# Patient Record
Sex: MALE | Race: WHITE | NOT HISPANIC OR LATINO | Employment: UNEMPLOYED | URBAN - METROPOLITAN AREA
[De-identification: names, ages, dates, MRNs, and addresses within clinical notes are randomized per-mention and may not be internally consistent; named-entity substitution may affect disease eponyms.]

---

## 2018-01-11 NOTE — RESULT NOTES
Message   please notify pt all labs stable  I would recommend t change his metformin to 5000mg bid, if he already picked up the 1,000mg let me know if not let me know to send 500mg bid  tx     Verified Results  (1) MICROALBUMIN CREATININE RATIO, RANDOM URINE 13Sep2016 03:56PM Kristine Alonso Order Number: [id number]     Test Name Result Flag Reference   MICROALBUMIN/ CREAT R 8 mg/g creatinine  0-30   MICROALBUMIN,URINE 10 3 mg/L  0 0-20 0   CREATININE URINE 132 0 mg/dL       (1) CBC/PLT/DIFF 13Sep2016 08:45AM Kristine Alonso Order Number: [id number]     Test Name Result Flag Reference   WBC COUNT 9 72 Thousand/uL  4 31-10 16   RBC COUNT 4 58 Million/uL  3 88-5 62   HEMOGLOBIN 15 1 g/dL  12 0-17 0   HEMATOCRIT 44 2 %  36 5-49 3   MCV 97 fL  82-98   MCH 33 0 pg  26 8-34 3   MCHC 34 2 g/dL  31 4-37 4   RDW 12 6 %  11 6-15 1   MPV 10 3 fL  8 9-12 7   PLATELET COUNT 758 Thousands/uL  149-390   nRBC AUTOMATED 0 /100 WBCs     NEUTROPHILS RELATIVE PERCENT 55 %  43-75   LYMPHOCYTES RELATIVE PERCENT 26 %  14-44   MONOCYTES RELATIVE PERCENT 10 %  4-12   EOSINOPHILS RELATIVE PERCENT 8 % H 0-6   BASOPHILS RELATIVE PERCENT 1 %  0-1   NEUTROPHILS ABSOLUTE COUNT 5 35 Thousands/?L  1 85-7 62   LYMPHOCYTES ABSOLUTE COUNT 2 56 Thousands/?L  0 60-4 47   MONOCYTES ABSOLUTE COUNT 0 94 Thousand/?L  0 17-1 22   EOSINOPHILS ABSOLUTE COUNT 0 77 Thousand/?L H 0 00-0 61   BASOPHILS ABSOLUTE COUNT 0 08 Thousands/?L  0 00-0 10     (1) COMPREHENSIVE METABOLIC PANEL [id number] 47:60IE Kristine Alonso Order Number: [id number]     Test Name Result Flag Reference   GLUCOSE,RANDM 125 mg/dL     If the patient is fasting, the ADA then defines impaired fasting glucose as > 100 mg/dL and diabetes as > or equal to 123 mg/dL     SODIUM 139 mmol/L  136-145   POTASSIUM 4 2 mmol/L  3 5-5 3   CHLORIDE 104 mmol/L  100-108   CARBON DIOXIDE 29 mmol/L  21-32   ANION GAP (CALC) 6 mmol/L  4-13   BLOOD UREA NITROGEN 13 mg/dL  5-25   CREATININE 0 71 mg/dL  0 60-1 30   Standardized to IDMS reference method   CALCIUM 9 0 mg/dL  8 3-10 1   BILI, TOTAL 0 45 mg/dL  0 20-1 00   ALK PHOSPHATAS 69 U/L     ALT (SGPT) 22 U/L  12-78   AST(SGOT) 12 U/L  5-45   ALBUMIN 4 0 g/dL  3 5-5 0   TOTAL PROTEIN 6 8 g/dL  6 4-8 2   eGFR Non-African American      >60 0 ml/min/1 73sq [facility] Disease Education Program recommendations are as follows:  GFR calculation is accurate only with a steady state creatinine  Chronic Kidney disease less than 60 ml/min/1 73 sq  meters  Kidney failure less than 15 ml/min/1 73 sq  meters  (1) LIPID PANEL FASTING W DIRECT LDL REFLEX 79Hts1718 08:45AM [facility] Order Number: [id number]     Test Name Result Flag Reference   CHOLESTEROL 130 mg/dL     LDL CHOLESTEROL CALCULATED 76 mg/dL  0-100   Triglyceride:         Normal              <150 mg/dl       Borderline High    150-199 mg/dl       High               200-499 mg/dl       Very High          >499 mg/dl  Cholesterol:         Desirable        <200 mg/dl      Borderline High  200-239 mg/dl      High             >239 mg/dl  HDL Cholesterol:        High    >59 mg/dL      Low     <41 mg/dL  LDL Cholesterol:        Optimal          <100 mg/dl        Near Optimal     100-129 mg/dl        Above Optimal          Borderline High   130-159 mg/dl          High              160-189 mg/dl          Very High        >189 mg/dl  LDL CALCULATED:    This screening LDL is a calculated result  It does not have the accuracy of the Direct Measured LDL in the monitoring of patients with hyperlipidemia and/or statin therapy  Direct Measure LDL (JNE436) must be ordered separately in these patients  TRIGLYCERIDES 90 mg/dL  <=150   Specimen collection should occur prior to N-Acetylcysteine or Metamizole administration due to the potential for falsely depressed results     HDL,DIRECT 36 mg/dL L 40-60   Specimen collection should occur prior to Metamizole administration due to the potential for falsely depressed results  (1) HEMOGLOBIN A1C [id number] 08:45AM Simran Harmon Order Number: [id number]     Test Name Result Flag Reference   HEMOGLOBIN A1C 6 2 %  4 2-6 3   EST  AVG  GLUCOSE 131 mg/dl       (1) TSH WITH FT4 REFLEX 06Wdg1402 08:45AM Simran Harmon Order Number: [id number]     Test Name Result Flag Reference   TSH 2 300 uIU/mL  0 358-3 740   Patients undergoing fluorescein dye angiography may retain small amounts of fluorescein in the body for 48-72 hours post procedure  Samples containing fluorescein can produce falsely depressed TSH values  If the patient had this procedure,a specimen should be resubmitted post fluorescein clearance         Signatures   Electronically signed by : LIAM Ackerman ; Sep 14 2016  2:23PM EST                       (Author)

## 2018-01-12 NOTE — PROGRESS NOTES
Assessment    1  Diabetes (250 00) (E11 9)   2  Heroin dependence (304 00) (F11 20)    Plan  Heroin dependence    · Drug Rehabilitation Referral Other Physician Referral  Consult  Status: Hold For -  Scheduling  Requested for: [id number]  are Referring to a non- Preferred Provider : Services not provided in network  Care Summary provided  : Yes    Discussion/Summary    Called multiple places for addiction specialist, left VM in many  [facility] take his insurance and provided pt with contact info as they will need to take an intake directly with him  I encouraged him to call me with update and appt info  will call him when I get results  refilled metformin  urine microalbumin collected and sent today  The patient was counseled regarding instructions for management, risk factor reductions, impressions  total time of encounter was 30 minutes  Possible side effects of new medications were reviewed with the patient/guardian today  The treatment plan was reviewed with the patient/guardian  The patient/guardian understands and agrees with the treatment plan     Self Referrals: No      Chief Complaint  Patient would like counseling for his habit since 6 months  refused flu vaccine  needs urine albumin- done today      History of Present Illness  HPI: Pt presents bc he is interested in stopping heroine use  He admits snorting 2 small bag of heroin a day for 6 months  he states he can quit himself the problem is the withdrawal effects and the insomnia and anxiety sxs if he doesn't use  He states his back pain is controlled and seeing a chiropractor  he states he really want to quit heroin bc is getting too expensive for him he spends $100 a week  He started using it for pain management of his back  he states in the past he used Suboxone which he took for couple days and was able to quickly wean off of heroine that way, he got the Suboxone from the street       Of note he is a known DM, has not been compliant with his medicine, did not get BW done as previously instructed and did not follow up with recommended appt for it  Today he got the BW this morning before coming in, results still not available  Review of Systems    Constitutional: no fever or chills, feels well, no tiredness, no recent weight loss or weight gain  Cardiovascular: no complaints of slow or fast heart rate, no chest pain, no palpitations, no leg claudication or lower extremity edema  Respiratory: no complaints of shortness of breath, no wheezing or cough, no dyspnea on exertion, no orthopnea or PND  Gastrointestinal: no complaints of abdominal pain, no constipation, no nausea or vomiting, no diarrhea or bloody stools  Genitourinary: no complaints of dysuria or incontinence, no hesitancy, no nocturia, no genital lesion, no inadequacy of penile erection  Musculoskeletal: no complaints of arthralgia, no myalgia, no joint swelling or stiffness, no limb pain or swelling  Active Problems    1  Acute lumbar back pain (724 2) (M54 5)   2  Cellulitis of left index finger (681 00) (L03 012)   3  Diabetes (250 00) (E11 9)   4  Encounter for screening colonoscopy (V76 51) (Z12 11)   5  Need for pneumococcal vaccination (V03 82) (Z23)   6  Sciatica, left (724 3) (M54 32)    Past Medical History    1  History of low back pain (V13 59) (Z87 39)   2  History of type 2 diabetes mellitus (V12 29) (Z86 39)  Active Problems And Past Medical History Reviewed: The active problems and past medical history were reviewed and updated today  Family History  Mother    1  Family history of thyroid disease (V18 19) (Z83 49)  Father    2  Paternal family history unobtainable (V49 89) (Z78 9)  Sibling    3   Family history of systemic lupus erythematosus (V19 4) (Z82 69)    Social History    · Caffeine use (V49 89) (F15 90)   · Current every day smoker (305 1) (F17 200)   ·    · History of heroin use (V11 8) (Z86 59)   · has used heroine in the past for back pain  Denies adiction or abuse  States only used      on an acute episode when he didnt have insurance  Currently not using it  · Occasional alcohol use   · Part-time employment   ·    · Poor hygiene (V40 39) (R46 0)   · Single   · Three children    Surgical History    1  History of Tonsillectomy    Current Meds   1  Amoxicillin-Pot Clavulanate 875-125 MG Oral Tablet; TAKE 1 TABLET TWICE DAILY   FOR 7 DAYS; Therapy: 20YSW6404 to (Last Rx:06Jun2016)  Requested for: 06Jun2016 Ordered   2  MetFORMIN HCl - 1000 MG Oral Tablet; TAKE 1 TABLET TWICE DAILY WITH MEALS; Therapy: 41VNJ5379 to (Evaluate:01Jun2016)  Requested for: 24QNM2217; Last   Rx:18May2016 Ordered   3  Motrin TABS; Therapy: (Recorded:18May2016) to Recorded    The medication list was reviewed and updated today  Allergies    1  Imitrex    Vitals   Recorded: 09QSH5309 35:31NC   Systolic 161   Diastolic 60   Heart Rate 087   Temperature 98 4 F   Pain Scale 0   O2 Saturation 96   Height 5 ft 7 in   Weight 148 lb 8 0 oz   BMI Calculated 23 26   BSA Calculated 1 78     Physical Exam    Constitutional   General appearance: No acute distress, well appearing and well nourished  Eyes   Conjunctiva and lids: No swelling, erythema, or discharge  Pupils and irises: Abnormal   Pupils: miosis bilaterally  Cornea, Lens, and Sclera:   Pulmonary   Respiratory effort: No increased work of breathing or signs of respiratory distress  Auscultation of lungs: Clear to auscultation, equal breath sounds bilaterally, no wheezes, no rales, no rhonci  Cardiovascular   Auscultation of heart: Normal rate and rhythm, normal S1 and S2, without murmurs      Examination of extremities for edema and/or varicosities: Normal     Psychiatric   Mood and affect: Normal          Signatures   Electronically signed by : LIAM Laureano ; Sep 13 2016 11:53AM EST                       (Author)

## 2018-01-12 NOTE — RESULT NOTES
Message   no bone infection; cont current regimen     Verified Results  * XR FOOT 3+ VIEW LEFT [id number] 04:54PM José Martins Order Number: [id number]     Test Name Result Flag Reference   XR FOOT 3+ VW LEFT (Report)     LEFT FOOT     INDICATION: Cellulitis of the left foot  Swelling and erythema of the left great toe  COMPARISON: Left foot plain films from 7/13/2005     VIEWS: 3; 3 images     FINDINGS:     There is no acute fracture or dislocation  There are no bony erosions or periosteal reaction to suggest osteomyelitis  No degenerative changes  No lytic or blastic lesions are seen  Soft tissues are unremarkable  IMPRESSION:     No acute osseous abnormality         Workstation performed: [id number]     Signed by:   Dyaa Clay MD   9/22/16

## 2018-01-13 NOTE — MISCELLANEOUS
Signatures   Electronically signed by : Bre Salazar; Sep 29 2016  3:45PM EST                       (Author)

## 2018-08-28 ENCOUNTER — APPOINTMENT (EMERGENCY)
Dept: RADIOLOGY | Facility: HOSPITAL | Age: 58
End: 2018-08-28
Payer: COMMERCIAL

## 2018-08-28 ENCOUNTER — HOSPITAL ENCOUNTER (EMERGENCY)
Facility: HOSPITAL | Age: 58
Discharge: HOME | End: 2018-08-28
Attending: EMERGENCY MEDICINE
Payer: COMMERCIAL

## 2018-08-28 VITALS
SYSTOLIC BLOOD PRESSURE: 138 MMHG | TEMPERATURE: 98.4 F | WEIGHT: 160 LBS | RESPIRATION RATE: 19 BRPM | DIASTOLIC BLOOD PRESSURE: 65 MMHG | HEIGHT: 68 IN | HEART RATE: 76 BPM | BODY MASS INDEX: 24.25 KG/M2 | OXYGEN SATURATION: 94 %

## 2018-08-28 DIAGNOSIS — R07.89 CHEST WALL PAIN: ICD-10-CM

## 2018-08-28 DIAGNOSIS — S60.511S: ICD-10-CM

## 2018-08-28 DIAGNOSIS — M79.631 RIGHT FOREARM PAIN: ICD-10-CM

## 2018-08-28 DIAGNOSIS — Z04.1 ENCOUNTER FOR EXAMINATION FOLLOWING MOTOR VEHICLE COLLISION (MVC): ICD-10-CM

## 2018-08-28 DIAGNOSIS — S69.91XA HAND INJURY, RIGHT, INITIAL ENCOUNTER: Primary | ICD-10-CM

## 2018-08-28 LAB
ANION GAP BLD CALC-SCNC: 18 MMOL/L (ref 4–13)
BUN BLD-MCNC: 11 MG/DL (ref 5–25)
CA-I BLD-SCNC: 1.18 MMOL/L (ref 1.12–1.32)
CHLORIDE BLD-SCNC: 104 MMOL/L (ref 100–108)
CREAT BLD-MCNC: 0.6 MG/DL (ref 0.6–1.3)
GFR SERPL CREATININE-BSD FRML MDRD: 109 ML/MIN/1.73SQ M
GLUCOSE SERPL-MCNC: 170 MG/DL (ref 65–140)
HCT VFR BLD CALC: 41 % (ref 36.5–49.3)
HGB BLDA-MCNC: 13.9 G/DL (ref 12–17)
PCO2 BLD: 26 MMOL/L (ref 21–32)
POTASSIUM BLD-SCNC: 3.7 MMOL/L (ref 3.5–5.3)
SODIUM BLD-SCNC: 143 MMOL/L (ref 136–145)
SPECIMEN SOURCE: ABNORMAL

## 2018-08-28 PROCEDURE — 96372 THER/PROPH/DIAG INJ SC/IM: CPT

## 2018-08-28 PROCEDURE — 74177 CT ABD & PELVIS W/CONTRAST: CPT

## 2018-08-28 PROCEDURE — 73090 X-RAY EXAM OF FOREARM: CPT

## 2018-08-28 PROCEDURE — 99285 EMERGENCY DEPT VISIT HI MDM: CPT

## 2018-08-28 PROCEDURE — 85014 HEMATOCRIT: CPT

## 2018-08-28 PROCEDURE — 93005 ELECTROCARDIOGRAM TRACING: CPT

## 2018-08-28 PROCEDURE — 80047 BASIC METABLC PNL IONIZED CA: CPT

## 2018-08-28 PROCEDURE — 71260 CT THORAX DX C+: CPT

## 2018-08-28 PROCEDURE — 73130 X-RAY EXAM OF HAND: CPT

## 2018-08-28 RX ORDER — KETOROLAC TROMETHAMINE 30 MG/ML
15 INJECTION, SOLUTION INTRAMUSCULAR; INTRAVENOUS ONCE
Status: COMPLETED | OUTPATIENT
Start: 2018-08-28 | End: 2018-08-28

## 2018-08-28 RX ORDER — CEPHALEXIN 250 MG/1
500 CAPSULE ORAL ONCE
Status: COMPLETED | OUTPATIENT
Start: 2018-08-28 | End: 2018-08-28

## 2018-08-28 RX ORDER — METHADONE HYDROCHLORIDE 10 MG/1
80 TABLET ORAL DAILY
COMMUNITY

## 2018-08-28 RX ORDER — ACETAMINOPHEN 325 MG/1
975 TABLET ORAL EVERY 6 HOURS PRN
Status: DISCONTINUED | OUTPATIENT
Start: 2018-08-28 | End: 2018-08-28 | Stop reason: HOSPADM

## 2018-08-28 RX ORDER — CEPHALEXIN 250 MG/1
500 CAPSULE ORAL EVERY 6 HOURS SCHEDULED
Qty: 40 CAPSULE | Refills: 0 | Status: SHIPPED | OUTPATIENT
Start: 2018-08-28 | End: 2018-09-02

## 2018-08-28 RX ORDER — IBUPROFEN 600 MG/1
600 TABLET ORAL EVERY 6 HOURS
COMMUNITY
End: 2019-02-04

## 2018-08-28 RX ADMIN — KETOROLAC TROMETHAMINE 15 MG: 30 INJECTION, SOLUTION INTRAMUSCULAR at 18:15

## 2018-08-28 RX ADMIN — IOHEXOL 100 ML: 350 INJECTION, SOLUTION INTRAVENOUS at 19:07

## 2018-08-28 RX ADMIN — CEPHALEXIN 500 MG: 250 CAPSULE ORAL at 20:48

## 2018-08-28 RX ADMIN — ACETAMINOPHEN 975 MG: 325 TABLET, FILM COATED ORAL at 18:14

## 2018-08-29 LAB
ATRIAL RATE: 106 BPM
P AXIS: 78 DEGREES
PR INTERVAL: 186 MS
QRS AXIS: 69 DEGREES
QRSD INTERVAL: 92 MS
QT INTERVAL: 350 MS
QTC INTERVAL: 464 MS
T WAVE AXIS: 67 DEGREES
VENTRICULAR RATE: 106 BPM

## 2018-08-29 PROCEDURE — 93010 ELECTROCARDIOGRAM REPORT: CPT | Performed by: INTERNAL MEDICINE

## 2018-08-29 NOTE — ED NOTES
Pt  Made aware that Dr Марина Mckoy is currently reviewing x-rays, and will be in within next 10 minutes to talk to patient  Verbalizes understanding          Rankin Babinski, RN  08/28/18 2034

## 2018-08-29 NOTE — DISCHARGE INSTRUCTIONS
Chest Wall Pain   WHAT YOU NEED TO KNOW:   Chest wall pain may be caused by problems with the muscles, cartilage, or bones of the chest wall  Chest wall pain may also be caused by pain that spreads to your chest from another part of your body  The pain may be aching, severe, dull, or sharp  It may come and go, or it may be constant  The pain may be worse when you move in certain ways, breathe deeply, or cough  DISCHARGE INSTRUCTIONS:   Call 911 if:   · You have any of the following signs of a heart attack:      ¨ Squeezing, pressure, or pain in your chest that lasts longer than 5 minutes or returns    ¨ Discomfort or pain in your back, neck, jaw, stomach, or arm     ¨ Trouble breathing    ¨ Nausea or vomiting    ¨ Lightheadedness or a sudden cold sweat, especially with chest pain or trouble breathing    Return to the emergency department if:   · You have severe pain  Contact your healthcare provider if:   · You develop a rash  · You have other new symptoms  · Your pain does not improve, even with treatment  · You have questions or concerns about your condition or care  Medicines: You may need any of the following:  · NSAIDs , such as ibuprofen, help decrease swelling, pain, and fever  This medicine is available with or without a doctor's order  NSAIDs can cause stomach bleeding or kidney problems in certain people  If you take blood thinner medicine, always ask your healthcare provider if NSAIDs are safe for you  Always read the medicine label and follow directions  · Acetaminophen  decreases pain  It is available without a doctor's order  Ask how much to take and how often to take it  Follow directions  Acetaminophen can cause liver damage if not taken correctly  · A cream  may be applied to your chest to decrease pain  · Take your medicine as directed  Contact your healthcare provider if you think your medicine is not helping or if you have side effects   Tell him of her if you are allergic to any medicine  Keep a list of the medicines, vitamins, and herbs you take  Include the amounts, and when and why you take them  Bring the list or the pill bottles to follow-up visits  Carry your medicine list with you in case of an emergency  Follow up with your healthcare provider as directed:  Write down your questions so you remember to ask them during your visits  Self-care:   · Rest  as needed  Avoid activities that make your chest wall pain worse  · Apply heat  on your chest for 20 to 30 minutes every 2 hours for as many days as directed  Heat helps decrease pain and muscle spasms  · Apply ice  on your chest for 15 to 20 minutes every hour or as directed  Use an ice pack, or put crushed ice in a plastic bag  Cover it with a towel  Ice helps prevent tissue damage and decreases swelling and pain  © 2017 [employer]  Information is for End User's use only and may not be sold, redistributed or otherwise used for commercial purposes  All illustrations and images included in CareNotes® are the copyrighted property of [employer] Inc  or [employer]  The above information is an  only  It is not intended as medical advice for individual conditions or treatments  Talk to your doctor, nurse or pharmacist before following any medical regimen to see if it is safe and effective for you  Musculoskeletal Pain   WHAT YOU NEED TO KNOW:   Muscle pain can be dull, achy, or sharp  You may have pain and tenderness to the touch as well  It can occur anywhere on your body and is often brought on by exercise  Muscle pain may occur from an injury, such as a sprain, tendonitis, or bone fracture  Muscle pain can also be the result of medical conditions, such as polymyositis, fibromyalgia, and connective tissue disorders  DISCHARGE INSTRUCTIONS:   Self care:   · Rest  as directed and avoid activity that causes pain   You may be able to return to normal activity when you can move without pain  Follow directions for rest and activity  You are at risk for injury for 3 weeks after your symptoms go away  · Ice  your painful muscle area to decrease pain and swelling  Use an ice pack, or put ice in a plastic bag and cover it with a towel  Always  put a cloth between the ice and your skin  Apply the ice as often as directed for the first 24 to 48 hours  · Compression  with a splint, brace, or elastic bandage helps decrease pain and swelling  This may be needed for muscle pain in arms or legs  A splint, brace, or bandage will also help protect the painful area when you move around  · Elevate  a painful arm or leg to reduce swelling and pain  Elevate your limb while you are sitting or lying down  Prop a painful leg on pillows to keep it above the level of your heart  Medicines:   · NSAIDs  help decrease swelling and pain or fever  This medicine is available with or without a doctor's order  NSAIDs can cause stomach bleeding or kidney problems in certain people  If you take blood thinner medicine, always ask your healthcare provider if NSAIDs are safe for you  Always read the medicine label and follow directions  · Acetaminophen  is used to decrease pain  It is available without a doctor's order  Ask your healthcare provider how much to take and when to take it  Follow directions  Acetaminophen can cause liver damage if not taken correctly  Do not take more than one medicine that contains acetaminophen unless directed  · Muscle relaxers  help relax your muscles to decrease pain and muscle spasms  · Steroids  may be given to decrease redness, pain, and swelling  · Take your medicine as directed  Contact your healthcare provider if you think your medicine is not helping or if you have side effects  Tell him if you are allergic to any medicine  Keep a list of the medicines, vitamins, and herbs you take  Include the amounts, and when and why you take them   Bring the list or the pill bottles to follow-up visits  Carry your medicine list with you in case of an emergency  Follow up with your healthcare provider as directed: You may need more tests to help healthcare providers find the cause of your muscle pain  You may need physical therapy to learn muscle strengthening exercises  Write down your questions so you remember to ask them during your visits  Contact your healthcare provider if:   · You have a fever  · You have trouble sleeping because of your pain  · Your painful area becomes more tender, red, and warm to the touch  · You have decreased movement of the painful area  · You have questions or concerns about your condition or care  Return to the emergency department if:   · You have increased severe pain when you move the painful muscle area  · You lose feeling in your painful muscle area  · You have new or worse swelling in the painful area  Your skin may feel tight  · You have increased muscle pain or pain that does not improve with treatment  © 2017 [employer] Information is for End User's use only and may not be sold, redistributed or otherwise used for commercial purposes  All illustrations and images included in CareNotes® are the copyrighted property of A [employer] M [employer] Inc  or [facility]  The above information is an  only  It is not intended as medical advice for individual conditions or treatments  Talk to your doctor, nurse or pharmacist before following any medical regimen to see if it is safe and effective for you
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
## 2018-09-19 ENCOUNTER — APPOINTMENT (OUTPATIENT)
Dept: RADIOLOGY | Age: 58
End: 2018-09-19
Payer: COMMERCIAL

## 2018-09-19 ENCOUNTER — OFFICE VISIT (OUTPATIENT)
Dept: URGENT CARE | Age: 58
End: 2018-09-19
Payer: COMMERCIAL

## 2018-09-19 VITALS
OXYGEN SATURATION: 96 % | HEART RATE: 89 BPM | HEIGHT: 69 IN | SYSTOLIC BLOOD PRESSURE: 133 MMHG | DIASTOLIC BLOOD PRESSURE: 63 MMHG | BODY MASS INDEX: 23.85 KG/M2 | TEMPERATURE: 98.2 F | RESPIRATION RATE: 16 BRPM | WEIGHT: 161 LBS

## 2018-09-19 DIAGNOSIS — S46.911A STRAIN OF RIGHT SHOULDER, INITIAL ENCOUNTER: ICD-10-CM

## 2018-09-19 DIAGNOSIS — M25.511 ACUTE PAIN OF RIGHT SHOULDER: ICD-10-CM

## 2018-09-19 DIAGNOSIS — S39.012A STRAIN OF MUSCLE, FASCIA AND TENDON OF LOWER BACK, INITIAL ENCOUNTER: Primary | ICD-10-CM

## 2018-09-19 PROCEDURE — 73030 X-RAY EXAM OF SHOULDER: CPT

## 2018-09-19 PROCEDURE — G0382 LEV 3 HOSP TYPE B ED VISIT: HCPCS | Performed by: FAMILY MEDICINE

## 2018-09-19 RX ORDER — CYCLOBENZAPRINE HCL 10 MG
10 TABLET ORAL 3 TIMES DAILY PRN
Qty: 15 TABLET | Refills: 0 | Status: SHIPPED | OUTPATIENT
Start: 2018-09-19 | End: 2019-02-04
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
## 2019-01-16 ENCOUNTER — APPOINTMENT (EMERGENCY)
Dept: RADIOLOGY | Facility: HOSPITAL | Age: 59
DRG: 720 | End: 2019-01-16
Payer: COMMERCIAL

## 2019-01-16 ENCOUNTER — HOSPITAL ENCOUNTER (INPATIENT)
Facility: HOSPITAL | Age: 59
LOS: 2 days | Discharge: HOME | DRG: 720 | End: 2019-01-18
Attending: EMERGENCY MEDICINE | Admitting: INTERNAL MEDICINE
Payer: COMMERCIAL

## 2019-01-16 DIAGNOSIS — R00.0 TACHYCARDIA: ICD-10-CM

## 2019-01-16 DIAGNOSIS — J18.9 SEPSIS DUE TO PNEUMONIA (HCC): Primary | ICD-10-CM

## 2019-01-16 DIAGNOSIS — R06.02 SOB (SHORTNESS OF BREATH): ICD-10-CM

## 2019-01-16 DIAGNOSIS — D72.829 LEUKOCYTOSIS: ICD-10-CM

## 2019-01-16 DIAGNOSIS — R50.9 FEVER: ICD-10-CM

## 2019-01-16 DIAGNOSIS — R79.89 HIGH SERUM LACTATE: ICD-10-CM

## 2019-01-16 DIAGNOSIS — R79.89 ELEVATED PROCALCITONIN: ICD-10-CM

## 2019-01-16 DIAGNOSIS — A41.9 SEPSIS DUE TO PNEUMONIA (HCC): Primary | ICD-10-CM

## 2019-01-16 PROBLEM — J43.8 OTHER EMPHYSEMA (HCC): Chronic | Status: ACTIVE | Noted: 2019-01-16

## 2019-01-16 PROBLEM — E87.1 HYPONATREMIA: Status: ACTIVE | Noted: 2019-01-16

## 2019-01-16 LAB
ALBUMIN SERPL BCP-MCNC: 3.1 G/DL (ref 3.5–5)
ALP SERPL-CCNC: 93 U/L (ref 46–116)
ALT SERPL W P-5'-P-CCNC: 22 U/L (ref 12–78)
ANION GAP SERPL CALCULATED.3IONS-SCNC: 7 MMOL/L (ref 4–13)
APTT PPP: 32 SECONDS (ref 26–38)
AST SERPL W P-5'-P-CCNC: 24 U/L (ref 5–45)
ATRIAL RATE: 114 BPM
BASOPHILS # BLD MANUAL: 0 THOUSAND/UL (ref 0–0.1)
BASOPHILS NFR MAR MANUAL: 0 % (ref 0–1)
BILIRUB SERPL-MCNC: 0.78 MG/DL (ref 0.2–1)
BUN SERPL-MCNC: 45 MG/DL (ref 5–25)
CALCIUM SERPL-MCNC: 8.9 MG/DL (ref 8.3–10.1)
CHLORIDE SERPL-SCNC: 97 MMOL/L (ref 100–108)
CO2 SERPL-SCNC: 25 MMOL/L (ref 21–32)
CREAT SERPL-MCNC: 1.18 MG/DL (ref 0.6–1.3)
DOHLE BOD BLD QL SMEAR: PRESENT
EOSINOPHIL # BLD MANUAL: 0 THOUSAND/UL (ref 0–0.4)
EOSINOPHIL NFR BLD MANUAL: 0 % (ref 0–6)
ERYTHROCYTE [DISTWIDTH] IN BLOOD BY AUTOMATED COUNT: 12 % (ref 11.6–15.1)
GFR SERPL CREATININE-BSD FRML MDRD: 67 ML/MIN/1.73SQ M
GLUCOSE SERPL-MCNC: 261 MG/DL (ref 65–140)
GLUCOSE SERPL-MCNC: 340 MG/DL (ref 65–140)
HCT VFR BLD AUTO: 42.9 % (ref 36.5–49.3)
HGB BLD-MCNC: 14.6 G/DL (ref 12–17)
INR PPP: 1.6 (ref 0.86–1.17)
LACTATE SERPL-SCNC: 2.1 MMOL/L (ref 0.5–2)
LACTATE SERPL-SCNC: 2.9 MMOL/L (ref 0.5–2)
LACTATE SERPL-SCNC: 3.4 MMOL/L (ref 0.5–2)
LYMPHOCYTES # BLD AUTO: 1.69 THOUSAND/UL (ref 0.6–4.47)
LYMPHOCYTES # BLD AUTO: 8 % (ref 14–44)
MCH RBC QN AUTO: 32.3 PG (ref 26.8–34.3)
MCHC RBC AUTO-ENTMCNC: 34 G/DL (ref 31.4–37.4)
MCV RBC AUTO: 95 FL (ref 82–98)
MONOCYTES # BLD AUTO: 0.21 THOUSAND/UL (ref 0–1.22)
MONOCYTES NFR BLD: 1 % (ref 4–12)
MYELOCYTES NFR BLD MANUAL: 1 % (ref 0–1)
NEUTROPHILS # BLD MANUAL: 19.02 THOUSAND/UL (ref 1.85–7.62)
NEUTS BAND NFR BLD MANUAL: 23 % (ref 0–8)
NEUTS SEG NFR BLD AUTO: 67 % (ref 43–75)
NRBC BLD AUTO-RTO: 0 /100 WBCS
P AXIS: 83 DEGREES
PLATELET # BLD AUTO: 124 THOUSANDS/UL (ref 149–390)
PLATELET # BLD AUTO: 151 THOUSANDS/UL (ref 149–390)
PLATELET BLD QL SMEAR: ADEQUATE
PMV BLD AUTO: 10.3 FL (ref 8.9–12.7)
PMV BLD AUTO: 9.9 FL (ref 8.9–12.7)
POIKILOCYTOSIS BLD QL SMEAR: PRESENT
POTASSIUM SERPL-SCNC: 3.7 MMOL/L (ref 3.5–5.3)
PR INTERVAL: 170 MS
PROCALCITONIN SERPL-MCNC: 76.33 NG/ML
PROT SERPL-MCNC: 7.7 G/DL (ref 6.4–8.2)
PROTHROMBIN TIME: 19.1 SECONDS (ref 11.8–14.2)
QRS AXIS: 78 DEGREES
QRSD INTERVAL: 82 MS
QT INTERVAL: 298 MS
QTC INTERVAL: 410 MS
RBC # BLD AUTO: 4.52 MILLION/UL (ref 3.88–5.62)
RBC MORPH BLD: PRESENT
SODIUM SERPL-SCNC: 129 MMOL/L (ref 136–145)
T WAVE AXIS: 85 DEGREES
TROPONIN I SERPL-MCNC: <0.02 NG/ML
VENTRICULAR RATE: 114 BPM
WBC # BLD AUTO: 21.13 THOUSAND/UL (ref 4.31–10.16)

## 2019-01-16 PROCEDURE — 83605 ASSAY OF LACTIC ACID: CPT | Performed by: INTERNAL MEDICINE

## 2019-01-16 PROCEDURE — 84484 ASSAY OF TROPONIN QUANT: CPT | Performed by: EMERGENCY MEDICINE

## 2019-01-16 PROCEDURE — 87040 BLOOD CULTURE FOR BACTERIA: CPT | Performed by: EMERGENCY MEDICINE

## 2019-01-16 PROCEDURE — 93010 ELECTROCARDIOGRAM REPORT: CPT | Performed by: INTERNAL MEDICINE

## 2019-01-16 PROCEDURE — 96374 THER/PROPH/DIAG INJ IV PUSH: CPT

## 2019-01-16 PROCEDURE — 83605 ASSAY OF LACTIC ACID: CPT | Performed by: EMERGENCY MEDICINE

## 2019-01-16 PROCEDURE — 84145 PROCALCITONIN (PCT): CPT | Performed by: EMERGENCY MEDICINE

## 2019-01-16 PROCEDURE — 80053 COMPREHEN METABOLIC PANEL: CPT | Performed by: EMERGENCY MEDICINE

## 2019-01-16 PROCEDURE — 85027 COMPLETE CBC AUTOMATED: CPT | Performed by: EMERGENCY MEDICINE

## 2019-01-16 PROCEDURE — 87147 CULTURE TYPE IMMUNOLOGIC: CPT | Performed by: EMERGENCY MEDICINE

## 2019-01-16 PROCEDURE — 85610 PROTHROMBIN TIME: CPT | Performed by: EMERGENCY MEDICINE

## 2019-01-16 PROCEDURE — 85730 THROMBOPLASTIN TIME PARTIAL: CPT | Performed by: EMERGENCY MEDICINE

## 2019-01-16 PROCEDURE — 94760 N-INVAS EAR/PLS OXIMETRY 1: CPT

## 2019-01-16 PROCEDURE — 82948 REAGENT STRIP/BLOOD GLUCOSE: CPT

## 2019-01-16 PROCEDURE — 36415 COLL VENOUS BLD VENIPUNCTURE: CPT | Performed by: EMERGENCY MEDICINE

## 2019-01-16 PROCEDURE — 85049 AUTOMATED PLATELET COUNT: CPT | Performed by: STUDENT IN AN ORGANIZED HEALTH CARE EDUCATION/TRAINING PROGRAM

## 2019-01-16 PROCEDURE — 87631 RESP VIRUS 3-5 TARGETS: CPT | Performed by: EMERGENCY MEDICINE

## 2019-01-16 PROCEDURE — 99285 EMERGENCY DEPT VISIT HI MDM: CPT

## 2019-01-16 PROCEDURE — 85007 BL SMEAR W/DIFF WBC COUNT: CPT | Performed by: EMERGENCY MEDICINE

## 2019-01-16 PROCEDURE — 71046 X-RAY EXAM CHEST 2 VIEWS: CPT

## 2019-01-16 PROCEDURE — 96361 HYDRATE IV INFUSION ADD-ON: CPT

## 2019-01-16 PROCEDURE — 93005 ELECTROCARDIOGRAM TRACING: CPT

## 2019-01-16 RX ORDER — ALBUTEROL SULFATE 2.5 MG/3ML
2.5 SOLUTION RESPIRATORY (INHALATION) EVERY 4 HOURS PRN
Status: DISCONTINUED | OUTPATIENT
Start: 2019-01-16 | End: 2019-01-18

## 2019-01-16 RX ORDER — ACETAMINOPHEN 325 MG/1
650 TABLET ORAL EVERY 6 HOURS PRN
Status: DISCONTINUED | OUTPATIENT
Start: 2019-01-16 | End: 2019-01-18 | Stop reason: HOSPADM

## 2019-01-16 RX ORDER — NICOTINE 21 MG/24HR
1 PATCH, TRANSDERMAL 24 HOURS TRANSDERMAL DAILY
Status: DISCONTINUED | OUTPATIENT
Start: 2019-01-17 | End: 2019-01-18 | Stop reason: HOSPADM

## 2019-01-16 RX ORDER — CYCLOBENZAPRINE HCL 10 MG
10 TABLET ORAL 3 TIMES DAILY PRN
Status: DISCONTINUED | OUTPATIENT
Start: 2019-01-16 | End: 2019-01-18 | Stop reason: HOSPADM

## 2019-01-16 RX ORDER — ONDANSETRON 2 MG/ML
4 INJECTION INTRAMUSCULAR; INTRAVENOUS EVERY 6 HOURS PRN
Status: DISCONTINUED | OUTPATIENT
Start: 2019-01-16 | End: 2019-01-18 | Stop reason: HOSPADM

## 2019-01-16 RX ORDER — HEPARIN SODIUM 5000 [USP'U]/ML
5000 INJECTION, SOLUTION INTRAVENOUS; SUBCUTANEOUS EVERY 8 HOURS SCHEDULED
Status: DISCONTINUED | OUTPATIENT
Start: 2019-01-16 | End: 2019-01-18 | Stop reason: HOSPADM

## 2019-01-16 RX ORDER — SODIUM CHLORIDE 9 MG/ML
125 INJECTION, SOLUTION INTRAVENOUS CONTINUOUS
Status: DISCONTINUED | OUTPATIENT
Start: 2019-01-16 | End: 2019-01-17

## 2019-01-16 RX ADMIN — HEPARIN SODIUM 5000 UNITS: 5000 INJECTION INTRAVENOUS; SUBCUTANEOUS at 21:14

## 2019-01-16 RX ADMIN — SODIUM CHLORIDE 125 ML/HR: 0.9 INJECTION, SOLUTION INTRAVENOUS at 19:38

## 2019-01-16 RX ADMIN — SODIUM CHLORIDE 1000 ML: 0.9 INJECTION, SOLUTION INTRAVENOUS at 19:35

## 2019-01-16 RX ADMIN — AZITHROMYCIN MONOHYDRATE 500 MG: 500 INJECTION, POWDER, LYOPHILIZED, FOR SOLUTION INTRAVENOUS at 19:35

## 2019-01-16 RX ADMIN — INSULIN LISPRO 2 UNITS: 100 INJECTION, SOLUTION INTRAVENOUS; SUBCUTANEOUS at 23:37

## 2019-01-16 RX ADMIN — CEFTRIAXONE SODIUM 1000 MG: 10 INJECTION, POWDER, FOR SOLUTION INTRAVENOUS at 18:27

## 2019-01-16 RX ADMIN — SODIUM CHLORIDE 1000 ML: 0.9 INJECTION, SOLUTION INTRAVENOUS at 17:21

## 2019-01-16 NOTE — PROGRESS NOTES
Senior Admission Note   Unit/Bed # ED 3 Encounter: [id number]  SOD Team B           Jeff Orozco 59 y o  male [id number]       Patient seen and examined  Reviewed H&P per Dr Mary Syed  Agree with the assessment and plan      Assessment/Plan: Principal Problem:    Sepsis due to pneumonia [facility])  Active Problems:    Diabetes ([facility] [address] )    Heroin dependence ([facility] [address] )    Other emphysema ([facility] )    Hyponatremia       Admit as INPATIENT to  SOD-B, Dr Artem Gage  Diet: Diabetic  CODE STATUS: Full Code  DVT PPX: Lovenox subq and SCDs bilaterally        Leyda Graham MD

## 2019-01-16 NOTE — H&P
INTERNAL MEDICINE HISTORY AND PHYSICAL  ED 03 SOD Team B     NAME: Monique Cisneros  AGE: 59 y o  SEX: male  : 1960   MRN: [id number]  ENCOUNTER: [id number]    DATE: 2019  TIME: 6:42 PM    Primary Care Physician: No primary care provider on file  Admitting Provider: Ripley Hammans, MD    Chief complaint: cold symptoms    History of Present Illness     Monique Cisneros is a 59 y o  male with a past medical history of diabetes mellitus, opioid dependence on methadone, tobacco abuse who presents with cold symptoms over the past couple months  Report primarily from daughter is the patient is lethargic and unable answer most questions  The daughter reports that he has been having cold-like symptoms since November and recently over the past few days has become more lethargic with nonbloody nonbilious emesis, nonproductive cough, decreased alertness, shortness of breath     The patient did not come in earlier due to lack of insurance  Unable to answer remainder review of systems due to the patient being severely lethargic and disoriented  In the emergency department, the patient received ceftriaxone and azithromycin  Patient's daughter Tanner Beck contact: [phone]    Review of Systems   Review of Systems   Unable to perform ROS: Mental status change       Past Medical History     Past Medical History:   Diagnosis Date    Diabetes mellitus ([facility] [address] )        Past Surgical History     Past Surgical History:   Procedure Laterality Date    TONSILLECTOMY         Social History     History   Alcohol Use No     History   Drug Use No     Comment: pt reports last using heroin several months ago     History   Smoking Status    Current Every Day Smoker    Packs/day: 0 50   Smokeless Tobacco    Never Used       Family History     Family History   Problem Relation Age of Onset    Thyroid disease Mother        Medications Prior to Admission     Prior to Admission medications    Medication Sig Start Date End Date Taking? Authorizing Provider   metFORMIN (GLUCOPHAGE) 500 mg tablet Take 750 mg by mouth 2 (two) times a day   5/18/16  Yes Historical Provider, MD   cyclobenzaprine (FLEXERIL) 10 mg tablet Take 1 tablet (10 mg total) by mouth 3 (three) times a day as needed for muscle spasms for up to 5 days 9/19/18 9/24/18  MARCELLO Ortiz   Dextromethorphan-Guaifenesin (MUCINEX DM PO) Take 1 tablet by mouth daily    Historical Provider, MD   ibuprofen (MOTRIN) 600 mg tablet Take 600 mg by mouth every 6 (six) hours    Historical Provider, MD   methadone (DOLOPHINE) 10 mg tablet Take 80 mg by mouth daily,    Historical Provider, MD       Allergies     Allergies   Allergen Reactions    Imitrex [Sumatriptan] Hallucinations       Objective     Vitals:    01/16/19 1701 01/16/19 1713   BP: 158/71    BP Location: Left arm    Pulse: (!) 114    Resp: 20    Temp: 98 5 °F (36 9 °C) (!) 101 7 °F (38 7 °C)   TempSrc: Oral Rectal   SpO2: 92%      There is no height or weight on file to calculate BMI  No intake or output data in the 24 hours ending 01/16/19 1842  Invasive Devices     Peripheral Intravenous Line            Peripheral IV 01/16/19 Right Antecubital less than 1 day                Physical Exam  GENERAL: Appears well-developed and well-nourished  lethargic with facial mask, on 2 L nasal cannula   HEENT: Normocephalic and atraumatic  No scleral icterus  PERRLA  EOMI B/L  No oropharyngeal edema  MM moist    NECK: Neck supple with no lymphadenopathy  Trachea midline  No JVD  CARDIOVASCULAR: S1 and S2 are present  Regular rate and rhythm  No murmurs, rubs, or gallops  RESPIRATORY: Faint crackles diffusely  Diminished respiratory expansion  ABDOMINAL: Bowel sounds present in all 4 quadrants, non-tender, soft, non-distended  No organomegaly, rebound, or guarding  EXTREMITIES: 2+ DP and PT pulses bilaterally; no cyanosis, clubbing, edema  ROM intact   SON x4   MUSCULOSKELETAL: No joint tenderness, deformity or swelling, full range of motion without pain  NEUROLOGIC: Patient is alert and oriented to self only  No sensory or motor deficits  CN 2-12 intact  Plantars downgoing bilaterally  Speech slurred and the sergeant patient requires frequent stimulation during interview   SKIN: Skin is warm and dry  No skin lesions are present  No rashes  PSYCHIATRIC: Unable to assess given altered mental status     Lab Results: I have personally reviewed pertinent reports  CBC:   Results from last 7 days  Lab Units 01/16/19  1707   WBC Thousand/uL 21 13*   RBC Million/uL 4 52   HEMOGLOBIN g/dL 14 6   HEMATOCRIT % 42 9   MCV fL 95   MCH pg 32 3   MCHC g/dL 34 0   RDW % 12 0   MPV fL 10 3   PLATELETS Thousands/uL 151   NRBC AUTO /100 WBCs 0   LYMPHO PCT % 8*   MONO PCT % 1*   EOS PCT % 0   BASOS PCT % 0   EOS ABS Thousand/uL 0 00   , Chemistry Profile:   Results from last 7 days  Lab Units 01/16/19  1707   POTASSIUM mmol/L 3 7   CHLORIDE mmol/L 97*   CO2 mmol/L 25   BUN mg/dL 45*   CREATININE mg/dL 1 18   CALCIUM mg/dL 8 9   AST U/L 24   ALT U/L 22   ALK PHOS U/L 93   EGFR ml/min/1 73sq m 67   , Coagulation Studies:   Results from last 7 days  Lab Units 01/16/19  1707   PROTIME seconds 19 1*   INR  1 60*   PTT seconds 32   , Cardiac Studies:   Results from last 7 days  Lab Units 01/16/19  1718   TROPONIN I ng/mL <0 02   , Additional Labs:   Results from last 7 days  Lab Units 01/16/19  1707   LACTIC ACID mmol/L 3 4*   , iSTAT CHEM 8:   Results from last 7 days  Lab Units 01/16/19  1707   ANION GAP mmol/L 7   EGFR ml/min/1 73sq m 67   HEMOGLOBIN g/dL 14 6   , ABG:   , Toxicology:   , Last A1C/Lipid Panel/Thyroid Panel:   Lab Results   Component Value Date    HGBA1C 6 2 09/13/2016    TRIG 90 09/13/2016    HDL 36 (L) 09/13/2016    LDLCALC 76 09/13/2016    [id number] 2 300 09/13/2016       Imaging: I have personally reviewed pertinent films in PACS  Xr Chest 2 Views    Result Date: 1/16/2019  Narrative: CHEST INDICATION:   concern for pneumonia  Shortness of breath COMPARISON:  August 28, 2018 EXAM PERFORMED/VIEWS:  XR CHEST PA & LATERAL Images: 2 FINDINGS:  Lungs are adequately aerated  Patchy infiltrates in the basilar portion of the right upper lobe and right lower lobe  Patchy infiltrates in the left lower lobe  No significant pleural effusion  Cardiac size within normal limits  No vascular congestion or peribronchial thickening  Osseous structures appear within normal limits for patient age  No pneumothorax or free air  Numerous EKG leads in place  Impression: Patchy infiltrates in right upper lobe, right lower lobe, and left lower lobe as described  Findings almost certainly representing multi lobar pneumonia  Clinical correlation and follow-up advised in several weeks to ensure resolution  The study was marked in [facility] for immediate notification  Workstation performed: [id number]     Urinalysis:       Invalid input(s): URIBILINOGEN     Urine Micro:        EKG, Pathology, and Other Studies: I have personally reviewed pertinent reports  Medications given in Emergency Department     Medication Administration - last 24 hours from 01/15/2019 1842 to 01/16/2019 1842       Date/Time Order Dose Route Action Action by     01/16/2019 1721 sodium chloride 0 9 % bolus 1,000 mL 1,000 mL Intravenous New Bag Nicol Felix RN     01/16/2019 1827 ceftriaxone (ROCEPHIN) 1 g/50 mL in dextrose IVPB 1,000 mg Intravenous New Bag Nicol Felix RN          Assessment and Plan     1  Sepsis secondary to multilobar pneumonia:  Patient presents with flu-like symptoms since November at worsening over the past few days     Initial WBC 21, procalcitonin 76, lactate 3 4, chest x-ray concerning for multilobar pneumonia and patient febrile on  mission with temperature 101 7° along with tachycardia to 114   Patient received ceftriaxone and azithromycin x1 in ED   · Continue IV antibiotics with ceftriaxone and azithromycin  · Follow-up blood cultures, influenza, RSV panel  · Continue IV fluids at 100 cc/hour  · Monitor WBC, temperature, respiratory status     2  Diabetes mellitus:  Blood glucose 340 on admission likely secondary to uncontrolled diabetes and stress response from infection  · Continue insulin sliding scale    3  Opioid dependence  · Will not give opioids  Patient chronically on methadone however patient is unable to give name methadone clinic and daughter is unsure  Will follow up in a m  After to reassess patient's mental status and ask about methadone clinic at that time  4  Tobacco abuse  · Nicotine patch    5  Hyponatremia  · IV fluids, monitor sodium    Code Status: Level 1 - Full Code  VTE Pharmacologic Prophylaxis: Heparin   VTE Mechanical Prophylaxis: sequential compression device  Admission Status: INPATIENT     Admission Time  I spent 1 hour admitting the patient  This involved direct patient contact where I performed a full history and physical, reviewing previous records, and reviewing laboratory and other diagnostic studies      Hernán Bowman MD  Internal Medicine  PGY-1

## 2019-01-16 NOTE — ED ATTENDING ATTESTATION
Portillo Lee DO, saw and evaluated the patient  I have discussed the patient with the resident/non-physician practitioner and agree with the resident's/non-physician practitioner's findings, Plan of Care, and MDM as documented in the resident's/non-physician practitioner's note, except where noted  All available labs and Radiology studies were reviewed  At this point I agree with the current assessment done in the Emergency Department  I have conducted an independent evaluation of this patient a history and physical is as follows:    58 yo male presents for evaluation of "cold symptoms" for past couple months  Decreased appetite, dyspnea, fatigue, fever  Didn't want to come in due to lack of insurance  Symptoms constant, wax/wane, generalized, severe in nature  No a/e factors  Hx of DM2 on metformin  Chronic smoker  Opioid dependence, kicked out of methadone clinic due to +UDS for opioids (not methadone)      Imp: fever, dyspnea, fatigue  Concern for PNA/sepsis  Likely moderate dehydration  plan: septic eval, IVF for tachycardia, abx if infection identified  Critical Care Time  The patient presented with a condition in which there was a high probability of imminent or life-threatening deterioration, and critical care services (excluding separately billable procedures) totalled 30-74 minutes (32 minutes for evaluation of sepsis, ongoing reassessment, interpretation of radiologic studies, lab studies, ECG  dosing broad spectrum abx, IVF resuscitation for lactic acidosis  obtaining hx from pt and surrogates, discussion with admitting team)               Procedures

## 2019-01-16 NOTE — SEPSIS NOTE
Sepsis Note   Ranjith Taylor 59 y o  male MRN: [id number]  Unit/Bed#: ED 03 Encounter: [id number]            Initial Sepsis Screening     Row Name 01/16/19 1722                Is the patient's history suggestive of a new or worsening infection? (!)  Yes (Proceed)  -AW        Suspected source of infection pneumonia  -AW        Are two or more of the following signs & symptoms of infection both present and new to the patient? (!)  Yes (Proceed)  -AW        Indicate SIRS criteria Hyperthemia > 38 3C (100 9F); Tachycardia > 90 bpm;Leukocytosis (WBC > 97994 IJL)  -AW        If the answer is yes to both questions, suspicion of sepsis is present          If severe sepsis is present AND tissue hypoperfusion perists in the hour after fluid resuscitation or lactate > 4, the patient meets criteria for SEPTIC SHOCK          Are any of the following organ dysfunction criteria present within 6 hours of suspected infection and SIRS criteria that are NOT considered to be chronic conditions? (!)  Yes  -AW        Organ dysfunction Lactate > 2 0 mmol/L;INR > 1 5 or aPTT > 60 secs  -AW        Date of presentation of severe sepsis 01/16/19  -AW        Time of presentation of severe sepsis 1834  -AW        Tissue hypoperfusion persists in the hour after crystalloid fluid administration, evidenced, by either:          Was hypotension present within one hour of the conclusion of crystalloid fluid administration?  No  -AW        Date of presentation of septic shock          Time of presentation of septic shock            User Key  (r) = Recorded By, (t) = Taken By, (c) = Cosigned By    [address] Name Provider Rhoda Gardner MD Resident

## 2019-01-16 NOTE — ED PROVIDER NOTES
History  Chief Complaint   Patient presents with    Vomiting     daughter reports pt has been sick for awhile, unable to eat, get out of bed, or catch his breath  states pt has no insurance so he refused coming to the hospital for several weeks  pt appears weak, sob, and slightly confused    Shortness of Breath     HPI    This is a 59year old male who presents to ED for evaluation of nonproductive cough and sob  Patient has had a URI for months now  However his condition has deteriorated in the past few days  Patient has been extremely lethargic, has had no p o  Intake for the past 2 days, has had 1 episode of nonbloody nonbilious emesis yesterday  States he continues to have a nonproductive cough  Patient does have history of COPD, is a current smoker, has had increased use of his inhaler with minimal relief  Patient also has a past medical history of diabetes, on metformin  On ROS, patient is denying any diarrhea, he endorses fevers and chills, denies any changes to his urine, denies chest pain or abdominal pain at this time  Remainder ROS negative  Prior to Admission Medications   Prescriptions Last Dose Informant Patient Reported? Taking?    Dextromethorphan-Guaifenesin (MUCINEX DM PO)   Yes No   Sig: Take 1 tablet by mouth daily   cyclobenzaprine (FLEXERIL) 10 mg tablet   No No   Sig: Take 1 tablet (10 mg total) by mouth 3 (three) times a day as needed for muscle spasms for up to 5 days   ibuprofen (MOTRIN) 600 mg tablet   Yes No   Sig: Take 600 mg by mouth every 6 (six) hours   metFORMIN (GLUCOPHAGE) 500 mg tablet 1/16/2019 at morning  Yes Yes   Sig: Take 750 mg by mouth 2 (two) times a day     methadone (DOLOPHINE) 10 mg tablet   Yes No   Sig: Take 80 mg by mouth daily,      Facility-Administered Medications: None       Past Medical History:   Diagnosis Date    Diabetes mellitus ([facility] [address] )        Past Surgical History:   Procedure Laterality Date    TONSILLECTOMY         Family History   Problem Relation Age of Onset    Thyroid disease Mother      I have reviewed and agree with the history as documented  Social History   Substance Use Topics    Smoking status: Current Every Day Smoker     Packs/day: 0 50    Smokeless tobacco: Never Used    Alcohol use No        Review of Systems   Constitutional: Positive for chills, fatigue and fever  HENT: Negative for nosebleeds and sore throat  Eyes: Negative for redness and visual disturbance  Respiratory: Positive for cough and shortness of breath  Negative for wheezing and stridor  Cardiovascular: Negative for chest pain and palpitations  Gastrointestinal: Positive for nausea and vomiting  Negative for abdominal pain and diarrhea  Endocrine: Negative for polyuria  Genitourinary: Negative for difficulty urinating and testicular pain  Musculoskeletal: Negative for back pain and neck stiffness  Skin: Negative for rash and wound  Neurological: Negative for seizures, speech difficulty and headaches  Psychiatric/Behavioral: Negative for dysphoric mood and hallucinations  All other systems reviewed and are negative  Physical Exam  ED Triage Vitals   Temperature Pulse Respirations Blood Pressure SpO2   01/16/19 1701 01/16/19 1701 01/16/19 1701 01/16/19 1701 01/16/19 1701   98 5 °F (36 9 °C) (!) 114 20 158/71 92 %      Temp Source Heart Rate Source Patient Position - Orthostatic VS BP Location FiO2 (%)   01/16/19 1701 01/16/19 1701 01/16/19 1701 01/16/19 1701 --   Oral Monitor Lying Left arm       Pain Score       01/16/19 2100       No Pain           Orthostatic Vital Signs  Vitals:    01/16/19 1939 01/16/19 2115 01/16/19 2116 01/17/19 0000   BP: 129/62 140/68  112/56   Pulse: (!) 113 (!) 116 (!) 113    Patient Position - Orthostatic VS:    Lying       Physical Exam   Constitutional: He is oriented to person, place, and time  He appears well-developed and well-nourished  HENT:   Head: Normocephalic and atraumatic     Right Ear: External ear normal    Left Ear: External ear normal    Oropharynx dry, multiple dental carries  Eyes: Pupils are equal, round, and reactive to light  Conjunctivae and EOM are normal    Neck: Normal range of motion  Cardiovascular: Regular rhythm, normal heart sounds and intact distal pulses  No murmur heard  tachycardic   Pulmonary/Chest: He has no wheezes  He exhibits no tenderness  Diffuse rhonchi throughout both lung fields   Abdominal: Soft  Bowel sounds are normal  There is no tenderness  There is no guarding  Musculoskeletal: Normal range of motion  Neurological: He is alert and oriented to person, place, and time  No cranial nerve deficit or sensory deficit  He exhibits normal muscle tone  Coordination normal    Skin: Skin is warm and dry  No rash noted  Psychiatric: He has a normal mood and affect  Nursing note and vitals reviewed        ED Medications  Medications   ceftriaxone (ROCEPHIN) 1 g/50 mL in dextrose IVPB (0 mg Intravenous Stopped 1/16/19 1925)   cyclobenzaprine (FLEXERIL) tablet 10 mg (not administered)   sodium chloride 0 9 % infusion (125 mL/hr Intravenous New Bag 1/16/19 1938)   ondansetron (ZOFRAN) injection 4 mg (not administered)   nicotine (NICODERM CQ) 21 mg/24 hr TD 24 hr patch 1 patch (not administered)   heparin (porcine) subcutaneous injection 5,000 Units (5,000 Units Subcutaneous Given 1/16/19 2114)   azithromycin (ZITHROMAX) 500 mg in sodium chloride 0 9% 250mL IVPB 500 mg (not administered)   acetaminophen (TYLENOL) tablet 650 mg (650 mg Oral Not Given 1/16/19 1916)   albuterol inhalation solution 2 5 mg (not administered)   insulin lispro (HumaLOG) 100 units/mL subcutaneous injection 1-5 Units (not administered)   insulin lispro (HumaLOG) 100 units/mL subcutaneous injection 1-5 Units (2 Units Subcutaneous Given 1/16/19 2337)   sodium chloride 0 9 % bolus 1,000 mL (0 mL Intravenous Stopped 1/16/19 1925)   azithromycin (ZITHROMAX) 500 mg in sodium chloride 0 9% 250mL IVPB 500 mg (500 mg Intravenous New Bag 1/16/19 1935)   sodium chloride 0 9 % bolus 1,000 mL (1,000 mL Intravenous New Bag 1/16/19 1935)       Diagnostic Studies  Results Reviewed     Procedure Component Value Units Date/Time    Lactic acid, plasma [[id number]]  (Normal) Collected:  01/17/19 0106    Lab Status:  Final result Specimen:  Blood from Arm, Left Updated:  01/17/19 0137     LACTIC ACID 2 0 mmol/L     Narrative:         Result may be elevated if tourniquet was used during collection  Lactic acid, plasma [[id number]]     Lab Status:  No result Specimen:  Blood     Lactic acid, plasma [[id number]]  (Abnormal) Collected:  01/16/19 2222    Lab Status:  Final result Specimen:  Blood Updated:  01/16/19 2307     LACTIC ACID 2 1 (HH) mmol/L     Narrative:         Result may be elevated if tourniquet was used during collection  Platelet count [[id number]]  (Abnormal) Collected:  01/16/19 2158    Lab Status:  Final result Specimen:  Blood from Arm, Left Updated:  01/16/19 2209     Platelets 379 (L) Thousands/uL      MPV 9 9 fL     Lactic Acid x2 [[id number]]  (Abnormal) Collected:  01/16/19 1933    Lab Status:  Final result Specimen:  Blood from Arm, Left Updated:  01/16/19 2025     LACTIC ACID 2 9 (HH) mmol/L     Narrative:         Result may be elevated if tourniquet was used during collection  Lactic acid, plasma [[id number]]     Lab Status:  No result Specimen:  Blood     CBC and differential [[id number]]  (Abnormal) Collected:  01/16/19 1707    Lab Status:  Final result Specimen:  Blood from Arm, Right Updated:  01/16/19 1817     WBC 21 13 (H) Thousand/uL      RBC 4 52 Million/uL      Hemoglobin 14 6 g/dL      Hematocrit 42 9 %      MCV 95 fL      MCH 32 3 pg      MCHC 34 0 g/dL      RDW 12 0 %      MPV 10 3 fL      Platelets 043 Thousands/uL      nRBC 0 /100 WBCs     Narrative: This is an appended report  These results have been appended to a previously verified report      Lactic Acid x2 [[id number]] (Abnormal) Collected:  01/16/19 1707    Lab Status:  Final result Specimen:  Blood from Arm, Right Updated:  01/16/19 1807     LACTIC ACID 3 4 (HH) mmol/L     Narrative:         Result may be elevated if tourniquet was used during collection  Procalcitonin [[id number]]  (Abnormal) Collected:  01/16/19 1707    Lab Status:  Final result Specimen:  Blood from Arm, Right Updated:  01/16/19 1759     Procalcitonin 76 33 (H) ng/ml     Troponin I [[id number]]  (Normal) Collected:  01/16/19 1718    Lab Status:  Final result Specimen:  Blood from Arm, Right Updated:  01/16/19 1754     Troponin I <0 02 ng/mL     APTT [[id number]]  (Normal) Collected:  01/16/19 1707    Lab Status:  Final result Specimen:  Blood from Arm, Right Updated:  01/16/19 1740     PTT 32 seconds     Protime-INR [[id number]]  (Abnormal) Collected:  01/16/19 1707    Lab Status:  Final result Specimen:  Blood from Arm, Right Updated:  01/16/19 1740     Protime 19 1 (H) seconds      INR 1 60 (H)    Comprehensive metabolic panel [[id number]]  (Abnormal) Collected:  01/16/19 1707    Lab Status:  Final result Specimen:  Blood from Arm, Right Updated:  01/16/19 1736     Sodium 129 (L) mmol/L      Potassium 3 7 mmol/L      Chloride 97 (L) mmol/L      CO2 25 mmol/L      ANION GAP 7 mmol/L      BUN 45 (H) mg/dL      Creatinine 1 18 mg/dL      Glucose 340 (H) mg/dL      Calcium 8 9 mg/dL      AST 24 U/L      ALT 22 U/L      Alkaline Phosphatase 93 U/L      Total Protein 7 7 g/dL      Albumin 3 1 (L) g/dL      Total Bilirubin 0 78 mg/dL      eGFR 67 ml/min/1 73sq m     Narrative:         National Kidney Disease Education Program recommendations are as follows:  GFR calculation is accurate only with a steady state creatinine  Chronic Kidney disease less than 60 ml/min/1 73 sq  meters  Kidney failure less than 15 ml/min/1 73 sq  meters  Influenza A/B and RSV by PCR [[id number]] Collected:  01/16/19 1725    Lab Status:   In process Specimen:  Nasopharyngeal from Nasopharyngeal Swab Updated:  01/16/19 1728    Blood culture #1 [[id number]] Collected:  01/16/19 1716    Lab Status: In process Specimen:  Blood from Arm, Left Updated:  01/16/19 1720    Blood culture #2 [[id number]] Collected:  01/16/19 1707    Lab Status: In process Specimen:  Blood from Arm, Right Updated:  01/16/19 1714    UA w Reflex to Microscopic w Reflex to Culture [[id number]]     Lab Status:  No result Specimen:  Urine                  XR chest 2 views   ED Interpretation by Margarita Collier DO (01/16 1834)   Abnormal   Multilobar PNA      Final Result by Armin Arroyo DO (01/16 1821)   Patchy infiltrates in right upper lobe, right lower lobe, and left lower lobe as described  Findings almost certainly representing multi lobar pneumonia  Clinical correlation and follow-up advised in several weeks to ensure resolution  The study was marked in [facility] for immediate notification  Workstation performed: [id number]               Procedures  Procedures      Phone Consults  ED Phone Contact    ED Course  ED Course as of Jan 17 0236 Wed Jan 16, 2019   1730 WBC: (!) 21 13   1745 Sodium: (!) 129   1809 Procalcitonin: (!) 76 33   1809 LACTIC ACID: (!!) 3 4                         Initial Sepsis Screening     Row Name 01/16/19 1722                Is the patient's history suggestive of a new or worsening infection? (!)  Yes (Proceed)  -AW        Suspected source of infection pneumonia  -AW        Are two or more of the following signs & symptoms of infection both present and new to the patient? (!)  Yes (Proceed)  -AW        Indicate SIRS criteria Hyperthemia > 38 3C (100 9F); Tachycardia > 90 bpm;Leukocytosis (WBC > 86594 IJL)  -AW        If the answer is yes to both questions, suspicion of sepsis is present          If severe sepsis is present AND tissue hypoperfusion perists in the hour after fluid resuscitation or lactate > 4, the patient meets criteria for SEPTIC SHOCK          Are any of the following organ dysfunction criteria present within 6 hours of suspected infection and SIRS criteria that are NOT considered to be chronic conditions? (!)  Yes  -AW        Organ dysfunction Lactate > 2 0 mmol/L;INR > 1 5 or aPTT > 60 secs  -AW        Date of presentation of severe sepsis 01/16/19  -AW        Time of presentation of severe sepsis 1834  -AW        Tissue hypoperfusion persists in the hour after crystalloid fluid administration, evidenced, by either:          Was hypotension present within one hour of the conclusion of crystalloid fluid administration? No  -AW        Date of presentation of septic shock          Time of presentation of septic shock            User Key  (r) = Recorded By, (t) = Taken By, (c) = Cosigned By    [address] Name Provider Type    AW Bettie Stewart MD Resident           Default Flowsheet Data (last 720 hours)      Sepsis Reassess     Row Name 01/16/19 2003                   Repeat Volume Status and Tissue Perfusion Assessment Performed    Repeat Volume Status and Tissue Perfusion Assessment Performed             Volume Status and Tissue Perfusion Post Fluid Resuscitation * Must Document All *    Vital Signs Reviewed (HR, RR, BP, T) Yes  -ROB        Shock Index Reviewed          Arterial Oxygen Saturation Reviewed (POx, SaO2 or SpO2) Yes (comment %)  -ROB        Cardio (!)  Normal S1/S2; Regular rate and rhythm; Tachycardia; No murmor; No rub or gallop  -ROB        Pulmonary Normal effort  -ROB        Capillary Refill Brisk  -ROB        Peripheral Pulses Radial  -ROB        Peripheral Pulse +2  -ROB        Skin Warm;Dry  -ROB        Urine output assessed Adequate  -ROB           *OR*   Intensive Monitoring- Must Document One of the Following Four *:    Vital Signs Reviewed          * Central Venous Pressure (CVP or RAP)          * Central Venous Oxygen (SVO2, ScvO2 or Oxygen saturation via central catheter)          * Bedside Cardiovascular US in IVC diameter and % collapse          * Passive Leg Raise OR Crystalloid Challenge            User Key  (r) = Recorded By, (t) = Taken By, (c) = Cosigned By    [address] Name Provider Sree Waller MD Resident                [facility]  CritCare Time    25-year-old male who presents to ED for likely sepsis secondary to pneumonia  Patient has elevated white count, his procalcitonin is also elevated  Patient started on ceftriaxone and azithromycin  Patient admitted to Medicine for further management  Disposition  Final diagnoses:   Sepsis due to pneumonia ([facility] [address] )   Fever   Tachycardia   SOB (shortness of breath)   Leukocytosis   High serum lactate   Elevated procalcitonin     Time reflects when diagnosis was documented in both MDM as applicable and the Disposition within this note     Time User Action Codes Description Comment    1/16/2019  6:37 PM Jaquita Siemens [J18 9,  A41 9] Sepsis due to pneumonia ([facility] [address] )     1/16/2019  6:37 PM Senia Nay Add [R50 9] Fever     1/16/2019  6:37 PM Senia Nay Add [R00 0] Tachycardia     1/16/2019  6:37 PM Senia Nay Add [R06 02] SOB (shortness of breath)     1/16/2019  6:37 PM Senia Nay Add [D72 829] Leukocytosis     1/16/2019  6:37 PM Senia Nay Add [R79 89] High serum lactate     1/16/2019  6:38 PM Senia Nay Add [R79 89] Elevated procalcitonin       ED Disposition     ED Disposition Condition Comment    Admit  Case was discussed with SOD and the patient's admission status was agreed to be Admission Status: inpatient status to the service of Dr Lakhwinder Pepe          Follow-up Information    None         Current Discharge Medication List      CONTINUE these medications which have NOT CHANGED    Details   metFORMIN (GLUCOPHAGE) 500 mg tablet Take 750 mg by mouth 2 (two) times a day        cyclobenzaprine (FLEXERIL) 10 mg tablet Take 1 tablet (10 mg total) by mouth 3 (three) times a day as needed for muscle spasms for up to 5 days  Qty: 15 tablet, Refills: 0    Associated Diagnoses: Strain of muscle, fascia and tendon of lower back, initial encounter      Dextromethorphan-Guaifenesin (MUCINEX DM PO) Take 1 tablet by mouth daily      ibuprofen (MOTRIN) 600 mg tablet Take 600 mg by mouth every 6 (six) hours      methadone (DOLOPHINE) 10 mg tablet Take 80 mg by mouth daily,           No discharge procedures on file  ED Provider  Attending physically available and evaluated Bret Perez I managed the patient along with the ED Attending      Electronically Signed by         Jorge Serna MD  01/17/19 4059

## 2019-01-17 LAB
ANION GAP SERPL CALCULATED.3IONS-SCNC: 6 MMOL/L (ref 4–13)
BUN SERPL-MCNC: 27 MG/DL (ref 5–25)
CALCIUM SERPL-MCNC: 8 MG/DL (ref 8.3–10.1)
CHLORIDE SERPL-SCNC: 107 MMOL/L (ref 100–108)
CO2 SERPL-SCNC: 24 MMOL/L (ref 21–32)
CREAT SERPL-MCNC: 0.71 MG/DL (ref 0.6–1.3)
FLUAV AG SPEC QL: NORMAL
FLUBV AG SPEC QL: NORMAL
GFR SERPL CREATININE-BSD FRML MDRD: 102 ML/MIN/1.73SQ M
GLUCOSE SERPL-MCNC: 163 MG/DL (ref 65–140)
GLUCOSE SERPL-MCNC: 193 MG/DL (ref 65–140)
GLUCOSE SERPL-MCNC: 208 MG/DL (ref 65–140)
GLUCOSE SERPL-MCNC: 212 MG/DL (ref 65–140)
GLUCOSE SERPL-MCNC: 220 MG/DL (ref 65–140)
GLUCOSE SERPL-MCNC: 229 MG/DL (ref 65–140)
L PNEUMO1 AG UR QL IA.RAPID: NEGATIVE
LACTATE SERPL-SCNC: 1.6 MMOL/L (ref 0.5–2)
LACTATE SERPL-SCNC: 2 MMOL/L (ref 0.5–2)
POTASSIUM SERPL-SCNC: 3.8 MMOL/L (ref 3.5–5.3)
PROCALCITONIN SERPL-MCNC: 68.87 NG/ML
RSV B RNA SPEC QL NAA+PROBE: NORMAL
S PNEUM AG UR QL: NEGATIVE
SODIUM SERPL-SCNC: 137 MMOL/L (ref 136–145)

## 2019-01-17 PROCEDURE — 80048 BASIC METABOLIC PNL TOTAL CA: CPT | Performed by: STUDENT IN AN ORGANIZED HEALTH CARE EDUCATION/TRAINING PROGRAM

## 2019-01-17 PROCEDURE — 82948 REAGENT STRIP/BLOOD GLUCOSE: CPT

## 2019-01-17 PROCEDURE — 84145 PROCALCITONIN (PCT): CPT | Performed by: STUDENT IN AN ORGANIZED HEALTH CARE EDUCATION/TRAINING PROGRAM

## 2019-01-17 PROCEDURE — 87449 NOS EACH ORGANISM AG IA: CPT | Performed by: INTERNAL MEDICINE

## 2019-01-17 PROCEDURE — 83605 ASSAY OF LACTIC ACID: CPT | Performed by: INTERNAL MEDICINE

## 2019-01-17 RX ORDER — METHADONE HYDROCHLORIDE 10 MG/1
90 TABLET ORAL DAILY
Status: DISCONTINUED | OUTPATIENT
Start: 2019-01-17 | End: 2019-01-18 | Stop reason: HOSPADM

## 2019-01-17 RX ORDER — IBUPROFEN 400 MG/1
400 TABLET ORAL ONCE
Status: DISCONTINUED | OUTPATIENT
Start: 2019-01-17 | End: 2019-01-18 | Stop reason: HOSPADM

## 2019-01-17 RX ORDER — VANCOMYCIN HYDROCHLORIDE 1 G/200ML
15 INJECTION, SOLUTION INTRAVENOUS EVERY 12 HOURS
Status: DISCONTINUED | OUTPATIENT
Start: 2019-01-17 | End: 2019-01-17

## 2019-01-17 RX ORDER — IBUPROFEN 400 MG/1
400 TABLET ORAL EVERY 6 HOURS PRN
Status: DISCONTINUED | OUTPATIENT
Start: 2019-01-17 | End: 2019-01-18 | Stop reason: HOSPADM

## 2019-01-17 RX ORDER — INSULIN GLARGINE 100 [IU]/ML
5 INJECTION, SOLUTION SUBCUTANEOUS
Status: DISCONTINUED | OUTPATIENT
Start: 2019-01-17 | End: 2019-01-18 | Stop reason: HOSPADM

## 2019-01-17 RX ADMIN — INSULIN LISPRO 2 UNITS: 100 INJECTION, SOLUTION INTRAVENOUS; SUBCUTANEOUS at 12:08

## 2019-01-17 RX ADMIN — INSULIN LISPRO 1 UNITS: 100 INJECTION, SOLUTION INTRAVENOUS; SUBCUTANEOUS at 17:23

## 2019-01-17 RX ADMIN — CEFTRIAXONE 1000 MG: 1 INJECTION, POWDER, FOR SOLUTION INTRAMUSCULAR; INTRAVENOUS at 16:30

## 2019-01-17 RX ADMIN — CEFEPIME HYDROCHLORIDE 2000 MG: 1 INJECTION, POWDER, FOR SOLUTION INTRAMUSCULAR; INTRAVENOUS at 05:03

## 2019-01-17 RX ADMIN — INSULIN GLARGINE 5 UNITS: 100 INJECTION, SOLUTION SUBCUTANEOUS at 22:57

## 2019-01-17 RX ADMIN — HEPARIN SODIUM 5000 UNITS: 5000 INJECTION INTRAVENOUS; SUBCUTANEOUS at 14:20

## 2019-01-17 RX ADMIN — SODIUM CHLORIDE 125 ML/HR: 0.9 INJECTION, SOLUTION INTRAVENOUS at 04:00

## 2019-01-17 RX ADMIN — AZITHROMYCIN MONOHYDRATE 500 MG: 500 INJECTION, POWDER, LYOPHILIZED, FOR SOLUTION INTRAVENOUS at 16:56

## 2019-01-17 RX ADMIN — HEPARIN SODIUM 5000 UNITS: 5000 INJECTION INTRAVENOUS; SUBCUTANEOUS at 22:57

## 2019-01-17 RX ADMIN — HEPARIN SODIUM 5000 UNITS: 5000 INJECTION INTRAVENOUS; SUBCUTANEOUS at 05:02

## 2019-01-17 RX ADMIN — INSULIN LISPRO 1 UNITS: 100 INJECTION, SOLUTION INTRAVENOUS; SUBCUTANEOUS at 22:56

## 2019-01-17 RX ADMIN — INSULIN LISPRO 2 UNITS: 100 INJECTION, SOLUTION INTRAVENOUS; SUBCUTANEOUS at 08:54

## 2019-01-17 RX ADMIN — METHADONE HYDROCHLORIDE 90 MG: 10 TABLET ORAL at 10:42

## 2019-01-17 RX ADMIN — ACETAMINOPHEN 650 MG: 325 TABLET ORAL at 02:34

## 2019-01-17 RX ADMIN — VANCOMYCIN HYDROCHLORIDE 1000 MG: 1 INJECTION, SOLUTION INTRAVENOUS at 06:07

## 2019-01-17 NOTE — RESTORATIVE TECHNICIAN NOTE
Restorative Specialist Mobility Note       Activity: Chair, Other (Comment) (Patient refused OOB amb, but agreeable to sit up in chair  Would like to "sit for a minute" and then walk  Restorative will follow up at a later time )     Assistive Device: None     Ambulation Response: Tolerated well  Repositioned: Sitting, Up in chair           Range of Motion: All extremities, Active  Anti-Embolism Device On: Bilateral     Patient left resting comfortably in bedside recliner, with chair alarm activated and call bell/table within reach

## 2019-01-17 NOTE — PROGRESS NOTES
Sod Res Katherine notified pt with elevated temp 102 7, tachycardia 110's  Tylenol 650 mg given  Will continue to monitor

## 2019-01-17 NOTE — UTILIZATION REVIEW
Initial Clinical Review    Admission: Date/Time/Statement: 1/16/19 @ 1839     Orders Placed This Encounter   Procedures    Inpatient Admission (expected length of stay for this patient is greater than two midnights)     Standing Status:   Standing     Number of Occurrences:   1     Order Specific Question:   Admitting Physician     Answer:   Alisha Davies     Order Specific Question:   Level of Care     Answer:   Med Surg [16]     Order Specific Question:   Estimated length of stay     Answer:   More than 2 Midnights     Order Specific Question:   Certification     Answer:   I certify that inpatient services are medically necessary for this patient for a duration of greater than two midnights  See H&P and MD Progress Notes for additional information about the patient's course of treatment  ED: Date/Time/Mode of Arrival:   ED Arrival Information     Expected Arrival Acuity Means of Arrival Escorted By Service Admission Type    - 1/16/2019 16:49 Emergent Walk-In Self General Medicine Emergency    Arrival Complaint    fever; sob        Chief Complaint:   Chief Complaint   Patient presents with    Vomiting     daughter reports pt has been sick for awhile, unable to eat, get out of bed, or catch his breath  states pt has no insurance so he refused coming to the hospital for several weeks  pt appears weak, sob, and slightly confused    Shortness of Breath     History of Illness: 59year old male who presents to ED for evaluation of nonproductive cough and sob      ED Vital Signs:   ED Triage Vitals   Temperature Pulse Respirations Blood Pressure SpO2   01/16/19 1701 01/16/19 1701 01/16/19 1701 01/16/19 1701 01/16/19 1701   98 5 °F (36 9 °C) (!) 114 20 158/71 92 %      Temp Source Heart Rate Source Patient Position - Orthostatic VS BP Location FiO2 (%)   01/16/19 1701 01/16/19 1701 01/16/19 1701 01/16/19 1701 --   Oral Monitor Lying Left arm       Pain Score       01/16/19 2100       No Pain        Wt Readings from Last 1 Encounters:   01/17/19 73 9 kg (163 lb)     O2 2L N/C    Vital Signs (abnormal):   01/17/19 0504    101  --  --  94 %  --  --   01/17/19 0406  99 5 °F (37 5 °C)   111  22  128/68  94 %  --  --   01/17/19 0355   102 2 °F (39 °C)  --  --  --  --  --  --   01/17/19 0000   102 7 °F (39 3 °C)  --  20  112/56  90 %  None (Room air)  Lying   01/16/19 2116  --   113  --  --  --  --  --   01/16/19 2115   100 58 °F (38 1 °C)   116  19  140/68  93 %  --  --   01/16/19 1939  --   113               Pertinent Labs/Diagnostic Test Results:   CXR - Patchy infiltrates in right upper lobe, right lower lobe, and left lower lobe  Findings almost certainly representing multi lobar pneumonia  Na = 129  Bun = 45  Glucose = 340  Lactic acid = 3 4  Wbc = 21 13  Procalcitonin = 76 33    ED Treatment:   Medication Administration from 01/16/2019 1649 to 01/16/2019 2035       Date/Time Order Dose Route Action     01/16/2019 1721 sodium chloride 0 9 % bolus 1,000 mL 1,000 mL Intravenous New Bag     01/16/2019 1827 ceftriaxone (ROCEPHIN) 1 g/50 mL in dextrose IVPB 1,000 mg Intravenous New Bag     01/16/2019 1935 azithromycin (ZITHROMAX) 500 mg in sodium chloride 0 9% 250mL IVPB 500 mg 500 mg Intravenous New Bag     01/16/2019 1935 sodium chloride 0 9 % bolus 1,000 mL 1,000 mL Intravenous New Bag     01/16/2019 1938 sodium chloride 0 9 % infusion 125 mL/hr Intravenous New Bag        Past Medical/Surgical History:    Active Ambulatory Problems     Diagnosis Date Noted    No Active Ambulatory Problems     Resolved Ambulatory Problems     Diagnosis Date Noted    No Resolved Ambulatory Problems     Past Medical History:   Diagnosis Date    Diabetes mellitus ([facility] )      Admitting Diagnosis: Leukocytosis [D72 829]  SOB (shortness of breath) [R06 02]  Tachycardia [R00 0]  Fever [R50 9]  Elevated procalcitonin [R79 89]  High serum lactate [R79 89]  Sepsis due to pneumonia ([facility] ) [J18 9, A41 9]     Age/Sex: 59 y o  male Assessment/Plan:   57y Male to ED with cold symptoms  over the past couple months  Report primarily from daughter as the patient is lethargic and unable answer most questions  The daughter reports that he has been having cold-like symptoms since November and recently over the past few days has become more lethargic with nonbloody nonbilious emesis, nonproductive cough, decreased alertness, shortness of breath     The patient did not come in earlier due to lack of insurance  Patient chronically on methadone however patient is unable to give name methadone clinic     Pt is being admitted with Sepsis secondary to multilobar pneumonia, Hyponatremia, Diabetes mellitus, Opioid dependence  Pt with flu like symptoms since November and worsening over the past few days  Iv Antibiotics  IVF  Monitor WBC, temp, respiratory status  Monitor sodium  Insulin sliding scale        Admission Orders:  Bld culture x2    Scheduled Meds:   Current Facility-Administered Medications:  cefepime 2,000 mg Intravenous Q12H   heparin (porcine) 5,000 Units Subcutaneous Q8H [address]   ibuprofen 400 mg Oral Once   insulin lispro 1-5 Units Subcutaneous TID AC   insulin lispro 1-5 Units Subcutaneous HS   methadone 90 mg Oral Daily   nicotine 1 patch Transdermal Daily   vancomycin 15 mg/kg Intravenous Q12H     Continuous Infusions:   sodium chloride 125 mL/hr Last Rate: 125 mL/hr (01/17/19 0400)     PRN Meds:   acetaminophen    albuterol    cyclobenzaprine    ibuprofen    ondansetron

## 2019-01-17 NOTE — MEDICAL STUDENT
Assessment and Plan:  Mr Whitney Richmond is a 60 y/o male recovering from sepsis due to multilobar community-acquired pneumonia  - Continue to give IV cephalosporin + a macrolide (e g  Ceftriaxone + azithromycin)  - monitor vitals, give 30 mgs/kg IV normal saline bolus and IV norepinephrine if rapid hypotension develops  - consider a B2 agonist for SOB like albuterol  - consult with case management to see what we can do to help him take his metformin to control hyperglycemia  Subjective:    HPI: I had the pleasure of meeting Mikhail Hernandez this morning  Mr Whitney Richmond is a 60 y/o male with a PMH significant for Diabetes Mellitus type II and heroin dependence  Mr Whitney Richmond noticed that he was having cold-like sx approximately one month ago  Upon questioning, his sx were a cough productive of clear sputum, sore throat, chills, weakness/fatigue, headache, nasal congestion, sinus pressure, and constipation  ROS was negative for nausea, vomiting, diarrhea, muscle aches, rashes, syncope/loss of consciousness, dizziness, chest pain, palpitations, or urinary changes  He says that the only medication he took to relieve his sx was OTC ibuprofen twice daily for the past month, providing some relief  His sx gradually worsened and 2 weeks ago, Mr Whitney Richmond noticed that he was feeling SOB at rest  He has been using his grandson's albuterol nebulizer which helped with his SOB  His medications include metformin for his DM (not taken for the past year due to lack of insurance), ibuprofen, and his grandson's albuterol (past two weeks)  He has been attempting to control his DM with diet without success  The patient says that earlier this year, his blood glucose on fingerstick was ~600  Social history includes a longtime smoking history of 0 5 ppd for 45 years  Mr Whitney Richmond says he is trying to cut down and planning to eventually quit  He also occasionally smokes marijuana 1-2 times per month   I let him know that his smoking history makes him more susceptible to lung infections  He does not drink alcohol and denies any other drug use  He used to work as a  for several years and was laid off last year  Since then, he has been without insurance and has been unable to pay for his prescription meds or to see a PCP  Objective:    Vitals:  T: 98 6  P: 95  R: 20  BP: 110/62  SpO2: 96% on nasal cannula    Physical Exam   Constitutional: He is oriented to person, place, and time  No distress  Nasal cannula in place  HENT:   Head: Normocephalic and atraumatic  Mouth/Throat: Uvula is midline  Dental caries present  Oropharyngeal exudate, posterior oropharyngeal edema and posterior oropharyngeal erythema present  Poor dentition  Thick, pungent white exudate on oropharynx   Eyes: Pupils are equal, round, and reactive to light  Conjunctivae and EOM are normal    Cardiovascular: Normal rate, regular rhythm, normal heart sounds and intact distal pulses  Exam reveals no gallop and no friction rub  No murmur heard  Pulses:       Radial pulses are 2+ on the right side, and 2+ on the left side  Dorsalis pedis pulses are 2+ on the right side, and 2+ on the left side  Pulmonary/Chest: He has wheezes in the right upper field, the right middle field, the right lower field, the left upper field, the left middle field and the left lower field  He exhibits no tenderness  Abdominal: Soft  Bowel sounds are normal  There is no tenderness  There is no guarding  Musculoskeletal: He exhibits no edema or tenderness  Neurological: He is alert and oriented to person, place, and time  No sensory deficit  Reflex Scores:       Brachioradialis reflexes are 2+ on the right side and 2+ on the left side  Patellar reflexes are 3+ on the right side and 2+ on the left side  Skin: Skin is warm and dry  No rash noted  He is not diaphoretic  No erythema  No pallor  Psychiatric: He has a normal mood and affect

## 2019-01-17 NOTE — PLAN OF CARE
METABOLIC, FLUID AND ELECTROLYTES - ADULT     Electrolytes maintained within normal limits Progressing     Fluid balance maintained Progressing     Glucose maintained within target range Progressing        Potential for Falls     Patient will remain free of falls Progressing        Prexisting or High Potential for Compromised Skin Integrity     Skin integrity is maintained or improved Progressing

## 2019-01-17 NOTE — RESPIRATORY THERAPY NOTE
RT Protocol Note  Dasia Mcrae 59 y o  male MRN: [id number]  Unit/Bed#: [facility] 601-01 Encounter: [id number]    Assessment    Principal Problem:    Sepsis due to pneumonia [facility])  Active Problems:    Diabetes ([facility] )    Heroin dependence ([facility] )    Other emphysema ([facility] )    Hyponatremia      Home Pulmonary Medications:  reviewed       Past Medical History:   Diagnosis Date    Diabetes mellitus ([place] [address] )      Social History     Social History    Marital status: Single     Spouse name: N/A    Number of children: N/A    Years of education: N/A     Social History Main Topics    Smoking status: Current Every Day Smoker     Packs/day: 0 50    Smokeless tobacco: Never Used    Alcohol use No    Drug use: No      Comment: pt reports last using heroin several months ago    Sexual activity: Not Asked     Other Topics Concern    None     Social History Narrative    None       Subjective         Objective    Physical Exam:   Assessment Type: (P) Post-treatment  General Appearance: (P) Drowsy, Lethargic  Respiratory Pattern: (P) Normal, Spontaneous  Chest Assessment: (P) Chest expansion symmetrical  Bilateral Breath Sounds: (P) Diminished, Coarse  R Breath Sounds: (P) Diminished  L Breath Sounds: (P) Diminished  Cough: (P) Strong, Non-productive, Congested  O2 Device: (P) nasal cannula    Vitals:  Blood pressure 140/68, pulse (!) 113, temperature (!) 100 58 °F (38 1 °C), resp  rate 19, SpO2 93 %  Imaging and other studies: I have personally reviewed pertinent reports  O2 Device: (P) nasal cannula     Plan    Respiratory Plan: (P) Mild Distress pathway        Resp Comments: (P) Pt assessed per respiratory protocol  Pt currently not in acute resp distress, pattern regular/unlabored and denies dyspnea  Poor historian do to lethargic nature  SLIM and RN aware of this  Current admission reason is sepsis secondary to pneumonia  History of smoking per patient  He stated he doesnt think he take respiratory medications at home  Spo2 on 2lpm nasal cannula is 96%  Based on the physical assessment and medical history, nebulizer therapy ordered Q4prn  Will continue to monitor and assess per respiratory protocol

## 2019-01-17 NOTE — SOCIAL WORK
MSW Student met with patient and explained her role  Pt was alert and oriented and reported that his emergency contact is [place] his [address]  Pt reported that he lives in a 2 story home with Bree  Pt reported that there are 2 steps to enter and 12 steps to the bdrm/bath  Pt denied any hx of MH stays, HHC, or inpatient PT/OT  Pt reported hx of substance abuse - he stated that he used to snort heroin and last time he used was about a year ago  Pt was IPTA with ADLS  Pt does not have a PCP but is interested in getting one  Pts pharmacy of choice is [facility] in [place]  Student gave pt an info link card  CM reviewed d/c planning process including the following: identifying help at home, patient preference for d/c planning needs, Discharge Lounge, [facility] Meds to Bed program, availability of treatment team to discuss questions or concerns patient and/or family may have regarding understanding medications and recognizing signs and symptoms once discharged  CM also encouraged patient to follow up with all recommended appointments after discharge   Patient advised of importance for patient and family to participate in managing patients medical well being     ~I have read and agree with the above documentation~    MANDIE Coronel

## 2019-01-17 NOTE — SEPSIS NOTE
Sepsis Note   Gabriel Sosa 59 y o  male MRN: [id number]  Unit/Bed#: ED 03 Encounter: [id number]            Initial Sepsis Screening     Row Name 01/16/19 1722                Is the patient's history suggestive of a new or worsening infection? (!)  Yes (Proceed)  -AW        Suspected source of infection pneumonia  -AW        Are two or more of the following signs & symptoms of infection both present and new to the patient? (!)  Yes (Proceed)  -AW        Indicate SIRS criteria Hyperthemia > 38 3C (100 9F); Tachycardia > 90 bpm;Leukocytosis (WBC > 22379 IJL)  -AW        If the answer is yes to both questions, suspicion of sepsis is present          If severe sepsis is present AND tissue hypoperfusion perists in the hour after fluid resuscitation or lactate > 4, the patient meets criteria for SEPTIC SHOCK          Are any of the following organ dysfunction criteria present within 6 hours of suspected infection and SIRS criteria that are NOT considered to be chronic conditions? (!)  Yes  -AW        Organ dysfunction Lactate > 2 0 mmol/L;INR > 1 5 or aPTT > 60 secs  -AW        Date of presentation of severe sepsis 01/16/19  -AW        Time of presentation of severe sepsis 1834  -AW        Tissue hypoperfusion persists in the hour after crystalloid fluid administration, evidenced, by either:          Was hypotension present within one hour of the conclusion of crystalloid fluid administration?  No  -AW        Date of presentation of septic shock          Time of presentation of septic shock            User Key  (r) = Recorded By, (t) = Taken By, (c) = Cosigned By    [address] Name Provider Type    AW Lawrence Cm MD Resident               Default Flowsheet Data (last 720 hours)      Sepsis Reassess     Row Name 01/16/19 2003                   Repeat Volume Status and Tissue Perfusion Assessment Performed    Repeat Volume Status and Tissue Perfusion Assessment Performed             Volume Status and Tissue Perfusion Post Fluid Resuscitation * Must Document All *    Vital Signs Reviewed (HR, RR, BP, T) Yes  -ROB        Shock Index Reviewed          Arterial Oxygen Saturation Reviewed (POx, SaO2 or SpO2) Yes (comment %)  -ROB        Cardio (!)  Normal S1/S2; Regular rate and rhythm; Tachycardia; No murmor; No rub or gallop  -ROB        Pulmonary Normal effort  -ROB        Capillary Refill Brisk  -ROB        Peripheral Pulses Radial  -ROB        Peripheral Pulse +2  -ROB        Skin Warm;Dry  -ROB        Urine output assessed Adequate  -ROB           *OR*   Intensive Monitoring- Must Document One of the Following Four *:    Vital Signs Reviewed          * Central Venous Pressure (CVP or RAP)          * Central Venous Oxygen (SVO2, ScvO2 or Oxygen saturation via central catheter)          * Bedside Cardiovascular US in IVC diameter and % collapse          * Passive Leg Raise OR Crystalloid Challenge            User Key  (r) = Recorded By, (t) = Taken By, (c) = Cosigned By    Initials Name Provider Oh Caldwell MD Resident

## 2019-01-17 NOTE — PROGRESS NOTES
IM Residency Progress Note   Unit/Bed#: [facility] 601-01 Encounter: [id number]  SOD Team Lani Cortes 59 y o  male [id number]    Hospital Stay Days: 1    Assessment/Plan:    Principal Problem:    Sepsis due to pneumonia [facility])  Active Problems:    Diabetes ([facility] [address] )    [facility] ([facility] )    Other emphysema ([facility] )    Hyponatremia    1  Sepsis secondary to multilobar pneumonia:  Overnight, patient had temperature to 101 7 with heart rate 110s and patient was transitioned to cefepime and vancomycin due to worsening clinical status  Procalcitonin down trending from 76 to 69 this morning with recent lactate 1 6  · Continue IV antibiotics with cefepime and vancomycin  · Follow-up blood cultures, flu and RSV negative  · Continue IV fluids at 125 cc/hour  · Monitor WBC, temperature, respiratory status     2  Diabetes mellitus:  Blood glucose 340 on admission likely secondary to uncontrolled diabetes and stress response from infection  Two hundred twenty this morning  · Continue insulin sliding scale     3  Opioid dependence  · Restarted home methadone 90 mg daily per [facility] meth[facility]     4  Tobacco abuse  · Nicotine patch     5  Hyponatremia  · IV fluids, monitor sodium    Disposition:  Continue inpatient care to antibiotics       Subjective:   Patient seen and examined this morning  No acute events overnight  Continues to have nonproductive cough  Denies fevers, chills, sweats, CP, SOB, abdominal pain, palpitations, wheezing, dysuria, bowel complaints         Vitals: Temp (24hrs), Av 3 °F (37 9 °C), Min:98 5 °F (36 9 °C), Max:102 7 °F (39 3 °C)  Current: Temperature: 98 6 °F (37 °C)  Vitals:    19 0406 19 0504 19 0647 19 0730   BP: 128/68  110/62    BP Location:   Left arm    Pulse: (!) 111 101 95    Resp: 22  20    Temp: 99 5 °F (37 5 °C) 98 96 °F (37 2 °C) 98 6 °F (37 °C)    TempSrc:   Oral    SpO2: 94% 94% 95% 96%   Weight:   73 9 kg (163 lb)    Height:   5' 10" (1 778 m) Body mass index is 23 39 kg/m²  I/O last 24 hours: In: 2537 1 [P O :60; I V :1377 1;  IV Piggyback:1100]  Out: 850 [Urine:850]      Physical Exam:   General Appearance:    Alert, cooperative, no distress, appears stated age on 2 L nasal cannula  Lungs:     Clear to auscultation bilaterally, respirations unlabored  Heart:    Regular rate and rhythm, S1 and S2 normal, no murmur, rub   or gallop  Abdomen:     Soft, non-tender, bowel sounds active all four quadrants,     no masses, no organomegaly  Extremities:   Extremities normal, atraumatic, no cyanosis or edema  Pulses:   2+ and symmetric all extremities  Skin:   Skin color, texture, turgor normal, no rashes or lesions  Neurologic:   AAO x 3    Invasive Devices     Peripheral Intravenous Line            Peripheral IV 01/16/19 Left Antecubital less than 1 day    Peripheral IV 01/16/19 Right Antecubital less than 1 day                Labs:   Recent Results (from the past 24 hour(s))   ECG 12 lead    Collection Time: 01/16/19  5:00 PM   Result Value Ref Range    Ventricular Rate 114 BPM    Atrial Rate 114 BPM    WY Interval 170 ms    QRSD Interval 82 ms    QT Interval 298 ms    QTC Interval 410 ms    P Axis 83 degrees    QRS Axis 78 degrees    T Wave Axis 85 degrees   APTT    Collection Time: 01/16/19  5:07 PM   Result Value Ref Range    PTT 32 26 - 38 seconds   Protime-INR    Collection Time: 01/16/19  5:07 PM   Result Value Ref Range    Protime 19 1 (H) 11 8 - 14 2 seconds    INR 1 60 (H) 0 86 - 1 17   CBC and differential    Collection Time: 01/16/19  5:07 PM   Result Value Ref Range    WBC 21 13 (H) 4 31 - 10 16 Thousand/uL    RBC 4 52 3 88 - 5 62 Million/uL    Hemoglobin 14 6 12 0 - 17 0 g/dL    Hematocrit 42 9 36 5 - 49 3 %    MCV 95 82 - 98 fL    MCH 32 3 26 8 - 34 3 pg    MCHC 34 0 31 4 - 37 4 g/dL    RDW 12 0 11 6 - 15 1 %    MPV 10 3 8 9 - 12 7 fL    Platelets 162 553 - 419 Thousands/uL    nRBC 0 /100 WBCs   Comprehensive metabolic panel Collection Time: 01/16/19  5:07 PM   Result Value Ref Range    Sodium 129 (L) 136 - 145 mmol/L    Potassium 3 7 3 5 - 5 3 mmol/L    Chloride 97 (L) 100 - 108 mmol/L    CO2 25 21 - 32 mmol/L    ANION GAP 7 4 - 13 mmol/L    BUN 45 (H) 5 - 25 mg/dL    Creatinine 1 18 0 60 - 1 30 mg/dL    Glucose 340 (H) 65 - 140 mg/dL    Calcium 8 9 8 3 - 10 1 mg/dL    AST 24 5 - 45 U/L    ALT 22 12 - 78 U/L    Alkaline Phosphatase 93 46 - 116 U/L    Total Protein 7 7 6 4 - 8 2 g/dL    Albumin 3 1 (L) 3 5 - 5 0 g/dL    Total Bilirubin 0 78 0 20 - 1 00 mg/dL    eGFR 67 ml/min/1 73sq m   Lactic Acid x2    Collection Time: 01/16/19  5:07 PM   Result Value Ref Range    LACTIC ACID 3 4 (HH) 0 5 - 2 0 mmol/L   Procalcitonin    Collection Time: 01/16/19  5:07 PM   Result Value Ref Range    Procalcitonin 76 33 (H) <=0 25 ng/ml   Manual Differential(PHLEBS Do Not Order)    Collection Time: 01/16/19  5:07 PM   Result Value Ref Range    Segmented % 67 43 - 75 %    Bands % 23 (H) 0 - 8 %    Lymphocytes % 8 (L) 14 - 44 %    Monocytes % 1 (L) 4 - 12 %    Eosinophils, % 0 0 - 6 %    Basophils % 0 0 - 1 %    Myelocytes % 1 0 - 1 %    Absolute Neutrophils 19 02 (H) 1 85 - 7 62 Thousand/uL    Lymphocytes Absolute 1 69 0 60 - 4 47 Thousand/uL    Monocytes Absolute 0 21 0 00 - 1 22 Thousand/uL    Eosinophils Absolute 0 00 0 00 - 0 40 Thousand/uL    Basophils Absolute 0 00 0 00 - 0 10 Thousand/uL    Total Counted      Dohle Bodies Present     RBC Morphology Present     Poikilocytes Present     Platelet Estimate Adequate Adequate   Troponin I    Collection Time: 01/16/19  5:18 PM   Result Value Ref Range    Troponin I <0 02 <=0 04 ng/mL   Influenza A/B and RSV by PCR    Collection Time: 01/16/19  5:25 PM   Result Value Ref Range    INFLU A PCR None Detected None Detected    INFLU B PCR None Detected None Detected    RSV PCR None Detected None Detected   Lactic Acid x2    Collection Time: 01/16/19  7:33 PM   Result Value Ref Range    LACTIC ACID 2 9 (HH) 0 5 - 2 0 mmol/L   Fingerstick Glucose (POCT)    Collection Time: 01/16/19  9:42 PM   Result Value Ref Range    POC Glucose 261 (H) 65 - 140 mg/dl   Platelet count    Collection Time: 01/16/19  9:58 PM   Result Value Ref Range    Platelets 630 (L) 114 - 390 Thousands/uL    MPV 9 9 8 9 - 12 7 fL   Lactic acid, plasma    Collection Time: 01/16/19 10:22 PM   Result Value Ref Range    LACTIC ACID 2 1 (HH) 0 5 - 2 0 mmol/L   Lactic acid, plasma    Collection Time: 01/17/19  1:06 AM   Result Value Ref Range    LACTIC ACID 2 0 0 5 - 2 0 mmol/L   Lactic acid, plasma    Collection Time: 01/17/19  4:46 AM   Result Value Ref Range    LACTIC ACID 1 6 0 5 - 2 0 mmol/L   Basic metabolic panel    Collection Time: 01/17/19  4:46 AM   Result Value Ref Range    Sodium 137 136 - 145 mmol/L    Potassium 3 8 3 5 - 5 3 mmol/L    Chloride 107 100 - 108 mmol/L    CO2 24 21 - 32 mmol/L    ANION GAP 6 4 - 13 mmol/L    BUN 27 (H) 5 - 25 mg/dL    Creatinine 0 71 0 60 - 1 30 mg/dL    Glucose 208 (H) 65 - 140 mg/dL    Calcium 8 0 (L) 8 3 - 10 1 mg/dL    eGFR 102 ml/min/1 73sq m   Procalcitonin    Collection Time: 01/17/19  4:46 AM   Result Value Ref Range    Procalcitonin 68 87 (H) <=0 25 ng/ml   Fingerstick Glucose (POCT)    Collection Time: 01/17/19  7:47 AM   Result Value Ref Range    POC Glucose 229 (H) 65 - 140 mg/dl       Radiology Results: I have personally reviewed pertinent reports  Other Diagnostic Testing:   I have personally reviewed pertinent reports          Active Meds:   Current Facility-Administered Medications   Medication Dose Route Frequency    acetaminophen (TYLENOL) tablet 650 mg  650 mg Oral Q6H PRN    albuterol inhalation solution 2 5 mg  2 5 mg Nebulization Q4H PRN    cefepime (MAXIPIME) 2,000 mg in dextrose 5 % 50 mL IVPB  2,000 mg Intravenous Q12H    cyclobenzaprine (FLEXERIL) tablet 10 mg  10 mg Oral TID PRN    heparin (porcine) subcutaneous injection 5,000 Units  5,000 Units Subcutaneous Q8H [facility]    ibuprofen (MOTRIN) tablet 400 mg  400 mg Oral Once    ibuprofen (MOTRIN) tablet 400 mg  400 mg Oral Q6H PRN    insulin lispro (HumaLOG) 100 units/mL subcutaneous injection 1-5 Units  1-5 Units Subcutaneous TID AC    insulin lispro (HumaLOG) 100 units/mL subcutaneous injection 1-5 Units  1-5 Units Subcutaneous HS    nicotine (NICODERM CQ) 21 mg/24 hr TD 24 hr patch 1 patch  1 patch Transdermal Daily    ondansetron (ZOFRAN) injection 4 mg  4 mg Intravenous Q6H PRN    sodium chloride 0 9 % infusion  125 mL/hr Intravenous Continuous    vancomycin (VANCOCIN) IVPB (premix) 1,000 mg  15 mg/kg Intravenous Q12H         VTE Pharmacologic Prophylaxis: Heparin  VTE Mechanical Prophylaxis: sequential compression device    Reji Machuca MD

## 2019-01-17 NOTE — PROGRESS NOTES
Pt  lethargic unable and uncertain to answer Database information, daughter also at bedside and provided some information though not fully versed on fathers medical information, will defer and attempt later when pt mentation improves

## 2019-01-17 NOTE — PROGRESS NOTES
Given consistently elevated blood sugars throughout the day requiring insulin coverage will start patient on 5units Lantus at bed time and check Hemoglobin A1c in the morning to assess for further need for diabetic regimen on discharge

## 2019-01-17 NOTE — PHYSICIAN ADVISOR
Current patient class: Inpatient  The patient is currently on Hospital Day: 2 at [facility]      The patient was admitted to the hospital at [address] [place] [address] on 1/16/19 for the following diagnosis:  Leukocytosis [D72 829]  SOB (shortness of breath) [R06 02]  Tachycardia [R00 0]  Fever [R50 9]  Elevated procalcitonin [R79 89]  High serum lactate [R79 89]  Sepsis due to pneumonia ([address] ) [J18 9, A41 9]       There is documentation in the medical record of an expected length of stay of at least 2 midnights  The patient is therefore expected to satisfy the 2 midnight benchmark and given the 2 midnight presumption is appropriate for INPATIENT ADMISSION  Given this expectation of a satisfying stay, CMS instructs us that the patient is most often appropriate for inpatient admission under part A provided medical necessity is documented in the chart  After review of the relevant documentation, labs, vital signs and test results, the patient is appropriate for INPATIENT ADMISSION  Admission to the hospital as an inpatient is a complex decision making process which requires the practitioner to consider the patients presenting complaint, history and physical examination and all relevant testing  With this in mind, in this case, the patient was deemed appropriate for INPATIENT ADMISSION  After review of the documentation and testing available at the time of the admission I concur with this clinical determination of medical necessity  Rationale is as follows: The patient is a 59 yrs old Male who presented to the ED at 1/16/2019  4:53 PM with a chief complaint of cold symptoms, vomiting and lethargy  He has had cold-like symptoms since November and recently over the past few days has become more lethargic with nonbloody nonbilious emesis, nonproductive cough, decreased alertness, shortness of breath  He had not presented to ED previously due to lack of insurance   Upon presentation to the emergency room patient was noted to be lethargic with difficulty answering questions  His ekg showed sinus tachycardia rate 114 and chest xray showed multilobar pneumonia and he met sepsis criteria  He was febrile to 102 7  Labs showed leucocytosis to 21 13, hyponatremia with sodium 129, hyperglycemia and lactic acidosis with procalcitonin to 76  He was treated for community acquired pneumonia with rocephin and zithromax IV, respiratory protocol and IV fluids and follow up procalcitonin levels to show response to antibiotics  Flu, blood cultures and RSV panel were obtained        The patients vitals on arrival were ED Triage Vitals   Temperature Pulse Respirations Blood Pressure SpO2   01/16/19 1701 01/16/19 1701 01/16/19 1701 01/16/19 1701 01/16/19 1701   98 5 °F (36 9 °C) (!) 114 20 158/71 92 %      Temp Source Heart Rate Source Patient Position - Orthostatic VS BP Location FiO2 (%)   01/16/19 1701 01/16/19 1701 01/16/19 1701 01/16/19 1701 --   Oral Monitor Lying Left arm       Pain Score       01/16/19 2100       No Pain           Past Medical History:   Diagnosis Date    Diabetes mellitus ([facility] [address] )      Past Surgical History:   Procedure Laterality Date    TONSILLECTOMY             Consults have been placed to:   IP CONSULT TO CASE MANAGEMENT  IP CONSULT TO PHARMACY    Vitals:    01/17/19 0406 01/17/19 0504 01/17/19 0647 01/17/19 0730   BP: 128/68  110/62    BP Location:   Left arm    Pulse: (!) 111 101 95    Resp: 22  20    Temp: 99 5 °F (37 5 °C) 98 96 °F (37 2 °C) 98 6 °F (37 °C)    TempSrc:   Oral    SpO2: 94% 94% 95% 96%   Weight:   73 9 kg (163 lb)    Height:   5' 10" (1 778 m)        Most recent labs:    Recent Labs      01/16/19   1707  01/16/19   1718  01/16/19   2158  01/17/19   0446   WBC  21 13*   --    --    --    HGB  14 6   --    --    --    HCT  42 9   --    --    --    PLT  151   --   124*   --    K  3 7   --    --   3 8   CALCIUM  8 9   --    --   8 0*   BUN  45*   --    --   27*   CREATININE 1 18   --    --   0 71   INR  1 60*   --    --    --    TROPONINI   --   <0 02   --    --    AST  24   --    --    --    ALT  22   --    --    --    ALKPHOS  93   --    --    --        Scheduled Meds:  Current Facility-Administered Medications:  acetaminophen 650 mg Oral Q6H PRN Sandy Romano MD   albuterol 2 5 mg Nebulization Q4H PRN Jennine Hatchet, MD   azithromycin 500 mg Intravenous Q24H Fariba Ireland MD   cefTRIAXone 1,000 mg Intravenous Q24H Fariba Ireland MD   cyclobenzaprine 10 mg Oral TID PRN Sandy Romano MD   heparin (porcine) 5,000 Units Subcutaneous [facility] Sandy Romano MD   ibuprofen 400 mg Oral Once Kailee Zayas, DO   ibuprofen 400 mg Oral Q6H PRN Fariba Ireland MD   insulin lispro 1-5 Units Subcutaneous TID AC Katherine Shannon,    insulin lispro 1-5 Units Subcutaneous HS Katherine Shannon DO   methadone 90 mg Oral Daily Sandy Romano MD   nicotine 1 patch Transdermal Daily Sandy Romano MD   ondansetron 4 mg Intravenous Q6H PRN Sandy Romano MD     Continuous Infusions:   PRN Meds:   acetaminophen    albuterol    cyclobenzaprine    ibuprofen    ondansetron    Surgical procedures (if appropriate):

## 2019-01-18 VITALS
DIASTOLIC BLOOD PRESSURE: 71 MMHG | TEMPERATURE: 99.3 F | BODY MASS INDEX: 23.34 KG/M2 | RESPIRATION RATE: 20 BRPM | HEART RATE: 103 BPM | HEIGHT: 70 IN | WEIGHT: 163 LBS | OXYGEN SATURATION: 89 % | SYSTOLIC BLOOD PRESSURE: 121 MMHG

## 2019-01-18 LAB
ANION GAP SERPL CALCULATED.3IONS-SCNC: 9 MMOL/L (ref 4–13)
BUN SERPL-MCNC: 20 MG/DL (ref 5–25)
CALCIUM SERPL-MCNC: 8.3 MG/DL (ref 8.3–10.1)
CHLORIDE SERPL-SCNC: 104 MMOL/L (ref 100–108)
CO2 SERPL-SCNC: 23 MMOL/L (ref 21–32)
CREAT SERPL-MCNC: 0.58 MG/DL (ref 0.6–1.3)
ERYTHROCYTE [DISTWIDTH] IN BLOOD BY AUTOMATED COUNT: 12.6 % (ref 11.6–15.1)
EST. AVERAGE GLUCOSE BLD GHB EST-MCNC: 192 MG/DL
GFR SERPL CREATININE-BSD FRML MDRD: 111 ML/MIN/1.73SQ M
GLUCOSE SERPL-MCNC: 121 MG/DL (ref 65–140)
GLUCOSE SERPL-MCNC: 243 MG/DL (ref 65–140)
GLUCOSE SERPL-MCNC: 251 MG/DL (ref 65–140)
HBA1C MFR BLD: 8.3 % (ref 4.2–6.3)
HCT VFR BLD AUTO: 36.8 % (ref 36.5–49.3)
HGB BLD-MCNC: 12.1 G/DL (ref 12–17)
MCH RBC QN AUTO: 31.4 PG (ref 26.8–34.3)
MCHC RBC AUTO-ENTMCNC: 32.9 G/DL (ref 31.4–37.4)
MCV RBC AUTO: 96 FL (ref 82–98)
PLATELET # BLD AUTO: 135 THOUSANDS/UL (ref 149–390)
PMV BLD AUTO: 10.4 FL (ref 8.9–12.7)
POTASSIUM SERPL-SCNC: 3.6 MMOL/L (ref 3.5–5.3)
PROCALCITONIN SERPL-MCNC: 38.69 NG/ML
RBC # BLD AUTO: 3.85 MILLION/UL (ref 3.88–5.62)
SODIUM SERPL-SCNC: 136 MMOL/L (ref 136–145)
WBC # BLD AUTO: 17.79 THOUSAND/UL (ref 4.31–10.16)

## 2019-01-18 PROCEDURE — 83036 HEMOGLOBIN GLYCOSYLATED A1C: CPT | Performed by: INTERNAL MEDICINE

## 2019-01-18 PROCEDURE — 82948 REAGENT STRIP/BLOOD GLUCOSE: CPT

## 2019-01-18 PROCEDURE — 85027 COMPLETE CBC AUTOMATED: CPT | Performed by: STUDENT IN AN ORGANIZED HEALTH CARE EDUCATION/TRAINING PROGRAM

## 2019-01-18 PROCEDURE — 80048 BASIC METABOLIC PNL TOTAL CA: CPT | Performed by: STUDENT IN AN ORGANIZED HEALTH CARE EDUCATION/TRAINING PROGRAM

## 2019-01-18 PROCEDURE — 84145 PROCALCITONIN (PCT): CPT | Performed by: STUDENT IN AN ORGANIZED HEALTH CARE EDUCATION/TRAINING PROGRAM

## 2019-01-18 PROCEDURE — 94760 N-INVAS EAR/PLS OXIMETRY 1: CPT

## 2019-01-18 RX ORDER — CEFDINIR 300 MG/1
300 CAPSULE ORAL 2 TIMES DAILY
Qty: 10 CAPSULE | Refills: 0 | Status: SHIPPED | OUTPATIENT
Start: 2019-01-18 | End: 2019-01-23

## 2019-01-18 RX ORDER — AZITHROMYCIN 250 MG/1
500 TABLET, FILM COATED ORAL EVERY 24 HOURS
Status: DISCONTINUED | OUTPATIENT
Start: 2019-01-18 | End: 2019-01-18

## 2019-01-18 RX ORDER — ALBUTEROL SULFATE 90 UG/1
2 AEROSOL, METERED RESPIRATORY (INHALATION) EVERY 4 HOURS PRN
Status: DISCONTINUED | OUTPATIENT
Start: 2019-01-18 | End: 2019-01-18 | Stop reason: HOSPADM

## 2019-01-18 RX ORDER — AZITHROMYCIN 250 MG/1
500 TABLET, FILM COATED ORAL EVERY 24 HOURS
Status: DISCONTINUED | OUTPATIENT
Start: 2019-01-18 | End: 2019-01-18 | Stop reason: HOSPADM

## 2019-01-18 RX ORDER — ALBUTEROL SULFATE 90 UG/1
2 AEROSOL, METERED RESPIRATORY (INHALATION) EVERY 4 HOURS PRN
Qty: 6.7 G | Refills: 0 | Status: SHIPPED | OUTPATIENT
Start: 2019-01-18 | End: 2019-02-04 | Stop reason: SDUPTHER

## 2019-01-18 RX ADMIN — INSULIN LISPRO 2 UNITS: 100 INJECTION, SOLUTION INTRAVENOUS; SUBCUTANEOUS at 08:19

## 2019-01-18 RX ADMIN — HEPARIN SODIUM 5000 UNITS: 5000 INJECTION INTRAVENOUS; SUBCUTANEOUS at 06:38

## 2019-01-18 RX ADMIN — METHADONE HYDROCHLORIDE 90 MG: 10 TABLET ORAL at 08:20

## 2019-01-18 RX ADMIN — NICOTINE 1 PATCH: 21 PATCH TRANSDERMAL at 08:19

## 2019-01-18 RX ADMIN — INSULIN LISPRO 2 UNITS: 100 INJECTION, SOLUTION INTRAVENOUS; SUBCUTANEOUS at 12:50

## 2019-01-18 NOTE — PROGRESS NOTES
IM Residency Progress Note   Unit/Bed#: [facility] 601-01 Encounter: [id number]  SOD Team Loreta Blackmon 59 y o  male [id number]    Hospital Stay Days: 2      Assessment/Plan:    Principal Problem:    Sepsis due to pneumonia [facility])  Active Problems:    Diabetes ([facility] [address] )    Heroin dependence ([facility] )    Other emphysema ([facility] )    Hyponatremia     1  Sepsis secondary to multilobar pneumonia:   AM Procalcitonin 38 down from 69 yesterday  WBC 17 this morning down from 21 on admission  Strep pneumoniae urine antigen negative  Patient afebrile overnight, vital signs stable  · Continue IV ceftriaxone and oral azithromycin (day 3/10?) with transition to oral antibiotic for total 10 day course  · Blood cultures on  with no growth, flu and RSV negative  · Monitor WBC, temperature, respiratory status     2  Diabetes mellitus:  Blood glucose 340 on admission likely secondary to uncontrolled diabetes and stress response from infection  Hemoglobin A1c 8 3  · Added Lantus 5 units at bedtime and continue insulin sliding scale     3  Opioid dependence  · Restarted home [facility]ad[facility]      4  Tobacco abuse  · Nicotine patch     5  Hyponatremia  · Resolved, continue to monitor sodium     Disposition:  Continue inpatient care to antibiotics     Subjective:   Patient seen and examined this morning  No acute events overnight  Denies fevers, chills, sweats, CP, SOB, abdominal pain, palpitations, wheezing, dysuria, bowel complaints  Vitals: Temp (24hrs), Av 7 °F (36 5 °C), Min:97 2 °F (36 2 °C), Max:98 1 °F (36 7 °C)  Current: Temperature: 98 1 °F (36 7 °C)  Vitals:    19 1522 19 2300 19 0055 19 0700   BP: 115/62 121/71     BP Location:  Left arm     Pulse: 100 (!) 108     Resp: 16 16     Temp: (!) 97 2 °F (36 2 °C) 98 1 °F (36 7 °C)     TempSrc:  Oral     SpO2: 94% 95% 94% 92%   Weight:       Height:        Body mass index is 23 39 kg/m²        I/O last 24 hours: In: 1317 1 [P O :560; I V :457 1;  IV Piggyback:300]  Out: 850 [Urine:850]      Physical Exam:  General Appearance:    Alert, cooperative, no distress, appears stated age  Lungs:    Diffuse wheezing worse on expiration  Heart:    Regular rate and rhythm, S1 and S2 normal, no murmur, rub   or gallop  Abdomen:     Soft, non-tender, bowel sounds active all four quadrants,     no masses, no organomegaly  Extremities:   Extremities normal, atraumatic, no cyanosis or edema  Pulses:   2+ and symmetric all extremities  Skin:   Skin color, texture, turgor normal, no rashes or lesions  Neurologic:   AAO x 3      Invasive Devices     Peripheral Intravenous Line            Peripheral IV 01/16/19 Left Antecubital 1 day    Peripheral IV 01/16/19 Right Antecubital 1 day                          Labs:   Recent Results (from the past 24 hour(s))   Fingerstick Glucose (POCT)    Collection Time: 01/17/19 11:35 AM   Result Value Ref Range    POC Glucose 220 (H) 65 - 140 mg/dl   Strep Pneumoniae, Urine    Collection Time: 01/17/19 11:45 AM   Result Value Ref Range    Strep pneumoniae antigen, urine Negative Negative   Legionella antigen, urine    Collection Time: 01/17/19 11:45 AM   Result Value Ref Range    Legionella Urinary Antigen Negative Negative   Fingerstick Glucose (POCT)    Collection Time: 01/17/19  5:09 PM   Result Value Ref Range    POC Glucose 212 (H) 65 - 140 mg/dl   Fingerstick Glucose (POCT)    Collection Time: 01/17/19  5:20 PM   Result Value Ref Range    POC Glucose 193 (H) 65 - 140 mg/dl   Fingerstick Glucose (POCT)    Collection Time: 01/17/19  9:29 PM   Result Value Ref Range    POC Glucose 163 (H) 65 - 140 mg/dl   Hemoglobin A1C w/ EAG Estimation    Collection Time: 01/18/19  4:43 AM   Result Value Ref Range    Hemoglobin A1C 8 3 (H) 4 2 - 6 3 %     mg/dl   CBC    Collection Time: 01/18/19  4:43 AM   Result Value Ref Range    WBC 17 79 (H) 4 31 - 10 16 Thousand/uL    RBC 3 85 (L) 3 88 - 5 62 Million/uL    Hemoglobin 12 1 12 0 - 17 0 g/dL    Hematocrit 36 8 36 5 - 49 3 %    MCV 96 82 - 98 fL    MCH 31 4 26 8 - 34 3 pg    MCHC 32 9 31 4 - 37 4 g/dL    RDW 12 6 11 6 - 15 1 %    Platelets 365 (L) 704 - 390 Thousands/uL    MPV 10 4 8 9 - 12 7 fL   Basic metabolic panel    Collection Time: 01/18/19  4:43 AM   Result Value Ref Range    Sodium 136 136 - 145 mmol/L    Potassium 3 6 3 5 - 5 3 mmol/L    Chloride 104 100 - 108 mmol/L    CO2 23 21 - 32 mmol/L    ANION GAP 9 4 - 13 mmol/L    BUN 20 5 - 25 mg/dL    Creatinine 0 58 (L) 0 60 - 1 30 mg/dL    Glucose 121 65 - 140 mg/dL    Calcium 8 3 8 3 - 10 1 mg/dL    eGFR 111 ml/min/1 73sq m   Procalcitonin    Collection Time: 01/18/19  4:43 AM   Result Value Ref Range    Procalcitonin 38 69 (H) <=0 25 ng/ml       Radiology Results: I have personally reviewed pertinent reports  Other Diagnostic Testing:   I have personally reviewed pertinent reports          Active Meds:   Current Facility-Administered Medications   Medication Dose Route Frequency    acetaminophen (TYLENOL) tablet 650 mg  650 mg Oral Q6H PRN    albuterol inhalation solution 2 5 mg  2 5 mg Nebulization Q4H PRN    azithromycin (ZITHROMAX) tablet 500 mg  500 mg Oral Q24H    cefTRIAXone (ROCEPHIN) 1,000 mg in dextrose 5 % 50 mL IVPB  1,000 mg Intravenous Q24H    cyclobenzaprine (FLEXERIL) tablet 10 mg  10 mg Oral TID PRN    heparin (porcine) subcutaneous injection 5,000 Units  5,000 Units Subcutaneous Q8H [address]    ibuprofen (MOTRIN) tablet 400 mg  400 mg Oral Once    ibuprofen (MOTRIN) tablet 400 mg  400 mg Oral Q6H PRN    insulin glargine (LANTUS) subcutaneous injection 5 Units 0 05 mL  5 Units Subcutaneous HS    insulin lispro (HumaLOG) 100 units/mL subcutaneous injection 1-5 Units  1-5 Units Subcutaneous TID AC    insulin lispro (HumaLOG) 100 units/mL subcutaneous injection 1-5 Units  1-5 Units Subcutaneous HS    methadone (DOLOPHINE) tablet 90 mg  90 mg Oral Daily    nicotine (NICODERM CQ) 21 mg/24 hr TD 24 hr patch 1 patch  1 patch Transdermal Daily    ondansetron (ZOFRAN) injection 4 mg  4 mg Intravenous Q6H PRN         VTE Pharmacologic Prophylaxis: Heparin  VTE Mechanical Prophylaxis: sequential compression device    Geovany Mcmahon MD

## 2019-01-18 NOTE — DISCHARGE INSTRUCTIONS
Community Acquired Pneumonia   WHAT YOU NEED TO KNOW:   What is community-acquired pneumonia (CAP)? CAP is a lung infection that you get outside of a hospital or nursing home setting  Your lungs become inflamed and cannot work well  CAP may be caused by bacteria, viruses, or fungi  What increases my risk for CAP? · Chronic lung disease    · Cigarette smoking    · Brain disorders such as stroke, dementia, and cerebral palsy    · Weakened immune system    · Recent surgery or trauma    · Surgery for cancer of the mouth, throat, or neck    · Medical conditions such as diabetes or heart disease  What are the signs and symptoms of CAP?   · Cough that may bring up green, yellow, or bloody mucus    · Fever, chills, or severe shaking    · Shortness of breath    · Breathing and heartbeat that are faster than usual    · Pain in your chest or back when you breathe in or cough    · Fatigue and loss of appetite    · Trouble thinking clearly (especially in elderly people)  How is CAP diagnosed? Your healthcare provider will listen to your lungs for abnormal sounds  You may also need any of the following:  · X-ray or CT scan  pictures may show a lung infection or other problems, such as fluid around your lungs  You may be given contrast liquid to help your lungs show up better in the pictures  Tell the healthcare provider if you have ever had an allergic reaction to contrast liquid  · A pulse oximeter  is a device that measures the amount of oxygen in your blood  · Blood and sputum tests  may be done to check for the germ causing your infection  · Bronchoscopy  is a procedure to look inside your airway and learn the cause of your airway or lung condition  A bronchoscope (thin tube with a light) is inserted into your mouth and moved down your throat to your airway  You may be given medicine to numb your throat and help you relax during the procedure   Tissue and fluid may be collected from your airway or lungs to be tested  How is CAP treated? Treatment will depend on what type of germ is causing your CAP, and how bad your symptoms are  You may need antibiotics if your pneumonia is caused by bacteria  Antiviral medicines may be given if you have viral pneumonia  You may need medicines that dilate your bronchial tubes  You may need oxygen if your blood oxygen level is lower than it should be  You may need to be admitted to the hospital if your pneumonia is severe  What can I do to manage CAP?   · Do not smoke or allow others to smoke around you  Nicotine and other chemicals in cigarettes and cigars can cause lung damage  Ask your healthcare provider for information if you currently smoke and need help to quit  E-cigarettes or smokeless tobacco still contain nicotine  Talk to your healthcare provider before you use these products  · Breathe warm, moist air  This helps loosen mucus  Loosely place a warm, wet washcloth over your nose and mouth  A room humidifier may also help make the air moist     · Take deep breaths  Deep breaths help open your airway  Take 2 deep breaths and cough 2 or 3 times every hour  Coughing helps get mucus out of your body  · Drink liquids as directed  Ask your healthcare provider how much liquid to drink each day and which liquids to drink  Liquids help make mucus thin and easier to get out of your body  · Gently tap your chest   This helps loosen mucus so it is easier to cough  Lie with your head lower than your chest several times a day and tap your chest      · Get plenty of rest   Rest helps your body heal   How can I prevent CAP? · Wash your hands often with soap and water  Carry germ-killing hand gel with you  You can use the gel to clean your hands when soap and water are not available  Do not touch your eyes, nose, or mouth unless you have washed your hands first      · Clean surfaces often    Clean doorknobs, countertops, cell phones, and other surfaces that are touched often     · Always cover your mouth when you cough  Cough into a tissue or your shirtsleeve so you do not spread germs from your hands  · Try to avoid people who have a cold or the flu  If you are sick, stay away from others as much as possible  · Ask about vaccines  You may need a vaccine to help prevent pneumonia  Get an influenza (flu) vaccine every year as soon as it becomes available  When should I seek immediate care? · You are confused and cannot think clearly  · You have increased trouble breathing  · Your lips or fingernails turn gray or blue  When should I contact my healthcare provider? · Your symptoms do not get better, or get worse  · You are urinating less, or not at all  · You have questions or concerns about your condition or care  CARE AGREEMENT:   You have the right to help plan your care  Learn about your health condition and how it may be treated  Discuss treatment options with your caregivers to decide what care you want to receive  You always have the right to refuse treatment  The above information is an  only  It is not intended as medical advice for individual conditions or treatments  Talk to your doctor, nurse or pharmacist before following any medical regimen to see if it is safe and effective for you  © 2017 [employer]  Information is for End User's use only and may not be sold, redistributed or otherwise used for commercial purposes  All illustrations and images included in CareNotes® are the copyrighted property of [employer]  or [employer]  Community Acquired Pneumonia   WHAT YOU NEED TO KNOW:   Community-acquired pneumonia (CAP) is a lung infection that you get outside of a hospital or nursing home setting  Your lungs become inflamed and cannot work well  CAP may be caused by bacteria, viruses, or fungi          DISCHARGE INSTRUCTIONS:   Seek care immediately if:   · You are confused and cannot think clearly  · You have increased trouble breathing  · Your lips or fingernails turn gray or blue  Contact your healthcare provider if:   · Your symptoms do not get better, or they get worse  · You are urinating less, or not at all  · You have questions or concerns about your condition or care  Medicines:   · Medicines  may be given to treat a bacterial, viral, or fungal infection  You may also be given medicines to dilate your bronchial tubes to help you breathe more easily  · Take your medicine as directed  Contact your healthcare provider if you think your medicine is not helping or if you have side effects  Tell him or her if you are allergic to any medicine  Keep a list of the medicines, vitamins, and herbs you take  Include the amounts, and when and why you take them  Bring the list or the pill bottles to follow-up visits  Carry your medicine list with you in case of an emergency  Follow up with your healthcare provider within 3 days or as directed: You may need another x-ray  Write down your questions so you remember to ask them during your visits  Deep breathing and coughing:  Deep breathing helps open the air passages in your lungs  Coughing helps bring up mucus from your lungs  Take a deep breath and hold the breath as long as you can  Then push the air out of your lungs with a deep, strong cough  Spit out any mucus you have coughed up  Take 10 deep breaths in a row every hour that you are awake  Remember to follow each deep breath with a cough  Do not smoke or allow others to smoke around you:  Nicotine and other chemicals in cigarettes and cigars can cause lung damage  Ask your healthcare provider for information if you currently smoke and need help to quit  E-cigarettes or smokeless tobacco still contain nicotine  Talk to your healthcare provider before you use these products  Manage CAP at home:   · Breathe warm, moist air  This helps loosen mucus   Loosely place a warm, wet washcloth over your nose and mouth  A room humidifier may also help make the air moist     · Drink liquids as directed  Ask your healthcare provider how much liquid to drink each day and which liquids to drink  Liquids help make mucus thin and easier to get out of your body  · Gently tap your chest   This helps loosen mucus so it is easier to cough  Lie with your head lower than your chest several times a day and tap your chest      · Get plenty of rest   Rest helps your body heal   Prevent CAP:   · Wash your hands often with soap and water  Carry germ-killing hand gel with you  You can use the gel to clean your hands when soap and water are not available  Do not touch your eyes, nose, or mouth unless you have washed your hands first      · Clean surfaces often  Clean doorknobs, countertops, cell phones, and other surfaces that are touched often  · Always cover your mouth when you cough  Cough into a tissue or your shirtsleeve so you do not spread germs from your hands  · Try to avoid people who have a cold or the flu  If you are sick, stay away from others as much as possible  · Ask about vaccines  You may need a vaccine to help prevent pneumonia  Get an influenza (flu) vaccine every year as soon as it becomes available  © 2017 [employer] Information is for End User's use only and may not be sold, redistributed or otherwise used for commercial purposes  All illustrations and images included in CareNotes® are the copyrighted property of [employer]  or Kin Hernandez  The above information is an  only  It is not intended as medical advice for individual conditions or treatments  Talk to your doctor, nurse or pharmacist before following any medical regimen to see if it is safe and effective for you      Community Acquired Pneumonia   WHAT YOU NEED TO KNOW:   Community-acquired pneumonia (CAP) is a lung infection that you get outside of a hospital or nursing home setting  Your lungs become inflamed and cannot work well  CAP may be caused by bacteria, viruses, or fungi  DISCHARGE INSTRUCTIONS:   Return to the emergency department if:   · You are confused and cannot think clearly  · You have increased trouble breathing  · Your lips or fingernails turn gray or blue  Contact your healthcare provider if:   · Your symptoms do not get better, or they get worse  · You are urinating less, or not at all  · You have questions or concerns about your condition or care  Medicines:   · Medicines  may be given to treat a bacterial, viral, or fungal infection  You may also be given medicines to dilate your bronchial tubes to help you breathe more easily  · Take your medicine as directed  Contact your healthcare provider if you think your medicine is not helping or if you have side effects  Tell him or her if you are allergic to any medicine  Keep a list of the medicines, vitamins, and herbs you take  Include the amounts, and when and why you take them  Bring the list or the pill bottles to follow-up visits  Carry your medicine list with you in case of an emergency  Follow up with your healthcare provider within 3 days or as directed: You may need another x-ray  Write down your questions so you remember to ask them during your visits  Deep breathing and coughing:  Deep breathing helps open the air passages in your lungs  Coughing helps bring up mucus from your lungs  Take a deep breath and hold the breath as long as you can  Then push the air out of your lungs with a deep, strong cough  Spit out any mucus you have coughed up  Take 10 deep breaths in a row every hour that you are awake  Remember to follow each deep breath with a cough  Do not smoke or allow others to smoke around you:  Nicotine and other chemicals in cigarettes and cigars can cause lung damage  Ask your healthcare provider for information if you currently smoke and need help to quit  E-cigarettes or smokeless tobacco still contain nicotine  Talk to your healthcare provider before you use these products  Manage CAP at home:   · Breathe warm, moist air  This helps loosen mucus  Loosely place a warm, wet washcloth over your nose and mouth  A room humidifier may also help make the air moist     · Drink liquids as directed  Ask your healthcare provider how much liquid to drink each day and which liquids to drink  Liquids help make mucus thin and easier to get out of your body  · Gently tap your chest   This helps loosen mucus so it is easier to cough  Lie with your head lower than your chest several times a day and tap your chest      · Get plenty of rest   Rest helps your body heal   Prevent CAP:   · Wash your hands often with soap and water  Carry germ-killing hand gel with you  You can use the gel to clean your hands when soap and water are not available  Do not touch your eyes, nose, or mouth unless you have washed your hands first      · Clean surfaces often  Clean doorknobs, countertops, cell phones, and other surfaces that are touched often  · Always cover your mouth when you cough  Cough into a tissue or your shirtsleeve so you do not spread germs from your hands  · Try to avoid people who have a cold or the flu  If you are sick, stay away from others as much as possible  · Ask about vaccines  You may need a vaccine to help prevent pneumonia  Get an influenza (flu) vaccine every year as soon as it becomes available  © 2017 [employer] Information is for End User's use only and may not be sold, redistributed or otherwise used for commercial purposes  All illustrations and images included in CareNotes® are the copyrighted property of [employer]  or Kin Hernandez  The above information is an  only  It is not intended as medical advice for individual conditions or treatments   Talk to your doctor, nurse or pharmacist before following any medical regimen to see if it is safe and effective for you

## 2019-01-18 NOTE — PLAN OF CARE

## 2019-01-18 NOTE — SOCIAL WORK
CM reviewed case with [facility] resident Olga Harris  Pt is not medically stable for discharge  Pt is anticipated to have no needs at time of discharge

## 2019-01-18 NOTE — PLAN OF CARE
DISCHARGE PLANNING - CARE MANAGEMENT     Discharge to post-acute care or home with appropriate resources Progressing        METABOLIC, FLUID AND ELECTROLYTES - ADULT     Electrolytes maintained within normal limits Progressing     Fluid balance maintained Progressing     Glucose maintained within target range Progressing        Potential for Falls     Patient will remain free of falls Progressing        Prexisting or High Potential for Compromised Skin Integrity     Skin integrity is maintained or improved Progressing

## 2019-01-18 NOTE — DISCHARGE SUMMARY
[facility] Discharge Summary - Elza Orozco 59 y o  male MRN: [id number]    [address] [facility]  Room / Bed: [facility] [address]62 Encounter: [id number]    BRIEF OVERVIEW    Admitting Provider: Irwin Soliz MD  Discharge Provider: No att  providers found  Primary Care Physician at Discharge: Irwin Soliz MD    Discharge To: Home    Admission Date: 1/16/2019     Discharge Date: 1/18/2019  1:57 PM    Primary Discharge Diagnosis  Principal Problem:    Sepsis due to pneumonia [facility])  Active Problems:    Diabetes ([facility] )    Heroin dependence ([facility] )    Other [facility]se[facility] )    Hyponatremia  Resolved Problems:    * No resolved hospital problems   *      Discharge Disposition: Home/Self Care  Discharged With Lines: no      Outpatient Follow-Up  Call and make appointment with patient when discharged  Follow up with consulting providers  none required   Active Issues Requiring Follow-up   none    Code Status: Prior  Advance Directive and Living Will: <no information>  Power of :    POLST:      Medications   Current Discharge Medication List      CONTINUE these medications which have NOT CHANGED    Details   metFORMIN (GLUCOPHAGE) 500 mg tablet Take 750 mg by mouth 2 (two) times a day        cyclobenzaprine (FLEXERIL) 10 mg tablet Take 1 tablet (10 mg total) by mouth 3 (three) times a day as needed for muscle spasms for up to 5 days  Qty: 15 tablet, Refills: 0    Associated Diagnoses: Strain of muscle, fascia and tendon of lower back, initial encounter      Dextromethorphan-Guaifenesin (MUCINEX DM PO) Take 1 tablet by mouth daily      ibuprofen (MOTRIN) 600 mg tablet Take 600 mg by mouth every 6 (six) hours      methadone (DOLOPHINE) 10 mg tablet Take 80 mg by mouth daily,           Current Discharge Medication List      START taking these medications    Details   albuterol (PROVENTIL HFA,VENTOLIN HFA) 90 mcg/act inhaler Inhale 2 puffs every 4 (four) hours as needed for wheezing for up to 30 days  Qty: 6 7 g, Refills: 0    Associated Diagnoses: SOB (shortness of breath)      cefdinir (OMNICEF) 300 mg capsule Take 1 capsule (300 mg total) by mouth 2 (two) times a day for 5 days  Qty: 10 capsule, Refills: 0    Associated Diagnoses: Sepsis due to pneumonia ([facility] )             Allergies  Allergies   Allergen Reactions    Imitrex [Sumatriptan] Hallucinations     Discharge Diet: regular diet  Activity restrictions: none    Praveen Smalls is a 59 y o  male with a past medical history of diabetes mellitus, opioid dependence on methadone, tobacco abuse who presents with cold symptoms over the past couple months  Report primarily from daughter is the patient is lethargic and unable answer most questions  The daughter reports that he has been having cold-like symptoms since November and recently over the past few days has become more lethargic with nonbloody nonbilious emesis, nonproductive cough, decreased alertness, shortness of breath  The patient did not come in earlier due to lack of insurance        Unable to answer remainder review of systems due to the patient being severely lethargic and disoriented      In the emergency department, the patient received ceftriaxone and azithromycin  During his hospital course, blood cultures were negative, flu and RSV negative  His white count trended down to 17 and procalcitonin down to 38  He remained afebrile and vital signs were stable  He was discharged to home with instructions to call to make an appointments outside medical center and continued on oral cefdinir for a total 7 day course  Presenting Problem/History of Present Illness  Principal Problem:    Sepsis due to pneumonia [facility])  Active Problems:    Diabetes ([facility] )    Heroin dependence ([facility] )    Other emphysema ([facility] )    Hyponatremia  Resolved Problems:    * No resolved hospital problems  *       1   Sepsis secondary to multilobar pneumonia:   AM Procalcitonin 38 down from 69 yesterday  WBC 17 this morning down from 21 on admission  Strep pneumoniae urine antigen negative  Patient afebrile overnight, vital signs stable  · Transition to p o  Cefdinir for 7 day course  · Blood cultures on 01/16 with no growth, flu and RSV negative     2  Diabetes mellitus:  Blood glucose 340 on admission likely secondary to uncontrolled diabetes and stress response from infection  Hemoglobin A1c 8 3  · Added Lantus 5 units at bedtime and continue insulin sliding scale     3  Opioid dependence  · Restarted home methadone 90 mg daily per [facility]      4  Tobacco abuse  · Nicotine patch     5  Hyponatremia  · Resolved    Discharge Condition: good    Discharge  Statement   I spent 1 hour minutes discharging the patient  This time was spent on the day of discharge  I had direct contact with the patient on the day of discharge  Additional documentation is required if more than 30 minutes were spent on discharge       Ghislaine Richey MD  PGY-1 Internal Medicine

## 2019-01-19 LAB
BACTERIA BLD CULT: ABNORMAL
GRAM STN SPEC: ABNORMAL

## 2019-01-21 LAB — BACTERIA BLD CULT: NORMAL

## 2019-02-04 ENCOUNTER — OFFICE VISIT (OUTPATIENT)
Dept: INTERNAL MEDICINE CLINIC | Facility: CLINIC | Age: 59
End: 2019-02-04

## 2019-02-04 VITALS
HEART RATE: 100 BPM | DIASTOLIC BLOOD PRESSURE: 70 MMHG | TEMPERATURE: 99 F | SYSTOLIC BLOOD PRESSURE: 110 MMHG | BODY MASS INDEX: 22.78 KG/M2 | WEIGHT: 158.73 LBS

## 2019-02-04 DIAGNOSIS — R06.02 SOB (SHORTNESS OF BREATH): ICD-10-CM

## 2019-02-04 DIAGNOSIS — Z12.11 SCREENING FOR COLON CANCER: ICD-10-CM

## 2019-02-04 DIAGNOSIS — E11.8 TYPE 2 DIABETES MELLITUS WITH COMPLICATION, UNSPECIFIED WHETHER LONG TERM INSULIN USE: Primary | ICD-10-CM

## 2019-02-04 DIAGNOSIS — R05.9 COUGH: ICD-10-CM

## 2019-02-04 LAB
LEFT EYE DIABETIC RETINOPATHY: NORMAL
LEFT EYE IMAGE QUALITY: NORMAL
LEFT EYE MACULAR EDEMA: NORMAL
LEFT EYE OTHER RETINOPATHY: NORMAL
RIGHT EYE DIABETIC RETINOPATHY: NORMAL
RIGHT EYE IMAGE QUALITY: NORMAL
RIGHT EYE MACULAR EDEMA: NORMAL
RIGHT EYE OTHER RETINOPATHY: NORMAL
SEVERITY (EYE EXAM): NORMAL

## 2019-02-04 PROCEDURE — 3725F SCREEN DEPRESSION PERFORMED: CPT | Performed by: INTERNAL MEDICINE

## 2019-02-04 PROCEDURE — 92250 FUNDUS PHOTOGRAPHY W/I&R: CPT | Performed by: INTERNAL MEDICINE

## 2019-02-04 PROCEDURE — 2022F DILAT RTA XM EVC RTNOPTHY: CPT | Performed by: INTERNAL MEDICINE

## 2019-02-04 PROCEDURE — 1111F DSCHRG MED/CURRENT MED MERGE: CPT | Performed by: INTERNAL MEDICINE

## 2019-02-04 PROCEDURE — 99213 OFFICE O/P EST LOW 20 MIN: CPT | Performed by: INTERNAL MEDICINE

## 2019-02-04 RX ORDER — CEFDINIR 300 MG/1
300 CAPSULE ORAL EVERY 12 HOURS SCHEDULED
COMMUNITY
End: 2019-02-04

## 2019-02-04 RX ORDER — ALBUTEROL SULFATE 90 UG/1
2 AEROSOL, METERED RESPIRATORY (INHALATION) EVERY 4 HOURS PRN
Qty: 6.7 G | Refills: 0 | Status: SHIPPED | OUTPATIENT
Start: 2019-02-04 | End: 2019-02-05 | Stop reason: SDUPTHER

## 2019-02-04 RX ORDER — BENZONATATE 100 MG/1
100 CAPSULE ORAL 3 TIMES DAILY PRN
Qty: 20 CAPSULE | Refills: 0 | Status: SHIPPED | OUTPATIENT
Start: 2019-02-04 | End: 2019-02-05 | Stop reason: SDUPTHER

## 2019-02-04 NOTE — ASSESSMENT & PLAN NOTE
Lab Results   Component Value Date    HGBA1C 8 3 (H) 01/18/2019       No results for input(s): POCGLU in the last 72 hours  Blood Sugar Average: Last 72 hrs:   last A1c as above 8 3 percent  Continue metformin 750 milligram twice a day  Will add sitagliptin 100 milligram once a day

## 2019-02-04 NOTE — PATIENT INSTRUCTIONS
Community Acquired Pneumonia   WHAT YOU NEED TO KNOW:   Community-acquired pneumonia (CAP) is a lung infection that you get outside of a hospital or nursing home setting  Your lungs become inflamed and cannot work well  CAP may be caused by bacteria, viruses, or fungi  DISCHARGE INSTRUCTIONS:   Return to the emergency department if:   · You are confused and cannot think clearly  · You have increased trouble breathing  · Your lips or fingernails turn gray or blue  Contact your healthcare provider if:   · Your symptoms do not get better, or they get worse  · You are urinating less, or not at all  · You have questions or concerns about your condition or care  Medicines:   · Medicines  may be given to treat a bacterial, viral, or fungal infection  You may also be given medicines to dilate your bronchial tubes to help you breathe more easily  · Take your medicine as directed  Contact your healthcare provider if you think your medicine is not helping or if you have side effects  Tell him or her if you are allergic to any medicine  Keep a list of the medicines, vitamins, and herbs you take  Include the amounts, and when and why you take them  Bring the list or the pill bottles to follow-up visits  Carry your medicine list with you in case of an emergency  Follow up with your healthcare provider within 3 days or as directed: You may need another x-ray  Write down your questions so you remember to ask them during your visits  Deep breathing and coughing:  Deep breathing helps open the air passages in your lungs  Coughing helps bring up mucus from your lungs  Take a deep breath and hold the breath as long as you can  Then push the air out of your lungs with a deep, strong cough  Spit out any mucus you have coughed up  Take 10 deep breaths in a row every hour that you are awake  Remember to follow each deep breath with a cough     Do not smoke or allow others to smoke around you:  Nicotine and other chemicals in cigarettes and cigars can cause lung damage  Ask your healthcare provider for information if you currently smoke and need help to quit  E-cigarettes or smokeless tobacco still contain nicotine  Talk to your healthcare provider before you use these products  Manage CAP at home:   · Breathe warm, moist air  This helps loosen mucus  Loosely place a warm, wet washcloth over your nose and mouth  A room humidifier may also help make the air moist     · Drink liquids as directed  Ask your healthcare provider how much liquid to drink each day and which liquids to drink  Liquids help make mucus thin and easier to get out of your body  · Gently tap your chest   This helps loosen mucus so it is easier to cough  Lie with your head lower than your chest several times a day and tap your chest      · Get plenty of rest   Rest helps your body heal   Prevent CAP:   · Wash your hands often with soap and water  Carry germ-killing hand gel with you  You can use the gel to clean your hands when soap and water are not available  Do not touch your eyes, nose, or mouth unless you have washed your hands first      · Clean surfaces often  Clean doorknobs, countertops, cell phones, and other surfaces that are touched often  · Always cover your mouth when you cough  Cough into a tissue or your shirtsleeve so you do not spread germs from your hands  · Try to avoid people who have a cold or the flu  If you are sick, stay away from others as much as possible  · Ask about vaccines  You may need a vaccine to help prevent pneumonia  Get an influenza (flu) vaccine every year as soon as it becomes available  © 2017 [address] [employer] Information is for End User's use only and may not be sold, redistributed or otherwise used for commercial purposes  All illustrations and images included in CareNotes® are the copyrighted property of [employer]  or Kin Hernandez    The above information is an  only  It is not intended as medical advice for individual conditions or treatments  Talk to your doctor, nurse or pharmacist before following any medical regimen to see if it is safe and effective for you
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
## 2019-02-04 NOTE — ASSESSMENT & PLAN NOTE
Has been recently discharged after treated for community-acquired pneumonia with 1 week course of antibiotic -  For has pleuritic chest pain however today afebrile lungs exam remarkable for expiratory  Wheezes   does not appear in respiratory distress  Will order chest x-ray in 5 weeks to follow up on his bilateral infiltrate cited in January 16 19    Will give him Tessalon Perles for cough   Will monitor of antibiotic and advised to go to the emergency room if his symptoms worsen

## 2019-02-04 NOTE — PROGRESS NOTES
INTERNAL MEDICINE FOLLOW-UP OFFICE VISIT  [facility]  [address]    NAME: Aziza Goetz  AGE: 59 y o  SEX: male    DATE OF ENCOUNTER: 2/4/2019    Assessment and Plan     Problem List Items Addressed This Visit     Diabetes ([address] ) - Primary     Lab Results   Component Value Date    HGBA1C 8 3 (H) 01/18/2019       No results for input(s): POCGLU in the last 72 hours  Blood Sugar Average: Last 72 hrs:   last A1c as above 8 3 percent  Continue metformin 750 milligram twice a day  Will add sitagliptin 100 milligram once a day  Relevant Medications    metFORMIN (GLUCOPHAGE) 500 mg tablet    sitaGLIPtin (JANUVIA) 100 mg tablet    Other Relevant Orders    IRIS Diabetc eye exam    Cough     Has been recently discharged after treated for community-acquired pneumonia with 1 week course of antibiotic -  For has pleuritic chest pain however today afebrile lungs exam remarkable for expiratory  Wheezes   does not appear in respiratory distress  Will order chest x-ray in 5 weeks to follow up on his bilateral infiltrate cited in January 16 19    Will give him Tessalon Perles for cough   Will monitor of antibiotic and advised to go to the emergency room if his symptoms worsen         Relevant Medications    benzonatate (TESSALON PERLES) 100 mg capsule    Other Relevant Orders    XR chest pa & lateral      Other Visit Diagnoses     Screening for colon cancer        Relevant Orders    Ambulatory referral to Gastroenterology    SOB (shortness of breath)        Relevant Medications    albuterol (PROVENTIL HFA,VENTOLIN HFA) 90 mcg/act inhaler          Orders Placed This Encounter   Procedures    IRIS Diabetc eye exam    XR chest pa & lateral    Ambulatory referral to Gastroenterology       - Counseling Documentation: patient was counseled regarding: instructions for management and risks and benefits of treatment options    Chief Complaint     Chief Complaint Patient presents with    Follow-up    Diabetes       History of Present Illness     This is a 40-year-old male with past medical history of opioid dependence-heroin abuse on methadone follow-up at methadone clinic, type 2 diabetes mellitus non-insulin-dependent, and tobacco abuse who was recently discharged from the hospital after treated for community-acquired pneumonia with 1 week course of antibiotic  He presented to the clinic today to follow-up on his pneumonia  History complaining of cough with clear sputum associated with pleuritic chest pain was seeking further antibiotic however he is afebrile, lung exam remarkable for expiratory wheeze mild no crackles or bronchial breath sounds not in acute respiratory distress  Does not appear sick  In terms of his diabetes his last A1c was 8 3 percent January 18 of this year he is on 1500 total of metformin a day for her  We will add the sitagliptin 100 milligram once a day  High-risk exam will be done today as well as diabetic foot exam          The following portions of the patient's history were reviewed and updated as appropriate: allergies, current medications, past family history, past medical history, past social history, past surgical history and problem list     Review of Systems     Review of Systems   Constitutional: Positive for fever  Respiratory: Negative for shortness of breath  Cardiovascular: Positive for chest pain  Gastrointestinal: Negative for diarrhea, nausea and vomiting         Active Problem List     Patient Active Problem List   Diagnosis    Diabetes [address] )    Heroin dependence ([facility] )    Sepsis due to pneumonia ([facility] )    Other emphysema (HCC)    Hyponatremia    Cough       Objective     /70 (BP Location: Right arm, Patient Position: Sitting, Cuff Size: Adult)   Pulse 100   Temp 99 °F (37 2 °C) (Oral)   Wt 72 kg (158 lb 11 7 oz)   BMI 22 78 kg/m²     Physical Exam   Constitutional: He is oriented to person, place, and time  No distress  Thin appearing  poorly groomed   Eyes: Right eye exhibits no discharge  Left eye exhibits no discharge  No scleral icterus  Neck: No tracheal deviation present  Cardiovascular: Normal rate and regular rhythm  Exam reveals no friction rub  Pulses are weak pulses  No murmur heard  Pulses:       Dorsalis pedis pulses are 1+ on the right side, and 1+ on the left side  Posterior tibial pulses are 0 on the right side, and 1+ on the left side  Pulmonary/Chest: Effort normal  No stridor  No respiratory distress  He has wheezes  Abdominal: Soft  Bowel sounds are normal  He exhibits no distension  Musculoskeletal: He exhibits no edema or deformity  Feet:    Feet:   Right Foot:   Skin Integrity: Negative for ulcer, skin breakdown, erythema, warmth, callus or dry skin  Left Foot:   Skin Integrity: Negative for ulcer, skin breakdown, erythema, warmth, callus or dry skin  Lymphadenopathy:     He has no cervical adenopathy  Neurological: He is alert and oriented to person, place, and time  Skin: He is not diaphoretic  Psychiatric: His behavior is normal  Thought content normal    Poorly groomed   Nursing note and vitals reviewed        Pertinent Laboratory/Diagnostic Studies:  CBC:   Lab Results   Component Value Date/Time    WBC 17 79 (H) 01/18/2019 04:43 AM    RBC 3 85 (L) 01/18/2019 04:43 AM    HGB 12 1 01/18/2019 04:43 AM    HCT 36 8 01/18/2019 04:43 AM    MCV 96 01/18/2019 04:43 AM    MCH 31 4 01/18/2019 04:43 AM    MCHC 32 9 01/18/2019 04:43 AM    RDW 12 6 01/18/2019 04:43 AM    MPV 10 4 01/18/2019 04:43 AM     (L) 01/18/2019 04:43 AM    NRBC 0 01/16/2019 05:07 PM    NEUTOPHILPCT 55 09/13/2016 08:45 AM    LYMPHOPCT 8 (L) 01/16/2019 05:07 PM    LYMPHOPCT 26 09/13/2016 08:45 AM    MONOPCT 1 (L) 01/16/2019 05:07 PM    MONOPCT 10 09/13/2016 08:45 AM    EOSPCT 0 01/16/2019 05:07 PM    EOSPCT 8 (H) 09/13/2016 08:45 AM    BASOPCT 0 01/16/2019 05:07 PM    BASOPCT 1 09/13/2016 08:45 AM    NEUTROABS 5 35 09/13/2016 08:45 AM    LYMPHSABS 2 56 09/13/2016 08:45 AM    MONOSABS 0 94 09/13/2016 08:45 AM    EOSABS 0 00 01/16/2019 05:07 PM    EOSABS 0 77 (H) 09/13/2016 08:45 AM     Chemistry Profile:   Lab Results   Component Value Date/Time    K 3 6 01/18/2019 04:43 AM     01/18/2019 04:43 AM    CO2 23 01/18/2019 04:43 AM    CO2 26 08/28/2018 06:31 PM    BUN 20 01/18/2019 04:43 AM    CREATININE 0 58 (L) 01/18/2019 04:43 AM    GLUC 121 01/18/2019 04:43 AM    GLUCOSE 170 (H) 08/28/2018 06:31 PM    CALCIUM 8 3 01/18/2019 04:43 AM    AST 24 01/16/2019 05:07 PM    ALT 22 01/16/2019 05:07 PM    ALKPHOS 93 01/16/2019 05:07 PM    EGFR 111 01/18/2019 04:43 AM    EGFR 109 08/28/2018 06:31 PM     CBC:   Results from last 6 Months  Lab Units 01/18/19 0443 01/16/19  1707   WBC Thousand/uL 17 79*  --  21 13*   RBC Million/uL 3 85*  --  4 52   HEMOGLOBIN g/dL 12 1  --  14 6   HEMATOCRIT % 36 8  --  42 9   MCV fL 96  --  95   MCH pg 31 4  --  32 3   MCHC g/dL 32 9  --  34 0   RDW % 12 6  --  12 0   MPV fL 10 4  < > 10 3   PLATELETS Thousands/uL 135*  < > 151   NRBC AUTO /100 WBCs  --   --  0   LYMPHO PCT %  --   --  8*   MONO PCT %  --   --  1*   EOS PCT %  --   --  0   BASOS PCT %  --   --  0   EOS ABS Thousand/uL  --   --  0 00   < > = values in this interval not displayed  Chemistry Profile:   Results from last 6 Months  Lab Units 01/18/19 0443 01/16/19  1707 08/28/18  1831   POTASSIUM mmol/L 3 6  < > 3 7  --    CHLORIDE mmol/L 104  < > 97*  --    CO2 mmol/L 23  < > 25  --    CO2, I-STAT mmol/L  --   --   --  26   BUN mg/dL 20  < > 45*  --    CREATININE mg/dL 0 58*  < > 1 18  --    GLUCOSE RANDOM mg/dL 121  < > 340*  --    GLUCOSE, ISTAT mg/dl  --   --   --  170*   CALCIUM mg/dL 8 3  < > 8 9  --    AST U/L  --   --  24  --    ALT U/L  --   --  22  --    ALK PHOS U/L  --   --  93  --    EGFR ml/min/1 73sq m 111  < > 67 109   < > = values in this interval not displayed      Current Medications     Current Outpatient Prescriptions:     albuterol (PROVENTIL HFA,VENTOLIN HFA) 90 mcg/act inhaler, Inhale 2 puffs every 4 (four) hours as needed for wheezing for up to 30 days, Disp: 6 7 g, Rfl: 0    ibuprofen (MOTRIN IB) 200 mg tablet, Take by mouth, Disp: , Rfl:     metFORMIN (GLUCOPHAGE) 500 mg tablet, Take 1 5 tablets (750 mg total) by mouth 2 (two) times a day, Disp: 90 tablet, Rfl: 3    methadone (DOLOPHINE) 10 mg tablet, Take 80 mg by mouth daily,, Disp: , Rfl:     benzonatate (TESSALON PERLES) 100 mg capsule, Take 1 capsule (100 mg total) by mouth 3 (three) times a day as needed for cough, Disp: 20 capsule, Rfl: 0    sitaGLIPtin (JANUVIA) 100 mg tablet, Take 1 tablet (100 mg total) by mouth daily, Disp: 90 tablet, Rfl: 1    Health Maintenance     Health Maintenance   Topic Date Due    Hepatitis C Screening  1960    Depression Screening PHQ  1960    CRC Screening: Colonoscopy  1960    DM Eye Exam  08/20/2016    Diabetic Foot Exam  06/13/2017    URINE MICROALBUMIN  09/13/2017    INFLUENZA VACCINE  04/04/2019 (Originally 7/1/2018)    Pneumococcal PPSV23 Medium Risk Adult (1 of 1 - PPSV23) 04/04/2019 (Originally 4/8/1977)    HEMOGLOBIN A1C  04/18/2019    DTaP,Tdap,and Td Vaccines (2 - Td) 06/04/2026   Colonoscopy screening ordered today  IRIS eye exam done today  Immunization History   Administered Date(s) Administered    Tdap 06/04/2016   He refused his flu shot and PPSV 23 toay as he is not feeling good  Martha Ramirez MD  PGY II  2/4/2019 4:22 PM            Diabetic Foot Exam    Patient's shoes and socks removed  Right Foot/Ankle   Right Foot Inspection  Skin Exam: skin normal and skin intact no dry skin, no warmth, no callus, no erythema, no maceration, no abnormal color, no pre-ulcer, no ulcer and no callus                          Toe Exam: no swelling  Sensory       Monofilament testing: diminished  Vascular    The right DP pulse is 1+   The right PT pulse is 0  Left Foot/Ankle  Left Foot Inspection  Skin Exam: skin normal and skin intactno dry skin, no warmth, no erythema, no maceration, normal color, no pre-ulcer, no ulcer and no callus                         Toe Exam: no swelling                   Sensory       Monofilament: diminished  Vascular    The left DP pulse is 1+  The left PT pulse is 1+  Assign Risk Category:  No deformity present;  Loss of protective sensation; Weak pulses       Risk: 1          PHQ-9 Depression Screening    PHQ-9:    Frequency of the following problems over the past two weeks:       Little interest or pleasure in doing things:  0 - not at all  Feeling down, depressed, or hopeless:  0 - not at all  PHQ-2 Score:  0

## 2019-02-05 ENCOUNTER — TELEPHONE (OUTPATIENT)
Dept: INTERNAL MEDICINE CLINIC | Facility: CLINIC | Age: 59
End: 2019-02-05

## 2019-02-05 DIAGNOSIS — R06.02 SOB (SHORTNESS OF BREATH): ICD-10-CM

## 2019-02-05 DIAGNOSIS — R05.9 COUGH: ICD-10-CM

## 2019-02-05 DIAGNOSIS — E11.8 TYPE 2 DIABETES MELLITUS WITH COMPLICATION, UNSPECIFIED WHETHER LONG TERM INSULIN USE: ICD-10-CM

## 2019-02-05 RX ORDER — BENZONATATE 100 MG/1
100 CAPSULE ORAL 3 TIMES DAILY PRN
Qty: 20 CAPSULE | Refills: 1 | Status: SHIPPED | OUTPATIENT
Start: 2019-02-05 | End: 2019-04-12 | Stop reason: ALTCHOICE

## 2019-02-05 RX ORDER — ALBUTEROL SULFATE 90 UG/1
2 AEROSOL, METERED RESPIRATORY (INHALATION) EVERY 4 HOURS PRN
Qty: 6.7 G | Refills: 2 | Status: SHIPPED | OUTPATIENT
Start: 2019-02-05 | End: 2019-03-03 | Stop reason: SDUPTHER

## 2019-02-05 RX ORDER — ALBUTEROL SULFATE 90 UG/1
2 AEROSOL, METERED RESPIRATORY (INHALATION) EVERY 4 HOURS PRN
Qty: 6.7 G | Refills: 0 | Status: SHIPPED | OUTPATIENT
Start: 2019-02-05 | End: 2019-02-05 | Stop reason: SDUPTHER

## 2019-02-05 NOTE — TELEPHONE ENCOUNTER
THIS MEDICATION ALBUTEROL 90 MCG/ACT INHALER WAS PRINTED YESTERDAY  CAN YOU PLEASE SEND IT TO THE PATIENT PHARMACY   THANKS

## 2019-02-05 NOTE — TELEPHONE ENCOUNTER
[address] IS NOT ENROLLED IN MEDICAID SO THE SCRIPTS HE SENT HAVE TO BE SENT AGAIN BY AN ATTENDING OR SOMEONE ELSE ENROLLED   CAN YOU PLEASE SEND    [facility]BPAR[facility] Best

## 2019-02-28 ENCOUNTER — TELEPHONE (OUTPATIENT)
Dept: INTERNAL MEDICINE CLINIC | Facility: CLINIC | Age: 59
End: 2019-02-28

## 2019-03-01 ENCOUNTER — TELEPHONE (OUTPATIENT)
Dept: INTERNAL MEDICINE CLINIC | Facility: CLINIC | Age: 59
End: 2019-03-01

## 2019-03-01 NOTE — TELEPHONE ENCOUNTER
I CONTACTED [facility] [employer] TO VERIFY IF PATIENTS PRIOR AUTH FOR JANUVIA WAS REVIEWED AND THEY INFORMED ME THAT AFFECTIVE TODAY 03/01/2019 PATIENT IS COVERED UNDER [employer]   IN ORDER TO VERIFY WHETHER OR NOT PRIOR AUTH IS NEEDED PATIENT NEEDS TO TAKE HIS [employer] CARITAS CARD INTO THE PHARMACY SO THEY CAN RUN HIS INFORMATION   I AM GOING TO CONTACT PATIENT TO INFORM HIM OF THIS   IF THE JANUVIA COMES UP AS NOT COVERED UNDER [employer] THEN I WILL BEGIN PRIOR AUTH AND HE WILL PROVIDE ME WITH ID NUMBER AND INSURANCE INFO

## 2019-03-01 NOTE — TELEPHONE ENCOUNTER
SPOKE WITH PATIENT AND MADE HIM AWARE, STATES THAT HE WILL BE GOING TO PHARMACY TO GIVE HIS INSURANCE CARDS TO PHARMACIST

## 2019-03-03 DIAGNOSIS — R06.02 SOB (SHORTNESS OF BREATH): ICD-10-CM

## 2019-03-03 RX ORDER — ALBUTEROL SULFATE 90 UG/1
2 AEROSOL, METERED RESPIRATORY (INHALATION) EVERY 4 HOURS PRN
Qty: 6.7 G | Refills: 2 | Status: SHIPPED | OUTPATIENT
Start: 2019-03-03 | End: 2019-03-29 | Stop reason: SDUPTHER

## 2019-03-29 DIAGNOSIS — R06.02 SOB (SHORTNESS OF BREATH): ICD-10-CM

## 2019-04-02 RX ORDER — ALBUTEROL SULFATE 90 UG/1
2 AEROSOL, METERED RESPIRATORY (INHALATION) EVERY 4 HOURS PRN
Qty: 6.7 G | Refills: 2 | Status: SHIPPED | OUTPATIENT
Start: 2019-04-02 | End: 2019-04-03 | Stop reason: SDUPTHER

## 2019-04-03 DIAGNOSIS — R06.02 SOB (SHORTNESS OF BREATH): ICD-10-CM

## 2019-04-03 RX ORDER — ALBUTEROL SULFATE 90 UG/1
2 AEROSOL, METERED RESPIRATORY (INHALATION) EVERY 4 HOURS PRN
Qty: 6.7 G | Refills: 2 | Status: SHIPPED | OUTPATIENT
Start: 2019-04-03 | End: 2019-05-03

## 2019-04-11 ENCOUNTER — TELEPHONE (OUTPATIENT)
Dept: INTERNAL MEDICINE CLINIC | Facility: CLINIC | Age: 59
End: 2019-04-11

## 2019-04-12 ENCOUNTER — OFFICE VISIT (OUTPATIENT)
Dept: INTERNAL MEDICINE CLINIC | Facility: CLINIC | Age: 59
End: 2019-04-12

## 2019-04-12 VITALS
HEART RATE: 88 BPM | WEIGHT: 163.58 LBS | HEIGHT: 68 IN | TEMPERATURE: 97.8 F | DIASTOLIC BLOOD PRESSURE: 58 MMHG | SYSTOLIC BLOOD PRESSURE: 110 MMHG | BODY MASS INDEX: 24.79 KG/M2

## 2019-04-12 DIAGNOSIS — Z23 NEED FOR PNEUMOCOCCAL VACCINATION: ICD-10-CM

## 2019-04-12 DIAGNOSIS — R05.9 COUGH: ICD-10-CM

## 2019-04-12 DIAGNOSIS — Z23 NEED FOR INFLUENZA VACCINATION: ICD-10-CM

## 2019-04-12 DIAGNOSIS — F17.200 TOBACCO DEPENDENCE: Primary | Chronic | ICD-10-CM

## 2019-04-12 PROCEDURE — 90472 IMMUNIZATION ADMIN EACH ADD: CPT | Performed by: INTERNAL MEDICINE

## 2019-04-12 PROCEDURE — 90732 PPSV23 VACC 2 YRS+ SUBQ/IM: CPT | Performed by: INTERNAL MEDICINE

## 2019-04-12 PROCEDURE — 90682 RIV4 VACC RECOMBINANT DNA IM: CPT | Performed by: INTERNAL MEDICINE

## 2019-04-12 PROCEDURE — 99213 OFFICE O/P EST LOW 20 MIN: CPT | Performed by: INTERNAL MEDICINE

## 2019-04-12 PROCEDURE — 90471 IMMUNIZATION ADMIN: CPT | Performed by: INTERNAL MEDICINE

## 2019-04-19 ENCOUNTER — CLINICAL SUPPORT (OUTPATIENT)
Dept: INTERNAL MEDICINE CLINIC | Facility: CLINIC | Age: 59
End: 2019-04-19

## 2019-04-19 DIAGNOSIS — J43.8 OTHER EMPHYSEMA (HCC): Primary | Chronic | ICD-10-CM

## 2019-04-19 DIAGNOSIS — R05.9 COUGH: ICD-10-CM

## 2019-04-19 RX ORDER — ALBUTEROL SULFATE 2.5 MG/3ML
2.5 SOLUTION RESPIRATORY (INHALATION) ONCE
Status: COMPLETED | OUTPATIENT
Start: 2019-04-19 | End: 2019-04-19

## 2019-04-19 RX ADMIN — ALBUTEROL SULFATE 2.5 MG: 2.5 SOLUTION RESPIRATORY (INHALATION) at 14:34

## 2019-05-13 ENCOUNTER — HOSPITAL ENCOUNTER (OUTPATIENT)
Dept: RADIOLOGY | Facility: HOSPITAL | Age: 59
Discharge: HOME | End: 2019-05-13
Payer: COMMERCIAL

## 2019-05-13 DIAGNOSIS — F17.200 TOBACCO DEPENDENCE: ICD-10-CM

## 2019-05-15 ENCOUNTER — TELEPHONE (OUTPATIENT)
Dept: OTHER | Facility: OTHER | Age: 59
End: 2019-05-15

## 2019-06-24 ENCOUNTER — TELEPHONE (OUTPATIENT)
Dept: INTERNAL MEDICINE CLINIC | Facility: CLINIC | Age: 59
End: 2019-06-24

## 2019-07-17 ENCOUNTER — OFFICE VISIT (OUTPATIENT)
Dept: INTERNAL MEDICINE CLINIC | Facility: CLINIC | Age: 59
End: 2019-07-17

## 2019-07-17 ENCOUNTER — TELEPHONE (OUTPATIENT)
Dept: INTERNAL MEDICINE CLINIC | Facility: CLINIC | Age: 59
End: 2019-07-17

## 2019-07-17 VITALS
BODY MASS INDEX: 22.99 KG/M2 | SYSTOLIC BLOOD PRESSURE: 108 MMHG | HEIGHT: 68 IN | TEMPERATURE: 98.3 F | DIASTOLIC BLOOD PRESSURE: 70 MMHG | HEART RATE: 84 BPM | WEIGHT: 151.68 LBS

## 2019-07-17 DIAGNOSIS — J43.8 OTHER EMPHYSEMA (HCC): Chronic | ICD-10-CM

## 2019-07-17 DIAGNOSIS — K22.3 ESOPHAGEAL RUPTURE: ICD-10-CM

## 2019-07-17 DIAGNOSIS — M25.512 BILATERAL SHOULDER PAIN, UNSPECIFIED CHRONICITY: ICD-10-CM

## 2019-07-17 DIAGNOSIS — R06.02 SHORTNESS OF BREATH: ICD-10-CM

## 2019-07-17 DIAGNOSIS — J44.9 CHRONIC OBSTRUCTIVE PULMONARY DISEASE, UNSPECIFIED COPD TYPE (HCC): ICD-10-CM

## 2019-07-17 DIAGNOSIS — R01.1 HEART MURMUR: ICD-10-CM

## 2019-07-17 DIAGNOSIS — M25.511 BILATERAL SHOULDER PAIN, UNSPECIFIED CHRONICITY: ICD-10-CM

## 2019-07-17 DIAGNOSIS — E11.8 TYPE 2 DIABETES MELLITUS WITH COMPLICATION, UNSPECIFIED WHETHER LONG TERM INSULIN USE: Primary | ICD-10-CM

## 2019-07-17 DIAGNOSIS — J98.4 RESTRICTIVE LUNG DISEASE: ICD-10-CM

## 2019-07-17 LAB — SL AMB POCT HEMOGLOBIN AIC: 5.8 (ref ?–6.5)

## 2019-07-17 PROCEDURE — 3044F HG A1C LEVEL LT 7.0%: CPT | Performed by: INTERNAL MEDICINE

## 2019-07-17 PROCEDURE — 83036 HEMOGLOBIN GLYCOSYLATED A1C: CPT | Performed by: INTERNAL MEDICINE

## 2019-07-17 PROCEDURE — 99214 OFFICE O/P EST MOD 30 MIN: CPT | Performed by: INTERNAL MEDICINE

## 2019-07-17 PROCEDURE — 3008F BODY MASS INDEX DOCD: CPT | Performed by: INTERNAL MEDICINE

## 2019-07-17 RX ORDER — ALBUTEROL SULFATE 90 UG/1
AEROSOL, METERED RESPIRATORY (INHALATION)
Refills: 2 | COMMUNITY
Start: 2019-07-15 | End: 2019-07-18 | Stop reason: SDUPTHER

## 2019-07-17 NOTE — PROGRESS NOTES
ASSESSMENT/PLAN:  1) Shoulder pain  Patient has been having shoulder pain for 6 weeks  He has full range of motion with 5/5 strength on physical exam  Pain is reduced with ibuprofen  Advised to take 300 mg ibuprofen if he feels pain  Will see back in 5 weeks  2) Diabetes mellitus type 2  HbA1C 5 8  Well controlled on metformin and Januvia    3) Esophageal rupture  Patient denies any chest pain  Vital signs are within normal limits  Follow up with high resolution CT scan    4) Dyspnea with restrictive disease  Patient is using albuterol at least 3 times a day  Spirometry indicated restrictive lung disease  PFTs ordered with bronchodilator before and after  5) Heart murmur  Patient has a new heart murmur  Late systolic and attenuated on inspiration  Possibly MVP  Echocardiogram ordered  6) Tobacco cessation  Patient is still smoking, however he is quitting slowly on his own terms  Chantix did not help him      Health Maintenance:  Advised diet and exercise  Advised to refrain from tobacco, alcohol, illicit drug use  Advised medical compliance    Schedule a follow-up appointment in 5 weeks  CHIEF COMPLAINT: Shoulder pain/Diabetes    HISTORY OF PRESENT ILLNESS:  Patient is a 38-year-old male with past medical history for diabetes mellitus type 2, tobacco abuse, and heroin abuse on methadone  He came in complaining of shoulder pain for the past 6 weeks that improve with ibuprofen 300 mg 3x per week  He has no problems moving his arms due to pain, but claims that he does sleep on his shoulders  He also uses his albuterol 3 times a day every day  He says that he has a productive cough in the morning clear sputum  He still smokes  Spirometry shows restrictive lung disease  We will follow up with a PFT  Patient claims that he remains to have bad breath  He also says that he has a bad taste with belching  He denies any regurgitation of undigested food   Previous low-dose CT scan showed pneumomediastinum  [facility] [employer] called him and told to visit the ER  However the patient claims that he thought it was a bad dream     SH:   Patient is currently unemployed, he was previously a   Patient admits to smoking and drinking  He also to heroin use, no IV use  The following portions of the patient's history were reviewed and updated as appropriate: allergies, current medications, past family history, past medical history, past social history, past surgical history and problem list     Review of Systems   Constitutional: Negative  HENT: Negative  Eyes: Negative  Respiratory: Positive for cough and shortness of breath  Cardiovascular: Negative for chest pain, palpitations and leg swelling  Gastrointestinal: Negative  Endocrine: Negative  Genitourinary: Negative  Musculoskeletal:        Bilateral shoulder pain, but full range of motion  Skin: Negative  Allergic/Immunologic: Negative  Neurological: Negative  Hematological: Negative  Psychiatric/Behavioral: Negative  OBJECTIVE:  Vitals:    07/17/19 1453   BP: 108/70   BP Location: Left arm   Patient Position: Sitting   Cuff Size: Adult   Pulse: 84   Temp: 98 3 °F (36 8 °C)   TempSrc: Oral   Weight: 68 8 kg (151 lb 10 8 oz)   Height: 5' 8" (1 727 m)     Physical Exam   Constitutional: He is oriented to person, place, and time  He appears well-developed and well-nourished  HENT:   Head: Normocephalic and atraumatic  Eyes: Pupils are equal, round, and reactive to light  Conjunctivae and EOM are normal    Neck: Normal range of motion  Neck supple  Cardiovascular: Normal rate and regular rhythm  Exam reveals no gallop and no friction rub  Murmur heard  Late systolic murmur attenuated with inspiration  Pulmonary/Chest: Effort normal  He has wheezes  Wheezing through out all lung fields   Abdominal: Soft  Bowel sounds are normal    Musculoskeletal: Normal range of motion     Bilateral upper extremities demonstrated full ROM and 5/5 strength  Neurological: He is alert and oriented to person, place, and time  Skin: Skin is warm and dry  Psychiatric: He has a normal mood and affect   His behavior is normal  Judgment and thought content normal          Current Outpatient Medications:     albuterol (PROVENTIL HFA,VENTOLIN HFA) 90 mcg/act inhaler, INHALE 2 PUFFS BY MOUTH EVERY 4 HOURS AS NEEDED FOR WHEEZING FOR UP TO 30 DAYS, Disp: , Rfl: 2    metFORMIN (GLUCOPHAGE) 500 mg tablet, Take 1 5 tablets (750 mg total) by mouth 2 (two) times a day, Disp: 90 tablet, Rfl: 3    methadone (DOLOPHINE) 10 mg tablet, Take 80 mg by mouth daily,, Disp: , Rfl:     sitaGLIPtin (JANUVIA) 100 mg tablet, Take 1 tablet (100 mg total) by mouth daily, Disp: 90 tablet, Rfl: 3    Past Medical History:   Diagnosis Date    Diabetes mellitus (HCC)      Past Surgical History:   Procedure Laterality Date    TONSILLECTOMY       Social History     Socioeconomic History    Marital status: Single     Spouse name: Not on file    Number of children: Not on file    Years of education: Not on file    Highest education level: Not on file   Occupational History    Not on file   Social Needs    Financial resource strain: Not on file    Food insecurity:     Worry: Not on file     Inability: Not on file    Transportation needs:     Medical: Not on file     Non-medical: Not on file   Tobacco Use    Smoking status: Current Every Day Smoker     Packs/day: 0 50    Smokeless tobacco: Never Used    Tobacco comment: "one per day"   Substance and Sexual Activity    Alcohol use: No    Drug use: Yes     Types: Marijuana, Heroin     Comment: currently smoking marihuana; pt reports last using heroin several months ago    Sexual activity: Never   Lifestyle    Physical activity:     Days per week: Not on file     Minutes per session: Not on file    Stress: Not on file   Relationships    Social connections:     Talks on phone: Not on file     Gets together: Not on file     Attends Scientologist service: Not on file     Active member of club or organization: Not on file     Attends meetings of clubs or organizations: Not on file     Relationship status: Not on file    Intimate partner violence:     Fear of current or ex partner: Not on file     Emotionally abused: Not on file     Physically abused: Not on file     Forced sexual activity: Not on file   Other Topics Concern    Not on file   Social History Narrative    Not on file     Family History   Problem Relation Age of Onset    Thyroid disease Mother    Naa Hidden Mother        ==  MD Hever Flowers [address] Internal Medicine PGY-1    [place] [address] [address] , [address] [address] [facility], [address]  Office: [phone]  Fax: [phone]

## 2019-07-17 NOTE — TELEPHONE ENCOUNTER
Pharmacy called in requesting refills on patients ventolin   It comes up as D/C on his med list    Can you please clarify ? If patient should be on it can you please send refills electronically to pharmacy ? Thank you !

## 2019-07-17 NOTE — TELEPHONE ENCOUNTER
Pt has appointment today at 3pm with Dr Miguel Saavedra to establish care with a resident and discuss inhaler usage as pharmacy had previously contacted us regarding his usage of it as well as possible need for statin  Will defer refills to appointment today

## 2019-07-18 DIAGNOSIS — R06.02 SHORTNESS OF BREATH: Primary | ICD-10-CM

## 2019-07-18 RX ORDER — ALBUTEROL SULFATE 90 UG/1
1 AEROSOL, METERED RESPIRATORY (INHALATION) EVERY 6 HOURS PRN
Qty: 1 INHALER | Refills: 2 | Status: SHIPPED | OUTPATIENT
Start: 2019-07-18 | End: 2020-01-10 | Stop reason: SDUPTHER

## 2019-07-29 DIAGNOSIS — E11.8 TYPE 2 DIABETES MELLITUS WITH COMPLICATION, UNSPECIFIED WHETHER LONG TERM INSULIN USE: ICD-10-CM

## 2019-08-13 ENCOUNTER — TELEPHONE (OUTPATIENT)
Dept: FAMILY MEDICINE CLINIC | Facility: CLINIC | Age: 59
End: 2019-08-13

## 2019-08-13 NOTE — TELEPHONE ENCOUNTER
This patient is scheduled for CT OF CHEST  The information I submitted was not enough to get it approved, so [facility] is requesting a [id number]  If you are able to do so, the number is below  If you wish to withdraw this request let us know so we can call central scheduling to cancel their upcoming appointment  WARREN:  [phone] Option #3  Tracking Number: [id number]  Insurance: [employer]   ID#: [id number]     Brenda Ryan8 IS UNDER ATTENDING:   DR Rob So     Reason for denial:   A physician reviewer made this  determination based on a review of the submitted information (lung problem)  Unless  otherwise indicated, results of a recent (Chest X-ray)  That test must show why more tests are  needed should be provided prior to an approval     PS: I had sent chest xray results done 01/16 and ct lung screening program that was done on 05/13/19 along with the visit notes  PLEASE LET ME KNOW AS SOON AS YOU CAN IN REGARDS TO THIS PATIENT'S CT

## 2019-08-14 NOTE — TELEPHONE ENCOUNTER
Dr Amy Sommers, I know you are covering, Is it possible for you to look into this? This case is time sensitive unfortunately

## 2019-08-15 ENCOUNTER — TELEPHONE (OUTPATIENT)
Dept: INTERNAL MEDICINE CLINIC | Facility: CLINIC | Age: 59
End: 2019-08-15

## 2019-08-15 NOTE — TELEPHONE ENCOUNTER
Spoke with peer to peer physician at [facility]  on 08/15/2019 at 8:41 a m  For approval of high-dose chest CT, peer to lakisha physician states that they require report of low-dose screening CT obtained on 05/13/2019 to the following fax number: [phone]  [facility] will respond accordingly by fax

## 2019-08-15 NOTE — TELEPHONE ENCOUNTER
I had initially sent this along with the visit notes  I have Re-faxed to WARREN  I will followup with them for approval  Thank you!

## 2019-08-16 NOTE — TELEPHONE ENCOUNTER
PER WARREN: I NEED TO RESUBMIT AUTH WITH NEW TRACKING NUMBER DUE TO INITIAL CASE BEING COMPLETELY CLOSED       NEW TRACKING# [id number]  WAITING FOR RESPONSE FROM INSURANCE

## 2019-08-16 NOTE — TELEPHONE ENCOUNTER
Unfortunately, Insurance still denied request for CT Chest  I will be calling WARREN to see why this was denied after peer to peer was done

## 2019-08-21 DIAGNOSIS — J98.2 PNEUMOMEDIASTINUM (HCC): Primary | ICD-10-CM

## 2019-08-21 NOTE — TELEPHONE ENCOUNTER
Since peer to peer was already done and CT scan was still denied, I have put in an order for a chest xray for the patient  Please tell him to get this done before his next appointment on 8/30/2019

## 2019-08-21 NOTE — TELEPHONE ENCOUNTER
Unfortunately, Authorization was still denied even with sending results of CT Low dose done on 05/13  With this new case, you can do peer to peer to re-confirm what the physician had stated  I had initially faxed over this test along with visit notes as well  Please let me know!  Thanks!!

## 2019-10-01 ENCOUNTER — HOSPITAL ENCOUNTER (EMERGENCY)
Facility: HOSPITAL | Age: 59
Discharge: HOME | End: 2019-10-01
Attending: EMERGENCY MEDICINE | Admitting: EMERGENCY MEDICINE
Payer: COMMERCIAL

## 2019-10-01 ENCOUNTER — APPOINTMENT (EMERGENCY)
Dept: RADIOLOGY | Facility: HOSPITAL | Age: 59
End: 2019-10-01
Payer: COMMERCIAL

## 2019-10-01 VITALS
RESPIRATION RATE: 22 BRPM | SYSTOLIC BLOOD PRESSURE: 149 MMHG | HEART RATE: 98 BPM | OXYGEN SATURATION: 97 % | TEMPERATURE: 98 F | DIASTOLIC BLOOD PRESSURE: 90 MMHG

## 2019-10-01 DIAGNOSIS — F11.23 OPIOID WITHDRAWAL (HCC): Primary | ICD-10-CM

## 2019-10-01 PROCEDURE — 99285 EMERGENCY DEPT VISIT HI MDM: CPT

## 2019-10-01 PROCEDURE — 71046 X-RAY EXAM CHEST 2 VIEWS: CPT

## 2019-10-01 PROCEDURE — 99284 EMERGENCY DEPT VISIT MOD MDM: CPT | Performed by: EMERGENCY MEDICINE

## 2019-10-01 RX ORDER — LORAZEPAM 0.5 MG/1
1 TABLET ORAL ONCE
Status: COMPLETED | OUTPATIENT
Start: 2019-10-01 | End: 2019-10-01

## 2019-10-01 RX ORDER — CLONIDINE HYDROCHLORIDE 0.2 MG/1
0.2 TABLET ORAL ONCE
Status: COMPLETED | OUTPATIENT
Start: 2019-10-01 | End: 2019-10-01

## 2019-10-01 RX ADMIN — CLONIDINE HYDROCHLORIDE 0.2 MG: 0.2 TABLET ORAL at 05:21

## 2019-10-01 RX ADMIN — LORAZEPAM 1 MG: 0.5 TABLET ORAL at 04:21

## 2019-10-01 NOTE — ED NOTES
Dr Boothe Keepers at bedside for patient evaluation; Patient pacing in the room at this time responding to questions       Katherine Vega RN  10/01/19 7058

## 2019-10-01 NOTE — ED PROVIDER NOTES
History  Chief Complaint   Patient presents with    Agitation     Pt reports taking 8mg of bupprenorphine at 0100  Pt states "I don't know if it was too strong or what but I've been shaking all over "       Patient is a 55-year-old male with a history of heroin abuse on methadone who presents for tremors and agitation  Patient says that he took an 8 mg Buprenorphine pill round 1:00 a m  Ryan Aguillon Patient says that he was prescribed this by his doctor to wean him off methadone  Patient says that shortly after taking the medication he developed tremors, shortness of breath and inability to stay still  Patient also admits to chills  Patient says that this is the 1st time that he has taken it  Patient says that he used a 10 dollar bag of heroin earlier this afternoon  He denies any nausea, vomiting, fevers, headache, chest pain, abdominal pain  He denies any other drug use today besides the heroin earlier in the afternoon          Prior to Admission Medications   Prescriptions Last Dose Informant Patient Reported? Taking? albuterol (PROVENTIL HFA,VENTOLIN HFA) 90 mcg/act inhaler   No Yes   Sig: Inhale 1 puff every 6 (six) hours as needed for wheezing   metFORMIN (GLUCOPHAGE) 500 mg tablet   No Yes   Sig: Take 1 5 tablets (750 mg total) by mouth 2 (two) times a day   methadone (DOLOPHINE) 10 mg tablet   Yes Yes   Sig: Take 80 mg by mouth daily,   sitaGLIPtin (JANUVIA) 100 mg tablet   No No   Sig: Take 1 tablet (100 mg total) by mouth daily      Facility-Administered Medications: None       Past Medical History:   Diagnosis Date    Diabetes mellitus ([facility] [address] )        Past Surgical History:   Procedure Laterality Date    TONSILLECTOMY         Family History   Problem Relation Age of Onset    Thyroid disease Mother     Cancer Mother      I have reviewed and agree with the history as documented      Social History     Tobacco Use    Smoking status: Current Every Day Smoker     Packs/day: 0 50    Smokeless tobacco: Never Used    Tobacco comment: "one per day"   Substance Use Topics    Alcohol use: No    Drug use: Yes     Types: Marijuana, Heroin     Comment: currently smoking marihuana; pt reports last using heroin earlier today        Review of Systems   Constitutional: Negative for chills, fever and unexpected weight change  HENT: Negative for congestion, sore throat and trouble swallowing  Eyes: Negative for pain, discharge and itching  Respiratory: Positive for shortness of breath  Negative for cough, chest tightness and wheezing  Cardiovascular: Negative for chest pain, palpitations and leg swelling  Gastrointestinal: Negative for abdominal pain, blood in stool, diarrhea, nausea and vomiting  Endocrine: Negative for polyuria  Genitourinary: Negative for difficulty urinating, dysuria, frequency and hematuria  Musculoskeletal: Negative for arthralgias and back pain  Neurological: Positive for tremors  Negative for dizziness, syncope, weakness, light-headedness and headaches  Physical Exam  ED Triage Vitals [10/01/19 0357]   Temperature Pulse Respirations Blood Pressure SpO2   98 °F (36 7 °C) (!) 122 22 165/72 97 %      Temp Source Heart Rate Source Patient Position - Orthostatic VS BP Location FiO2 (%)   Oral Monitor Lying Right arm --      Pain Score       No Pain             Orthostatic Vital Signs  Vitals:    10/01/19 0357 10/01/19 0521 10/01/19 0523   BP: 165/72 149/90 149/90   Pulse: (!) 122  98   Patient Position - Orthostatic VS: Lying  Lying       Physical Exam   Constitutional: He is oriented to person, place, and time  He appears well-developed and well-nourished  Continually moving throughout the room  Will not sit still   HENT:   Head: Normocephalic and atraumatic  Right Ear: External ear normal    Left Ear: External ear normal    Eyes: Pupils are equal, round, and reactive to light   Conjunctivae and EOM are normal    Pupils  3 mm,equal reactive to light   Neck: Normal range of motion  Cardiovascular: Normal rate, regular rhythm, normal heart sounds and intact distal pulses  Exam reveals no gallop and no friction rub  No murmur heard  Pulmonary/Chest: Effort normal and breath sounds normal  No stridor  No respiratory distress  He has no wheezes  He has no rales  Abdominal: Soft  Bowel sounds are normal  He exhibits no distension  There is no tenderness  There is no guarding  Musculoskeletal: Normal range of motion  He exhibits no edema, tenderness or deformity  No clonus   Lymphadenopathy:     He has no cervical adenopathy  Neurological: He is alert and oriented to person, place, and time  No cranial nerve deficit or sensory deficit  He exhibits normal muscle tone  Skin: Skin is warm and dry  Psychiatric: He has a normal mood and affect  His behavior is normal    Nursing note and vitals reviewed  ED Medications  Medications   LORazepam (ATIVAN) tablet 1 mg (1 mg Oral Given 10/1/19 0421)   cloNIDine (CATAPRES) tablet 0 2 mg (0 2 mg Oral Given 10/1/19 0521)       Diagnostic Studies  Results Reviewed     None                 XR chest 2 views   ED Interpretation by Selam Osorio DO (10/01 0518)   No acute cardiopulmonary disease            Procedures  Procedures        ED Course                               MDM  Number of Diagnoses or Management Options  Opioid withdrawal [facility]):   Diagnosis management comments:  40-year-old male presenting for tremors, shortness of breath and agitation after taking Buprenorphine  Tonight  Says he was prescribed by his doctor to wean him off methadone  Patient is tachycardic secondary to agitation  Denies chest pain, , abdominal pain, nausea, vomiting   believe patient is withdrawing from opioids  Will give 1mg ativan for agitation  Will give   2mg clonidine   Patient discharged, told to follow up with doctor who prescribes his buprenorphine       Disposition  Final diagnoses:   Opioid withdrawal ([address] )     Time reflects when diagnosis was documented in both MDM as applicable and the Disposition within this note     Time User Action Codes Description Comment    10/1/2019  5:22 AM Valeda  Add [F11 23] Opioid withdrawal [facility])       ED Disposition     ED Disposition Condition Date/Time Comment    Discharge Stable Tue Oct 1, 2019  5:22 AM Jhonny Vargas discharge to home/self care  Follow-up Information     Follow up With Specialties Details Why Contact Info    your primary care doctor  Schedule an appointment as soon as possible for a visit  As needed           Discharge Medication List as of 10/1/2019  5:22 AM      CONTINUE these medications which have NOT CHANGED    Details   albuterol (PROVENTIL HFA,VENTOLIN HFA) 90 mcg/act inhaler Inhale 1 puff every 6 (six) hours as needed for wheezing, Starting Thu 7/18/2019, Normal      metFORMIN (GLUCOPHAGE) 500 mg tablet Take 1 5 tablets (750 mg total) by mouth 2 (two) times a day, Starting Mon 7/29/2019, Normal      methadone (DOLOPHINE) 10 mg tablet Take 80 mg by mouth daily,, Historical Med      sitaGLIPtin (JANUVIA) 100 mg tablet Take 1 tablet (100 mg total) by mouth daily, Starting Tue 2/5/2019, Normal           No discharge procedures on file  ED Provider  Attending physically available and evaluated Jhonny Vargas I managed the patient along with the ED Attending      Electronically Signed by         Madison Zelaya DO  10/01/19 4461

## 2019-10-01 NOTE — ED ATTENDING ATTESTATION
10/1/2019  I, Boni Solo MD, saw and evaluated the patient  I have discussed the patient with the resident/non-physician practitioner and agree with the resident's/non-physician practitioner's findings, Plan of Care, and MDM as documented in the resident's/non-physician practitioner's note, except where noted  All available labs and Radiology studies were reviewed  I was present for key portions of any procedure(s) performed by the resident/non-physician practitioner and I was immediately available to provide assistance  At this point I agree with the current assessment done in the Emergency Department  I have conducted an independent evaluation of this patient a history and physical is as follows:    ED Course      Emergency Department Note- Luisa Reagan 62 y o  male MRN: [id number]    Unit/Bed#: ED 10 Encounter: [id number]    Luisa Reagan is a 62 y o  male who presents with   Chief Complaint   Patient presents with    Agitation     Pt reports taking 8mg of bupprenorphine at 0100  Pt states "I don't know if it was too strong or what but I've been shaking all over "         History of Present Illness   HPI:  Luisa Reagan is a 62 y o  male who presents for evaluation of:  Heroin withdrawal  Patient uses heroin daily, typically he snorts the heroin daily  Patient has also been taking methadone chronically  Patient took buprenorphine at 1 am that Dr Cornelio Brantley prescribed  Patient feels shaky all over and in withdrawal      Review of Systems   Constitutional: Positive for chills  Negative for fever  Cardiovascular: Negative for chest pain and palpitations  Gastrointestinal: Negative for nausea and vomiting  Psychiatric/Behavioral: Negative for dysphoric mood and suicidal ideas  All other systems reviewed and are negative        Historical Information   Past Medical History:   Diagnosis Date    Diabetes mellitus ([facility] [address] )      Past Surgical History:   Procedure Laterality Date    TONSILLECTOMY Social History   Social History     Substance and Sexual Activity   Alcohol Use No     Social History     Substance and Sexual Activity   Drug Use Yes    Types: Marijuana, Heroin    Comment: currently smoking marihuana; pt reports last using heroin earlier today     Social History     Tobacco Use   Smoking Status Current Every Day Smoker    Packs/day: 0 50   Smokeless Tobacco Never Used   Tobacco Comment    "one per day"     Family History: non-contributory    Meds/Allergies   all medications and allergies reviewed  Allergies   Allergen Reactions    Imitrex [Sumatriptan] Hallucinations     Annotation - [id number]: metal taste and spacey    Immune Globulin      Other reaction(s): Other (See Comments)  Tripping, spacing       Objective   First Vitals:   Blood Pressure: 165/72 (10/01/19 0357)  Pulse: (!) 122 (10/01/19 0357)  Temperature: 98 °F (36 7 °C) (10/01/19 0357)  Temp Source: Oral (10/01/19 0357)  Respirations: 22 (10/01/19 0357)  SpO2: 97 % (10/01/19 0357)    Current Vitals:   Blood Pressure: 165/72 (10/01/19 0357)  Pulse: (!) 122 (10/01/19 0357)  Temperature: 98 °F (36 7 °C) (10/01/19 0357)  Temp Source: Oral (10/01/19 0357)  Respirations: 22 (10/01/19 0357)  SpO2: 97 % (10/01/19 0357)    No intake or output data in the 24 hours ending 10/01/19 0500    Invasive Devices     None                 Physical Exam   Constitutional: He is oriented to person, place, and time  He appears well-developed and well-nourished  HENT:   Head: Normocephalic and atraumatic  Eyes: Pupils are equal, round, and reactive to light  EOM are normal    Musculoskeletal: Normal range of motion  He exhibits no deformity  Neurological: He is alert and oriented to person, place, and time  Skin: Skin is warm and dry  Capillary refill takes less than 2 seconds  Psychiatric: He has a normal mood and affect  His behavior is normal  Judgment and thought content normal    Nursing note and vitals reviewed          Medical Decision Makin  Opiate withdrawal    No results found for this or any previous visit (from the past 36 hour(s))  XR chest 2 views    (Results Pending)         Portions of the record may have been created with voice recognition software  Occasional wrong word or "sound a like" substitutions may have occurred due to the inherent limitations of voice recognition software  Read the chart carefully and recognize, using context, where substitutions have occurred            Critical Care Time  Procedures

## 2019-10-01 NOTE — ED NOTES
Pt reports last heroin use earlier today, states " I don't know if it was fentanyl or what"     Maday Rojas, RN  10/01/19 0636

## 2019-10-28 ENCOUNTER — TELEPHONE (OUTPATIENT)
Dept: MULTI SPECIALTY CLINIC | Facility: CLINIC | Age: 59
End: 2019-10-28

## 2019-10-28 DIAGNOSIS — E11.8 TYPE 2 DIABETES MELLITUS WITH COMPLICATION (HCC): ICD-10-CM

## 2020-01-10 ENCOUNTER — TELEPHONE (OUTPATIENT)
Dept: INTERNAL MEDICINE CLINIC | Facility: CLINIC | Age: 60
End: 2020-01-10

## 2020-01-10 DIAGNOSIS — R06.02 SHORTNESS OF BREATH: ICD-10-CM

## 2020-01-10 RX ORDER — ALBUTEROL SULFATE 90 UG/1
1 AEROSOL, METERED RESPIRATORY (INHALATION) EVERY 6 HOURS PRN
Qty: 1 INHALER | Refills: 0 | Status: SHIPPED | OUTPATIENT
Start: 2020-01-10 | End: 2020-02-10 | Stop reason: SDUPTHER

## 2020-01-10 NOTE — TELEPHONE ENCOUNTER
albuterol (PROVENTIL HFA,VENTOLIN HFA) 90 mcg/act inhaler    SOB      FYI patient is scheduled for 1/13/2020 9:30am with Dr Sukhwinder Alvarez

## 2020-01-13 ENCOUNTER — OFFICE VISIT (OUTPATIENT)
Dept: INTERNAL MEDICINE CLINIC | Facility: CLINIC | Age: 60
End: 2020-01-13

## 2020-01-13 ENCOUNTER — TELEPHONE (OUTPATIENT)
Dept: INTERNAL MEDICINE CLINIC | Facility: CLINIC | Age: 60
End: 2020-01-13

## 2020-01-13 VITALS
HEIGHT: 68 IN | HEART RATE: 81 BPM | TEMPERATURE: 98 F | DIASTOLIC BLOOD PRESSURE: 60 MMHG | OXYGEN SATURATION: 96 % | WEIGHT: 160.94 LBS | BODY MASS INDEX: 24.39 KG/M2 | SYSTOLIC BLOOD PRESSURE: 118 MMHG

## 2020-01-13 DIAGNOSIS — J43.8 OTHER EMPHYSEMA (HCC): Chronic | ICD-10-CM

## 2020-01-13 DIAGNOSIS — E11.8 TYPE 2 DIABETES MELLITUS WITH COMPLICATION (HCC): Primary | ICD-10-CM

## 2020-01-13 DIAGNOSIS — Z23 NEED FOR INFLUENZA VACCINATION: ICD-10-CM

## 2020-01-13 DIAGNOSIS — M54.31 SCIATICA OF RIGHT SIDE: ICD-10-CM

## 2020-01-13 DIAGNOSIS — F17.200 TOBACCO DEPENDENCE: Chronic | ICD-10-CM

## 2020-01-13 DIAGNOSIS — Z12.11 COLON CANCER SCREENING: ICD-10-CM

## 2020-01-13 LAB — SL AMB POCT HEMOGLOBIN AIC: 6.8 (ref ?–6.5)

## 2020-01-13 PROCEDURE — 3044F HG A1C LEVEL LT 7.0%: CPT | Performed by: INTERNAL MEDICINE

## 2020-01-13 PROCEDURE — 90471 IMMUNIZATION ADMIN: CPT | Performed by: INTERNAL MEDICINE

## 2020-01-13 PROCEDURE — 90682 RIV4 VACC RECOMBINANT DNA IM: CPT | Performed by: INTERNAL MEDICINE

## 2020-01-13 PROCEDURE — 3008F BODY MASS INDEX DOCD: CPT | Performed by: INTERNAL MEDICINE

## 2020-01-13 PROCEDURE — 83036 HEMOGLOBIN GLYCOSYLATED A1C: CPT | Performed by: INTERNAL MEDICINE

## 2020-01-13 PROCEDURE — 99213 OFFICE O/P EST LOW 20 MIN: CPT | Performed by: INTERNAL MEDICINE

## 2020-01-13 RX ORDER — CYCLOBENZAPRINE HCL 5 MG
5 TABLET ORAL 3 TIMES DAILY PRN
Qty: 90 TABLET | Refills: 0 | Status: SHIPPED | OUTPATIENT
Start: 2020-01-13 | End: 2020-01-29 | Stop reason: SDUPTHER

## 2020-01-13 RX ORDER — BUPRENORPHINE AND NALOXONE 8; 2 MG/1; MG/1
FILM, SOLUBLE BUCCAL; SUBLINGUAL
COMMUNITY
Start: 2019-10-09 | End: 2020-01-13 | Stop reason: ALTCHOICE

## 2020-01-13 NOTE — PROGRESS NOTES
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, and mold in basement where he was working  He had screening CT done in May 2019 which was not concerning for cancer, but did show pneumomediastinum which he did not follow up  Lungs did show emphysematous changes, and groundglass opacities though those may be due to pneumomediastinum  High-res CT was ordered at last visit, not completed  He needs further characterization of his lung disease in order to determine appropriate treatment, and will need to follow up with a pulmonologist  He will obtain full PFTs and HRCT then see pulmonology  Patient left the office before conclusion of visit; task sent and patient was called with instructions  Type 2 diabetes mellitus with complication (HCC)  -     POCT hemoglobin A1c  A1c increased today to 6 8 from 5 8 five months ago  He admits to not using metformin for 2 months  He does not have sitagliptin due to coverage  Will resume metformin, pt has plenty at home  Recheck in 3 months  Nurse visit scheduled for IRIS exam      Need for influenza vaccination  -     influenza vaccine, [id number], quadrivalent, recombinant, PF, 0 5 mL, for patients 18 yr+ (FLUBLOK)    Colon cancer screening  -     Occult Bloood,Fecal Immunochemical; Future  Declines colonoscopy    Sciatica of right side  -     cyclobenzaprine (FLEXERIL) 5 mg tablet; Take 1 tablet (5 mg total) by mouth 3 (three) times a day as needed for muscle spasms    Tobacco dependence  -     Ambulatory referral to Pulmonology;  Future    Other orders  -     Discontinue: buprenorphine-naloxone (SUBOXONE) 8-2 mg; PLACE 1 FILM UNDER THE TONGUE TWO TIMES DAILY    Not taking due to episode of precipitated withdrawal        Subjective:      Patient ID: Noe Balderas is a 62 y o  male  Patient has history of COPD/ILD - not fully characterized due to noncompliance with workup; current methadone use, R sciatica  History is also notable for CT lung cancer screening done in May 2019 which was notable for possible ILD, but also a pneumomediastinum - patient was called and told to go to ED but did not  Here for f/u breathing - needs albuterol refilled  He also needs flexeril for sciatica on R; this has worked in the past to decrease the spasming and improves pain overall  States that the methadone clinic warns against using a certain muscle relaxer  Going to a chiropractor soon which has helped his back pain in the past     Reports that he did not take his metformin for about 2 months - Was out of the house for several weeks due to lead paint getting fixed, did not have access to medications   Has not seen a pulmonologist  Used to work as a   Still smokes but has decreased  Rolls own cigarettes  Concerned about mold - had worked in his basement which had a lot of mold  Does have chronic productive cough with clear phlegm, but says "if i'm doing dirty work it turns brown" No hemoptysis  Has not been physically active due to sciatica, though does walk his dogs and has no breathing issues with this    Not on suboxone currently    Has lots of extra metformin at home, is taking 3 pills daily    Reports that he has had testing for hepatitis, HIV, and others at methadone clinic    Wants eye exam on a different day                The following portions of the patient's history were reviewed and updated as appropriate: allergies, current medications, past family history, past medical history, past social history, past surgical history and problem list     Review of Systems   Constitutional: Negative for chills and fever     HENT: Positive for sinus pressure  Negative for sinus pain  Respiratory: Positive for cough, shortness of breath and wheezing  Cardiovascular: Negative for chest pain and leg swelling  Gastrointestinal: Positive for constipation  Negative for diarrhea, nausea and vomiting  Musculoskeletal: Positive for back pain and gait problem  R foot drop secondary to sciatica   Skin: Negative for rash and wound  Objective:      /60 (BP Location: Right arm, Patient Position: Sitting, Cuff Size: Adult)   Pulse 81   Temp 98 °F (36 7 °C) (Oral)   Ht 5' 8" (1 727 m)   Wt 73 kg (160 lb 15 oz)   SpO2 96%   BMI 24 47 kg/m²          Physical Exam   Constitutional: He is oriented to person, place, and time  He appears well-developed and well-nourished  No distress  HENT:   Head: Normocephalic  Mouth/Throat: Oropharynx is clear and moist    Eyes: Pupils are equal, round, and reactive to light  Conjunctivae and EOM are normal    Neck: Neck supple  Cardiovascular: Normal rate and regular rhythm  Pulmonary/Chest: Effort normal  No accessory muscle usage  No tachypnea  He has wheezes in the left upper field  He has rhonchi in the left upper field and the left middle field  He has no rales  Abdominal: Soft  Bowel sounds are normal  He exhibits no distension  There is no tenderness  Musculoskeletal: Normal range of motion  He exhibits no edema or deformity  Neurological: He is alert and oriented to person, place, and time  Skin: Skin is warm and dry  He is not diaphoretic  Psychiatric: He has a normal mood and affect

## 2020-01-13 NOTE — TELEPHONE ENCOUNTER
Called patient and made him aware  I gave him the phone number for  central Scheduling to schedule PFTs and CT scan  Patient aware Pulmonary will call him for an appointment  Please place order for PFT

## 2020-01-13 NOTE — TELEPHONE ENCOUNTER
Please call patient to make sure he gets the followin  IRIS eye exam (has appt)  2  PFT testing - I know that he has nurse visit for this but we actually need him to get full PFTs done - may need new appt for this instead of the nurse visit; let me know if we need new order  3  CT high resolution scan ordered by Dr Breana Gerardo  4  Appointment with pulmonology    Numbers 2 and 3 are the most important, because they will need that information at the pulm appointment  Thank you!

## 2020-01-17 ENCOUNTER — CLINICAL SUPPORT (OUTPATIENT)
Dept: INTERNAL MEDICINE CLINIC | Facility: CLINIC | Age: 60
End: 2020-01-17

## 2020-01-17 DIAGNOSIS — E11.69 TYPE 2 DIABETES MELLITUS WITH OTHER SPECIFIED COMPLICATION, UNSPECIFIED WHETHER LONG TERM INSULIN USE (HCC): Primary | ICD-10-CM

## 2020-01-17 PROCEDURE — 2023F DILAT RTA XM W/O RTNOPTHY: CPT | Performed by: INTERNAL MEDICINE

## 2020-01-17 PROCEDURE — 2025F 7 FLD RTA PHOTO W/O RTNOPTHY: CPT | Performed by: INTERNAL MEDICINE

## 2020-01-17 PROCEDURE — 92250 FUNDUS PHOTOGRAPHY W/I&R: CPT | Performed by: INTERNAL MEDICINE

## 2020-01-17 NOTE — PROGRESS NOTES
Patient seen here today on nurse schedule for iris exam  Patient states he has metal fragments in both his eyes due to being a   Patient toelrated test well

## 2020-01-29 DIAGNOSIS — M54.31 SCIATICA OF RIGHT SIDE: ICD-10-CM

## 2020-02-01 ENCOUNTER — HOSPITAL ENCOUNTER (EMERGENCY)
Facility: HOSPITAL | Age: 60
Discharge: HOME | End: 2020-02-01
Attending: EMERGENCY MEDICINE | Admitting: EMERGENCY MEDICINE
Payer: COMMERCIAL

## 2020-02-01 VITALS
WEIGHT: 170 LBS | SYSTOLIC BLOOD PRESSURE: 144 MMHG | DIASTOLIC BLOOD PRESSURE: 82 MMHG | TEMPERATURE: 98.3 F | BODY MASS INDEX: 25.85 KG/M2 | OXYGEN SATURATION: 94 % | RESPIRATION RATE: 18 BRPM | HEART RATE: 109 BPM

## 2020-02-01 DIAGNOSIS — J45.909 ASTHMA: Primary | ICD-10-CM

## 2020-02-01 DIAGNOSIS — J44.9 COPD (CHRONIC OBSTRUCTIVE PULMONARY DISEASE) (HCC): ICD-10-CM

## 2020-02-01 PROCEDURE — 94640 AIRWAY INHALATION TREATMENT: CPT

## 2020-02-01 PROCEDURE — 99284 EMERGENCY DEPT VISIT MOD MDM: CPT | Performed by: EMERGENCY MEDICINE

## 2020-02-01 PROCEDURE — 99283 EMERGENCY DEPT VISIT LOW MDM: CPT

## 2020-02-01 RX ORDER — IPRATROPIUM BROMIDE AND ALBUTEROL SULFATE 2.5; .5 MG/3ML; MG/3ML
3 SOLUTION RESPIRATORY (INHALATION) ONCE
Status: COMPLETED | OUTPATIENT
Start: 2020-02-01 | End: 2020-02-01

## 2020-02-01 RX ORDER — ALBUTEROL SULFATE 90 UG/1
2 AEROSOL, METERED RESPIRATORY (INHALATION) ONCE
Status: COMPLETED | OUTPATIENT
Start: 2020-02-01 | End: 2020-02-01

## 2020-02-01 RX ADMIN — IPRATROPIUM BROMIDE AND ALBUTEROL SULFATE 3 ML: 2.5; .5 SOLUTION RESPIRATORY (INHALATION) at 17:21

## 2020-02-01 RX ADMIN — DEXAMETHASONE SODIUM PHOSPHATE 10 MG: 10 INJECTION, SOLUTION INTRAMUSCULAR; INTRAVENOUS at 17:30

## 2020-02-01 RX ADMIN — ALBUTEROL SULFATE 2 PUFF: 90 AEROSOL, METERED RESPIRATORY (INHALATION) at 17:21

## 2020-02-01 NOTE — ED ATTENDING ATTESTATION
2/1/2020  I, Andre Yañez MD, saw and evaluated the patient  I have discussed the patient with the resident/non-physician practitioner and agree with the resident's/non-physician practitioner's findings, Plan of Care, and MDM as documented in the resident's/non-physician practitioner's note, except where noted  All available labs and Radiology studies were reviewed  I was present for key portions of any procedure(s) performed by the resident/non-physician practitioner and I was immediately available to provide assistance  At this point I agree with the current assessment done in the Emergency Department  I have conducted an independent evaluation of this patient a history and physical is as follows:    77-year-old man with history of asthma and COPD presenting with dry cough and wheezing  He is out of his albuterol inhaler  He does not feel particularly short of breath at this time period is awake and alert no acute distress  Heart regular rate rhythm, no murmurs rubs or gallops  There is decreased air movement with expiratory wheezing in all lung fields  No respiratory distress  No extremity swelling or edema  Will treat symptomatically and instruct return if worsening symptoms  Will prescribe albuterol inhaler      ED Course         Critical Care Time  Procedures

## 2020-02-01 NOTE — ED PROVIDER NOTES
History  Chief Complaint   Patient presents with    Asthma     patient states "i ran out of my albuterol and I don't know where else to go"  Patient c/o wheezing and cough     Patient presents for evaluation of asthma and need for an albuterol inhaler  He states that he has a history of COPD, asthma, diabetes  He ran out of his asthma inhaler earlier today and since then has been having increasing dyspnea  This is made worse when he is ambulating, he states sometimes in the morning it takes him a little while to get going that he needs to take apart from his inhaler to help  He also believes this is partially because he he came down with a cold over the past couple days that has made him use his inhaler more often  He denies any fever, chills, productive sputum, chest pain, history of blood clots, recent travel, sick contacts  States that he feels like his breathing has been getting worse over the past year and a half but that he has follow-up with a pulmonologist and is supposed to be getting a CT scan as well as a further evaluation of his current respiratory status  Patient has no other complaints for me at this time  Prior to Admission Medications   Prescriptions Last Dose Informant Patient Reported? Taking?    albuterol (PROVENTIL HFA,VENTOLIN HFA) 90 mcg/act inhaler   No No   Sig: Inhale 1 puff every 6 (six) hours as needed for wheezing   cyclobenzaprine (FLEXERIL) 5 mg tablet   No No   Sig: Take 1 tablet (5 mg total) by mouth 3 (three) times a day as needed for muscle spasms   metFORMIN (GLUCOPHAGE) 500 mg tablet   No No   Sig: Take 1 5 tablets (750 mg total) by mouth 2 (two) times a day   methadone (DOLOPHINE) 10 mg tablet   Yes No   Sig: Take 80 mg by mouth daily,   sitaGLIPtin (JANUVIA) 100 mg tablet   No No   Sig: Take 1 tablet (100 mg total) by mouth daily   Patient not taking: Reported on 1/13/2020      Facility-Administered Medications: None       Past Medical History:   Diagnosis Date  Diabetes mellitus ([facility] [address] )        Past Surgical History:   Procedure Laterality Date    TONSILLECTOMY         Family History   Problem Relation Age of Onset    Thyroid disease Mother     Cancer Mother      I have reviewed and agree with the history as documented  Social History     Tobacco Use    Smoking status: Current Every Day Smoker     Packs/day: 0 50    Smokeless tobacco: Never Used    Tobacco comment: "one per day"   Substance Use Topics    Alcohol use: Yes     Comment: rarely    Drug use: Yes     Types: Marijuana, Heroin     Comment: currently smoking marihuana; pt reports last using heroin earlier today        Review of Systems   Constitutional: Negative for activity change, chills, fatigue and fever  Respiratory: Positive for cough, shortness of breath and wheezing  Negative for chest tightness  Cardiovascular: Negative for chest pain and palpitations  Gastrointestinal: Negative for abdominal distention, abdominal pain, constipation, diarrhea, nausea and vomiting  Genitourinary: Negative for dysuria and hematuria  Musculoskeletal: Negative for arthralgias, myalgias and neck pain  Neurological: Negative for dizziness, syncope, light-headedness and headaches  All other systems reviewed and are negative  Physical Exam  ED Triage Vitals [02/01/20 1700]   Temperature Pulse Respirations Blood Pressure SpO2   98 3 °F (36 8 °C) (!) 109 18 144/82 94 %      Temp Source Heart Rate Source Patient Position - Orthostatic VS BP Location FiO2 (%)   Oral Monitor Sitting Right arm --      Pain Score       9             Orthostatic Vital Signs  Vitals:    02/01/20 1700   BP: 144/82   Pulse: (!) 109   Patient Position - Orthostatic VS: Sitting       Physical Exam   Constitutional: He is oriented to person, place, and time  He appears well-developed and well-nourished  No distress  HENT:   Head: Normocephalic and atraumatic     Right Ear: External ear normal    Left Ear: External ear normal  Eyes: Pupils are equal, round, and reactive to light  Conjunctivae and EOM are normal  Right eye exhibits no discharge  Left eye exhibits no discharge  Neck: Normal range of motion  Neck supple  No JVD present  No tracheal deviation present  Cardiovascular: Normal rate, regular rhythm, normal heart sounds and intact distal pulses  Exam reveals no gallop and no friction rub  No murmur heard  Pulmonary/Chest: Effort normal  No respiratory distress  He has wheezes  He has no rales  Diffuse wheezing in all lung fields, poor air movement throughout  Abdominal: Soft  Bowel sounds are normal  He exhibits no distension and no mass  There is no tenderness  There is no guarding  Musculoskeletal: Normal range of motion  He exhibits no edema, tenderness or deformity  Neurological: He is alert and oriented to person, place, and time  No cranial nerve deficit or sensory deficit  He exhibits normal muscle tone  Coordination normal    Skin: He is not diaphoretic  Psychiatric: He has a normal mood and affect  His behavior is normal  Judgment and thought content normal    Vitals reviewed  ED Medications  Medications   ipratropium-albuterol (DUO-NEB) 0 5-2 5 mg/3 mL inhalation solution 3 mL (3 mL Nebulization Given 2/1/20 1721)   albuterol (PROVENTIL HFA,VENTOLIN HFA) inhaler 2 puff (2 puffs Inhalation Given 2/1/20 1721)   dexamethasone 10 mg/mL oral liquid 10 mg 1 mL (10 mg Oral Given 2/1/20 1730)       Diagnostic Studies  Results Reviewed     None                 No orders to display         Procedures  Procedures      ED Course                               MDM  Number of Diagnoses or Management Options  Asthma:   COPD (chronic obstructive pulmonary disease) [facility]):   Diagnosis management comments: Patient much improved after receiving breathing treatment here in the emergency department    We provided with an albuterol inhaler to use at home instructed follow up with primary care office in the next couple of days for further discussion controlled COPD  Provided with a dose of dexamethasone here as well for asthma exacerbation  Instructed return to emergency department for any shortness of breath, difficulty breathing, chest pain  Disposition  Final diagnoses:   Asthma   COPD (chronic obstructive pulmonary disease) ([facility] [address] )     Time reflects when diagnosis was documented in both MDM as applicable and the Disposition within this note     Time User Action Codes Description Comment    2/1/2020  5:42 PM Mariaa Aguilar Add [K69 543] Asthma     2/1/2020  5:42 PM Demetra Pablo Add [J44 9] COPD (chronic obstructive pulmonary disease) [facility])       ED Disposition     ED Disposition Condition Date/Time Comment    Discharge Stable Sat Feb 1, 2020  5:42 PM Daron Kim discharge to home/self care  Follow-up Information     Follow up With Specialties Details Why Contact Info Additional Information    [address] [place] [address] Emergency Department Emergency Medicine  As needed [address] [place] [address], [address] [place] [address], [address] [phone]          Patient's Medications   Discharge Prescriptions    No medications on file     No discharge procedures on file  ED Provider  Attending physically available and evaluated Daron Kim I managed the patient along with the ED Attending      Electronically Signed by         Maame Elizalde MD  02/01/20 3232

## 2020-02-04 RX ORDER — CYCLOBENZAPRINE HCL 5 MG
5 TABLET ORAL 3 TIMES DAILY PRN
Qty: 90 TABLET | Refills: 2 | Status: SHIPPED | OUTPATIENT
Start: 2020-02-04 | End: 2020-04-30 | Stop reason: SDUPTHER

## 2020-02-10 DIAGNOSIS — R06.02 SHORTNESS OF BREATH: ICD-10-CM

## 2020-02-10 RX ORDER — ALBUTEROL SULFATE 90 UG/1
1 AEROSOL, METERED RESPIRATORY (INHALATION) EVERY 6 HOURS PRN
Qty: 1 INHALER | Refills: 2 | Status: SHIPPED | OUTPATIENT
Start: 2020-02-10 | End: 2020-02-11 | Stop reason: SDUPTHER

## 2020-02-10 NOTE — TELEPHONE ENCOUNTER
Name of medication, dose, quantity and frequency:  ALBUTEROL INHALER    Number of refills left: 0     Amount of medication left:  0    Pharmacy verified and updated:  YES    Additional information:      PATIENT CALLED IN REQUESTING REFILLS

## 2020-02-11 ENCOUNTER — TELEPHONE (OUTPATIENT)
Dept: INTERNAL MEDICINE CLINIC | Facility: CLINIC | Age: 60
End: 2020-02-11

## 2020-02-11 DIAGNOSIS — R06.02 SHORTNESS OF BREATH: ICD-10-CM

## 2020-02-11 RX ORDER — ALBUTEROL SULFATE 90 UG/1
2 AEROSOL, METERED RESPIRATORY (INHALATION) EVERY 6 HOURS PRN
Qty: 1 INHALER | Refills: 2 | Status: SHIPPED | OUTPATIENT
Start: 2020-02-11 | End: 2020-02-20 | Stop reason: SDUPTHER

## 2020-02-12 ENCOUNTER — TELEPHONE (OUTPATIENT)
Dept: INTERNAL MEDICINE CLINIC | Facility: CLINIC | Age: 60
End: 2020-02-12

## 2020-02-12 NOTE — TELEPHONE ENCOUNTER
This patient was scheduled for CT CHEST  The information I submitted was not enough to get it approved, so [facility] is requesting a [id number]  If you are able to do so, the number is below  If you wish to withdraw this request let us know so we can call central scheduling to cancel their upcoming appointment  [place]:  [phone] Option #3  Tracking [address] [place] [address]  ID#:  [id number]          [place] have been completely denied please see attached forms  Please let me know if you need anything thank you        Attending NPI [id number] Frances Franco

## 2020-02-20 ENCOUNTER — OFFICE VISIT (OUTPATIENT)
Dept: INTERNAL MEDICINE CLINIC | Facility: CLINIC | Age: 60
End: 2020-02-20

## 2020-02-20 ENCOUNTER — TELEPHONE (OUTPATIENT)
Dept: INTERNAL MEDICINE CLINIC | Facility: CLINIC | Age: 60
End: 2020-02-20

## 2020-02-20 VITALS
HEART RATE: 111 BPM | WEIGHT: 160.94 LBS | SYSTOLIC BLOOD PRESSURE: 122 MMHG | BODY MASS INDEX: 24.39 KG/M2 | TEMPERATURE: 98.7 F | DIASTOLIC BLOOD PRESSURE: 64 MMHG | HEIGHT: 68 IN | OXYGEN SATURATION: 97 %

## 2020-02-20 DIAGNOSIS — J44.9 CHRONIC OBSTRUCTIVE PULMONARY DISEASE, UNSPECIFIED COPD TYPE (HCC): Primary | ICD-10-CM

## 2020-02-20 DIAGNOSIS — R06.02 SHORTNESS OF BREATH: ICD-10-CM

## 2020-02-20 PROBLEM — F17.200 SMOKER: Status: ACTIVE | Noted: 2020-02-20

## 2020-02-20 PROCEDURE — T1015 CLINIC SERVICE: HCPCS | Performed by: HOSPITALIST

## 2020-02-20 PROCEDURE — 2022F DILAT RTA XM EVC RTNOPTHY: CPT | Performed by: HOSPITALIST

## 2020-02-20 PROCEDURE — 3044F HG A1C LEVEL LT 7.0%: CPT | Performed by: HOSPITALIST

## 2020-02-20 PROCEDURE — 99213 OFFICE O/P EST LOW 20 MIN: CPT | Performed by: HOSPITALIST

## 2020-02-20 PROCEDURE — 3008F BODY MASS INDEX DOCD: CPT | Performed by: HOSPITALIST

## 2020-02-20 RX ORDER — ALBUTEROL SULFATE 90 UG/1
2 AEROSOL, METERED RESPIRATORY (INHALATION) EVERY 6 HOURS PRN
Qty: 1 INHALER | Refills: 2 | Status: SHIPPED | OUTPATIENT
Start: 2020-02-20 | End: 2020-07-24 | Stop reason: SDUPTHER

## 2020-02-20 RX ORDER — ALBUTEROL SULFATE 90 UG/1
2 AEROSOL, METERED RESPIRATORY (INHALATION) EVERY 6 HOURS PRN
Qty: 1 INHALER | Refills: 2 | Status: SHIPPED | OUTPATIENT
Start: 2020-02-20 | End: 2020-02-20

## 2020-02-20 NOTE — TELEPHONE ENCOUNTER
Name of medication, dose, quantity and frequency albuterol (PROVENTIL HFA,VENTOLIN HFA) 90 mcg/act inhaler        Number of refills left: 2    Amount of medication left: 0    Pharmacy verified and updated : yes    Additional information:   Informed patient there are 2 refills at the pharmacy , he stated he went to the pharmacy to request the refill and was advised to contact us since it is too early to request refill   Patient stated hes been having shortness of breath and really needs a refill

## 2020-02-20 NOTE — TELEPHONE ENCOUNTER
Spoke with Jose Ramon Che , was advised to add patient to schedule    Patient was added on to the schedule

## 2020-02-20 NOTE — PROGRESS NOTES
[facility]  INTERNAL MEDICINE OFFICE VISIT     PATIENT INFORMATION     Prince Chavira   62 y o  male   MRN: [id number]    ASSESSMENT/PLAN     1  SOB and cough with clear sputum  Likely from his presumed COPD  Pt with long history of smoking  No official PFTs available  CT scan from last year showed emphysematous changes  Currently ran out of albuterol for past 2 days  Admits to feeling fine while using albuterol  Denies any chest pain, history of PE or DVT or any recent prolonged inactivity  Stable vitals  Bilateral diffuse wheezing on physical exam   His symptoms likely due to on going smoking history, not on appropriate medications for presumed COPD and running out of his albuterol  Plan:  · Will refill his albuterol and start on Advair  · Patient admits that he has appointment with pulmonary for PFTs and further management on 02/21/20  · Will see patient in 5 weeks for follow-up on his symptoms and PFTs results  2  Current smoker  Patient admits to smoking since very young age  Currently has cut down to 3 cigarettes per day  Admits to not being ready to quit right now  Plan:  · Will reassess for smoking cessation during next visit  3  DM: Currently takes metformin and Januvia  A1c 6 8 a month ago  Continue current regimen  4  History of opioid dependence:  Currently on methadone treatment with a outpatient clinic  Screenings:  Colon cancer: pending fecal occult test  Will re-enforce next visit  Will consider HIV and Hep C screening during next visit  Diagnoses and all orders for this visit:    Chronic obstructive pulmonary disease, unspecified COPD type ([facility] )  -     Discontinue: fluticasone-salmeterol (Lou Vaughan) 250-50 mcg/dose inhaler; Inhale 1 puff 2 (two) times a day Rinse mouth after use  -     Discontinue: albuterol (PROVENTIL HFA,VENTOLIN HFA) 90 mcg/act inhaler;  Inhale 2 puffs every 6 (six) hours as needed for wheezing  - fluticasone-salmeterol (ADVAIR, WIXELA) 250-50 mcg/dose inhaler; Inhale 1 puff 2 (two) times a day Rinse mouth after use  -     albuterol (PROVENTIL HFA,VENTOLIN HFA) 90 mcg/act inhaler; Inhale 2 puffs every 6 (six) hours as needed for wheezing    Shortness of breath  -     Discontinue: fluticasone-salmeterol (ADVAIR, WIXELA) 250-50 mcg/dose inhaler; Inhale 1 puff 2 (two) times a day Rinse mouth after use  -     Discontinue: albuterol (PROVENTIL HFA,VENTOLIN HFA) 90 mcg/act inhaler; Inhale 2 puffs every 6 (six) hours as needed for wheezing  -     fluticasone-salmeterol (ADVAIR, WIXELA) 250-50 mcg/dose inhaler; Inhale 1 puff 2 (two) times a day Rinse mouth after use  -     albuterol (PROVENTIL HFA,VENTOLIN HFA) 90 mcg/act inhaler; Inhale 2 puffs every 6 (six) hours as needed for wheezing      Schedule a follow-up appointment in 5 weeks  HEALTH MAINTENANCE     Immunization History   Administered Date(s) Administered    Influenza, recombinant, quadrivalent,injectable, preservative free 04/12/2019, 01/13/2020    Pneumococcal Polysaccharide PPV23 04/12/2019    Tdap 06/04/2016     CHIEF COMPLAINT     Chief Complaint   Patient presents with    Follow-up    Shortness of Breath      HISTORY OF PRESENT ILLNESS     This is 62 year male with past medical history of current smoker, presumed COPD/asthma, diabetes, history of opioid dependence came to the clinic because of worsening shortness of breath and running out of his albuterol inhaler  Patient has a long history of smoking but has never been officially diagnosed with COPD  Patient was last seen on February 11 and was asked to see pulmonary for further management of his presumed COPD  Patient was also prescribed albuterol during that visit  Patient admits that he was feeling fine with his albuterol until 2 days ago when he ran out of it and started having shortness of breath with exertion    Denies any history of cancer or previous history of DVT or PE  Denies any recent inmobility  Patient admits to having some mild cough with clear sputum production  Denies any fever or chills, chest pain or chest pressure  Per chart review, patient was found to have diffuse emphysematous changes on his CT scan from 2019  Patient admits to smoking 3 cigarettes per day  Admits that he used to smoke 1 pack per day before  Denies any alcohol or IV drug use  REVIEW OF SYSTEMS     Review of Systems   Constitutional: Negative for activity change, chills, diaphoresis, fatigue and fever  HENT: Negative for congestion, rhinorrhea, sinus pressure and sinus pain  Respiratory: Positive for shortness of breath  Negative for apnea, cough and stridor  Cardiovascular: Negative for chest pain, palpitations and leg swelling  Gastrointestinal: Negative for abdominal distention, abdominal pain, blood in stool, constipation and diarrhea  Endocrine: Negative for cold intolerance, heat intolerance and polyuria  Genitourinary: Negative for difficulty urinating  Musculoskeletal: Negative for arthralgias, back pain, joint swelling and myalgias  Skin: Negative for color change, pallor, rash and wound  Neurological: Negative for dizziness, seizures, syncope, light-headedness, numbness and headaches  Psychiatric/Behavioral: Negative for agitation and hallucinations  The patient is not nervous/anxious and is not hyperactive  OBJECTIVE     Vitals:    02/20/20 1013   BP: 122/64   BP Location: Right arm   Patient Position: Sitting   Cuff Size: Adult   Pulse: (!) 111   Temp: 98 7 °F (37 1 °C)   TempSrc: Temporal   SpO2: 97%   Weight: 73 kg (160 lb 15 oz)   Height: 5' 8" (1 727 m)     Physical Exam   Constitutional: He is oriented to person, place, and time  He appears well-developed and well-nourished  No distress  HENT:   Head: Normocephalic and atraumatic  Nose: Nose normal    Mouth/Throat: Oropharynx is clear and moist  No oropharyngeal exudate     Eyes: Right eye exhibits no discharge  Left eye exhibits no discharge  No scleral icterus  Neck: Normal range of motion  Neck supple  Cardiovascular: Normal rate, regular rhythm, normal heart sounds and intact distal pulses  Exam reveals no gallop and no friction rub  No murmur heard  Pulmonary/Chest: Effort normal  No stridor  No respiratory distress  He has wheezes  He has no rales  Abdominal: Soft  Bowel sounds are normal  He exhibits no distension  There is no tenderness  There is no guarding  Musculoskeletal: Normal range of motion  He exhibits no edema  Neurological: He is alert and oriented to person, place, and time  Skin: Skin is warm  He is not diaphoretic  No erythema  Psychiatric: He has a normal mood and affect   His behavior is normal  Judgment and thought content normal      CURRENT MEDICATIONS     Current Outpatient Medications:     albuterol (PROVENTIL HFA,VENTOLIN HFA) 90 mcg/act inhaler, Inhale 2 puffs every 6 (six) hours as needed for wheezing, Disp: 1 Inhaler, Rfl: 2    cyclobenzaprine (FLEXERIL) 5 mg tablet, Take 1 tablet (5 mg total) by mouth 3 (three) times a day as needed for muscle spasms, Disp: 90 tablet, Rfl: 2    metFORMIN (GLUCOPHAGE) 500 mg tablet, Take 1 5 tablets (750 mg total) by mouth 2 (two) times a day, Disp: 90 tablet, Rfl: 3    methadone (DOLOPHINE) 10 mg tablet, Take 80 mg by mouth daily,, Disp: , Rfl:     fluticasone-salmeterol (ADVAIR, WIXELA) 250-50 mcg/dose inhaler, Inhale 1 puff 2 (two) times a day Rinse mouth after use , Disp: 1 Inhaler, Rfl: 2    sitaGLIPtin (JANUVIA) 100 mg tablet, Take 1 tablet (100 mg total) by mouth daily (Patient not taking: Reported on 1/13/2020), Disp: 90 tablet, Rfl: 3    PAST MEDICAL & SURGICAL HISTORY     Past Medical History:   Diagnosis Date    Diabetes mellitus ([facility] [address] )      Past Surgical History:   Procedure Laterality Date    TONSILLECTOMY       SOCIAL & FAMILY HISTORY     Social History     Socioeconomic History    Marital status: Single     Spouse name: Not on file    Number of children: Not on file    Years of education: Not on file    Highest education level: Not on file   Occupational History    Not on file   Social Needs    Financial resource strain: Not on file    Food insecurity:     Worry: Not on file     Inability: Not on file    Transportation needs:     Medical: Not on file     Non-medical: Not on file   Tobacco Use    Smoking status: Current Every Day Smoker     Packs/day: 0 50    Smokeless tobacco: Never Used    Tobacco comment: "one per day"   Substance and Sexual Activity    Alcohol use: Yes     Comment: rarely    Drug use: Yes     Types: Marijuana, Heroin     Comment: currently smoking marihuana; pt reports last using heroin earlier today    Sexual activity: Never   Lifestyle    Physical activity:     Days per week: Not on file     Minutes per session: Not on file    Stress: Not on file   Relationships    Social connections:     Talks on phone: Not on file     Gets together: Not on file     Attends Protestant service: Not on file     Active member of club or organization: Not on file     Attends meetings of clubs or organizations: Not on file     Relationship status: Not on file    Intimate partner violence:     Fear of current or ex partner: Not on file     Emotionally abused: Not on file     Physically abused: Not on file     Forced sexual activity: Not on file   Other Topics Concern    Not on file   Social History Narrative    Not on file     Social History     Substance and Sexual Activity   Alcohol Use Yes    Comment: rarely     Social History     Substance and Sexual Activity   Drug Use Yes    Types: Marijuana, Heroin    Comment: currently smoking marihuana; pt reports last using heroin earlier today     Social History     Tobacco Use   Smoking Status Current Every Day Smoker    Packs/day: 0 50   Smokeless Tobacco Never Used   Tobacco Comment    "one per day"     Family History   Problem Relation Age of Onset    Thyroid disease Mother     Cancer Mother      ==  Frank Coronel, DO  PGY-1  [address] Internal [facility]   [facility] , [address] [address] [facility] [address], [address] [facility]  Office: [phone]  Fax: [phone]

## 2020-02-21 ENCOUNTER — TRANSCRIBE ORDERS (OUTPATIENT)
Dept: ADMISSIONS | Facility: HOSPITAL | Age: 60
End: 2020-02-21

## 2020-02-21 ENCOUNTER — HOSPITAL ENCOUNTER (OUTPATIENT)
Dept: PULMONOLOGY | Facility: HOSPITAL | Age: 60
Discharge: HOME | End: 2020-02-21
Payer: COMMERCIAL

## 2020-02-21 DIAGNOSIS — J43.8 OTHER EMPHYSEMA (HCC): ICD-10-CM

## 2020-02-21 PROCEDURE — 94060 EVALUATION OF WHEEZING: CPT | Performed by: INTERNAL MEDICINE

## 2020-02-21 PROCEDURE — 94760 N-INVAS EAR/PLS OXIMETRY 1: CPT

## 2020-02-21 PROCEDURE — 94729 DIFFUSING CAPACITY: CPT

## 2020-02-21 PROCEDURE — 94729 DIFFUSING CAPACITY: CPT | Performed by: INTERNAL MEDICINE

## 2020-02-21 PROCEDURE — 94726 PLETHYSMOGRAPHY LUNG VOLUMES: CPT

## 2020-02-21 PROCEDURE — 94060 EVALUATION OF WHEEZING: CPT

## 2020-02-21 PROCEDURE — 94726 PLETHYSMOGRAPHY LUNG VOLUMES: CPT | Performed by: INTERNAL MEDICINE

## 2020-02-21 RX ORDER — ALBUTEROL SULFATE 2.5 MG/3ML
2.5 SOLUTION RESPIRATORY (INHALATION) ONCE
Status: COMPLETED | OUTPATIENT
Start: 2020-02-21 | End: 2020-02-21

## 2020-02-21 RX ADMIN — ALBUTEROL SULFATE 2.5 MG: 2.5 SOLUTION RESPIRATORY (INHALATION) at 10:22

## 2020-03-09 DIAGNOSIS — E11.8 TYPE 2 DIABETES MELLITUS WITH COMPLICATION (HCC): ICD-10-CM

## 2020-03-09 NOTE — TELEPHONE ENCOUNTER
Name of medication, dose, quantity and frequency:    Requested Prescriptions     Pending Prescriptions Disp Refills    metFORMIN (GLUCOPHAGE) 500 mg tablet 90 tablet 3     Sig: Take 1 5 tablets (750 mg total) by mouth 2 (two) times a day       Number of refills left: 0    Amount of medication left:    Pharmacy verified and updated: yes    Additional information:    Received fax from pharmacy requesting refills

## 2020-04-30 ENCOUNTER — TELEMEDICINE (OUTPATIENT)
Dept: INTERNAL MEDICINE CLINIC | Facility: CLINIC | Age: 60
End: 2020-04-30

## 2020-04-30 DIAGNOSIS — R06.02 SHORTNESS OF BREATH: ICD-10-CM

## 2020-04-30 DIAGNOSIS — E11.69 TYPE 2 DIABETES MELLITUS WITH OTHER SPECIFIED COMPLICATION, WITHOUT LONG-TERM CURRENT USE OF INSULIN (HCC): ICD-10-CM

## 2020-04-30 DIAGNOSIS — J44.9 CHRONIC OBSTRUCTIVE PULMONARY DISEASE, UNSPECIFIED COPD TYPE (HCC): ICD-10-CM

## 2020-04-30 DIAGNOSIS — M54.31 SCIATICA OF RIGHT SIDE: ICD-10-CM

## 2020-04-30 DIAGNOSIS — E11.69 TYPE 2 DIABETES MELLITUS WITH OTHER SPECIFIED COMPLICATION, UNSPECIFIED WHETHER LONG TERM INSULIN USE (HCC): ICD-10-CM

## 2020-04-30 DIAGNOSIS — J30.2 SEASONAL ALLERGIC RHINITIS, UNSPECIFIED TRIGGER: ICD-10-CM

## 2020-04-30 DIAGNOSIS — J44.9 CHRONIC OBSTRUCTIVE PULMONARY DISEASE, UNSPECIFIED COPD TYPE (HCC): Primary | ICD-10-CM

## 2020-04-30 PROBLEM — F11.20 METHADONE DEPENDENCE (HCC): Status: ACTIVE | Noted: 2020-04-30

## 2020-04-30 PROBLEM — E87.1 HYPONATREMIA: Status: RESOLVED | Noted: 2019-01-16 | Resolved: 2020-04-30

## 2020-04-30 PROBLEM — A41.9 SEPSIS DUE TO PNEUMONIA (HCC): Status: RESOLVED | Noted: 2019-01-16 | Resolved: 2020-04-30

## 2020-04-30 PROBLEM — J18.9 SEPSIS DUE TO PNEUMONIA (HCC): Status: RESOLVED | Noted: 2019-01-16 | Resolved: 2020-04-30

## 2020-04-30 PROBLEM — R05.9 COUGH: Status: RESOLVED | Noted: 2019-02-04 | Resolved: 2020-04-30

## 2020-04-30 PROCEDURE — G2012 BRIEF CHECK IN BY MD/QHP: HCPCS | Performed by: INTERNAL MEDICINE

## 2020-04-30 PROCEDURE — T1015 CLINIC SERVICE: HCPCS | Performed by: INTERNAL MEDICINE

## 2020-04-30 RX ORDER — LORATADINE 10 MG/1
10 TABLET ORAL DAILY
Qty: 30 TABLET | Refills: 1 | Status: SHIPPED | OUTPATIENT
Start: 2020-04-30 | End: 2020-06-25 | Stop reason: SDUPTHER

## 2020-04-30 RX ORDER — CYCLOBENZAPRINE HCL 5 MG
5 TABLET ORAL 3 TIMES DAILY PRN
Qty: 90 TABLET | Refills: 2 | Status: SHIPPED | OUTPATIENT
Start: 2020-04-30 | End: 2020-07-28 | Stop reason: SDUPTHER

## 2020-06-25 DIAGNOSIS — J30.2 SEASONAL ALLERGIC RHINITIS, UNSPECIFIED TRIGGER: ICD-10-CM

## 2020-06-27 RX ORDER — LORATADINE 10 MG/1
10 TABLET ORAL DAILY
Qty: 30 TABLET | Refills: 1 | Status: SHIPPED | OUTPATIENT
Start: 2020-06-27 | End: 2020-08-23

## 2020-07-13 DIAGNOSIS — E11.8 TYPE 2 DIABETES MELLITUS WITH COMPLICATION (HCC): ICD-10-CM

## 2020-07-13 NOTE — TELEPHONE ENCOUNTER
Requested Prescriptions     Pending Prescriptions Disp Refills    metFORMIN (GLUCOPHAGE) 500 mg tablet 90 tablet 3     Sig: Take 1 5 tablets (750 mg total) by mouth 2 (two) times a day

## 2020-07-24 DIAGNOSIS — J44.9 CHRONIC OBSTRUCTIVE PULMONARY DISEASE, UNSPECIFIED COPD TYPE (HCC): ICD-10-CM

## 2020-07-24 DIAGNOSIS — R06.02 SHORTNESS OF BREATH: ICD-10-CM

## 2020-07-24 NOTE — TELEPHONE ENCOUNTER
Name of medication, dose, quantity and frequency:    Requested Prescriptions     Pending Prescriptions Disp Refills    albuterol (PROVENTIL HFA,VENTOLIN HFA) 90 mcg/act inhaler 1 Inhaler 2     Sig: Inhale 2 puffs every 6 (six) hours as needed for wheezing       Number of refills left: 0    Amount of medication left:    Pharmacy verified and updated: yes    Additional information:      Received fax from pharmacy requesting refills

## 2020-07-25 RX ORDER — ALBUTEROL SULFATE 90 UG/1
2 AEROSOL, METERED RESPIRATORY (INHALATION) EVERY 6 HOURS PRN
Qty: 1 INHALER | Refills: 2 | Status: SHIPPED | OUTPATIENT
Start: 2020-07-25 | End: 2020-11-16 | Stop reason: SDUPTHER

## 2020-07-28 DIAGNOSIS — R06.02 SHORTNESS OF BREATH: ICD-10-CM

## 2020-07-28 DIAGNOSIS — J44.9 CHRONIC OBSTRUCTIVE PULMONARY DISEASE, UNSPECIFIED COPD TYPE (HCC): ICD-10-CM

## 2020-07-28 DIAGNOSIS — M54.31 SCIATICA OF RIGHT SIDE: ICD-10-CM

## 2020-07-28 NOTE — TELEPHONE ENCOUNTER
Name of medication, dose, quantity and frequency:      Requested Prescriptions     Pending Prescriptions Disp Refills    cyclobenzaprine (FLEXERIL) 5 mg tablet 90 tablet 2     Sig: Take 1 tablet (5 mg total) by mouth 3 (three) times a day as needed for muscle spasms    fluticasone-salmeterol (ADVAIR, WIXELA) 250-50 mcg/dose inhaler 1 Inhaler 2     Sig: Inhale 1 puff 2 (two) times a day Rinse mouth after use  Number of refills left: 0    Amount of medication left:    Pharmacy verified and updated: yes    Additional information:      Received fax from pharmacy requesting refills

## 2020-07-29 RX ORDER — CYCLOBENZAPRINE HCL 5 MG
5 TABLET ORAL 3 TIMES DAILY PRN
Qty: 90 TABLET | Refills: 2 | Status: SHIPPED | OUTPATIENT
Start: 2020-07-29 | End: 2020-11-13 | Stop reason: ALTCHOICE

## 2020-08-22 DIAGNOSIS — J30.2 SEASONAL ALLERGIC RHINITIS, UNSPECIFIED TRIGGER: ICD-10-CM

## 2020-08-23 RX ORDER — LORATADINE 10 MG/1
TABLET ORAL
Qty: 30 TABLET | Refills: 1 | Status: SHIPPED | OUTPATIENT
Start: 2020-08-23 | End: 2020-11-30 | Stop reason: SDUPTHER

## 2020-10-24 DIAGNOSIS — J30.2 SEASONAL ALLERGIC RHINITIS, UNSPECIFIED TRIGGER: ICD-10-CM

## 2020-10-24 DIAGNOSIS — J44.9 CHRONIC OBSTRUCTIVE PULMONARY DISEASE, UNSPECIFIED COPD TYPE (HCC): ICD-10-CM

## 2020-10-24 DIAGNOSIS — M54.31 SCIATICA OF RIGHT SIDE: ICD-10-CM

## 2020-10-24 DIAGNOSIS — R06.02 SHORTNESS OF BREATH: ICD-10-CM

## 2020-10-27 DIAGNOSIS — M54.31 SCIATICA OF RIGHT SIDE: ICD-10-CM

## 2020-10-27 DIAGNOSIS — J30.2 SEASONAL ALLERGIC RHINITIS, UNSPECIFIED TRIGGER: ICD-10-CM

## 2020-11-02 DIAGNOSIS — M54.31 SCIATICA OF RIGHT SIDE: ICD-10-CM

## 2020-11-10 DIAGNOSIS — E11.8 TYPE 2 DIABETES MELLITUS WITH COMPLICATION (HCC): ICD-10-CM

## 2020-11-12 RX ORDER — CYCLOBENZAPRINE HCL 5 MG
5 TABLET ORAL 3 TIMES DAILY PRN
Qty: 90 TABLET | Refills: 2 | OUTPATIENT
Start: 2020-11-12

## 2020-11-16 DIAGNOSIS — J44.9 CHRONIC OBSTRUCTIVE PULMONARY DISEASE, UNSPECIFIED COPD TYPE (HCC): ICD-10-CM

## 2020-11-16 DIAGNOSIS — R06.02 SHORTNESS OF BREATH: ICD-10-CM

## 2020-11-17 RX ORDER — ALBUTEROL SULFATE 90 UG/1
2 AEROSOL, METERED RESPIRATORY (INHALATION) EVERY 6 HOURS PRN
Qty: 1 INHALER | Refills: 2 | Status: SHIPPED | OUTPATIENT
Start: 2020-11-17 | End: 2021-03-05 | Stop reason: SDUPTHER

## 2020-11-20 RX ORDER — LORATADINE 10 MG/1
TABLET ORAL
Qty: 30 TABLET | Refills: 1 | OUTPATIENT
Start: 2020-11-20

## 2020-11-20 RX ORDER — CYCLOBENZAPRINE HCL 5 MG
5 TABLET ORAL 3 TIMES DAILY PRN
Qty: 90 TABLET | Refills: 2 | OUTPATIENT
Start: 2020-11-20

## 2020-11-30 DIAGNOSIS — J44.9 CHRONIC OBSTRUCTIVE PULMONARY DISEASE, UNSPECIFIED COPD TYPE (HCC): ICD-10-CM

## 2020-11-30 DIAGNOSIS — R06.02 SHORTNESS OF BREATH: ICD-10-CM

## 2020-11-30 DIAGNOSIS — J30.2 SEASONAL ALLERGIC RHINITIS, UNSPECIFIED TRIGGER: ICD-10-CM

## 2020-11-30 RX ORDER — LORATADINE 10 MG/1
10 TABLET ORAL DAILY
Qty: 30 TABLET | Refills: 5 | Status: SHIPPED | OUTPATIENT
Start: 2020-11-30 | End: 2021-05-31

## 2020-11-30 RX ORDER — CYCLOBENZAPRINE HCL 5 MG
TABLET ORAL
Qty: 90 TABLET | Refills: 2 | OUTPATIENT
Start: 2020-11-30

## 2020-11-30 RX ORDER — LORATADINE 10 MG/1
TABLET ORAL
Qty: 30 TABLET | Refills: 1 | OUTPATIENT
Start: 2020-11-30

## 2020-12-23 ENCOUNTER — OFFICE VISIT (OUTPATIENT)
Dept: INTERNAL MEDICINE CLINIC | Facility: CLINIC | Age: 60
End: 2020-12-23

## 2020-12-23 VITALS
BODY MASS INDEX: 25.76 KG/M2 | SYSTOLIC BLOOD PRESSURE: 131 MMHG | HEIGHT: 68 IN | TEMPERATURE: 97.1 F | DIASTOLIC BLOOD PRESSURE: 63 MMHG | WEIGHT: 170 LBS | HEART RATE: 97 BPM

## 2020-12-23 DIAGNOSIS — G62.9 POLYNEUROPATHY: ICD-10-CM

## 2020-12-23 DIAGNOSIS — E11.8 TYPE 2 DIABETES MELLITUS WITH COMPLICATION (HCC): Primary | ICD-10-CM

## 2020-12-23 LAB — SL AMB POCT HEMOGLOBIN AIC: 11.5 (ref ?–6.5)

## 2020-12-23 PROCEDURE — 3008F BODY MASS INDEX DOCD: CPT | Performed by: INTERNAL MEDICINE

## 2020-12-23 PROCEDURE — 3725F SCREEN DEPRESSION PERFORMED: CPT | Performed by: INTERNAL MEDICINE

## 2020-12-23 PROCEDURE — 83036 HEMOGLOBIN GLYCOSYLATED A1C: CPT | Performed by: INTERNAL MEDICINE

## 2020-12-23 PROCEDURE — 99213 OFFICE O/P EST LOW 20 MIN: CPT | Performed by: INTERNAL MEDICINE

## 2020-12-23 PROCEDURE — 3046F HEMOGLOBIN A1C LEVEL >9.0%: CPT | Performed by: INTERNAL MEDICINE

## 2020-12-23 RX ORDER — BLOOD-GLUCOSE METER
1 KIT MISCELLANEOUS AS NEEDED
Qty: 1 EACH | Refills: 0 | Status: SHIPPED | OUTPATIENT
Start: 2020-12-23 | End: 2021-09-07

## 2020-12-23 RX ORDER — INSULIN GLARGINE 100 [IU]/ML
15 INJECTION, SOLUTION SUBCUTANEOUS
Qty: 5 PEN | Refills: 2 | Status: SHIPPED | OUTPATIENT
Start: 2020-12-23 | End: 2021-01-06

## 2020-12-23 RX ORDER — LANCETS 28 GAUGE
EACH MISCELLANEOUS
Qty: 100 EACH | Refills: 1 | Status: SHIPPED | OUTPATIENT
Start: 2020-12-23 | End: 2021-02-05

## 2021-01-04 ENCOUNTER — TELEPHONE (OUTPATIENT)
Dept: INTERNAL MEDICINE CLINIC | Facility: CLINIC | Age: 61
End: 2021-01-04

## 2021-01-04 NOTE — TELEPHONE ENCOUNTER
Patient called and said he's unable to get insulin pen cause pharmacy needs a prior authorization  I called pharmacy to confirm  As per Ludie Congress at [facility] [place] they need prior authorization for insulin

## 2021-01-05 NOTE — TELEPHONE ENCOUNTER
We receive fax from patient pharmacy [facility]) confirming that is not covered by patient insurance  Please review:     Covered alternative are:Lantus Solostar U-100                                         Levemir FlexTouch U-100                                         Lantus U-100 insulin (Soln)                                         Apidra SoloStar U-100                                         Humalog Mix 75-25 Kwikpen                                         Levemir U-100 insulin

## 2021-01-06 ENCOUNTER — TELEPHONE (OUTPATIENT)
Dept: INTERNAL MEDICINE CLINIC | Facility: CLINIC | Age: 61
End: 2021-01-06

## 2021-01-06 DIAGNOSIS — E11.69 TYPE 2 DIABETES MELLITUS WITH OTHER SPECIFIED COMPLICATION, WITHOUT LONG-TERM CURRENT USE OF INSULIN (HCC): Primary | ICD-10-CM

## 2021-01-06 RX ORDER — INSULIN GLARGINE 100 [IU]/ML
15 INJECTION, SOLUTION SUBCUTANEOUS
Qty: 5 PEN | Refills: 0 | Status: SHIPPED | OUTPATIENT
Start: 2021-01-06 | End: 2021-01-28

## 2021-01-06 NOTE — TELEPHONE ENCOUNTER
NO SHOW #2 [id number] (PODIATRY)    NO SHOW #1 ON 12/28/2020 (PODIATRY)    Called PT and LVM to reschedule  Please let him know next time will be 3rd offense

## 2021-01-07 ENCOUNTER — TELEPHONE (OUTPATIENT)
Dept: INTERNAL MEDICINE CLINIC | Facility: CLINIC | Age: 61
End: 2021-01-07

## 2021-01-07 DIAGNOSIS — E11.69 TYPE 2 DIABETES MELLITUS WITH OTHER SPECIFIED COMPLICATION, WITHOUT LONG-TERM CURRENT USE OF INSULIN (HCC): ICD-10-CM

## 2021-01-07 DIAGNOSIS — J43.8 OTHER EMPHYSEMA (HCC): Primary | Chronic | ICD-10-CM

## 2021-01-28 ENCOUNTER — TELEMEDICINE (OUTPATIENT)
Dept: INTERNAL MEDICINE CLINIC | Facility: CLINIC | Age: 61
End: 2021-01-28

## 2021-01-28 VITALS — TEMPERATURE: 97.2 F

## 2021-01-28 DIAGNOSIS — E11.69 TYPE 2 DIABETES MELLITUS WITH OTHER SPECIFIED COMPLICATION, WITHOUT LONG-TERM CURRENT USE OF INSULIN (HCC): ICD-10-CM

## 2021-01-28 PROCEDURE — 99213 OFFICE O/P EST LOW 20 MIN: CPT | Performed by: INTERNAL MEDICINE

## 2021-01-28 RX ORDER — INSULIN GLARGINE 100 [IU]/ML
20 INJECTION, SOLUTION SUBCUTANEOUS
Qty: 5 PEN | Refills: 0 | Status: SHIPPED | OUTPATIENT
Start: 2021-01-28 | End: 2021-05-14

## 2021-01-28 NOTE — PROGRESS NOTES
Virtual Regular Visit      Assessment/Plan:    Problem List Items Addressed This Visit        Endocrine    Type 2 diabetes mellitus, without long-term current use of insulin ([facility])    Relevant Medications    insulin glargine (Lantus SoloStar) 100 units/mL injection pen          Type 2 diabetes now insulin dependent  Patient is currently on 15 U of glargine at bedtime and 1000 mg of metformin BID  He stated his BG has been as follows:  Morning (8 am) 200-250 (400 when he first started using insulin)  Lunch (noon) 200   Dinner (4-8 pm) 170-200  Bedtime (11pm-midnight) 100-200   He stated he had one hypoglycemic episode a few days ago  His BG was 73 and he felt quite jittery  He drank a small can of coke which relieved his symptoms  Will increase glargine to 20 U at this time; Patient will call in 1 week with BG log as well as food log  Return to clinic in 5 weeks  Reason for visit is   Chief Complaint   Patient presents with    Follow-up        Encounter provider Micah Mota MD    Provider located at [facility]-[address]  [address]  [facility] [address] [phone]      Recent Visits  No visits were found meeting these conditions  Showing recent visits within past 7 days and meeting all other requirements     Today's Visits  Date Type Provider Dept   01/28/21 Telemedicine Micah Mota MD  [facility] today's visits and meeting all other requirements     Future Appointments  No visits were found meeting these conditions  Showing future appointments within next 150 days and meeting all other requirements        The patient was identified by name and date of birth  Camilla Marie was informed that this is a telemedicine visit and that the visit is being conducted through [facility] and patient was informed that this is a secure, HIPAA-compliant platform  He agrees to proceed     My office door was closed  No one else was in the room  He acknowledged consent and understanding of privacy and security of the video platform  The patient has agreed to participate and understands they can discontinue the visit at any time  Patient is aware this is a billable service  Subjective  Alvarez Licona is a 56 y o  male  With a PMHx significant for T2DM now requiring insulin, COPD, opioid dependence, and tobacco abuse  He is coming in for follow up of T2DM  Patient states that his blood sugars have been around 200 through out the day  He's had one episode of hypoglycemia a few days ago - he stated his BG was 73, but he was feeling quite jittery and this episode resolved after drinking a small can of coke  Past Medical History:   Diagnosis Date    Cough 2/4/2019    Diabetes mellitus ([address] )        Past Surgical History:   Procedure Laterality Date    TONSILLECTOMY         Current Outpatient Medications   Medication Sig Dispense Refill    albuterol (PROVENTIL HFA,VENTOLIN HFA) 90 mcg/act inhaler Inhale 2 puffs every 6 (six) hours as needed for wheezing 1 Inhaler 2    fluticasone-salmeterol (ADVAIR, WIXELA) 250-50 mcg/dose inhaler Inhale 1 puff 2 (two) times a day Rinse mouth after use  1 Inhaler 5    glucose blood test strip Use as instructed 200 each 2    glucose monitoring kit (FREESTYLE) monitoring kit Use 1 each as needed (Twice a day and as needed) 1 each 0    insulin glargine (Lantus SoloStar) 100 units/mL injection pen Inject 15 Units under the skin daily at bedtime 5 pen 0    Insulin Pen Needle 31G X 4 MM MISC Use daily 100 each 0    Lancets (freestyle) lancets Use as instructed 100 each 1    loratadine (CLARITIN) 10 mg tablet Take 1 tablet (10 mg total) by mouth daily 30 tablet 5    metFORMIN (GLUCOPHAGE) 500 mg tablet TAKE 1 AND 1/2 TABLETS BY MOUTH TWO TIMES DAILY 90 tablet 3    methadone (DOLOPHINE) 10 mg tablet Take 80 mg by mouth daily,       No current facility-administered medications for this visit           Allergies Allergen Reactions    Imitrex [Sumatriptan] Hallucinations     Annotation - [id number]: metal taste and spacey    Immune Globulin      Other reaction(s): Other (See Comments)  Tripping, spacing       Review of Systems   Constitutional: Negative  Respiratory: Negative  Cardiovascular: Negative  Gastrointestinal: Negative  Genitourinary: Negative  Neurological: Negative  Video Exam    Vitals:    01/28/21 1433   Temp: (!) 97 2 °F (36 2 °C)   TempSrc: Temporal       Physical Exam  Constitutional:       Appearance: Normal appearance  HENT:      Head: Normocephalic and atraumatic  Mouth/Throat:      Mouth: Mucous membranes are moist       Pharynx: Oropharynx is clear  Eyes:      Extraocular Movements: Extraocular movements intact  Conjunctiva/sclera: Conjunctivae normal    Neck:      Musculoskeletal: Normal range of motion  Pulmonary:      Effort: Pulmonary effort is normal    Musculoskeletal: Normal range of motion  Neurological:      Mental Status: He is alert and oriented to person, place, and time  Mental status is at baseline  Psychiatric:         Mood and Affect: Mood normal           I spent 25 minutes directly with the patient during this visit      VIRTUAL VISIT Heri Alonso acknowledges that he has consented to an online visit or consultation  He understands that the online visit is based solely on information provided by him, and that, in the absence of a face-to-face physical evaluation by the physician, the diagnosis he receives is both limited and provisional in terms of accuracy and completeness  This is not intended to replace a full medical face-to-face evaluation by the physician  Jesenia Mcgill understands and accepts these terms

## 2021-02-05 DIAGNOSIS — E11.8 TYPE 2 DIABETES MELLITUS WITH COMPLICATION (HCC): ICD-10-CM

## 2021-02-05 RX ORDER — LANCETS
EACH MISCELLANEOUS
Qty: 100 EACH | Refills: 1 | Status: SHIPPED | OUTPATIENT
Start: 2021-02-05 | End: 2021-04-09

## 2021-03-05 DIAGNOSIS — J44.9 CHRONIC OBSTRUCTIVE PULMONARY DISEASE, UNSPECIFIED COPD TYPE (HCC): ICD-10-CM

## 2021-03-05 DIAGNOSIS — R06.02 SHORTNESS OF BREATH: ICD-10-CM

## 2021-03-05 NOTE — TELEPHONE ENCOUNTER
Name of medication, dose, quantity and frequency  Requested Prescriptions     Pending Prescriptions Disp Refills    albuterol (PROVENTIL HFA,VENTOLIN HFA) 90 mcg/act inhaler 1 Inhaler 2     Sig: Inhale 2 puffs every 6 (six) hours as needed for wheezing         Number of refills left:    Amount of medication left:    Pharmacy verified and updated    Additional information:

## 2021-03-08 RX ORDER — ALBUTEROL SULFATE 90 UG/1
2 AEROSOL, METERED RESPIRATORY (INHALATION) EVERY 6 HOURS PRN
Qty: 1 INHALER | Refills: 2 | Status: SHIPPED | OUTPATIENT
Start: 2021-03-08 | End: 2021-06-14

## 2021-03-11 DIAGNOSIS — E11.8 TYPE 2 DIABETES MELLITUS WITH COMPLICATION (HCC): ICD-10-CM

## 2021-04-09 DIAGNOSIS — E11.8 TYPE 2 DIABETES MELLITUS WITH COMPLICATION (HCC): ICD-10-CM

## 2021-04-09 RX ORDER — LANCETS
EACH MISCELLANEOUS
Qty: 100 EACH | Refills: 1 | Status: SHIPPED | OUTPATIENT
Start: 2021-04-09 | End: 2021-06-03

## 2021-04-13 ENCOUNTER — TELEPHONE (OUTPATIENT)
Dept: INTERNAL MEDICINE CLINIC | Facility: CLINIC | Age: 61
End: 2021-04-13

## 2021-04-13 NOTE — TELEPHONE ENCOUNTER
Patient has an appt on Thursday & wants to know if he should be concerned due to underlying conditions   Please review

## 2021-04-15 ENCOUNTER — IMMUNIZATIONS (OUTPATIENT)
Dept: FAMILY MEDICINE CLINIC | Facility: HOSPITAL | Age: 61
End: 2021-04-15

## 2021-04-15 DIAGNOSIS — Z23 ENCOUNTER FOR IMMUNIZATION: Primary | ICD-10-CM

## 2021-04-15 PROCEDURE — 0001A SARS-COV-2 / COVID-19 MRNA VACCINE (PFIZER-BIONTECH) 30 MCG: CPT

## 2021-04-15 PROCEDURE — 91300 SARS-COV-2 / COVID-19 MRNA VACCINE (PFIZER-BIONTECH) 30 MCG: CPT

## 2021-04-30 ENCOUNTER — OFFICE VISIT (OUTPATIENT)
Dept: INTERNAL MEDICINE CLINIC | Facility: CLINIC | Age: 61
End: 2021-04-30

## 2021-04-30 VITALS
BODY MASS INDEX: 24.98 KG/M2 | TEMPERATURE: 98.1 F | HEIGHT: 68 IN | WEIGHT: 164.8 LBS | SYSTOLIC BLOOD PRESSURE: 147 MMHG | DIASTOLIC BLOOD PRESSURE: 74 MMHG | HEART RATE: 99 BPM

## 2021-04-30 DIAGNOSIS — E11.40 DIABETIC NEUROPATHY (HCC): ICD-10-CM

## 2021-04-30 DIAGNOSIS — H61.23 BILATERAL IMPACTED CERUMEN: ICD-10-CM

## 2021-04-30 DIAGNOSIS — E11.69 TYPE 2 DIABETES MELLITUS WITH OTHER SPECIFIED COMPLICATION, WITHOUT LONG-TERM CURRENT USE OF INSULIN (HCC): Primary | ICD-10-CM

## 2021-04-30 DIAGNOSIS — Z00.00 HEALTHCARE MAINTENANCE: ICD-10-CM

## 2021-04-30 LAB — SL AMB POCT HEMOGLOBIN AIC: 9.6 (ref ?–6.5)

## 2021-04-30 PROCEDURE — 83036 HEMOGLOBIN GLYCOSYLATED A1C: CPT | Performed by: INTERNAL MEDICINE

## 2021-04-30 PROCEDURE — 99213 OFFICE O/P EST LOW 20 MIN: CPT | Performed by: INTERNAL MEDICINE

## 2021-04-30 RX ORDER — DULOXETIN HYDROCHLORIDE 30 MG/1
30 CAPSULE, DELAYED RELEASE ORAL DAILY
Qty: 30 CAPSULE | Refills: 3 | Status: SHIPPED | OUTPATIENT
Start: 2021-04-30 | End: 2021-05-14 | Stop reason: ALTCHOICE

## 2021-04-30 NOTE — PROGRESS NOTES
INTERNAL MEDICINE FOLLOW-UP OFFICE VISIT  [facility]  [address]    NAME: Dasia Mcrae  AGE: 59 y o  SEX: male    DATE OF ENCOUNTER: 4/30/2021    Assessment and Plan     Neuropathic pain: Likely diabetic neuropathy based on description and history of poorly-controlled diabetes  Patient requesting gabapentin at this time  Given his ongoing history of opiate abuse I am hesitant to recommend this medication if other alternatives are available  -  Will prescribe Cymbalta at this time     diabetes:  Poorly controlled recently  Patient reports compliance with glargine 20 hs which was started in December of 2020  He denies recent hypoglycemic events  He also is compliant with metformin 750 b i d   Patient does not bring a blood glucose log but reports his post meal blood sugars are usually in the low 200s with a m  blood glucose in the 170s  A1c pending at this time  Patient reports he has attempted to make improved dietary and exercise choices  -  Will continue current regimen at this time, patient advised to bring in a blood glucose log to his upcoming appointment with his PCP Dr Kacy Keita  Earwax: Patient reports recent aggressive cleaning of years leading to a small amount of bleeding from ear canal   On exam eardrum intact and with small amount of dried blood noted in ear canal as well as significant earwax  Patient advised to not stick Q-tips in his ears  -  Debrox ordered     health maintenance:  -  Patient reports he has a fit kit at home and plans to have this test done  - CT lung ordered for lung cancer screening  - basic labs  Ordered as patient has not had these drawn  In quite a while  - will check hep C/HIV  - patient has had 1/2 COVID vaccines performed, advised compliance with 2nd vaccine      Orders Placed This Encounter   Procedures    CT lung screening program    Basic metabolic panel    CBC and differential    Microalbumin / creatinine urine ratio    HIV 1/2 ANTIGEN/ANTIBODY (4TH GENERATION) W REFLEX [facility]    Hepatitis C Qualitative by PCR    POCT hemoglobin A1c           Chief Complaint     Chief Complaint   Patient presents with    Leg Pain     requesting Gabapentin for nerve pain       History of Present Illness     HPI      69-year-old male with medical history of active heroin abuse, 30 pack year history with active tobacco abuse, poorly controlled type 2 diabetes, COPD  Patient presents to discuss lower extremity neuropathic pain described as a burning sensation and primarily noted at night  This has been going on for the last several weeks  Patient reports that he has continued using heroin in attempt to control the pain  He requests gabapentin to assist with management  Patient also reports good compliance with glargine 20 hs and metformin 750 b i d  related to his diabetes  He reports morning blood glucose is typically in the 170s with post meal blood glucose in the low 200s  He does not bring in a blood glucose log this time  States he has been attempting to improve his diet and exercise habits  Patient reports that he may quit tobacco use within the next year,  Refuses any nicotine replacement therapy etc  At this time  No other acute complaints  The following portions of the patient's history were reviewed and updated as appropriate: allergies, current medications, past family history, past medical history, past social history, past surgical history and problem list     Review of Systems     Review of Systems   Constitutional: Negative for activity change, appetite change, chills, diaphoresis, fatigue, fever and unexpected weight change  Respiratory: Negative for apnea, cough, choking, chest tightness, shortness of breath, wheezing and stridor  Cardiovascular: Negative for chest pain, palpitations and leg swelling     Gastrointestinal: Negative for abdominal distention, abdominal pain, anal bleeding, blood in stool, constipation, diarrhea and nausea  Genitourinary: Negative for difficulty urinating, dysuria, enuresis, flank pain, frequency, genital sores and hematuria  Musculoskeletal: Negative for arthralgias, back pain, gait problem, joint swelling, myalgias, neck pain and neck stiffness  Skin: Negative for color change, pallor, rash and wound  Neurological: Negative for dizziness, facial asymmetry, light-headedness and headaches  Hematological: Negative for adenopathy  Does not bruise/bleed easily  Psychiatric/Behavioral: Negative for agitation, behavioral problems, confusion, decreased concentration and dysphoric mood  Active Problem List     Patient Active Problem List   Diagnosis    Type 2 diabetes mellitus, without long-term current use of insulin (HCC)    Other emphysema (HCC)    Tobacco dependence    Smoker    Chronic obstructive pulmonary disease (HCC)    Methadone dependence (HCC)       Objective     /74 (BP Location: Left arm, Patient Position: Sitting, Cuff Size: Standard)   Pulse 99   Temp 98 1 °F (36 7 °C) (Temporal)   Ht 5' 8" (1 727 m)   Wt 74 8 kg (164 lb 12 8 oz)   BMI 25 06 kg/m²     Physical Exam  Vitals signs reviewed  Constitutional:       General: He is not in acute distress  Appearance: Normal appearance  He is not ill-appearing, toxic-appearing or diaphoretic  HENT:      Head: Normocephalic and atraumatic  Eyes:      General: No scleral icterus  Right eye: No discharge  Left eye: No discharge  Extraocular Movements: Extraocular movements intact  Conjunctiva/sclera: Conjunctivae normal       Pupils: Pupils are equal, round, and reactive to light  Cardiovascular:      Rate and Rhythm: Normal rate and regular rhythm  Pulses: Normal pulses  Heart sounds: Normal heart sounds  No murmur  No friction rub  No gallop  Pulmonary:      Effort: Pulmonary effort is normal  No respiratory distress  Breath sounds: Normal breath sounds  No stridor  No wheezing, rhonchi or rales  Chest:      Chest wall: No tenderness  Abdominal:      General: Abdomen is flat  Bowel sounds are normal  There is no distension  Palpations: Abdomen is soft  There is no mass  Tenderness: There is no abdominal tenderness  There is no guarding or rebound  Hernia: No hernia is present  Musculoskeletal: Normal range of motion  Right lower leg: No edema  Left lower leg: No edema  Skin:     General: Skin is warm and dry  Coloration: Skin is not jaundiced or pale  Findings: No bruising or erythema  Neurological:      General: No focal deficit present  Mental Status: He is alert and oriented to person, place, and time  Cranial Nerves: No cranial nerve deficit  Sensory: No sensory deficit           Pertinent Laboratory/Diagnostic Studies:  CBC:   Lab Results   Component Value Date/Time    WBC 17 79 (H) 01/18/2019 04:43 AM    RBC 3 85 (L) 01/18/2019 04:43 AM    HGB 12 1 01/18/2019 04:43 AM    HCT 36 8 01/18/2019 04:43 AM    MCV 96 01/18/2019 04:43 AM    MCH 31 4 01/18/2019 04:43 AM    MCHC 32 9 01/18/2019 04:43 AM    RDW 12 6 01/18/2019 04:43 AM    MPV 10 4 01/18/2019 04:43 AM     (L) 01/18/2019 04:43 AM    NRBC 0 01/16/2019 05:07 PM    NEUTOPHILPCT 55 09/13/2016 08:45 AM    LYMPHOPCT 8 (L) 01/16/2019 05:07 PM    LYMPHOPCT 26 09/13/2016 08:45 AM    MONOPCT 1 (L) 01/16/2019 05:07 PM    MONOPCT 10 09/13/2016 08:45 AM    EOSPCT 0 01/16/2019 05:07 PM    EOSPCT 8 (H) 09/13/2016 08:45 AM    BASOPCT 0 01/16/2019 05:07 PM    BASOPCT 1 09/13/2016 08:45 AM    NEUTROABS 5 35 09/13/2016 08:45 AM    LYMPHSABS 2 56 09/13/2016 08:45 AM    MONOSABS 0 94 09/13/2016 08:45 AM    EOSABS 0 00 01/16/2019 05:07 PM    EOSABS 0 77 (H) 09/13/2016 08:45 AM     Chemistry Profile:   Lab Results   Component Value Date/Time    K 3 6 01/18/2019 04:43 AM     01/18/2019 04:43 AM    CO2 23 01/18/2019 04:43 AM    CO2 26 08/28/2018 06:31 PM    BUN 20 01/18/2019 04:43 AM    CREATININE 0 58 (L) 01/18/2019 04:43 AM    GLUC 121 01/18/2019 04:43 AM    GLUCOSE 170 (H) 08/28/2018 06:31 PM    CALCIUM 8 3 01/18/2019 04:43 AM    AST 24 01/16/2019 05:07 PM    ALT 22 01/16/2019 05:07 PM    ALKPHOS 93 01/16/2019 05:07 PM    EGFR 111 01/18/2019 04:43 AM    EGFR 109 08/28/2018 06:31 PM     CBC: No results for input(s): WBC, RBC, HGB, HCT, MCV, MCH, MCHC, RDW, MPV, PLT, NRBC, NEUTOPHILPCT, LYMPHOPCT, MONOPCT, EOSPCT, BASOPCT, NEUTROABS, LYMPHSABS, MONOSABS, EOSABS, MONOSABS in the last 8784 hours  Chemistry Profile: No results for input(s): NA, K, CL, CO2, ANIONGAP, BUN, CREATININE, GLUF, GLUC, GLUCOSE, CALCIUM, CORRECTEDCA, MG, PHOS, AST, ALT, ALKPHOS, PROT, BILITOT, EGFR, GFRAA, GFRNONAA, EGFRAA, EGFRNAA, EGFRNONAFA in the last 8784 hours      Invalid input(s): EXTSODIUM, EXTPOTASSIUM, EXTCO2, EXTANIONGAP, EXTBUN, EXTCREAT, EXTGLUBLD, GLU, SLAMBGLUCOSE, EXTCALCIUM, CAADJUST, ALBUMIN, SERUMALBUMIN, EXTEGFR    Current Medications     Current Outpatient Medications:     albuterol (PROVENTIL HFA,VENTOLIN HFA) 90 mcg/act inhaler, Inhale 2 puffs every 6 (six) hours as needed for wheezing, Disp: 1 Inhaler, Rfl: 2    fluticasone-salmeterol (ADVAIR, WIXELA) 250-50 mcg/dose inhaler, Inhale 1 puff 2 (two) times a day Rinse mouth after use , Disp: 1 Inhaler, Rfl: 5    glucose blood test strip, Use as instructed, Disp: 200 each, Rfl: 2    glucose monitoring kit (FREESTYLE) monitoring kit, Use 1 each as needed (Twice a day and as needed), Disp: 1 each, Rfl: 0    insulin glargine (Lantus SoloStar) 100 units/mL injection pen, Inject 20 Units under the skin daily at bedtime, Disp: 5 pen, Rfl: 0    Insulin Pen Needle 31G X 4 MM MISC, Use daily, Disp: 100 each, Rfl: 0    loratadine (CLARITIN) 10 mg tablet, Take 1 tablet (10 mg total) by mouth daily, Disp: 30 tablet, Rfl: 5    metFORMIN (GLUCOPHAGE) 500 mg tablet, TAKE 1 AND 1/2 TABLETS BY MOUTH TWO TIMES DAILY, Disp: 90 tablet, Rfl: 3    methadone (DOLOPHINE) 10 mg tablet, Take 80 mg by mouth daily,, Disp: , Rfl:     Microlet Lancets MISC, USE AS INSTRUCTED, Disp: 100 each, Rfl: 1    carbamide peroxide (DEBROX) 6 5 % otic solution, Administer 5 drops into both ears 2 (two) times a day, Disp: 15 mL, Rfl: 3    DULoxetine (CYMBALTA) 30 mg delayed release capsule, Take 1 capsule (30 mg total) by mouth daily, Disp: 30 capsule, Rfl: 3    Health Maintenance     Health Maintenance   Topic Date Due    Hepatitis C Screening  Never done    HIV Screening  Never done    BMI: Followup Plan  Never done    Annual Physical  Never done    Colorectal Cancer Screening  Never done    URINE MICROALBUMIN  09/13/2017    Diabetic Foot Exam  02/04/2020    Influenza Vaccine (1) 09/01/2020    DM Eye Exam  01/17/2021    HEMOGLOBIN A1C  03/23/2021    COVID-19 Vaccine (2 - Pfizer 2-dose series) 05/06/2021    Depression Screening PHQ  12/23/2021    BMI: Adult  04/30/2022    DTaP,Tdap,and Td Vaccines (2 - Td) 06/04/2026    Pneumococcal Vaccine: Pediatrics (0 to 5 Years) and At-Risk Patients (6 to 59 Years)  Completed    HIB Vaccine  Aged Out    Hepatitis B Vaccine  Aged Out    IPV Vaccine  Aged Out    Hepatitis A Vaccine  Aged Out    Meningococcal ACWY Vaccine  Aged Out    HPV Vaccine  Aged [employer] History   Administered Date(s) Administered    Influenza, recombinant, quadrivalent,injectable, preservative free 04/12/2019, 01/13/2020    Pneumococcal Polysaccharide PPV23 04/12/2019    SARS-CoV-2 / COVID-19 mRNA IM (Pfizer-BioNTech) 04/15/2021    Tdap 06/04/2016       Yohana End  4/30/2021 11:17 AM

## 2021-05-08 ENCOUNTER — IMMUNIZATIONS (OUTPATIENT)
Dept: FAMILY MEDICINE CLINIC | Facility: HOSPITAL | Age: 61
End: 2021-05-08

## 2021-05-08 DIAGNOSIS — Z23 ENCOUNTER FOR IMMUNIZATION: Primary | ICD-10-CM

## 2021-05-08 PROCEDURE — 0002A SARS-COV-2 / COVID-19 MRNA VACCINE (PFIZER-BIONTECH) 30 MCG: CPT

## 2021-05-08 PROCEDURE — 91300 SARS-COV-2 / COVID-19 MRNA VACCINE (PFIZER-BIONTECH) 30 MCG: CPT

## 2021-05-14 ENCOUNTER — OFFICE VISIT (OUTPATIENT)
Dept: INTERNAL MEDICINE CLINIC | Facility: CLINIC | Age: 61
End: 2021-05-14

## 2021-05-14 VITALS
BODY MASS INDEX: 25.46 KG/M2 | TEMPERATURE: 97.2 F | DIASTOLIC BLOOD PRESSURE: 76 MMHG | SYSTOLIC BLOOD PRESSURE: 164 MMHG | HEIGHT: 68 IN | WEIGHT: 168 LBS | HEART RATE: 90 BPM

## 2021-05-14 DIAGNOSIS — M54.32 SCIATICA OF LEFT SIDE: ICD-10-CM

## 2021-05-14 DIAGNOSIS — E11.8 TYPE 2 DIABETES MELLITUS WITH COMPLICATION (HCC): ICD-10-CM

## 2021-05-14 DIAGNOSIS — E11.69 TYPE 2 DIABETES MELLITUS WITH OTHER SPECIFIED COMPLICATION, WITHOUT LONG-TERM CURRENT USE OF INSULIN (HCC): Primary | ICD-10-CM

## 2021-05-14 DIAGNOSIS — E11.42 DIABETIC POLYNEUROPATHY ASSOCIATED WITH TYPE 2 DIABETES MELLITUS (HCC): ICD-10-CM

## 2021-05-14 PROCEDURE — 99213 OFFICE O/P EST LOW 20 MIN: CPT | Performed by: HOSPITALIST

## 2021-05-14 RX ORDER — GABAPENTIN 100 MG/1
100 CAPSULE ORAL 3 TIMES DAILY
Qty: 90 CAPSULE | Refills: 2 | Status: SHIPPED | OUTPATIENT
Start: 2021-05-14 | End: 2021-07-29

## 2021-05-14 RX ORDER — INSULIN GLARGINE 100 [IU]/ML
30 INJECTION, SOLUTION SUBCUTANEOUS
Qty: 9 ML | Refills: 2 | Status: SHIPPED | OUTPATIENT
Start: 2021-05-14 | End: 2021-08-30 | Stop reason: SDUPTHER

## 2021-05-14 NOTE — PROGRESS NOTES
ASSESSMENT/PLAN:  Diagnoses and all orders for this visit:    Type 2 diabetes mellitus with other specified complication, without long-term current use of insulin ([facility] [address] )  -     Ambulatory referral to Podiatry; Future  -     Ambulatory referral to Ophthalmology; Future  -     insulin glargine (Lantus SoloStar) 100 units/mL injection pen; Inject 30 Units under the skin daily at bedtime  -     metFORMIN (GLUCOPHAGE) 500 mg tablet; Take 2 tablets (1,000 mg total) by mouth 2 (two) times a day  - Will increase metformin to 1000 mg BID; Will also increase lantus 30 at bedtime from 20 U  - He states that all his BG is 150-200 before breakfast, lunch, and dinner  Sciatica of left side  -     Ambulatory referral to Physical Therapy; Future    Diabetic polyneuropathy associated with type 2 diabetes mellitus (HCC)  -     gabapentin (NEURONTIN) 100 mg capsule; Take 1 capsule (100 mg total) by mouth 3 (three) times a day  - Will start on gabapentin 100 mg TID    - Patient to complete lab work prior to the next visit  Health Maintenance:  Advised diet and exercise  Advised to refrain from tobacco, alcohol, illicit drug use  Advised medical compliance  He has a FIT test at home that he will get done soon  BMI Counseling: Body mass index is 25 54 kg/m²  The BMI is above normal  Nutrition recommendations include reducing portion sizes, 3-5 servings of fruits/vegetables daily, reducing fast food intake, consuming healthier snacks, increasing intake of lean protein and reducing intake of saturated fat and trans fat  Exercise recommendations include exercising 3-5 times per week  Schedule a follow-up appointment in 5 weeks  CHIEF COMPLAINT: Follow up    HISTORY OF PRESENT ILLNESS:    Patient is a 59year old with a PMHx significant for COPD GOLD stage III, T2DM on insulin, opioid abuse on Methadone  He presents for diabetes follow up  He states that his BG range from 150 to 200, but never over 250 or below 120   He denies any hypoglycemic events  He is still smoking 1/2 pack a day; he states that he has cut down from 1 pack  He does not want any nicotine patches/gum to help quit  He would like to do it on his own  He is requesting gabapentin due to his diabetic neuropathy  It is in his bilateral lower extremities and has been occurring for a year now, but he has noticed that it has worsened over the past month or so  He is using nasal heroine approximately once weekly due to this pain  The following portions of the patient's history were reviewed and updated as appropriate: allergies, current medications, past family history, past medical history, past social history, past surgical history and problem list     Review of Systems   Constitutional: Negative  Respiratory: Negative  Cardiovascular: Negative  Gastrointestinal: Negative  OBJECTIVE:  Vitals:    05/14/21 0812   BP: 164/76   BP Location: Right arm   Patient Position: Sitting   Cuff Size: Adult   Pulse: 90   Temp: (!) 97 2 °F (36 2 °C)   TempSrc: Temporal   Weight: 76 2 kg (168 lb)   Height: 5' 8" (1 727 m)     Physical Exam  Constitutional:       Appearance: Normal appearance  HENT:      Head: Normocephalic and atraumatic  Nose: Nose normal       Mouth/Throat:      Mouth: Mucous membranes are moist       Pharynx: Oropharynx is clear  Eyes:      Extraocular Movements: Extraocular movements intact  Conjunctiva/sclera: Conjunctivae normal    Neck:      Musculoskeletal: Normal range of motion  Cardiovascular:      Rate and Rhythm: Normal rate and regular rhythm  Heart sounds: No murmur  No friction rub  No gallop  Pulmonary:      Effort: Pulmonary effort is normal  No respiratory distress  Breath sounds: Normal breath sounds  No stridor  No wheezing, rhonchi or rales  Chest:      Chest wall: No tenderness  Abdominal:      General: Bowel sounds are normal  There is no distension  Palpations: Abdomen is soft  There is no mass  Tenderness: There is no guarding or rebound  Hernia: No hernia is present  Musculoskeletal: Normal range of motion  Skin:     General: Skin is warm and dry  Neurological:      General: No focal deficit present  Mental Status: He is alert and oriented to person, place, and time     Psychiatric:         Mood and Affect: Mood normal            Current Outpatient Medications:     albuterol (PROVENTIL HFA,VENTOLIN HFA) 90 mcg/act inhaler, Inhale 2 puffs every 6 (six) hours as needed for wheezing, Disp: 1 Inhaler, Rfl: 2    carbamide peroxide (DEBROX) 6 5 % otic solution, Administer 5 drops into both ears 2 (two) times a day, Disp: 15 mL, Rfl: 3    fluticasone-salmeterol (ADVAIR, WIXELA) 250-50 mcg/dose inhaler, Inhale 1 puff 2 (two) times a day Rinse mouth after use , Disp: 1 Inhaler, Rfl: 5    glucose blood test strip, Use as instructed, Disp: 200 each, Rfl: 2    glucose monitoring kit (FREESTYLE) monitoring kit, Use 1 each as needed (Twice a day and as needed), Disp: 1 each, Rfl: 0    insulin glargine (Lantus SoloStar) 100 units/mL injection pen, Inject 30 Units under the skin daily at bedtime, Disp: 9 mL, Rfl: 2    Insulin Pen Needle 31G X 4 MM MISC, Use daily, Disp: 100 each, Rfl: 0    loratadine (CLARITIN) 10 mg tablet, Take 1 tablet (10 mg total) by mouth daily, Disp: 30 tablet, Rfl: 5    metFORMIN (GLUCOPHAGE) 500 mg tablet, Take 2 tablets (1,000 mg total) by mouth 2 (two) times a day, Disp: 90 tablet, Rfl: 3    methadone (DOLOPHINE) 10 mg tablet, Take 80 mg by mouth daily,, Disp: , Rfl:     Microlet Lancets MISC, USE AS INSTRUCTED, Disp: 100 each, Rfl: 1    gabapentin (NEURONTIN) 100 mg capsule, Take 1 capsule (100 mg total) by mouth 3 (three) times a day, Disp: 90 capsule, Rfl: 2    Past Medical History:   Diagnosis Date    Cough 2/4/2019    Diabetes mellitus ([facility] [address] )      Past Surgical History:   Procedure Laterality Date    TONSILLECTOMY       Social History Socioeconomic History    Marital status: Registered Domestic Partner     Spouse name: Iman Prieto Number of children: 3    Years of education: Not on file    Highest education level: Not on file   Occupational History    Occupation: unemployed/disabled   Social Needs    Financial resource strain: Not hard at all   [place] insecurity     Worry: Never true     Inability: Never true   [employer] needs     Medical: No     Non-medical: No   Tobacco Use    Smoking status: Current Every Day Smoker     Packs/day: 0 50    Smokeless tobacco: Never Used    Tobacco comment: half a pack every other day   Substance and Sexual Activity    Alcohol use: Not Currently     Comment: rarely    Drug use: Yes     Types: Marijuana, Heroin, Fentanyl     Comment: currently smoking marijuana and occasional heroin, one bag a week    Sexual activity: Never   Lifestyle    Physical activity     Days per week: 7 days     Minutes per session: 60 min    Stress: Not at all   Relationships    Social connections     Talks on phone: More than three times a week     Gets together: More than three times a week     Attends [employer] service: Never     Active member of club or organization: No     Attends meetings of clubs or organizations: Never     Relationship status: Living with partner    Intimate partner violence     Fear of current or ex partner: No     Emotionally abused: No     Physically abused: No     Forced sexual activity: No   Other Topics Concern    Not on file   Social History Narrative    Not on file     Family History   Problem Relation Age of Onset    Thyroid disease Mother    Dago New Mother        ==  MD Hever [address] Internal Medicine PGY-2    [place] [address]  [address]   [facility] , [address] [address] [facility], [address] [facility]  Office: [phone]  Fax: [phone]

## 2021-05-17 ENCOUNTER — CONSULT (OUTPATIENT)
Dept: MULTI SPECIALTY CLINIC | Facility: CLINIC | Age: 61
End: 2021-05-17

## 2021-05-17 VITALS
DIASTOLIC BLOOD PRESSURE: 70 MMHG | HEART RATE: 91 BPM | WEIGHT: 165 LBS | BODY MASS INDEX: 25.01 KG/M2 | HEIGHT: 68 IN | TEMPERATURE: 98 F | SYSTOLIC BLOOD PRESSURE: 155 MMHG

## 2021-05-17 DIAGNOSIS — L98.8 MACERATION OF SKIN: ICD-10-CM

## 2021-05-17 DIAGNOSIS — R06.02 SHORTNESS OF BREATH: ICD-10-CM

## 2021-05-17 DIAGNOSIS — B35.3 TINEA PEDIS OF BOTH FEET: Primary | ICD-10-CM

## 2021-05-17 DIAGNOSIS — J30.2 SEASONAL ALLERGIC RHINITIS, UNSPECIFIED TRIGGER: ICD-10-CM

## 2021-05-17 DIAGNOSIS — E11.69 TYPE 2 DIABETES MELLITUS WITH OTHER SPECIFIED COMPLICATION, WITHOUT LONG-TERM CURRENT USE OF INSULIN (HCC): ICD-10-CM

## 2021-05-17 DIAGNOSIS — J44.9 CHRONIC OBSTRUCTIVE PULMONARY DISEASE, UNSPECIFIED COPD TYPE (HCC): ICD-10-CM

## 2021-05-17 PROCEDURE — 99243 OFF/OP CNSLTJ NEW/EST LOW 30: CPT | Performed by: PODIATRIST

## 2021-05-17 RX ORDER — CLINDAMYCIN PHOSPHATE 10 MG/ML
1 SOLUTION TOPICAL DAILY
Qty: 30 PAD | Refills: 0 | Status: SHIPPED | OUTPATIENT
Start: 2021-05-17 | End: 2022-02-24

## 2021-05-17 RX ORDER — KETOCONAZOLE 20 MG/G
CREAM TOPICAL DAILY
Qty: 15 G | Refills: 10 | Status: SHIPPED | OUTPATIENT
Start: 2021-05-17 | End: 2021-10-25 | Stop reason: SDUPTHER

## 2021-05-17 NOTE — PROGRESS NOTES
At [facility] 59 y o  male MRN: [id number]  Encounter: [id number]      Assessment/Plan        Diagnoses and all orders for this visit:    Tinea pedis of both feet  -     ketoconazole (NIZORAL) 2 % cream; Apply topically daily    Type 2 diabetes mellitus with other specified complication, without long-term current use of insulin ([facility] [address] )  -     Ambulatory referral to Podiatry    Maceration of skin  -     clindamycin (CLEOCIN T) 1 %; Apply 1 pad topically daily       Plan:   Patient was seen/examined  All questions and concerns addressed   Nails x10 were sharply trimmed to normal length and thickness with a large nail nipper without incident (CPT 30634)   Ketoconazole prescription for tinea pedis, clindamycin swabs for interdigital maceration  Educated patient on etiology of these and explained that he should not soak his feet given diminished sensation and as additional moisture may worsen his maceration/tinea pedis   Educated patient on proper diabetic foot care; checking feet every day, wearing white socks, always wearing diabetic shoes even in house, tight glycemic control, proper low-sugar diet and exercise   RTC in 6 months    Dr Jason Villanueva was present during this entire procedure  History of Present Illness     HPI:  Ranjith Taylor is a 59 y o  male who presents with elongated toenails and itchy, scaly rash on the bottom of his feet  States that their nails are painful, elongated  They have difficulty applying their socks and shoes  The pressure within their shoe gear is painful and they have been unable to cut their nails adequately  Patient reports numbness/tingling which is well controlled with gabapentin  They state their last HbA1c was 9 6%  The patient denies any nausea, vomiting, fever, chills, shortness of breath, or chest pains  Review of Systems   Constitutional: Negative  HENT: Negative  Eyes: Negative  Respiratory: Negative  Cardiovascular: Negative  Gastrointestinal: Negative  Musculoskeletal: Negative   Skin: elongated thickened toenails, itching/scaling to bilateral feet   Neurological: Negative  Historical Information   Past Medical History:   Diagnosis Date    Cough 2/4/2019    Diabetes mellitus ([address] )      Past Surgical History:   Procedure Laterality Date    TONSILLECTOMY       Social History   Social History     Substance and Sexual Activity   Alcohol Use Not Currently    Comment: rarely     Social History     Substance and Sexual Activity   Drug Use Yes    Types: Marijuana, Heroin, Fentanyl    Comment: currently smoking marijuana and occasional heroin, one bag a week     Social History     Tobacco Use   Smoking Status Current Every Day Smoker    Packs/day: 0 50   Smokeless Tobacco Never Used   Tobacco Comment    half a pack every other day     Family History:   Family History   Problem Relation Age of Onset    Thyroid disease Mother     Cancer Mother        Meds/Allergies   (Not in a hospital admission)    Allergies   Allergen Reactions    Imitrex [Sumatriptan] Hallucinations     Annotation - [id number]: metal taste and spacey    Immune Globulin      Other reaction(s): Other (See Comments)  Tripping, spacing       Objective     Current Vitals:   Blood Pressure: 155/70 (05/17/21 1500)  Pulse: 91 (05/17/21 1500)  Temperature: 98 °F (36 7 °C) (05/17/21 1500)  Temp Source: Temporal (05/17/21 1500)  Height: 5' 8" (172 7 cm) (05/17/21 1500)  Weight - Scale: 74 8 kg (165 lb) (05/17/21 1500)        /70 (BP Location: Right arm, Patient Position: Sitting, Cuff Size: Adult)   Pulse 91   Temp 98 °F (36 7 °C) (Temporal)   Ht 5' 8" (1 727 m)   Wt 74 8 kg (165 lb)   BMI 25 09 kg/m²       Lower Extremity Exam:    Vascular: Right foot DP/PT +2                   Left foot DP/PT +2                   There is no lower extremity edema bilateral     Musculoskeletal: There is 5/5 strength throughout the bilateral lower extremity             full ankle range of motion with well maintained subtalar range of motion  There is no tenderness over the foot and ankle  There is not foot deformities    Neurological: Sensation to 5 07 Harvey-Frannie nylon filament: diminished bilaterally      Vibratory sense to distal Foot  positive bilaterally      Sharp/Dull sense is positive bilaterally      Dermatology: Skin Condition:  dryness, rash and scaling     There is evidence of macerated tissue between toe spaces  Nail Exam: onychomycosis of the toenails       Open ulcerations: No     Calluses: No    Risk Category: 1 = Loss of protective sensation

## 2021-05-25 DIAGNOSIS — R06.02 SHORTNESS OF BREATH: ICD-10-CM

## 2021-05-25 DIAGNOSIS — J44.9 CHRONIC OBSTRUCTIVE PULMONARY DISEASE, UNSPECIFIED COPD TYPE (HCC): ICD-10-CM

## 2021-05-25 DIAGNOSIS — J30.2 SEASONAL ALLERGIC RHINITIS, UNSPECIFIED TRIGGER: ICD-10-CM

## 2021-05-31 RX ORDER — LORATADINE 10 MG/1
TABLET ORAL
Qty: 30 TABLET | Refills: 5 | Status: SHIPPED | OUTPATIENT
Start: 2021-05-31 | End: 2021-11-24

## 2021-06-03 ENCOUNTER — TELEPHONE (OUTPATIENT)
Dept: FAMILY MEDICINE CLINIC | Facility: CLINIC | Age: 61
End: 2021-06-03

## 2021-06-03 DIAGNOSIS — E11.8 TYPE 2 DIABETES MELLITUS WITH COMPLICATION (HCC): ICD-10-CM

## 2021-06-03 RX ORDER — LANCETS
EACH MISCELLANEOUS
Qty: 100 EACH | Refills: 1 | Status: SHIPPED | OUTPATIENT
Start: 2021-06-03

## 2021-06-07 ENCOUNTER — TELEPHONE (OUTPATIENT)
Dept: FAMILY MEDICINE CLINIC | Facility: CLINIC | Age: 61
End: 2021-06-07

## 2021-06-07 NOTE — TELEPHONE ENCOUNTER
ENRRIQUE w/ patient to call to schedule an appointment for Diabetes Education  Grant Martinez, ESTEFANIN, LDN, KANWAL    Left another message (6/14/21) for patient to call back to schedule an appointment    NORBERTS

## 2021-06-11 DIAGNOSIS — R06.02 SHORTNESS OF BREATH: ICD-10-CM

## 2021-06-11 DIAGNOSIS — J44.9 CHRONIC OBSTRUCTIVE PULMONARY DISEASE, UNSPECIFIED COPD TYPE (HCC): ICD-10-CM

## 2021-06-14 RX ORDER — ALBUTEROL SULFATE 90 UG/1
AEROSOL, METERED RESPIRATORY (INHALATION)
Qty: 8.5 G | Refills: 2 | Status: SHIPPED | OUTPATIENT
Start: 2021-06-14 | End: 2021-10-25 | Stop reason: SDUPTHER

## 2021-06-24 ENCOUNTER — TELEPHONE (OUTPATIENT)
Dept: INTERNAL MEDICINE CLINIC | Facility: CLINIC | Age: 61
End: 2021-06-24

## 2021-06-29 RX ORDER — LORATADINE 10 MG/1
10 TABLET ORAL DAILY
Qty: 30 TABLET | Refills: 5 | OUTPATIENT
Start: 2021-06-29

## 2021-07-09 DIAGNOSIS — E11.69 TYPE 2 DIABETES MELLITUS WITH OTHER SPECIFIED COMPLICATION, WITHOUT LONG-TERM CURRENT USE OF INSULIN (HCC): ICD-10-CM

## 2021-07-09 NOTE — TELEPHONE ENCOUNTER
Name of medication, dose, quantity and frequency  Requested Prescriptions     Pending Prescriptions Disp Refills    Insulin Pen Needle 31G X 4 MM MISC 100 each 0     Sig: Use daily         Number of refills left:    Amount of medication left:    Pharmacy verified and updated    Additional information:

## 2021-07-26 ENCOUNTER — TELEPHONE (OUTPATIENT)
Dept: FAMILY MEDICINE CLINIC | Facility: CLINIC | Age: 61
End: 2021-07-26

## 2021-07-26 NOTE — TELEPHONE ENCOUNTER
Spoke with patient, he agreed to meet w/ me for 1:1 Diabetes assessment, and he is also interested in [facility] classes in August   Grant Martinez, ALBERTO, LUTHERN, Roxi Thorpe

## 2021-07-27 DIAGNOSIS — E11.42 DIABETIC POLYNEUROPATHY ASSOCIATED WITH TYPE 2 DIABETES MELLITUS (HCC): ICD-10-CM

## 2021-07-29 RX ORDER — GABAPENTIN 100 MG/1
CAPSULE ORAL
Qty: 90 CAPSULE | Refills: 2 | Status: SHIPPED | OUTPATIENT
Start: 2021-07-29 | End: 2021-10-26

## 2021-08-02 ENCOUNTER — TELEPHONE (OUTPATIENT)
Dept: FAMILY MEDICINE CLINIC | Facility: CLINIC | Age: 61
End: 2021-08-02

## 2021-08-02 DIAGNOSIS — E11.8 TYPE 2 DIABETES MELLITUS WITH COMPLICATION (HCC): ICD-10-CM

## 2021-08-30 DIAGNOSIS — E11.69 TYPE 2 DIABETES MELLITUS WITH OTHER SPECIFIED COMPLICATION, WITHOUT LONG-TERM CURRENT USE OF INSULIN (HCC): ICD-10-CM

## 2021-08-31 RX ORDER — INSULIN GLARGINE 100 [IU]/ML
30 INJECTION, SOLUTION SUBCUTANEOUS
Qty: 9 ML | Refills: 2 | Status: SHIPPED | OUTPATIENT
Start: 2021-08-31 | End: 2021-09-07

## 2021-09-07 ENCOUNTER — OFFICE VISIT (OUTPATIENT)
Dept: INTERNAL MEDICINE CLINIC | Facility: CLINIC | Age: 61
End: 2021-09-07

## 2021-09-07 VITALS
TEMPERATURE: 97.9 F | WEIGHT: 161.8 LBS | OXYGEN SATURATION: 94 % | DIASTOLIC BLOOD PRESSURE: 72 MMHG | BODY MASS INDEX: 24.6 KG/M2 | SYSTOLIC BLOOD PRESSURE: 132 MMHG | HEART RATE: 84 BPM

## 2021-09-07 DIAGNOSIS — E11.8 TYPE 2 DIABETES MELLITUS WITH COMPLICATION (HCC): ICD-10-CM

## 2021-09-07 DIAGNOSIS — E11.69 TYPE 2 DIABETES MELLITUS WITH OTHER SPECIFIED COMPLICATION, WITHOUT LONG-TERM CURRENT USE OF INSULIN (HCC): Primary | ICD-10-CM

## 2021-09-07 LAB — SL AMB POCT HEMOGLOBIN AIC: 11.9 (ref ?–6.5)

## 2021-09-07 PROCEDURE — 99214 OFFICE O/P EST MOD 30 MIN: CPT | Performed by: HOSPITALIST

## 2021-09-07 PROCEDURE — 83036 HEMOGLOBIN GLYCOSYLATED A1C: CPT | Performed by: HOSPITALIST

## 2021-09-07 RX ORDER — INSULIN GLARGINE 100 [IU]/ML
35 INJECTION, SOLUTION SUBCUTANEOUS
Qty: 10.5 ML | Refills: 2 | Status: SHIPPED | OUTPATIENT
Start: 2021-09-07 | End: 2021-12-07

## 2021-09-07 RX ORDER — BLOOD-GLUCOSE METER
1 KIT MISCELLANEOUS AS NEEDED
Qty: 1 EACH | Refills: 0 | Status: SHIPPED | OUTPATIENT
Start: 2021-09-07

## 2021-09-07 NOTE — PROGRESS NOTES
[facility]  INTERNAL MEDICINE OFFICE VISIT     PATIENT INFORMATION     Anabela Smith   59 y o  male   MRN: [id number]    ASSESSMENT/PLAN     Diagnoses and all orders for this visit:    Type 2 diabetes mellitus with other specified complication, without long-term current use of insulin (HCC)  -     POCT hemoglobin A1c  -     insulin glargine (Lantus SoloStar) 100 units/mL injection pen; Inject 35 Units under the skin daily at bedtime  -     glucose monitoring kit (FREESTYLE) monitoring kit; Use 1 each as needed (Monitor blood glucose)  -     Ambulatory referral to Diabetic Education; Future    Type 2 diabetes mellitus with complication (HCC)  -     metFORMIN (GLUCOPHAGE) 500 mg tablet; Take 2 tablets (1,000 mg total) by mouth 2 (two) times a day      Schedule a follow-up appointment in 3 months for A1c follow up  Type 2 diabetes mellitus  - Increase Lantus to 35U QHS  - Increase metformin to 1000mg BID  - Instructed patient to meet with diabetes education/dietician  - Instructed patient to bring glucometer to next visit  - Instructed patient to make an appointment with ophthalmology    Late systolic murmur at PMI  - Grade I-II/VI, nonradiating  - Patient reports knowledge of murmur for decades  - No further workup at this time    [address]     Immunization History   Administered Date(s) Administered    Influenza, recombinant, quadrivalent,injectable, preservative free 04/12/2019, 01/13/2020    Pneumococcal Polysaccharide PPV23 04/12/2019    SARS-CoV-2 / COVID-19 mRNA IM (Pfizer-BioNTech) 04/15/2021, 05/08/2021    Tdap 06/04/2016     CHIEF COMPLAINT     Chief Complaint   Patient presents with    Follow-up     A1c f/u      HISTORY OF PRESENT ILLNESS     59yo M with PMH of DM2 with neuropathy, COPD and opioid use with methadone dependence presents for follow up A1c   He was previously evaluated by Dr Arnulfo Escobar in 5/2021 and was instructed to increase his Lantus to 30U QHS, as well as increase his metformin to 1000mg BID  He was also given gabapentin 100mg TID for neuropathic symmptoms  Patient has been giving himself 30U Lantus QHS but is still only taking 750mg BID of metformin  Of note, patient went about 1 week without his Lantus 2 weeks ago because he ran out and "it wasn't refilled"  He has since restarted his Lantus with compliance  He says he misses a dose of metformin about once a month on average  He did not bring a blood glucose log or his glucometer, but reports his AM fasting levels are consistently 140-160  He does report instances where his blood glucose dips to 70-90, during which he feels shaky but eats a snack with improvement  He wasn't able to say how frequently this happens  He also reports instances when his AM fasting glucose is >300-350, but unable to provide frequency  Patient denies any lightheadedness, dizziness, palpitations (even when his blood glucose falls to 70-90), changes in vision, paresthesias (improved with gabapentin), polyuria, dysuria, polydipsia  Patient does endorse significant fatigue and some nausea and "spitting up" when his AM fasting levels are >300  He also reports some tremors when his blood glucose falls to the 70-90 range  POCT HbA1c today is 11 9 from 9 6 in 4/2021  Patient admits to recent worsening of diet 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  REVIEW OF SYSTEMS     Review of Systems   Constitutional: Negative for chills and fever  HENT: Negative for ear pain and sore throat  Eyes: Negative for pain and visual disturbance  Respiratory: Positive for shortness of breath  Negative for cough  Chronic SOB 2/2 COPD, improving   Cardiovascular: Negative for chest pain and palpitations  Gastrointestinal: Positive for constipation, nausea and vomiting  Negative for abdominal pain, blood in stool and diarrhea          Chronic constipation secondary to methadone use   Endocrine: Negative for polydipsia and polyuria  Genitourinary: Negative for dysuria and hematuria  Musculoskeletal: Negative for arthralgias and back pain  Skin: Negative for color change and rash  Neurological: Negative for seizures and syncope  All other systems reviewed and are negative  OBJECTIVE     Vitals:    09/07/21 1430   BP: 132/72   BP Location: Left arm   Patient Position: Sitting   Cuff Size: Standard   Pulse: 84   Temp: 97 9 °F (36 6 °C)   TempSrc: Temporal   SpO2: 94%   Weight: 73 4 kg (161 lb 12 8 oz)     Physical Exam  Vitals and nursing note reviewed  Constitutional:       Appearance: He is well-developed  HENT:      Head: Normocephalic and atraumatic  Mouth/Throat:      Lips: Pink  Mouth: Mucous membranes are moist       Comments: Poor dentition  Eyes:      Conjunctiva/sclera: Conjunctivae normal       Pupils: Pupils are equal, round, and reactive to light  Cardiovascular:      Rate and Rhythm: Normal rate and regular rhythm  Heart sounds: Murmur heard  Crescendo systolic murmur is present  Comments: Late systolic murmur  Pulmonary:      Effort: Pulmonary effort is normal  No respiratory distress  Breath sounds: Normal breath sounds  Abdominal:      Palpations: Abdomen is soft  Tenderness: There is no abdominal tenderness  Musculoskeletal:      Cervical back: Neck supple  Right lower leg: No edema  Left lower leg: No edema  Skin:     General: Skin is warm and dry  Neurological:      Mental Status: He is alert         CURRENT MEDICATIONS     Current Outpatient Medications:     albuterol (PROVENTIL HFA,VENTOLIN HFA) 90 mcg/act inhaler, INHALE 2 PUFFS EVERY 6 HOURS AS NEEDED FOR WHEEZING, Disp: 8 5 g, Rfl: 2    carbamide peroxide (DEBROX) 6 5 % otic solution, Administer 5 drops into both ears 2 (two) times a day, Disp: 15 mL, Rfl: 3    fluticasone-salmeterol (ADVAIR, WIXELA) 250-50 mcg/dose inhaler, INHALE 1 PUFF BY MOUTH TWO TIMES DAILY RINSE MOUTH AFTER EACH USE, Disp: 60 each, Rfl: 5    gabapentin (NEURONTIN) 100 mg capsule, TAKE 1 CAPSULE BY MOUTH THREE TIMES DAILY, Disp: 90 capsule, Rfl: 2    glucose blood test strip, Use as instructed, Disp: 200 each, Rfl: 2    insulin glargine (Lantus SoloStar) 100 units/mL injection pen, Inject 35 Units under the skin daily at bedtime, Disp: 10 5 mL, Rfl: 2    Insulin Pen Needle 31G X 4 MM MISC, Use daily, Disp: 100 each, Rfl: 0    ketoconazole (NIZORAL) 2 % cream, Apply topically daily, Disp: 15 g, Rfl: 10    loratadine (CLARITIN) 10 mg tablet, TAKE 1 TABLET BY MOUTH EVERY DAY, Disp: 30 tablet, Rfl: 5    metFORMIN (GLUCOPHAGE) 500 mg tablet, Take 2 tablets (1,000 mg total) by mouth 2 (two) times a day, Disp: 90 tablet, Rfl: 3    methadone (DOLOPHINE) 10 mg tablet, Take 80 mg by mouth daily,, Disp: , Rfl:     Microlet Lancets MISC, USE AS DIRECTED, Disp: 100 each, Rfl: 1    clindamycin (CLEOCIN T) 1 %, Apply 1 pad topically daily (Patient not taking: Reported on 9/7/2021), Disp: 30 pad, Rfl: 0    glucose monitoring kit (FREESTYLE) monitoring kit, Use 1 each as needed (Monitor blood glucose), Disp: 1 each, Rfl: 0    PAST MEDICAL & SURGICAL HISTORY     Past Medical History:   Diagnosis Date    Cough 2/4/2019    Diabetes mellitus ([facility] [address] )      Past Surgical History:   Procedure Laterality Date    TONSILLECTOMY       SOCIAL & FAMILY HISTORY     Social History     Socioeconomic History    Marital status: Registered Domestic Partner     Spouse name:  Jordan Lopez Number of children: 3    Years of education: Not on file    Highest education level: Not on file   Occupational History    Occupation: unemployed/disabled   Tobacco Use    Smoking status: Current Every Day Smoker     Packs/day: 0 50    Smokeless tobacco: Never Used    Tobacco comment: half a pack every other day   Vaping Use    Vaping Use: Never used   Substance and Sexual Activity    Alcohol use: Not Currently     Comment: rarely    Drug use: Yes     Types: Marijuana, Heroin, Fentanyl     Comment: currently smoking marijuana and occasional heroin, one bag a week    Sexual activity: Never   Other Topics Concern    Not on file   Social History Narrative    Not on file     Social Determinants of Health     Financial Resource Strain: Low Risk     Difficulty of Paying Living Expenses: Not hard at all   Food Insecurity: No Food Insecurity    Worried About [address] in the Last Year: Never true    [address] in the Last Year: Never true   Transportation Needs: No Transportation Needs    Lack of Transportation (Medical): No    Lack of Transportation (Non-Medical): No   Physical Activity: Sufficiently Active    Days of Exercise per Week: 7 days    Minutes of Exercise per Session: 60 min   Stress: No Stress Concern Present    Feeling of Stress : Not at all   Social Connections:  Moderately Isolated    Frequency of Communication with Friends and Family: More than three times a week    Frequency of Social Gatherings with Friends and Family: More than three times a week    Attends Spiritism Services: Never    Active Member of Clubs or Organizations: No    Attends Club or Organization Meetings: Never    Marital Status: Living with partner   Intimate Partner Violence: Not At Risk    Fear of Current or Ex-Partner: No    Emotionally Abused: No    Physically Abused: No    Sexually Abused: No     Social History     Substance and Sexual Activity   Alcohol Use Not Currently    Comment: rarely     Substance and Sexual Activity   Alcohol Use Not Currently    Comment: rarely        Substance and Sexual Activity   Drug Use Yes    Types: Marijuana, Heroin, Fentanyl    Comment: currently smoking marijuana and occasional heroin, one bag a week     Social History     Tobacco Use   Smoking Status Current Every Day Smoker    Packs/day: 0 50   Smokeless Tobacco Never Used   Tobacco Comment    half a pack every other day     Family History   Problem Relation Age of Onset    Thyroid disease Mother     Cancer Mother                ==  Cristino Kathleen,   PGY-1  [facility] [address]   [facility] , [address] [address] [facility] [address], [address] [facility]  Office: [phone]  Fax: [phone]

## 2021-09-23 DIAGNOSIS — R06.02 SHORTNESS OF BREATH: ICD-10-CM

## 2021-09-23 DIAGNOSIS — J44.9 CHRONIC OBSTRUCTIVE PULMONARY DISEASE, UNSPECIFIED COPD TYPE (HCC): ICD-10-CM

## 2021-09-23 RX ORDER — ALBUTEROL SULFATE 90 UG/1
AEROSOL, METERED RESPIRATORY (INHALATION)
Qty: 8.5 G | Refills: 2 | OUTPATIENT
Start: 2021-09-23

## 2021-10-05 DIAGNOSIS — E11.69 TYPE 2 DIABETES MELLITUS WITH OTHER SPECIFIED COMPLICATION, WITHOUT LONG-TERM CURRENT USE OF INSULIN (HCC): Primary | ICD-10-CM

## 2021-10-05 RX ORDER — PEN NEEDLE, DIABETIC 31 GX5/16"
NEEDLE, DISPOSABLE MISCELLANEOUS
Qty: 100 EACH | Refills: 0 | Status: SHIPPED | OUTPATIENT
Start: 2021-10-05 | End: 2021-12-30

## 2021-10-25 ENCOUNTER — OFFICE VISIT (OUTPATIENT)
Dept: INTERNAL MEDICINE CLINIC | Facility: CLINIC | Age: 61
End: 2021-10-25

## 2021-10-25 ENCOUNTER — APPOINTMENT (OUTPATIENT)
Dept: LAB | Facility: CLINIC | Age: 61
End: 2021-10-25
Payer: MEDICARE

## 2021-10-25 VITALS
OXYGEN SATURATION: 94 % | WEIGHT: 164 LBS | TEMPERATURE: 97.9 F | HEART RATE: 99 BPM | DIASTOLIC BLOOD PRESSURE: 65 MMHG | BODY MASS INDEX: 24.94 KG/M2 | SYSTOLIC BLOOD PRESSURE: 119 MMHG

## 2021-10-25 DIAGNOSIS — Z00.00 HEALTHCARE MAINTENANCE: ICD-10-CM

## 2021-10-25 DIAGNOSIS — E11.69 TYPE 2 DIABETES MELLITUS WITH OTHER SPECIFIED COMPLICATION, UNSPECIFIED WHETHER LONG TERM INSULIN USE (HCC): ICD-10-CM

## 2021-10-25 DIAGNOSIS — B35.3 TINEA PEDIS OF BOTH FEET: ICD-10-CM

## 2021-10-25 DIAGNOSIS — J44.9 CHRONIC OBSTRUCTIVE PULMONARY DISEASE, UNSPECIFIED COPD TYPE (HCC): ICD-10-CM

## 2021-10-25 DIAGNOSIS — R06.02 SHORTNESS OF BREATH: ICD-10-CM

## 2021-10-25 LAB
ANION GAP SERPL CALCULATED.3IONS-SCNC: 5 MMOL/L (ref 4–13)
BASOPHILS # BLD AUTO: 0.08 THOUSANDS/ΜL (ref 0–0.1)
BASOPHILS NFR BLD AUTO: 1 % (ref 0–1)
BUN SERPL-MCNC: 21 MG/DL (ref 5–25)
CALCIUM SERPL-MCNC: 9.5 MG/DL (ref 8.3–10.1)
CHLORIDE SERPL-SCNC: 99 MMOL/L (ref 100–108)
CO2 SERPL-SCNC: 29 MMOL/L (ref 21–32)
CREAT SERPL-MCNC: 0.99 MG/DL (ref 0.6–1.3)
CREAT UR-MCNC: 48.1 MG/DL
EOSINOPHIL # BLD AUTO: 0.34 THOUSAND/ΜL (ref 0–0.61)
EOSINOPHIL NFR BLD AUTO: 4 % (ref 0–6)
ERYTHROCYTE [DISTWIDTH] IN BLOOD BY AUTOMATED COUNT: 12 % (ref 11.6–15.1)
GFR SERPL CREATININE-BSD FRML MDRD: 81 ML/MIN/1.73SQ M
GLUCOSE P FAST SERPL-MCNC: 392 MG/DL (ref 65–99)
HCT VFR BLD AUTO: 43.4 % (ref 36.5–49.3)
HGB BLD-MCNC: 14.9 G/DL (ref 12–17)
IMM GRANULOCYTES # BLD AUTO: 0.04 THOUSAND/UL (ref 0–0.2)
IMM GRANULOCYTES NFR BLD AUTO: 0 % (ref 0–2)
LYMPHOCYTES # BLD AUTO: 1.75 THOUSANDS/ΜL (ref 0.6–4.47)
LYMPHOCYTES NFR BLD AUTO: 18 % (ref 14–44)
MCH RBC QN AUTO: 31.8 PG (ref 26.8–34.3)
MCHC RBC AUTO-ENTMCNC: 34.3 G/DL (ref 31.4–37.4)
MCV RBC AUTO: 93 FL (ref 82–98)
MICROALBUMIN UR-MCNC: 38 MG/L (ref 0–20)
MICROALBUMIN/CREAT 24H UR: 79 MG/G CREATININE (ref 0–30)
MONOCYTES # BLD AUTO: 0.61 THOUSAND/ΜL (ref 0.17–1.22)
MONOCYTES NFR BLD AUTO: 6 % (ref 4–12)
NEUTROPHILS # BLD AUTO: 6.68 THOUSANDS/ΜL (ref 1.85–7.62)
NEUTS SEG NFR BLD AUTO: 71 % (ref 43–75)
NRBC BLD AUTO-RTO: 0 /100 WBCS
PLATELET # BLD AUTO: 190 THOUSANDS/UL (ref 149–390)
PMV BLD AUTO: 10.5 FL (ref 8.9–12.7)
POTASSIUM SERPL-SCNC: 4 MMOL/L (ref 3.5–5.3)
RBC # BLD AUTO: 4.68 MILLION/UL (ref 3.88–5.62)
SODIUM SERPL-SCNC: 133 MMOL/L (ref 136–145)
WBC # BLD AUTO: 9.5 THOUSAND/UL (ref 4.31–10.16)

## 2021-10-25 PROCEDURE — 85025 COMPLETE CBC W/AUTO DIFF WBC: CPT

## 2021-10-25 PROCEDURE — 87521 HEPATITIS C PROBE&RVRS TRNSC: CPT

## 2021-10-25 PROCEDURE — 87389 HIV-1 AG W/HIV-1&-2 AB AG IA: CPT

## 2021-10-25 PROCEDURE — 80048 BASIC METABOLIC PNL TOTAL CA: CPT

## 2021-10-25 PROCEDURE — 82570 ASSAY OF URINE CREATININE: CPT | Performed by: INTERNAL MEDICINE

## 2021-10-25 PROCEDURE — 82043 UR ALBUMIN QUANTITATIVE: CPT | Performed by: INTERNAL MEDICINE

## 2021-10-25 PROCEDURE — 99214 OFFICE O/P EST MOD 30 MIN: CPT | Performed by: INTERNAL MEDICINE

## 2021-10-25 PROCEDURE — 36415 COLL VENOUS BLD VENIPUNCTURE: CPT

## 2021-10-25 RX ORDER — KETOCONAZOLE 20 MG/G
CREAM TOPICAL DAILY
Qty: 15 G | Refills: 0 | Status: SHIPPED | OUTPATIENT
Start: 2021-10-25

## 2021-10-25 RX ORDER — ALBUTEROL SULFATE 90 UG/1
2 AEROSOL, METERED RESPIRATORY (INHALATION) EVERY 6 HOURS PRN
Qty: 8.5 G | Refills: 0 | Status: SHIPPED | OUTPATIENT
Start: 2021-10-25 | End: 2022-02-09

## 2021-10-26 DIAGNOSIS — E11.42 DIABETIC POLYNEUROPATHY ASSOCIATED WITH TYPE 2 DIABETES MELLITUS (HCC): ICD-10-CM

## 2021-10-26 RX ORDER — GABAPENTIN 100 MG/1
CAPSULE ORAL
Qty: 90 CAPSULE | Refills: 2 | Status: SHIPPED | OUTPATIENT
Start: 2021-10-26 | End: 2022-01-19

## 2021-10-27 LAB
HCV RNA SERPL QL NAA+PROBE: NEGATIVE
HIV 1+2 AB+HIV1 P24 AG SERPL QL IA: NORMAL

## 2021-11-24 DIAGNOSIS — J44.9 CHRONIC OBSTRUCTIVE PULMONARY DISEASE, UNSPECIFIED COPD TYPE (HCC): ICD-10-CM

## 2021-11-24 DIAGNOSIS — J30.2 SEASONAL ALLERGIC RHINITIS, UNSPECIFIED TRIGGER: ICD-10-CM

## 2021-11-24 DIAGNOSIS — R06.02 SHORTNESS OF BREATH: ICD-10-CM

## 2021-11-24 RX ORDER — LORATADINE 10 MG/1
TABLET ORAL
Qty: 30 TABLET | Refills: 5 | Status: SHIPPED | OUTPATIENT
Start: 2021-11-24

## 2021-12-07 ENCOUNTER — OFFICE VISIT (OUTPATIENT)
Dept: INTERNAL MEDICINE CLINIC | Facility: CLINIC | Age: 61
End: 2021-12-07

## 2021-12-07 VITALS
BODY MASS INDEX: 25.39 KG/M2 | HEART RATE: 82 BPM | OXYGEN SATURATION: 96 % | DIASTOLIC BLOOD PRESSURE: 77 MMHG | SYSTOLIC BLOOD PRESSURE: 139 MMHG | WEIGHT: 167 LBS | TEMPERATURE: 97.2 F

## 2021-12-07 DIAGNOSIS — E11.8 TYPE 2 DIABETES MELLITUS WITH COMPLICATION (HCC): Primary | ICD-10-CM

## 2021-12-07 DIAGNOSIS — Z23 NEED FOR INFLUENZA VACCINATION: ICD-10-CM

## 2021-12-07 DIAGNOSIS — E11.69 TYPE 2 DIABETES MELLITUS WITH OTHER SPECIFIED COMPLICATION, WITHOUT LONG-TERM CURRENT USE OF INSULIN (HCC): ICD-10-CM

## 2021-12-07 LAB — SL AMB POCT HEMOGLOBIN AIC: 12.8 (ref ?–6.5)

## 2021-12-07 PROCEDURE — 99213 OFFICE O/P EST LOW 20 MIN: CPT | Performed by: HOSPITALIST

## 2021-12-07 PROCEDURE — G0008 ADMIN INFLUENZA VIRUS VAC: HCPCS | Performed by: HOSPITALIST

## 2021-12-07 PROCEDURE — 83036 HEMOGLOBIN GLYCOSYLATED A1C: CPT | Performed by: HOSPITALIST

## 2021-12-07 PROCEDURE — 90682 RIV4 VACC RECOMBINANT DNA IM: CPT | Performed by: HOSPITALIST

## 2021-12-07 RX ORDER — INSULIN GLARGINE 100 [IU]/ML
45 INJECTION, SOLUTION SUBCUTANEOUS
Qty: 13.5 ML | Refills: 2 | Status: SHIPPED | OUTPATIENT
Start: 2021-12-07 | End: 2022-04-14

## 2021-12-16 ENCOUNTER — TELEPHONE (OUTPATIENT)
Dept: INTERNAL MEDICINE CLINIC | Facility: CLINIC | Age: 61
End: 2021-12-16

## 2021-12-17 ENCOUNTER — TELEPHONE (OUTPATIENT)
Dept: INTERNAL MEDICINE CLINIC | Facility: CLINIC | Age: 61
End: 2021-12-17

## 2022-02-07 ENCOUNTER — OFFICE VISIT (OUTPATIENT)
Dept: LAB | Age: 62
End: 2022-02-07
Payer: MEDICARE

## 2022-02-07 DIAGNOSIS — F11.29 OPIOID DEPENDENCE WITH OPIOID-INDUCED DISORDER (HCC): ICD-10-CM

## 2022-02-07 DIAGNOSIS — Z02.9 ENCOUNTERS FOR UNSPECIFIED ADMINISTRATIVE PURPOSE: ICD-10-CM

## 2022-02-07 DIAGNOSIS — Z79.899 ENCOUNTER FOR LONG-TERM (CURRENT) USE OF OTHER MEDICATIONS: ICD-10-CM

## 2022-02-07 LAB
ATRIAL RATE: 88 BPM
P AXIS: 82 DEGREES
PR INTERVAL: 188 MS
QRS AXIS: 59 DEGREES
QRSD INTERVAL: 90 MS
QT INTERVAL: 378 MS
QTC INTERVAL: 457 MS
T WAVE AXIS: 63 DEGREES
VENTRICULAR RATE: 88 BPM

## 2022-02-07 PROCEDURE — 93010 ELECTROCARDIOGRAM REPORT: CPT | Performed by: INTERNAL MEDICINE

## 2022-02-07 PROCEDURE — 93005 ELECTROCARDIOGRAM TRACING: CPT

## 2022-02-16 ENCOUNTER — HOSPITAL ENCOUNTER (EMERGENCY)
Facility: HOSPITAL | Age: 62
Discharge: HOME | End: 2022-02-16
Attending: EMERGENCY MEDICINE | Admitting: EMERGENCY MEDICINE
Payer: MEDICARE

## 2022-02-16 VITALS
BODY MASS INDEX: 25.76 KG/M2 | TEMPERATURE: 98.9 F | RESPIRATION RATE: 18 BRPM | OXYGEN SATURATION: 96 % | HEIGHT: 68 IN | WEIGHT: 170 LBS | HEART RATE: 107 BPM | DIASTOLIC BLOOD PRESSURE: 68 MMHG | SYSTOLIC BLOOD PRESSURE: 158 MMHG

## 2022-02-16 DIAGNOSIS — R03.0 ELEVATED BLOOD PRESSURE READING: ICD-10-CM

## 2022-02-16 DIAGNOSIS — S01.311A LACERATION OF RIGHT EAR, INITIAL ENCOUNTER: ICD-10-CM
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
## 2022-02-16 PROCEDURE — 99284 EMERGENCY DEPT VISIT MOD MDM: CPT | Performed by: EMERGENCY MEDICINE

## 2022-02-16 PROCEDURE — 99283 EMERGENCY DEPT VISIT LOW MDM: CPT

## 2022-02-16 PROCEDURE — 12051 INTMD RPR FACE/MM 2.5 CM/<: CPT | Performed by: EMERGENCY MEDICINE

## 2022-02-16 RX ORDER — AMOXICILLIN AND CLAVULANATE POTASSIUM 875; 125 MG/1; MG/1
1 TABLET, FILM COATED ORAL EVERY 12 HOURS SCHEDULED
Qty: 10 TABLET | Refills: 0 | Status: SHIPPED | OUTPATIENT
Start: 2022-02-16 | End: 2022-02-21

## 2022-02-16 RX ORDER — LIDOCAINE HYDROCHLORIDE 10 MG/ML
5 INJECTION, SOLUTION EPIDURAL; INFILTRATION; INTRACAUDAL; PERINEURAL ONCE
Status: COMPLETED | OUTPATIENT
Start: 2022-02-16 | End: 2022-02-16

## 2022-02-16 RX ORDER — GINSENG 100 MG
1 CAPSULE ORAL ONCE
Status: COMPLETED | OUTPATIENT
Start: 2022-02-16 | End: 2022-02-16

## 2022-02-16 RX ORDER — AMOXICILLIN AND CLAVULANATE POTASSIUM 875; 125 MG/1; MG/1
1 TABLET, FILM COATED ORAL ONCE
Status: COMPLETED | OUTPATIENT
Start: 2022-02-16 | End: 2022-02-16

## 2022-02-16 RX ADMIN — LIDOCAINE HYDROCHLORIDE 5 ML: 10 INJECTION, SOLUTION EPIDURAL; INFILTRATION; INTRACAUDAL at 20:23

## 2022-02-16 RX ADMIN — AMOXICILLIN AND CLAVULANATE POTASSIUM 1 TABLET: 875; 125 TABLET, FILM COATED ORAL at 20:23

## 2022-02-16 RX ADMIN — BACITRACIN ZINC 1 SMALL APPLICATION: 500 OINTMENT TOPICAL at 21:56

## 2022-02-17 NOTE — DISCHARGE INSTRUCTIONS
You were evaluated in the emergency department for: laceration  You can access your current and pending results through [facility]  A radiologist will take a second look at your X-Rays, if you had any, and you will be contacted with any new findings  You should follow-up with your primary care provider, as soon as possible, for re-evaluation  If you do not have a primary care provider, I have referred you to [address] [place] [address]  You will be contacted about scheduling an appointment  Their phone number is also included on this paperwork  You should get the wound rechecked and the sutures removed in seven days  Your workup revealed no emergent features at this time; however, many disease processes are dynamic:    Please, return to the emergency department if you experience new or worsening symptoms, fever, chest pain, shortness of breath, difficulty breathing, dizziness, abdominal pain, persistent nausea/vomiting, syncope or passing out, blood in your urine or stool, coughing up blood, leg swelling/pain, urinary retention, bowel or bladder incontinence, numbness between your legs, swelling of the ear, pus-like discharge, or significantly worsening pain of the ear  Additionally, your blood pressure was measured to be high  This is something that you should discuss with your primary care provider and have re-checked within one week

## 2022-02-17 NOTE — ED ATTENDING ATTESTATION
Final Diagnosis:  1  Dog bite, initial encounter    2  Elevated blood pressure reading    3  Laceration of right ear, initial encounter           I, Miguel Angel Zamora MD, saw and evaluated the patient  All available labs and X-rays were ordered by me or the resident and have been reviewed by myself  I discussed the patient with the resident / non-physician and agree with the resident's / non-physician practitioner's findings and plan as documented in the resident's / non-physician practicitioner's note, except where noted  At this point, I agree with the current assessment done in the ED  I was present during key portions of all procedures performed unless otherwise stated  Chief Complaint   Patient presents with    Dog Bite     Pt c/o right ear laceration from his dog  This is a 59 y o  male presenting for evaluation of dog bite to the RIGHT ear  He was playing with his own dog and was accidentally bit on the RIGHT ear  There's an obvious 1 5cm laceration  If I spread it, it's gaping and stays that way  Will need sutures  Tetanus up to date  PMH:   has a past medical history of Cough (2/4/2019), Diabetes mellitus ([facility] [address] ), Methadone dependence ([facility] [address] ) (02/16/2022), and Migraines (02/16/2022)  PSH:   has a past surgical history that includes Tonsillectomy      Social:  Social History     Substance and Sexual Activity   Alcohol Use Not Currently    Comment: rarely     Social History     Tobacco Use   Smoking Status Current Every Day Smoker    Packs/day: 0 50   Smokeless Tobacco Never Used   Tobacco Comment    half a pack every other day     Social History     Substance and Sexual Activity   Drug Use Yes    Types: Marijuana, Heroin, Fentanyl    Comment: currently smoking marijuana and occasional heroin, one bag a week     PE:  Vitals:    02/16/22 1947   BP: 158/68   BP Location: Left arm   Pulse: (!) 107   Resp: 18   Temp: 98 9 °F (37 2 °C)   TempSrc: Temporal   SpO2: 96%   Weight: 77 1 kg (170 lb) Height: 5' 8" (1 727 m)   General: VS reviewed  Appears in NAD  awake, alert  Well-nourished, well-developed  Appears stated age  Speaking normally in full sentences  Head: Normocephalic, atraumatic  Eyes: EOM-I  No diplopia  No hyphema  No subconjunctival hemorrhages  Ear exam as above   Symmetrical lids  ENT: Atraumatic external nose and ears  MMM  No malocclusion  No stridor  Normal phonation  No drooling  Normal swallowing  Neck: No JVD  CV: No pallor noted  Lungs:   No tachypnea  No respiratory distress  MSK:   FROM spontaneously  Skin: Dry, intact  Neuro: Awake, alert, GCS15, CN II-XII grossly intact  Motor grossly intact  Psychiatric/Behavioral: Appropriate mood and affect   Exam: deferred  A:  - Ear laceration  P:  - augmentin for dog bite  - suture (with 48 hour follow-up) given cartilage is tehre and it's gaping  - close f/u    - 13 point ROS was performed and all are normal unless stated in the history above  - Nursing note reviewed  Vitals reviewed  - Orders placed by myself and/or advanced practitioner / resident     - Previous chart was reviewed  - No language barrier    - History obtained from patient  - There are no limitations to the history obtained  - Critical care time: Not applicable for this patient       Code Status: Prior  Advance Directive and Living Will:      Power of :    POLST:      Medications   amoxicillin-clavulanate (AUGMENTIN) 875-125 mg per tablet 1 tablet (1 tablet Oral Given 2/16/22 2023)   lidocaine (PF) (XYLOCAINE-MPF) 1 % injection 5 mL (5 mL Infiltration Given by Other 2/16/22 2023)   bacitracin topical ointment 1 small application (1 small application Topical Given 2/16/22 2156)     No orders to display     Orders Placed This Encounter   Procedures    Laceration repair    Ambulatory Referral to [facility] [place] - No data to display  Time reflects when diagnosis was documented in both MDM as applicable and the Disposition within this note       Time User Action Codes Description Comment    2/16/2022  7:53 PM Heber Kelly A Add [R03 0] Elevated blood pressure reading     2/16/2022  7:54 PM Heber Kelly Add [Q35  0XXA] Dog bite, initial encounter     2/16/2022  7:54 PM [facility] A Modify [R03 0] Elevated blood pressure reading     2/16/2022  7:54 PM Heber Kelly Modify [M75  0XXA] Dog bite, initial encounter     2/16/2022  7:54 PM [facility] A Add [W73 216X] Laceration of right ear, initial encounter           ED Disposition       ED Disposition Condition Date/Time Comment    Discharge Stable Wed Feb 16, 2022  9:51 PM Meli Selby discharge to home/self care                  Follow-up Information       Follow up With Specialties Details Why Contact Info Additional [facility] Schedule an appointment as soon as possible for a visit   [address], [address] [place] [address], [address], [address], [place], [address]          Discharge Medication List as of 2/16/2022  9:52 PM        START taking these medications    Details   amoxicillin-clavulanate (AUGMENTIN) 875-125 mg per tablet Take 1 tablet by mouth every 12 (twelve) hours for 5 days, Starting Wed 2/16/2022, Until Mon 2/21/2022, Normal           CONTINUE these medications which have NOT CHANGED    Details   albuterol (PROVENTIL HFA,VENTOLIN HFA) 90 mcg/act inhaler INHALE 2 PUFFS INTO THE LUNGS EVERY 6 HOURS AS NEEDED FOR WHEEZING, Normal      carbamide peroxide (DEBROX) 6 5 % otic solution Administer 5 drops into both ears 2 (two) times a day, Starting Fri 4/30/2021, Normal      clindamycin (CLEOCIN T) 1 % Apply 1 pad topically daily, Starting Mon 5/17/2021, Until Tue 12/7/2021, Normal      fluticasone-salmeterol (Advair) 250-50 mcg/dose inhaler Inhale 1 puff 2 (two) times a day Rinse mouth after use , Starting Tue 12/21/2021, Until Sun 6/19/2022, Normal      gabapentin (NEURONTIN) 100 mg capsule TAKE 1 CAPSULE BY MOUTH THREE TIMES DAILY, Normal      glucose blood test strip Use as instructed, Normal      glucose monitoring kit (FREESTYLE) monitoring kit Use 1 each as needed (Monitor blood glucose), Starting Tue 9/7/2021, Normal      insulin glargine (Lantus SoloStar) 100 units/mL injection pen Inject 45 Units under the skin daily at bedtime, Starting Tue 12/7/2021, Until Mon 3/7/2022, Normal      !! Insulin Pen Needle (B-D UF III MINI PEN NEEDLES) 31G X 5 MM MISC Use to test blood sugar 3 times daily, Normal      !! Insulin Pen Needle 31G X 4 MM MISC Use daily, Starting Mon 7/12/2021, Normal      ketoconazole (NIZORAL) 2 % cream Apply topically daily, Starting Mon 10/25/2021, Normal      loratadine (CLARITIN) 10 mg tablet TAKE 1 TABLET BY MOUTH EVERY DAY, Normal      metFORMIN (GLUCOPHAGE) 500 mg tablet TAKE 2 TABLETS BY MOUTH TWO TIMES DAILY, Normal      methadone (DOLOPHINE) 10 mg tablet Take 80 mg by mouth daily,, Historical Med      Microlet Lancets MISC USE AS DIRECTED, Normal       !! - Potential duplicate medications found  Please discuss with provider  Prior to Admission Medications   Prescriptions Last Dose Informant Patient Reported? Taking?    Insulin Pen Needle (B-D UF III MINI PEN NEEDLES) 31G X 5 MM MISC   No No   Sig: Use to test blood sugar 3 times daily   Insulin Pen Needle 31G X 4 MM MISC   No No   Sig: Use daily   Microlet Lancets MISC   No No   Sig: USE AS DIRECTED   albuterol (PROVENTIL HFA,VENTOLIN HFA) 90 mcg/act inhaler   No No   Sig: INHALE 2 PUFFS INTO THE LUNGS EVERY 6 HOURS AS NEEDED FOR WHEEZING   carbamide peroxide (DEBROX) 6 5 % otic solution   No No   Sig: Administer 5 drops into both ears 2 (two) times a day   Patient not taking: Reported on 12/7/2021    clindamycin (CLEOCIN T) 1 %   No No   Sig: Apply 1 pad topically daily fluticasone-salmeterol (Advair) 250-50 mcg/dose inhaler   No No   Sig: Inhale 1 puff 2 (two) times a day Rinse mouth after use    gabapentin (NEURONTIN) 100 mg capsule   No No   Sig: TAKE 1 CAPSULE BY MOUTH THREE TIMES DAILY   glucose blood test strip   No No   Sig: Use as instructed   glucose monitoring kit (FREESTYLE) monitoring kit   No No   Sig: Use 1 each as needed (Monitor blood glucose)   insulin glargine (Lantus SoloStar) 100 units/mL injection pen   No No   Sig: Inject 45 Units under the skin daily at bedtime   ketoconazole (NIZORAL) 2 % cream   No No   Sig: Apply topically daily   loratadine (CLARITIN) 10 mg tablet   No No   Sig: TAKE 1 TABLET BY MOUTH EVERY DAY   metFORMIN (GLUCOPHAGE) 500 mg tablet   No No   Sig: TAKE 2 TABLETS BY MOUTH TWO TIMES DAILY   methadone (DOLOPHINE) 10 mg tablet   Yes No   Sig: Take 80 mg by mouth daily,      Facility-Administered Medications: None       Portions of the record may have been created with voice recognition software  Occasional wrong word or "sound a like" substitutions may have occurred due to the inherent limitations of voice recognition software  Read the chart carefully and recognize, using context, where substitutions have occurred      Electronically signed by:  Germaine Sanford

## 2022-02-17 NOTE — ED PROVIDER NOTES
History  Chief Complaint   Patient presents with    Dog Bite     Pt c/o right ear laceration from his dog  Patient is a 58-year-old male, with a history significant for diabetes, who presents to the ED today due to laceration on the right ear/dog bite  Patient states that, shortly prior to arrival, he was playing with his dog when his dog's tooth became caught on his ear  Patient states that there was no aggression associated with the injury  Patient reports mild pain, characterized as stinging, of the right ear at this time  Patient does not desire analgesia at this time and he denies injury anywhere else  Patient was in his usual state of health prior to the injury  As stated, the dog is his and it is up-to-date on vaccinations  Patient's last tetanus shot was in June of 2016  No clear exacerbating factors and patient has not taken anything to remit his symptoms  Patient is without other complaints at this time      - No language barrier    - History obtained from patient  - There are no limitations to the history obtained  - Previous charting was reviewed          Prior to Admission Medications   Prescriptions Last Dose Informant Patient Reported? Taking? Insulin Pen Needle (B-D UF III MINI PEN NEEDLES) 31G X 5 MM MISC   No No   Sig: Use to test blood sugar 3 times daily   Insulin Pen Needle 31G X 4 MM MISC   No No   Sig: Use daily   Microlet Lancets MISC   No No   Sig: USE AS DIRECTED   albuterol (PROVENTIL HFA,VENTOLIN HFA) 90 mcg/act inhaler   No No   Sig: INHALE 2 PUFFS INTO THE LUNGS EVERY 6 HOURS AS NEEDED FOR WHEEZING   carbamide peroxide (DEBROX) 6 5 % otic solution   No No   Sig: Administer 5 drops into both ears 2 (two) times a day   Patient not taking: Reported on 12/7/2021    clindamycin (CLEOCIN T) 1 %   No No   Sig: Apply 1 pad topically daily   fluticasone-salmeterol (Advair) 250-50 mcg/dose inhaler   No No   Sig: Inhale 1 puff 2 (two) times a day Rinse mouth after use  gabapentin (NEURONTIN) 100 mg capsule   No No   Sig: TAKE 1 CAPSULE BY MOUTH THREE TIMES DAILY   glucose blood test strip   No No   Sig: Use as instructed   glucose monitoring kit (FREESTYLE) monitoring kit   No No   Sig: Use 1 each as needed (Monitor blood glucose)   insulin glargine (Lantus SoloStar) 100 units/mL injection pen   No No   Sig: Inject 45 Units under the skin daily at bedtime   ketoconazole (NIZORAL) 2 % cream   No No   Sig: Apply topically daily   loratadine (CLARITIN) 10 mg tablet   No No   Sig: TAKE 1 TABLET BY MOUTH EVERY DAY   metFORMIN (GLUCOPHAGE) 500 mg tablet   No No   Sig: TAKE 2 TABLETS BY MOUTH TWO TIMES DAILY   methadone (DOLOPHINE) 10 mg tablet   Yes No   Sig: Take 80 mg by mouth daily,      Facility-Administered Medications: None       Past Medical History:   Diagnosis Date    Cough 2/4/2019    Diabetes mellitus ([facility] )     Methadone dependence ([facility] ) 02/16/2022    Migraines 02/16/2022       Past Surgical History:   Procedure Laterality Date    TONSILLECTOMY         Family History   Problem Relation Age of Onset    Thyroid disease Mother     Cancer Mother      I have reviewed and agree with the history as documented  E-Cigarette/Vaping    E-Cigarette Use Never User      E-Cigarette/Vaping Substances    Nicotine No     THC No     CBD No     Flavoring No     Other No     Unknown No      Social History     Tobacco Use    Smoking status: Current Every Day Smoker     Packs/day: 0 50    Smokeless tobacco: Never Used    Tobacco comment: half a pack every other day   Vaping Use    Vaping Use: Never used   Substance Use Topics    Alcohol use: Not Currently     Comment: rarely    Drug use: Yes     Types: Marijuana, Heroin, Fentanyl     Comment: currently smoking marijuana and occasional heroin, one bag a week        Review of Systems   Constitutional: Negative for fever  Respiratory: Negative for shortness of breath  Cardiovascular: Negative for chest pain  Gastrointestinal: Negative for abdominal pain  Musculoskeletal: Negative for gait problem  Skin: Positive for wound  Allergic/Immunologic: Positive for environmental allergies  Psychiatric/Behavioral: Negative for confusion  All other systems reviewed and are negative  Physical Exam  ED Triage Vitals [02/16/22 1947]   Temperature Pulse Respirations Blood Pressure SpO2   98 9 °F (37 2 °C) (!) 107 18 158/68 96 %      Temp Source Heart Rate Source Patient Position - Orthostatic VS BP Location FiO2 (%)   Temporal Monitor Sitting Left arm --      Pain Score       --             Orthostatic Vital Signs  Vitals:    02/16/22 1947   BP: 158/68   Pulse: (!) 107   Patient Position - Orthostatic VS: Sitting       Physical Exam  Vitals and nursing note reviewed  Constitutional:       General: He is not in acute distress  Appearance: He is not ill-appearing, toxic-appearing or diaphoretic  Comments: Patient appears comfortable during my evaluation    HENT:      Head: Normocephalic and atraumatic  Right Ear: External ear normal       Left Ear: External ear normal       Nose: Nose normal  No rhinorrhea  Mouth/Throat:      Mouth: Mucous membranes are moist       Pharynx: Oropharynx is clear  No oropharyngeal exudate or posterior oropharyngeal erythema  Eyes:      General: No scleral icterus  Right eye: No discharge  Left eye: No discharge  Conjunctiva/sclera: Conjunctivae normal       Pupils: Pupils are equal, round, and reactive to light  Neck:      Comments: Patient is spontaneously rotating their neck to the left and right during the history and physical exam interaction without difficulty or apparent discomfort    Cardiovascular:      Rate and Rhythm: Normal rate and regular rhythm  Pulses: Normal pulses  Heart sounds: Normal heart sounds  Comments: 2+ Radial  Pulmonary:      Effort: Pulmonary effort is normal  No respiratory distress        Breath sounds: Normal breath sounds  No stridor  No wheezing, rhonchi or rales  Abdominal:      General: Abdomen is flat  There is no distension  Palpations: Abdomen is soft  Tenderness: There is no abdominal tenderness  There is no right CVA tenderness, left CVA tenderness, guarding or rebound  Musculoskeletal:         General: No deformity  Cervical back: Neck supple  No tenderness  No muscular tenderness  Lymphadenopathy:      Cervical: No cervical adenopathy  Skin:     General: Skin is warm and dry  Capillary Refill: Capillary refill takes less than 2 seconds  Findings: Lesion (Roughly 1 cm laceration on the scapha of the right ear  This wound is not through and through) present  Comments: Picture of the wound added to the chart   Neurological:      Mental Status: He is alert  Comments: Patient is speaking clearly in complete sentences  Patient is answering appropriately and able follow commands  Patient is moving all four extremities spontaneously  No facial droop  Tongue midline  Patient able to ambulate without difficulty      Psychiatric:         Mood and Affect: Mood normal          Behavior: Behavior normal          ED Medications  Medications   amoxicillin-clavulanate (AUGMENTIN) 875-125 mg per tablet 1 tablet (1 tablet Oral Given 2/16/22 2023)   lidocaine (PF) (XYLOCAINE-MPF) 1 % injection 5 mL (5 mL Infiltration Given by Other 2/16/22 2023)   bacitracin topical ointment 1 small application (1 small application Topical Given 2/16/22 2156)       Diagnostic Studies  Results Reviewed     None                 No orders to display         Procedures  Laceration repair    Date/Time: 2/16/2022 9:40 PM  Performed by: Kelly Baker MD  Authorized by: Kelly Baker MD   Consent: Verbal consent obtained    Risks and benefits: risks, benefits and alternatives were discussed  Patient identity confirmed: verbally with patient  Time out: Immediately prior to procedure a "time out" was called to verify the correct patient, procedure, equipment, support staff and site/side marked as required  Body area: head/neck  Location details: right ear  Laceration length: 1 cm  Anesthesia: local infiltration    Anesthesia:  Local Anesthetic: lidocaine 1% without epinephrine  Anesthetic total: 1 mL    Sedation:  Patient sedated: no      Wound Dehiscence:  Superficial Wound Dehiscence: simple closure      Procedure Details:  Preparation: Patient was prepped and draped in the usual sterile fashion  Irrigation solution: saline  Irrigation method: syringe  Amount of cleaning: extensive  Debridement: none  Degree of undermining: none  Skin closure: 6-0 Prolene  Number of sutures: 2  Technique: simple  Approximation: close  Approximation difficulty: simple  Dressing: antibiotic ointment  Patient tolerance: patient tolerated the procedure well with no immediate complications            ED Course                             SBIRT 22yo+      Most Recent Value   SBIRT (24 yo +)    In order to provide better care to our patients, we are screening all of our patients for alcohol and drug use  Would it be okay to ask you these screening questions? Unable to answer at this time Filed at: 02/16/2022 [address] [place] [address]  Number of Diagnoses or Management Options  Dog bite, initial encounter  Elevated blood pressure reading  Laceration of right ear, initial encounter  Diagnosis management comments: Patient is a 70-year-old male, with a history significant for diabetes, who presents to the ED today due to laceration on the right ear/dog bite  Patient states that, shortly prior to arrival, he was playing with his dog when his dog's tooth became caught on his ear  Patient states that there was no aggression associated with the injury  Patient reports mild pain, characterized as stinging, of the right ear at this time    Patient does not desire analgesia at this time and he denies injury anywhere else   Patient was in his usual state of health prior to the injury  As stated, the dog is his and it is up-to-date on vaccinations  Patient's last tetanus shot was in June of 2016  Patient is currently afebrile and hemodynamically stable  His physical exam is notable for a roughly 1 cm laceration on the scaphoid of his right ear that is not through and through  This presentation is concerning for:  Dog bite (accidental), laceration  To protect the cartilage, the decision was made to suture the laceration despite the etiology being dog bite  Patient was in agreement with this plan, after discussion of risks and benefits, and this was performed  Patient was given strict return precautions given his immunocompromised status due to the diabetes  Will additionally manage with Augmentin  Disposition  Final diagnoses:   Elevated blood pressure reading   Dog bite, initial encounter   Laceration of right ear, initial encounter     Time reflects when diagnosis was documented in both MDM as applicable and the Disposition within this note     Time User Action Codes Description Comment    2/16/2022  7:53 PM Elsa RANKIN Add [R03 0] Elevated blood pressure reading     2/16/2022  7:54 PM Elsa Teague Mya Kalata  0XXA] Dog bite, initial encounter     2/16/2022  7:54 PM Puja Payton Modify [R03 0] Elevated blood pressure reading     2/16/2022  7:54 PM Elsa Ontiveros Modify [L49  0XXA] Dog bite, initial encounter     2/16/2022  7:54 PM Puja Payton Add [W26 782A] Laceration of right ear, initial encounter       ED Disposition     ED Disposition Condition Date/Time Comment    Discharge Stable Wed Feb 16, 2022  9:51 PM Blanca Hatch discharge to home/self care              Follow-up Information     Follow up With Specialties Details Why Contact Info Additional [address] [facility] Schedule an appointment as soon as possible for a visit   [address], [address] [facility] [address], [address], [address] [place], [place], [address]          Discharge Medication List as of 2/16/2022  9:52 PM      START taking these medications    Details   amoxicillin-clavulanate (AUGMENTIN) 875-125 mg per tablet Take 1 tablet by mouth every 12 (twelve) hours for 5 days, Starting Wed 2/16/2022, Until Mon 2/21/2022, Normal         CONTINUE these medications which have NOT CHANGED    Details   albuterol (PROVENTIL HFA,VENTOLIN HFA) 90 mcg/act inhaler INHALE 2 PUFFS INTO THE LUNGS EVERY 6 HOURS AS NEEDED FOR WHEEZING, Normal      carbamide peroxide (DEBROX) 6 5 % otic solution Administer 5 drops into both ears 2 (two) times a day, Starting Fri 4/30/2021, Normal      clindamycin (CLEOCIN T) 1 % Apply 1 pad topically daily, Starting Mon 5/17/2021, Until Tue 12/7/2021, Normal      fluticasone-salmeterol (Advair) 250-50 mcg/dose inhaler Inhale 1 puff 2 (two) times a day Rinse mouth after use , Starting Tue 12/21/2021, Until Sun 6/19/2022, Normal      gabapentin (NEURONTIN) 100 mg capsule TAKE 1 CAPSULE BY MOUTH THREE TIMES DAILY, Normal      glucose blood test strip Use as instructed, Normal      glucose monitoring kit (FREESTYLE) monitoring kit Use 1 each as needed (Monitor blood glucose), Starting Tue 9/7/2021, Normal      insulin glargine (Lantus SoloStar) 100 units/mL injection pen Inject 45 Units under the skin daily at bedtime, Starting Tue 12/7/2021, Until Mon 3/7/2022, Normal      !! Insulin Pen Needle (B-D UF III MINI PEN NEEDLES) 31G X 5 MM MISC Use to test blood sugar 3 times daily, Normal      !!  Insulin Pen Needle 31G X 4 MM MISC Use daily, Starting Mon 7/12/2021, Normal      ketoconazole (NIZORAL) 2 % cream Apply topically daily, Starting Mon 10/25/2021, Normal      loratadine (CLARITIN) 10 mg tablet TAKE 1 TABLET BY MOUTH EVERY DAY, Normal metFORMIN (GLUCOPHAGE) 500 mg tablet TAKE 2 TABLETS BY MOUTH TWO TIMES DAILY, Normal      methadone (DOLOPHINE) 10 mg tablet Take 80 mg by mouth daily,, Historical Med      Microlet Lancets MISC USE AS DIRECTED, Normal       !! - Potential duplicate medications found  Please discuss with provider  PDMP Review     None           ED Provider  Attending physically available and evaluated Hailey Aldrich I managed the patient along with the ED Attending      Electronically Signed by         Cristin Callahan MD  02/16/22 9763

## 2022-02-22 ENCOUNTER — TELEPHONE (OUTPATIENT)
Dept: DIABETES SERVICES | Facility: OTHER | Age: 62
End: 2022-02-22

## 2022-02-22 NOTE — TELEPHONE ENCOUNTER
L/m for patient to discuss and schedule diabetes education appt  Will attempt to call again      Leilani Alomnte RD,LDN,CHC

## 2022-02-24 ENCOUNTER — OFFICE VISIT (OUTPATIENT)
Dept: INTERNAL MEDICINE CLINIC | Facility: CLINIC | Age: 62
End: 2022-02-24

## 2022-02-24 VITALS
TEMPERATURE: 97.9 F | DIASTOLIC BLOOD PRESSURE: 72 MMHG | HEART RATE: 73 BPM | OXYGEN SATURATION: 97 % | BODY MASS INDEX: 25.76 KG/M2 | WEIGHT: 169.4 LBS | SYSTOLIC BLOOD PRESSURE: 132 MMHG

## 2022-02-24 DIAGNOSIS — E11.8 TYPE 2 DIABETES MELLITUS WITH COMPLICATION (HCC): ICD-10-CM

## 2022-02-24 PROCEDURE — 99213 OFFICE O/P EST LOW 20 MIN: CPT | Performed by: HOSPITALIST

## 2022-02-24 NOTE — PATIENT INSTRUCTIONS
Please change the battery in your glucometer and check your blood sugar 4 times per day: when you wake up in the morning before you eat, and before each meal 3 times per day  Please write these readings down (date, time, blood sugar reading) and bring that log with you to your next visit  Keep making an effort to modify your diet and continue exercising regularly  Please get your blood work done around 3/10/2022 so that you can discuss these results at your next appointment

## 2022-02-24 NOTE — PROGRESS NOTES
[facility]  INTERNAL MEDICINE OFFICE VISIT     PATIENT INFORMATION     Adrian Lawson   59 y o  male   MRN: [id number]    ASSESSMENT/PLAN     Diagnoses and all orders for this visit:    Type 2 diabetes mellitus with complication (HCC)  -     glucose blood test strip; Use as instructed  -     HEMOGLOBIN A1C W/ EAG ESTIMATION; Future      Schedule a follow-up appointment in 4 weeks for DM follow up  - Instructed patient to change the battery in his glucometer and check his BG 4 times per day (AM fasting BG and TID before each meal)  He was clearly instructed to bring his glucometer and BG log to his next appointment, because we cannot change his DM regimen without knowledge of his home BG   - Instructed patient to obtain HbA1c between 3/10 and 3/17 so that his updated DM care can be discussed thoroughly at his next visit on 4/6   - Counseled patient on importance of maintaining follow up with ophthalmology and podiatry  HEALTH MAINTENANCE     Immunization History   Administered Date(s) Administered    COVID-19 PFIZER VACCINE 0 3 ML IM 04/15/2021, 05/08/2021    Influenza, recombinant, quadrivalent,injectable, preservative free 04/12/2019, 01/13/2020, 12/07/2021    Pneumococcal Polysaccharide PPV23 04/12/2019    Tdap 06/04/2016     CHIEF COMPLAINT     Chief Complaint   Patient presents with    Diabetes     1 week f/u - taking out stitches      HISTORY OF PRESENT ILLNESS     57yo M with PMH of DM2, COPD, drug use on methadone presents for DM2 follow up  Patient did not bring his glucometer or log of BG to today's visit  He states that his glucometer battery may be dying, as he is only able to measure his BG at home twice a day (any more frequent readings causes his glucometer to shut off before the reading can be displayed)  He reports that his AM fasting BG ranges from 125-250 and his "PM" BG (measured around 2000 daily) measures around 100-125   Patient denies changes in vision, hypoglycemic BG readings or episodes (no palpitations, diaphoresis, lightheadedness, presyncope)  He has baseline paresthesias in B/L feet, which gabapentin helps with  He reports adherence to his metformin 1000mg BID  He misses his Lantus about once or twice a month  REVIEW OF SYSTEMS     Review of Systems   Constitutional: Negative for chills and fever  HENT: Negative for rhinorrhea and sore throat  Eyes: Negative for visual disturbance  Respiratory: Negative for cough and shortness of breath  Cardiovascular: Negative for chest pain and palpitations  Gastrointestinal: Positive for nausea  Negative for abdominal pain, blood in stool and vomiting  When BG is elevated   Endocrine: Positive for polyuria  Negative for polydipsia and polyphagia  Genitourinary: Negative for dysuria and hematuria  Musculoskeletal: Negative for arthralgias and back pain  Skin: Negative for color change and rash  Neurological: Positive for numbness  Negative for dizziness, syncope, light-headedness and headaches  Paresthesias in bilateral feet, baseline   All other systems reviewed and are negative  OBJECTIVE     Vitals:    02/24/22 1610   BP: 132/72   BP Location: Left arm   Patient Position: Sitting   Cuff Size: Standard   Pulse: 73   Temp: 97 9 °F (36 6 °C)   TempSrc: Temporal   SpO2: 97%   Weight: 76 8 kg (169 lb 6 4 oz)     Physical Exam  Vitals reviewed  Constitutional:       Appearance: He is well-developed  HENT:      Head: Normocephalic and atraumatic  Eyes:      Conjunctiva/sclera: Conjunctivae normal    Cardiovascular:      Rate and Rhythm: Normal rate and regular rhythm  Heart sounds: S1 normal and S2 normal  No murmur heard  Pulmonary:      Effort: Pulmonary effort is normal  No respiratory distress  Breath sounds: Wheezing present  No decreased breath sounds, rhonchi or rales     Abdominal:      General: Bowel sounds are normal       Palpations: Abdomen is soft       Tenderness: There is no abdominal tenderness  Musculoskeletal:      Cervical back: Neck supple  Right lower leg: No edema  Left lower leg: No edema  Feet:      Right foot:      Skin integrity: Skin integrity normal  No ulcer, skin breakdown or erythema  Left foot:      Skin integrity: Skin integrity normal  No ulcer, skin breakdown or erythema  Skin:     General: Skin is warm and dry  Neurological:      Mental Status: He is alert and oriented to person, place, and time         CURRENT MEDICATIONS     Current Outpatient Medications:     albuterol (PROVENTIL HFA,VENTOLIN HFA) 90 mcg/act inhaler, INHALE 2 PUFFS INTO THE LUNGS EVERY 6 HOURS AS NEEDED FOR WHEEZING, Disp: 8 5 g, Rfl: 2    clindamycin (CLEOCIN T) 1 %, Apply 1 pad topically daily, Disp: 30 pad, Rfl: 0    fluticasone-salmeterol (Advair) 250-50 mcg/dose inhaler, Inhale 1 puff 2 (two) times a day Rinse mouth after use , Disp: 60 blister, Rfl: 5    gabapentin (NEURONTIN) 100 mg capsule, TAKE 1 CAPSULE BY MOUTH THREE TIMES DAILY, Disp: 90 capsule, Rfl: 3    glucose blood test strip, Use as instructed, Disp: 200 each, Rfl: 2    glucose monitoring kit (FREESTYLE) monitoring kit, Use 1 each as needed (Monitor blood glucose), Disp: 1 each, Rfl: 0    insulin glargine (Lantus SoloStar) 100 units/mL injection pen, Inject 45 Units under the skin daily at bedtime, Disp: 13 5 mL, Rfl: 2    Insulin Pen Needle (B-D UF III MINI PEN NEEDLES) 31G X 5 MM MISC, Use to test blood sugar 3 times daily, Disp: 100 each, Rfl: 10    Insulin Pen Needle 31G X 4 MM MISC, Use daily, Disp: 100 each, Rfl: 0    ketoconazole (NIZORAL) 2 % cream, Apply topically daily, Disp: 15 g, Rfl: 0    metFORMIN (GLUCOPHAGE) 500 mg tablet, TAKE 2 TABLETS BY MOUTH TWO TIMES DAILY, Disp: 120 tablet, Rfl: 3    methadone (DOLOPHINE) 10 mg tablet, Take 80 mg by mouth daily,, Disp: , Rfl:     Microlet Lancets MISC, USE AS DIRECTED, Disp: 100 each, Rfl: 1   carbamide peroxide (DEBROX) 6 5 % otic solution, Administer 5 drops into both ears 2 (two) times a day (Patient not taking: Reported on 12/7/2021 ), Disp: 15 mL, Rfl: 3    loratadine (CLARITIN) 10 mg tablet, TAKE 1 TABLET BY MOUTH EVERY DAY (Patient not taking: Reported on 2/24/2022), Disp: 30 tablet, Rfl: 5    PAST MEDICAL & SURGICAL HISTORY     Past Medical History:   Diagnosis Date    Cough 2/4/2019    [facility]     [facility] 02/16/2022    Migraines 02/16/2022     Past Surgical History:   Procedure Laterality Date    TONSILLECTOMY       SOCIAL & FAMILY HISTORY     Social History     Socioeconomic History    Marital status: Registered Domestic Partner     Spouse name: Vanessa Justice Number of children: 3    Years of education: Not on file    Highest education level: Not on file   Occupational History    Occupation: unemployed/disabled   Tobacco Use    Smoking status: Current Every Day Smoker     Packs/day: 0 50    Smokeless tobacco: Never Used    Tobacco comment: half a pack every other day   Vaping Use    Vaping Use: Never used   Substance and Sexual Activity    Alcohol use: Not Currently     Comment: rarely    Drug use: Yes     Types: Marijuana, Heroin, Fentanyl     Comment: currently smoking marijuana and occasional heroin, one bag a week    Sexual activity: Never   Other Topics Concern    Not on file   Social History Narrative    Not on file     Social Determinants of Health     Financial Resource Strain: High Risk    Difficulty of Paying Living Expenses: Hard   Food Insecurity: No Food Insecurity    Worried About Running Out of Food in the Last Year: Never true    Jimy of Food in the Last Year: Never true   Transportation Needs: No Transportation Needs    Lack of Transportation (Medical): No    Lack of Transportation (Non-Medical):  No   Physical Activity: Sufficiently Active    Days of Exercise per Week: 7 days    Minutes of Exercise per Session: 60 min Stress: No Stress Concern Present    Feeling of Stress : Not at all   Social Connections: Moderately Isolated    Frequency of Communication with Friends and Family: More than three times a week    Frequency of Social Gatherings with Friends and Family: More than three times a week    Attends Mandaeism Services: Never    Active Member of Clubs or Organizations: No    Attends Club or Organization Meetings: Never    Marital Status: Living with partner   Intimate Partner Violence: Not At Risk    Fear of Current or Ex-Partner: No    Emotionally Abused: No    Physically Abused: No    Sexually Abused: No   Housing Stability: Not on file     Social History     Substance and Sexual Activity   Alcohol Use Not Currently    Comment: rarely       Social History     Substance and Sexual Activity   Drug Use Yes    Types: Marijuana, Heroin, Fentanyl    Comment: currently smoking marijuana and occasional heroin, one bag a week     Social History     Tobacco Use   Smoking Status Current Every Day Smoker    Packs/day: 0 50   Smokeless Tobacco Never Used   Tobacco Comment    half a pack every other day     Family History   Problem Relation Age of Onset    Thyroid disease Mother     Cancer Mother                ==  Socomariely Reyes DO  PGY-1  [facility] [address] [facility] [address] , [address] [place] [address] 28, [address] [facility]  Office: [phone]  Fax: [phone]

## 2022-02-25 ENCOUNTER — TELEPHONE (OUTPATIENT)
Dept: DIABETES SERVICES | Facility: OTHER | Age: 62
End: 2022-02-25

## 2022-02-28 ENCOUNTER — TELEPHONE (OUTPATIENT)
Dept: INTERNAL MEDICINE CLINIC | Facility: CLINIC | Age: 62
End: 2022-02-28

## 2022-02-28 DIAGNOSIS — E11.8 TYPE 2 DIABETES MELLITUS WITH COMPLICATION (HCC): ICD-10-CM

## 2022-02-28 NOTE — TELEPHONE ENCOUNTER
[facility] called they can't accept the script refilled for the test strips  Dr Roger Morales doesn't come up enrolled for Medicare  Can we please have a different provider send this refill request? Possibly the attending Doctor today  Thank you

## 2022-02-28 NOTE — TELEPHONE ENCOUNTER
Dr Boris Boeck, can you please send refill on test strips as they cant accept the one sent from [facility] of insurance

## 2022-04-06 ENCOUNTER — TELEPHONE (OUTPATIENT)
Dept: OTHER | Facility: OTHER | Age: 62
End: 2022-04-06

## 2022-05-05 DIAGNOSIS — R06.02 SHORTNESS OF BREATH: ICD-10-CM

## 2022-05-05 DIAGNOSIS — J44.9 CHRONIC OBSTRUCTIVE PULMONARY DISEASE, UNSPECIFIED COPD TYPE (HCC): ICD-10-CM

## 2022-05-27 DIAGNOSIS — J44.9 CHRONIC OBSTRUCTIVE PULMONARY DISEASE, UNSPECIFIED COPD TYPE (HCC): ICD-10-CM

## 2022-05-27 DIAGNOSIS — R06.02 SHORTNESS OF BREATH: ICD-10-CM

## 2022-05-27 DIAGNOSIS — E11.42 DIABETIC POLYNEUROPATHY ASSOCIATED WITH TYPE 2 DIABETES MELLITUS (HCC): ICD-10-CM

## 2022-05-27 RX ORDER — FLUTICASONE PROPIONATE AND SALMETEROL 50; 250 UG/1; UG/1
POWDER RESPIRATORY (INHALATION)
Qty: 60 BLISTER | Refills: 5 | Status: SHIPPED | OUTPATIENT
Start: 2022-05-27

## 2022-05-27 RX ORDER — GABAPENTIN 100 MG/1
CAPSULE ORAL
Qty: 90 CAPSULE | Refills: 3 | Status: SHIPPED | OUTPATIENT
Start: 2022-05-27

## 2022-07-18 ENCOUNTER — RA CDI HCC (OUTPATIENT)
Dept: OTHER | Facility: HOSPITAL | Age: 62
End: 2022-07-18

## 2022-07-22 ENCOUNTER — TELEPHONE (OUTPATIENT)
Dept: INTERNAL MEDICINE CLINIC | Facility: CLINIC | Age: 62
End: 2022-07-22

## 2022-08-11 ENCOUNTER — TELEPHONE (OUTPATIENT)
Dept: INTERNAL MEDICINE CLINIC | Facility: CLINIC | Age: 62
End: 2022-08-11

## 2022-08-11 NOTE — TELEPHONE ENCOUNTER
Folder Color- [id number]     Name of Form- [employer]riber Form     Form to be filled out by- Lydia Daniel    Form to be Faxed: [phone]    Patient made aware of 10 business day policy

## 2022-08-12 ENCOUNTER — TELEPHONE (OUTPATIENT)
Dept: INTERNAL MEDICINE CLINIC | Facility: CLINIC | Age: 62
End: 2022-08-12

## 2022-08-12 NOTE — TELEPHONE ENCOUNTER
Form placed in [facility] clerical folder to be faxed  [facility]- Please call patient and schedule a follow up to have labs done and determine if medication needs to be started

## 2022-08-12 NOTE — TELEPHONE ENCOUNTER
will need more recent lipid labs and discussion at next visit in regards to starting statin; will likely need high intesity statin considering age, DM, and HTN

## 2022-08-12 NOTE — TELEPHONE ENCOUNTER
Per Dr Sally Westfall, patient will need more recent lipid labs and discussion at next visit in regards to starting statin; will likely need high intesity statin considering age, DM, and HTN

## 2022-10-29 DIAGNOSIS — E11.42 DIABETIC POLYNEUROPATHY ASSOCIATED WITH TYPE 2 DIABETES MELLITUS (HCC): ICD-10-CM

## 2022-10-31 RX ORDER — GABAPENTIN 100 MG/1
CAPSULE ORAL
Qty: 90 CAPSULE | Refills: 3 | Status: SHIPPED | OUTPATIENT
Start: 2022-10-31

## 2022-11-12 DIAGNOSIS — E11.69 TYPE 2 DIABETES MELLITUS WITH OTHER SPECIFIED COMPLICATION, WITHOUT LONG-TERM CURRENT USE OF INSULIN (HCC): ICD-10-CM

## 2022-11-15 RX ORDER — INSULIN GLARGINE 100 [IU]/ML
INJECTION, SOLUTION SUBCUTANEOUS
Qty: 15 ML | Refills: 5 | Status: SHIPPED | OUTPATIENT
Start: 2022-11-15

## 2022-11-15 NOTE — TELEPHONE ENCOUNTER
Per the last visit this is the correct dose and I will send the refill at this time  However this was in February 2022, so patient should be seen ASAP to follow up regarding his diabetes management

## 2023-12-07 ENCOUNTER — OFFICE VISIT (OUTPATIENT)
Dept: INTERNAL MEDICINE CLINIC | Facility: CLINIC | Age: 63
End: 2023-12-07

## 2023-12-07 VITALS
HEART RATE: 89 BPM | TEMPERATURE: 98.6 F | BODY MASS INDEX: 21.6 KG/M2 | SYSTOLIC BLOOD PRESSURE: 129 MMHG | HEIGHT: 68 IN | DIASTOLIC BLOOD PRESSURE: 65 MMHG | WEIGHT: 142.5 LBS | OXYGEN SATURATION: 98 %

## 2023-12-07 DIAGNOSIS — E11.69 TYPE 2 DIABETES MELLITUS WITH OTHER SPECIFIED COMPLICATION, WITHOUT LONG-TERM CURRENT USE OF INSULIN (HCC): ICD-10-CM

## 2023-12-07 DIAGNOSIS — F17.210 SMOKING GREATER THAN 30 PACK YEARS: ICD-10-CM

## 2023-12-07 DIAGNOSIS — E11.8 TYPE 2 DIABETES MELLITUS WITH COMPLICATION (HCC): ICD-10-CM

## 2023-12-07 DIAGNOSIS — E11.42 DIABETIC POLYNEUROPATHY ASSOCIATED WITH TYPE 2 DIABETES MELLITUS (HCC): ICD-10-CM

## 2023-12-07 DIAGNOSIS — R21 RASH: ICD-10-CM

## 2023-12-07 DIAGNOSIS — J44.9 CHRONIC OBSTRUCTIVE PULMONARY DISEASE, UNSPECIFIED COPD TYPE (HCC): ICD-10-CM

## 2023-12-07 DIAGNOSIS — B35.3 TINEA PEDIS OF BOTH FEET: ICD-10-CM

## 2023-12-07 DIAGNOSIS — R06.02 SHORTNESS OF BREATH: ICD-10-CM

## 2023-12-07 DIAGNOSIS — Z23 ENCOUNTER FOR IMMUNIZATION: ICD-10-CM

## 2023-12-07 DIAGNOSIS — Z00.00 MEDICARE ANNUAL WELLNESS VISIT, SUBSEQUENT: Primary | ICD-10-CM

## 2023-12-07 DIAGNOSIS — R63.4 WEIGHT LOSS: ICD-10-CM

## 2023-12-07 RX ORDER — ALBUTEROL SULFATE 90 UG/1
1 AEROSOL, METERED RESPIRATORY (INHALATION) EVERY 4 HOURS PRN
Qty: 18 G | Refills: 2 | Status: SHIPPED | OUTPATIENT
Start: 2023-12-07

## 2023-12-07 RX ORDER — FLUTICASONE PROPIONATE AND SALMETEROL 50; 250 UG/1; UG/1
POWDER RESPIRATORY (INHALATION)
Qty: 60 BLISTER | Refills: 5 | Status: SHIPPED | OUTPATIENT
Start: 2023-12-07

## 2023-12-07 RX ORDER — GABAPENTIN 100 MG/1
100 CAPSULE ORAL 3 TIMES DAILY
Qty: 90 CAPSULE | Refills: 3 | Status: SHIPPED | OUTPATIENT
Start: 2023-12-07

## 2023-12-07 RX ORDER — METFORMIN HYDROCHLORIDE 500 MG/1
500 TABLET, EXTENDED RELEASE ORAL
Qty: 90 TABLET | Refills: 0 | Status: SHIPPED | OUTPATIENT
Start: 2023-12-07 | End: 2024-03-06

## 2023-12-07 RX ORDER — LANCETS
EACH MISCELLANEOUS
Qty: 100 EACH | Refills: 1 | Status: SHIPPED | OUTPATIENT
Start: 2023-12-07

## 2023-12-07 RX ORDER — KETOCONAZOLE 20 MG/G
CREAM TOPICAL DAILY
Qty: 15 G | Refills: 0 | Status: SHIPPED | OUTPATIENT
Start: 2023-12-07

## 2023-12-07 RX ORDER — INSULIN GLARGINE 100 [IU]/ML
45 INJECTION, SOLUTION SUBCUTANEOUS
Qty: 15 ML | Refills: 5 | Status: SHIPPED | OUTPATIENT
Start: 2023-12-07

## 2023-12-07 RX ORDER — FLURBIPROFEN SODIUM 0.3 MG/ML
SOLUTION/ DROPS OPHTHALMIC
Qty: 100 EACH | Refills: 10 | Status: SHIPPED | OUTPATIENT
Start: 2023-12-07 | End: 2023-12-07

## 2023-12-07 NOTE — PATIENT INSTRUCTIONS
Medicare Preventive Visit Patient Instructions  Thank you for completing your Welcome to Medicare Visit or Medicare Annual Wellness Visit today. Your next wellness visit will be due in one year (12/7/2024). The screening/preventive services that you may require over the next 5-10 years are detailed below. Some tests may not apply to you based off risk factors and/or age. Screening tests ordered at today's visit but not completed yet may show as past due. Also, please note that scanned in results may not display below. Preventive Screenings:  Service Recommendations Previous Testing/Comments   Colorectal Cancer Screening  Colonoscopy    Fecal Occult Blood Test (FOBT)/Fecal Immunochemical Test (FIT)  Fecal DNA/Cologuard Test  Flexible Sigmoidoscopy Age: 43-73 years old   Colonoscopy: every 10 years (May be performed more frequently if at higher risk)  OR  FOBT/FIT: every 1 year  OR  Cologuard: every 3 years  OR  Sigmoidoscopy: every 5 years  Screening may be recommended earlier than age 39 if at higher risk for colorectal cancer. Also, an individualized decision between you and your healthcare provider will decide whether screening between the ages of 77-80 would be appropriate.  Colonoscopy: Not on file  FOBT/FIT: Not on file  Cologuard: Not on file  Sigmoidoscopy: Not on file          Prostate Cancer Screening Individualized decision between patient and health care provider in men between ages of 53-66   Medicare will cover every 12 months beginning on the day after your 50th birthday PSA: No results in last 5 years           Hepatitis C Screening Once for adults born between 1945 and 1965  More frequently in patients at high risk for Hepatitis C Hep C Antibody: 10/25/2021    Screening Current   Diabetes Screening 1-2 times per year if you're at risk for diabetes or have pre-diabetes Fasting glucose: 392 mg/dL (10/25/2021)  A1C: 12.8 (12/7/2021)  Screening Not Indicated  History Diabetes   Cholesterol Screening Once every 5 years if you don't have a lipid disorder. May order more often based on risk factors. Lipid panel: Not on file         Other Preventive Screenings Covered by Medicare:  Abdominal Aortic Aneurysm (AAA) Screening: covered once if your at risk. You're considered to be at risk if you have a family history of AAA or a male between the age of 70-76 who smoking at least 100 cigarettes in your lifetime. Lung Cancer Screening: covers low dose CT scan once per year if you meet all of the following conditions: (1) Age 48-67; (2) No signs or symptoms of lung cancer; (3) Current smoker or have quit smoking within the last 15 years; (4) You have a tobacco smoking history of at least 20 pack years (packs per day x number of years you smoked); (5) You get a written order from a healthcare provider. Glaucoma Screening: covered annually if you're considered high risk: (1) You have diabetes OR (2) Family history of glaucoma OR (3)  aged 48 and older OR (3)  American aged 72 and older  Osteoporosis Screening: covered every 2 years if you meet one of the following conditions: (1) Have a vertebral abnormality; (2) On glucocorticoid therapy for more than 3 months; (3) Have primary hyperparathyroidism; (4) On osteoporosis medications and need to assess response to drug therapy. HIV Screening: covered annually if you're between the age of 14-79. Also covered annually if you are younger than 13 and older than 72 with risk factors for HIV infection. For pregnant patients, it is covered up to 3 times per pregnancy.     Immunizations:  Immunization Recommendations   Influenza Vaccine Annual influenza vaccination during flu season is recommended for all persons aged >= 6 months who do not have contraindications   Pneumococcal Vaccine   * Pneumococcal conjugate vaccine = PCV13 (Prevnar 13), PCV15 (Vaxneuvance), PCV20 (Prevnar 20)  * Pneumococcal polysaccharide vaccine = PPSV23 (Pneumovax) Adults 19-63 yo with certain risk factors or if 69+ yo  If never received any pneumonia vaccine: recommend Prevnar 20 (PCV20)  Give PCV20 if previously received 1 dose of PCV13 or PPSV23   Hepatitis B Vaccine 3 dose series if at intermediate or high risk (ex: diabetes, end stage renal disease, liver disease)   Respiratory syncytial virus (RSV) Vaccine - COVERED BY MEDICARE PART D  * RSVPreF3 (Arexvy) CDC recommends that adults 61years of age and older may receive a single dose of RSV vaccine using shared clinical decision-making (SCDM)   Tetanus (Td) Vaccine - COST NOT COVERED BY MEDICARE PART B Following completion of primary series, a booster dose should be given every 10 years to maintain immunity against tetanus. Td may also be given as tetanus wound prophylaxis. Tdap Vaccine - COST NOT COVERED BY MEDICARE PART B Recommended at least once for all adults. For pregnant patients, recommended with each pregnancy. Shingles Vaccine (Shingrix) - COST NOT COVERED BY MEDICARE PART B  2 shot series recommended in those 19 years and older who have or will have weakened immune systems or those 50 years and older     Health Maintenance Due:      Topic Date Due   • Colorectal Cancer Screening  Never done   • HIV Screening  Completed   • Hepatitis C Screening  Completed     Immunizations Due:      Topic Date Due   • Hepatitis A Vaccine (1 of 2 - Risk 2-dose series) Never done   • Hepatitis B Vaccine (1 of 3 - Risk 3-dose series) Never done   • Pneumococcal Vaccine: 65+ Years (2 - PCV) 04/12/2020   • COVID-19 Vaccine (3 - Pfizer series) 07/03/2021   • Influenza Vaccine (1) 09/01/2023     Advance Directives   What are advance directives? Advance directives are legal documents that state your wishes and plans for medical care. These plans are made ahead of time in case you lose your ability to make decisions for yourself.  Advance directives can apply to any medical decision, such as the treatments you want, and if you want to donate organs. What are the types of advance directives? There are many types of advance directives, and each state has rules about how to use them. You may choose a combination of any of the following:  Living will: This is a written record of the treatment you want. You can also choose which treatments you do not want, which to limit, and which to stop at a certain time. This includes surgery, medicine, IV fluid, and tube feedings. Durable power of  for [facility]): This is a written record that states who you want to make healthcare choices for you when you are unable to make them for yourself. This person, called a proxy, is usually a family member or a friend. You may choose more than 1 proxy. Do not resuscitate (DNR) order:  A DNR order is used in case your heart stops beating or you stop breathing. It is a request not to have certain forms of treatment, such as CPR. A DNR order may be included in other types of advance directives. Medical directive: This covers the care that you want if you are in a coma, near death, or unable to make decisions for yourself. You can list the treatments you want for each condition. Treatment may include pain medicine, surgery, blood transfusions, dialysis, IV or tube feedings, and a ventilator (breathing machine). Values history: This document has questions about your views, beliefs, and how you feel and think about life. This information can help others choose the care that you would choose. Why are advance directives important? An advance directive helps you control your care. Although spoken wishes may be used, it is better to have your wishes written down. Spoken wishes can be misunderstood, or not followed. Treatments may be given even if you do not want them. An advance directive may make it easier for your family to make difficult choices about your care.    Cigarette Smoking and Your Health   Risks to your health if you smoke:  Nicotine and other chemicals found in tobacco damage every cell in your body. Even if you are a light smoker, you have an increased risk for cancer, heart disease, and lung disease. If you are pregnant or have diabetes, smoking increases your risk for complications. Benefits to your health if you stop smoking: You decrease respiratory symptoms such as coughing, wheezing, and shortness of breath. You reduce your risk for cancers of the lung, mouth, throat, kidney, bladder, pancreas, stomach, and cervix. If you already have cancer, you increase the benefits of chemotherapy. You also reduce your risk for cancer returning or a second cancer from developing. You reduce your risk for heart disease, blood clots, heart attack, and stroke. You reduce your risk for lung infections, and diseases such as pneumonia, asthma, chronic bronchitis, and emphysema. Your circulation improves. More oxygen can be delivered to your body. If you have diabetes, you lower your risk for complications, such as kidney, artery, and eye diseases. You also lower your risk for nerve damage. Nerve damage can lead to amputations, poor vision, and blindness. You improve your body's ability to heal and to fight infections. For more information and support to stop smoking:   Smokefree. gov  Phone: 7- 737 - 760-0690  Web Address: www.[employer]. [facility] [address] Information is for End User's use only and may not be sold, redistributed or otherwise used for commercial purposes.  All illustrations and images included in CareNotes® are the copyrighted property of [employer] or [address] [place] [address]

## 2023-12-07 NOTE — PROGRESS NOTES
Assessment and Plan:     Problem List Items Addressed This Visit       Type 2 diabetes mellitus, without long-term current use of insulin ([address] [facility])    Patient has history of type 2 diabetes  Patient is noncompliant with his medications  Patient used to be on glargine insulin 45 units at night and metformin 1000 twice daily  Patient failed to follow-up on his visits for the last 3 years  Patient A1c in the clinic is 13.7    Blood Glucose Monitoring Suppl KIT    Insulin Pen Needle 31G X 4 MM MISC    Insulin Glargine Solostar (Lantus SoloStar) 100 UNIT/ML SOPN    metFORMIN (GLUCOPHAGE-XR) 500 mg 24 hr tablet  Patient was advised to keep glucose log for the next 2 weeks and given to the clinic staff  Patient was advised that if his blood glucose drops below 100 he has to give us a call    Other Relevant Orders    POCT hemoglobin A1c    CBC and differential    Basic metabolic panel    Lipid Panel with Direct LDL reflex    Albumin / creatinine urine ratio    POCT diabetic eye exam    Chronic obstructive pulmonary disease ([address] [facility])    Patient has history of COPD and smoking history for more than 30 years  Patient has resolved exam  Patient is noncompliant with his COPD medications    albuterol (Ventolin HFA) 90 mcg/act inhaler    Advair Diskus 250-50 MCG/ACT inhaler      Medicare annual wellness visit, subsequent    -  Primary    Encounter for immunization        Relevant Orders    influenza vaccine, high-dose, PF 0.7 mL (FLUZONE HIGH-DOSE)    Pneumococcal Conjugate Vaccine 20-valent (Pcv20)    Diabetic polyneuropathy associated with type 2 diabetes mellitus ([address] [facility])        Patient reported numbness and tingling on his bilateral foot  Patient is poorly controlled diabetic    gabapentin (NEURONTIN) 100 mg capsule    Insulin Glargine Solostar (Lantus SoloStar) 100 UNIT/ML SOPN    metFORMIN (GLUCOPHAGE-XR) 500 mg 24 hr tablet    Tinea pedis of both feet        Relevant Medications    ketoconazole (NIZORAL) 2 % cream    Smoking greater than 30 pack years        Relevant Orders    Screening lung lung screening program    Rash        Patient has itching rash on his forearm  Patient reported that he has 2 dogs at home    Ambulatory Referral to Dermatology    Weight loss      Patient has extensive weight loss recently  Patient is noncompliant has not following up and missed follow-up visits  Patient denies any fever, chest pain, abdominal pain, night sweats  Patient is due for colonoscopy and CT lung screening  Patient is agreeable  CT lung screening was ordered  Planning to order colonoscopy after 6 weeks when the patient comes for follow-up    Healthcare maintenance  Planning to offer colonoscopy after 6 weeks  Plan to offer patient hepatitis A and hepatitis B after 6 weeks during follow-up visit  Plan to offer patient with eye exam and foot exam after 6 weeks neck pain            Depression Screening and Follow-up Plan: Patient was screened for depression during today's encounter. They screened negative with a PHQ-2 score of 0. Preventive health issues were discussed with patient, and age appropriate screening tests were ordered as noted in patient's After Visit Summary. Personalized health advice and appropriate referrals for health education or preventive services given if needed, as noted in patient's After Visit Summary. History of Present Illness:     Patient presents for a Medicare Wellness Visit  57-year-old male patient with past medical history of COPD, diabetes type 2 who came into for Medicare annual visit. Patient reported that he ran out of medication a long time ago around is noncompliant with his diabetes or COPD medications. According to patient he was in [place]. Patient denies any symptoms except for numbness and tingling in his feet. Patient also reports some rash on his left arm. Patient reported that he has dogs at home.   HPI   Patient Care Team:  Kai Lane DO as PCP - General (Internal Medicine)  Wicho Roach Sasha Welch DO as PCP - [facility] ([facility])  Harrie Jeans, DO as PCP - PCP-[employer] ([facility])     Review of Systems:     Review of Systems   Constitutional:  Negative for activity change, appetite change, chills, diaphoresis, fatigue, fever and unexpected weight change. HENT:  Negative for congestion, dental problem, drooling, ear discharge, ear pain, facial swelling, hearing loss, mouth sores, nosebleeds, postnasal drip, sinus pressure, sneezing, sore throat, tinnitus and trouble swallowing. Eyes:  Negative for photophobia, pain, discharge, redness and itching. Respiratory:  Negative for apnea, cough, choking, chest tightness, shortness of breath, wheezing and stridor. Cardiovascular:  Negative for chest pain, palpitations and leg swelling. Gastrointestinal:  Negative for abdominal pain, constipation, diarrhea, nausea, rectal pain and vomiting. Endocrine: Negative for cold intolerance, heat intolerance, polydipsia, polyphagia and polyuria. Genitourinary:  Negative for decreased urine volume, difficulty urinating, dysuria, enuresis, flank pain, genital sores, hematuria, penile discharge, penile pain, penile swelling, scrotal swelling and urgency. Musculoskeletal:  Negative for back pain, myalgias, neck pain and neck stiffness. Neurological:  Positive for numbness. Negative for dizziness, tremors, seizures, syncope, facial asymmetry, speech difficulty, weakness and headaches. Psychiatric/Behavioral:  Negative for agitation, behavioral problems, confusion, dysphoric mood and sleep disturbance. The patient is not nervous/anxious and is not hyperactive.          Problem List:     Patient Active Problem List   Diagnosis    Type 2 diabetes mellitus, without long-term current use of insulin (HCC)    Other emphysema (HCC)    Tobacco dependence    Smoker    Chronic obstructive pulmonary disease ([address] [facility])    Methadone dependence ([address] [facility])      Past Medical and Surgical History:     Past Medical History:   Diagnosis Date    Cough 2/4/2019    Diabetes mellitus ([address] [facility])     Methadone dependence ([address] [facility]) 02/16/2022    Migraines 02/16/2022     Past Surgical History:   Procedure Laterality Date    TONSILLECTOMY        Family History:     Family History   Problem Relation Age of Onset    Thyroid disease Mother     Cancer Mother       Social History:     Social History     Socioeconomic History    Marital status: Registered Domestic Partner     [facility] [address] [place] [address]    Number of children: 3    Years of education: None    Highest education level: None   Occupational History    Occupation: unemployed/disabled   Tobacco Use    Smoking status: Every Day     Packs/day: 0.50     Types: Cigarettes    Smokeless tobacco: Never    Tobacco comments:     half a pack every other day   Vaping Use    Vaping Use: Never used   Substance and Sexual Activity    Alcohol use: Not Currently     Comment: rarely    Drug use: Yes     Types: Marijuana, Heroin, Fentanyl     Comment: currently smoking marijuana and occasional heroin, one bag a week    Sexual activity: Never   Other Topics Concern    None   Social History Narrative    None     Social Determinants of Health     Financial Resource Strain: Low Risk  (12/7/2023)    Overall Financial Resource Strain (CARDIA)     Difficulty of Paying Living Expenses: Not hard at all   Food Insecurity: No Food Insecurity (12/7/2023)    Hunger Vital Sign     Worried About Running Out of Food in the Last Year: Never true     Ran Out of Food in the Last Year: Never true   Transportation Needs: No Transportation Needs (12/7/2023)    PRAPARE - Transportation     Lack of Transportation (Medical): No     Lack of Transportation (Non-Medical):  No   Physical Activity: Sufficiently Active (4/30/2021)    Exercise Vital Sign     Days of Exercise per Week: 7 days     Minutes of Exercise per Session: 60 min   Stress: No Stress Concern Present (4/30/2021)    [address] [facility] Questionnaire     Feeling of Stress : Not at all   Social Connections: Moderately Isolated (4/30/2021)    Social Connection and Isolation Panel [NHANES]     Frequency of Communication with Friends and Family: More than three times a week     Frequency of Social Gatherings with Friends and Family: More than three times a week     Attends Voodoo Services: Never     Active Member of Clubs or Organizations: No     Attends Club or Organization Meetings: Never     Marital Status: Living with partner   Intimate Partner Violence: Not At Risk (4/30/2021)    Humiliation, Afraid, Rape, and Kick questionnaire     Fear of Current or Ex-Partner: No     Emotionally Abused: No     Physically Abused: No     Sexually Abused: No   Housing Stability: Not on file      Medications and Allergies:     Current Outpatient Medications   Medication Sig Dispense Refill    Advair Diskus 250-50 MCG/ACT inhaler Rinse mouth after use. 60 blister 5    albuterol (Ventolin HFA) 90 mcg/act inhaler Inhale 1 puff every 4 (four) hours as needed for wheezing 18 g 2    Blood Glucose Monitoring Suppl KIT Use 1 each as needed (Monitor blood glucose) 1 kit 2    gabapentin (NEURONTIN) 100 mg capsule Take 1 capsule (100 mg total) by mouth 3 (three) times a day 90 capsule 3    glucose blood test strip Test sugar 4 times per day 200 each 2    Insulin Glargine Solostar (Lantus SoloStar) 100 UNIT/ML SOPN Inject 0.45 mL (45 Units total) under the skin daily at bedtime 15 mL 5    Insulin Pen Needle 31G X 4 MM MISC Use daily 100 each 0    ketoconazole (NIZORAL) 2 % cream Apply topically daily 15 g 0    metFORMIN (GLUCOPHAGE-XR) 500 mg 24 hr tablet Take 1 tablet (500 mg total) by mouth daily with dinner 90 tablet 0    Microlet Lancets MISC USE AS DIRECTED 100 each 1    methadone (DOLOPHINE) 10 mg tablet Take 80 mg by mouth daily,       No current facility-administered medications for this visit.      Allergies   Allergen Reactions    Imitrex [Sumatriptan] Hallucinations     Annotation - [id number]: metal taste and spacey    Immune Globulin      Other reaction(s): Other (See Comments)  Tripping, spacing      Immunizations:     Immunization History   Administered Date(s) Administered    COVID-19 PFIZER VACCINE 0.3 ML IM 04/15/2021, 05/08/2021    Influenza, recombinant, quadrivalent,injectable, preservative free 04/12/2019, 01/13/2020, 12/07/2021    Pneumococcal Polysaccharide PPV23 04/12/2019    Tdap 06/04/2016      Health Maintenance:         Topic Date Due    Colorectal Cancer Screening  Never done    HIV Screening  Completed    Hepatitis C Screening  Completed         Topic Date Due    Hepatitis A Vaccine (1 of 2 - Risk 2-dose series) Never done    Hepatitis B Vaccine (1 of 3 - Risk 3-dose series) Never done    Pneumococcal Vaccine: 65+ Years (2 - PCV) 04/12/2020    COVID-19 Vaccine (3 - Pfizer series) 07/03/2021    Influenza Vaccine (1) 09/01/2023      Medicare Screening Tests and Risk Assessments:     Margaret Anne is here for his Subsequent Wellness visit. Health Risk Assessment:   Patient rates overall health as fair. Patient feels that their physical health rating is slightly worse. Patient is very satisfied with their life. Eyesight was rated as slightly worse. Hearing was rated as same. Patient feels that their emotional and mental health rating is same. Patients states they are sometimes angry. Patient states they are always unusually tired/fatigued. Pain experienced in the last 7 days has been some. Patient's pain rating has been 4/10. Patient states that he has experienced no weight loss or gain in last 6 months. Depression Screening:   PHQ-2 Score: 0      Fall Risk Screening: In the past year, patient has experienced: no history of falling in past year      Home Safety:  Patient does not have trouble with stairs inside or outside of their home. Patient has working smoke alarms and has working carbon monoxide detector. Home safety hazards include: none. Nutrition:   Current diet is Regular. Medications:   Patient is currently taking over-the-counter supplements. OTC medications include: see medication list. Patient is not able to manage medications. Activities of Daily Living (ADLs)/Instrumental Activities of Daily Living (IADLs):   Walk and transfer into and out of bed and chair?: Yes  Dress and groom yourself?: Yes    Bathe or shower yourself?: Yes    Feed yourself? Yes  Do your laundry/housekeeping?: Yes  Manage your money, pay your bills and track your expenses?: Yes  Make your own meals?: Yes    Do your own shopping?: Yes    PREVENTIVE SCREENINGS        Diabetes Screening:     General: Screening Not Indicated and History Diabetes      Abdominal Aortic Aneurysm (AAA) Screening:    Risk factors include: age between 70-77 yo and tobacco use        Lung Cancer Screening:     General: Screening Not Indicated      Hepatitis C Screening:    General: Screening Current    Screening, Brief Intervention, and Referral to Treatment (SBIRT)    Screening  Typical number of drinks in a day: 0  Typical number of drinks in a week: 0  Interpretation: Low risk drinking behavior. Single Item Drug Screening:  How often have you used an illegal drug (including marijuana) or a prescription medication for non-medical reasons in the past year? never    Single Item Drug Screen Score: 0  Interpretation: Negative screen for possible drug use disorder    No results found. Physical Exam:     /65 (BP Location: Right arm, Patient Position: Sitting, Cuff Size: Standard)   Pulse 89   Temp 98.6 °F (37 °C) (Temporal)   Ht 5' 8" (1.727 m)   Wt 64.6 kg (142 lb 8 oz)   SpO2 98%   BMI 21.67 kg/m²     Physical Exam  Constitutional:       General: He is not in acute distress. Appearance: He is not ill-appearing. HENT:      Head: Normocephalic and atraumatic. Nose: Nose normal. No congestion or rhinorrhea.       Mouth/Throat:      Mouth: Mucous membranes are moist.      Pharynx: No oropharyngeal exudate or posterior oropharyngeal erythema. Eyes:      General: No scleral icterus. Right eye: No discharge. Left eye: No discharge. Pupils: Pupils are equal, round, and reactive to light. Cardiovascular:      Rate and Rhythm: Normal rate and regular rhythm. Pulses: Normal pulses. Heart sounds: Normal heart sounds. No murmur heard. No friction rub. No gallop. Pulmonary:      Effort: Pulmonary effort is normal. No respiratory distress. Breath sounds: Normal breath sounds. No stridor. No rhonchi or rales. Chest:      Chest wall: No tenderness. Abdominal:      General: Abdomen is flat. There is no distension. Palpations: There is no mass. Tenderness: There is no abdominal tenderness. There is no right CVA tenderness, left CVA tenderness, guarding or rebound. Hernia: No hernia is present. Musculoskeletal:         General: No swelling, tenderness, deformity or signs of injury. Right lower leg: No edema. Left lower leg: No edema. Skin:     Coloration: Skin is not jaundiced or pale. Findings: Rash present. No bruising, erythema or lesion. Neurological:      General: No focal deficit present. Mental Status: He is alert. Cranial Nerves: No cranial nerve deficit. Sensory: No sensory deficit. Motor: No weakness. Coordination: Coordination normal.      Gait: Gait normal.      Deep Tendon Reflexes: Reflexes normal.   Psychiatric:         Mood and Affect: Mood normal.         Behavior: Behavior normal.         Thought Content:  Thought content normal.         Judgment: Judgment normal.          Mir Vega, DO

## 2023-12-08 LAB — SL AMB POCT HEMOGLOBIN AIC: 13.5 (ref ?–6.5)

## 2023-12-14 ENCOUNTER — APPOINTMENT (OUTPATIENT)
Dept: LAB | Age: 63
End: 2023-12-14
Payer: MEDICARE

## 2023-12-14 DIAGNOSIS — Z02.9 ENCOUNTERS FOR UNSPECIFIED ADMINISTRATIVE PURPOSE: ICD-10-CM

## 2023-12-14 DIAGNOSIS — Z79.891 ENCOUNTER FOR LONG-TERM METHADONE USE: ICD-10-CM

## 2023-12-14 DIAGNOSIS — F11.20 OPIOID TYPE DEPENDENCE, CONTINUOUS (HCC): ICD-10-CM

## 2023-12-14 LAB
ATRIAL RATE: 97 BPM
P AXIS: 83 DEGREES
PR INTERVAL: 188 MS
QRS AXIS: 57 DEGREES
QRSD INTERVAL: 94 MS
QT INTERVAL: 384 MS
QTC INTERVAL: 487 MS
T WAVE AXIS: 68 DEGREES
VENTRICULAR RATE: 97 BPM

## 2023-12-14 PROCEDURE — 93005 ELECTROCARDIOGRAM TRACING: CPT

## 2023-12-19 DIAGNOSIS — R63.4 WEIGHT LOSS: Primary | ICD-10-CM

## 2024-01-27 DIAGNOSIS — E11.8 TYPE 2 DIABETES MELLITUS WITH COMPLICATION (HCC): ICD-10-CM

## 2024-01-29 RX ORDER — LANCETS 33 GAUGE
EACH MISCELLANEOUS
Qty: 100 EACH | Refills: 5 | Status: SHIPPED | OUTPATIENT
Start: 2024-01-29 | End: 2024-02-05 | Stop reason: SDUPTHER

## 2024-02-05 DIAGNOSIS — E11.8 TYPE 2 DIABETES MELLITUS WITH COMPLICATION (HCC): ICD-10-CM

## 2024-02-05 RX ORDER — LANCETS 33 GAUGE
EACH MISCELLANEOUS
Qty: 100 EACH | Refills: 5 | Status: SHIPPED | OUTPATIENT
Start: 2024-02-05

## 2024-02-05 NOTE — TELEPHONE ENCOUNTER
Received a Voicemail from [facility] stating the script for One Touch Delica plus Lancets sent by Dr. Blanc . needs directions as it cannot be fill it without directions because he's on Medicare.    Resent scripts with new directions

## 2024-02-26 DIAGNOSIS — J44.9 CHRONIC OBSTRUCTIVE PULMONARY DISEASE, UNSPECIFIED COPD TYPE (HCC): ICD-10-CM

## 2024-02-26 DIAGNOSIS — E11.69 TYPE 2 DIABETES MELLITUS WITH OTHER SPECIFIED COMPLICATION, WITHOUT LONG-TERM CURRENT USE OF INSULIN (HCC): ICD-10-CM

## 2024-02-26 DIAGNOSIS — R06.02 SHORTNESS OF BREATH: ICD-10-CM

## 2024-02-26 RX ORDER — ALBUTEROL SULFATE 90 UG/1
AEROSOL, METERED RESPIRATORY (INHALATION)
Qty: 18 G | Refills: 3 | Status: SHIPPED | OUTPATIENT
Start: 2024-02-26

## 2024-02-26 RX ORDER — METFORMIN HYDROCHLORIDE 500 MG/1
500 TABLET, EXTENDED RELEASE ORAL
Qty: 90 TABLET | Refills: 3 | Status: SHIPPED | OUTPATIENT
Start: 2024-02-26

## 2024-03-27 ENCOUNTER — APPOINTMENT (EMERGENCY)
Dept: RADIOLOGY | Facility: HOSPITAL | Age: 64
DRG: 637 | End: 2024-03-27
Payer: COMMERCIAL

## 2024-03-27 ENCOUNTER — HOSPITAL ENCOUNTER (INPATIENT)
Facility: HOSPITAL | Age: 64
LOS: 4 days | Discharge: HOME | DRG: 637 | End: 2024-03-31
Attending: EMERGENCY MEDICINE | Admitting: STUDENT IN AN ORGANIZED HEALTH CARE EDUCATION/TRAINING PROGRAM
Payer: COMMERCIAL

## 2024-03-27 DIAGNOSIS — E11.10 DKA (DIABETIC KETOACIDOSIS) (HCC): ICD-10-CM

## 2024-03-27 DIAGNOSIS — E11.69 TYPE 2 DIABETES MELLITUS WITH OTHER SPECIFIED COMPLICATION, WITHOUT LONG-TERM CURRENT USE OF INSULIN (HCC): Primary | ICD-10-CM

## 2024-03-27 DIAGNOSIS — F11.20 METHADONE DEPENDENCE (HCC): ICD-10-CM

## 2024-03-27 DIAGNOSIS — I48.92 ATRIAL FLUTTER (HCC): ICD-10-CM

## 2024-03-27 LAB
2HR DELTA HS TROPONIN: 4 NG/L
4HR DELTA HS TROPONIN: 5 NG/L
ALBUMIN SERPL BCP-MCNC: 3.8 G/DL (ref 3.5–5)
ALP SERPL-CCNC: 115 U/L (ref 34–104)
ALT SERPL W P-5'-P-CCNC: 18 U/L (ref 7–52)
AMPHETAMINES SERPL QL SCN: NEGATIVE
ANION GAP SERPL CALCULATED.3IONS-SCNC: 14 MMOL/L (ref 4–13)
ANION GAP SERPL CALCULATED.3IONS-SCNC: 16 MMOL/L (ref 4–13)
ANION GAP SERPL CALCULATED.3IONS-SCNC: 24 MMOL/L (ref 4–13)
AST SERPL W P-5'-P-CCNC: 20 U/L (ref 13–39)
ATRIAL RATE: 278 BPM
B-OH-BUTYR SERPL-MCNC: 8.96 MMOL/L (ref 0.02–0.27)
BACTERIA UR QL AUTO: NORMAL /HPF
BARBITURATES UR QL: NEGATIVE
BASE EX.OXY STD BLDV CALC-SCNC: 70.2 % (ref 60–80)
BASE EXCESS BLDV CALC-SCNC: -9.2 MMOL/L
BENZODIAZ UR QL: NEGATIVE
BILIRUB SERPL-MCNC: 0.75 MG/DL (ref 0.2–1)
BILIRUB UR QL STRIP: NEGATIVE
BNP SERPL-MCNC: 230 PG/ML (ref 0–100)
BUN SERPL-MCNC: 30 MG/DL (ref 5–25)
BUN SERPL-MCNC: 33 MG/DL (ref 5–25)
BUN SERPL-MCNC: 42 MG/DL (ref 5–25)
CALCIUM SERPL-MCNC: 8.2 MG/DL (ref 8.4–10.2)
CALCIUM SERPL-MCNC: 8.2 MG/DL (ref 8.4–10.2)
CALCIUM SERPL-MCNC: 9.2 MG/DL (ref 8.4–10.2)
CARDIAC TROPONIN I PNL SERPL HS: 22 NG/L
CARDIAC TROPONIN I PNL SERPL HS: 26 NG/L
CARDIAC TROPONIN I PNL SERPL HS: 27 NG/L
CHLORIDE SERPL-SCNC: 101 MMOL/L (ref 96–108)
CHLORIDE SERPL-SCNC: 88 MMOL/L (ref 96–108)
CHLORIDE SERPL-SCNC: 97 MMOL/L (ref 96–108)
CK SERPL-CCNC: 46 U/L (ref 39–308)
CLARITY UR: CLEAR
CO2 SERPL-SCNC: 17 MMOL/L (ref 21–32)
CO2 SERPL-SCNC: 20 MMOL/L (ref 21–32)
CO2 SERPL-SCNC: 21 MMOL/L (ref 21–32)
COCAINE UR QL: NEGATIVE
COLOR UR: COLORLESS
CREAT SERPL-MCNC: 0.68 MG/DL (ref 0.6–1.3)
CREAT SERPL-MCNC: 0.86 MG/DL (ref 0.6–1.3)
CREAT SERPL-MCNC: 1.04 MG/DL (ref 0.6–1.3)
CRP SERPL QL: 14.1 MG/L
ERYTHROCYTE [DISTWIDTH] IN BLOOD BY AUTOMATED COUNT: 11.9 % (ref 11.6–15.1)
EST. AVERAGE GLUCOSE BLD GHB EST-MCNC: 418 MG/DL
GFR SERPL CREATININE-BSD FRML MDRD: 100 ML/MIN/1.73SQ M
GFR SERPL CREATININE-BSD FRML MDRD: 74 ML/MIN/1.73SQ M
GFR SERPL CREATININE-BSD FRML MDRD: 90 ML/MIN/1.73SQ M
GLUCOSE SERPL-MCNC: 117 MG/DL (ref 65–140)
GLUCOSE SERPL-MCNC: 142 MG/DL (ref 65–140)
GLUCOSE SERPL-MCNC: 146 MG/DL (ref 65–140)
GLUCOSE SERPL-MCNC: 160 MG/DL (ref 65–140)
GLUCOSE SERPL-MCNC: 170 MG/DL (ref 65–140)
GLUCOSE SERPL-MCNC: 198 MG/DL (ref 65–140)
GLUCOSE SERPL-MCNC: 205 MG/DL (ref 65–140)
GLUCOSE SERPL-MCNC: 266 MG/DL (ref 65–140)
GLUCOSE SERPL-MCNC: 318 MG/DL (ref 65–140)
GLUCOSE SERPL-MCNC: 402 MG/DL (ref 65–140)
GLUCOSE SERPL-MCNC: 480 MG/DL (ref 65–140)
GLUCOSE SERPL-MCNC: 538 MG/DL (ref 65–140)
GLUCOSE SERPL-MCNC: 548 MG/DL (ref 65–140)
GLUCOSE UR STRIP-MCNC: ABNORMAL MG/DL
HAV IGM SER QL: NORMAL
HBA1C MFR BLD: 16.2 %
HBV CORE IGM SER QL: NORMAL
HBV SURFACE AG SER QL: NORMAL
HCO3 BLDV-SCNC: 14.9 MMOL/L (ref 24–30)
HCT VFR BLD AUTO: 46.3 % (ref 36.5–49.3)
HCV AB SER QL: NORMAL
HGB BLD-MCNC: 16.5 G/DL (ref 12–17)
HGB UR QL STRIP.AUTO: ABNORMAL
HIV 1+2 AB+HIV1 P24 AG SERPL QL IA: NORMAL
HIV1 P24 AG SER QL: NORMAL
KETONES UR STRIP-MCNC: ABNORMAL MG/DL
LACTATE SERPL-SCNC: 1.7 MMOL/L (ref 0.5–2)
LEUKOCYTE ESTERASE UR QL STRIP: ABNORMAL
LIPASE SERPL-CCNC: <6 U/L (ref 11–82)
MAGNESIUM SERPL-MCNC: 2.2 MG/DL (ref 1.9–2.7)
MAGNESIUM SERPL-MCNC: 2.3 MG/DL (ref 1.9–2.7)
MAGNESIUM SERPL-MCNC: 2.3 MG/DL (ref 1.9–2.7)
MCH RBC QN AUTO: 31.8 PG (ref 26.8–34.3)
MCHC RBC AUTO-ENTMCNC: 35.6 G/DL (ref 31.4–37.4)
MCV RBC AUTO: 89 FL (ref 82–98)
METHADONE UR QL: POSITIVE
NITRITE UR QL STRIP: NEGATIVE
NON-SQ EPI CELLS URNS QL MICRO: NORMAL /HPF
O2 CT BLDV-SCNC: 17.6 ML/DL
OPIATES UR QL SCN: NEGATIVE
OXYCODONE+OXYMORPHONE UR QL SCN: NEGATIVE
P AXIS: -76 DEGREES
PCO2 BLDV: 28.8 MM HG (ref 42–50)
PCP UR QL: NEGATIVE
PH BLDV: 7.33 [PH] (ref 7.3–7.4)
PH UR STRIP.AUTO: 5 [PH]
PHOSPHATE SERPL-MCNC: 1.9 MG/DL (ref 2.3–4.1)
PHOSPHATE SERPL-MCNC: 2.1 MG/DL (ref 2.3–4.1)
PHOSPHATE SERPL-MCNC: 3.2 MG/DL (ref 2.3–4.1)
PLATELET # BLD AUTO: 269 THOUSANDS/UL (ref 149–390)
PMV BLD AUTO: 9.5 FL (ref 8.9–12.7)
PO2 BLDV: 38.5 MM HG (ref 35–45)
POTASSIUM SERPL-SCNC: 3.9 MMOL/L (ref 3.5–5.3)
POTASSIUM SERPL-SCNC: 4.2 MMOL/L (ref 3.5–5.3)
POTASSIUM SERPL-SCNC: 4.5 MMOL/L (ref 3.5–5.3)
PROCALCITONIN SERPL-MCNC: 0.09 NG/ML
PROT SERPL-MCNC: 7.5 G/DL (ref 6.4–8.4)
PROT UR STRIP-MCNC: ABNORMAL MG/DL
QRS AXIS: 64 DEGREES
QRSD INTERVAL: 72 MS
QT INTERVAL: 294 MS
QTC INTERVAL: 447 MS
RBC # BLD AUTO: 5.19 MILLION/UL (ref 3.88–5.62)
RBC #/AREA URNS AUTO: NORMAL /HPF
SODIUM SERPL-SCNC: 129 MMOL/L (ref 135–147)
SODIUM SERPL-SCNC: 134 MMOL/L (ref 135–147)
SODIUM SERPL-SCNC: 135 MMOL/L (ref 135–147)
SP GR UR STRIP.AUTO: 1.02 (ref 1–1.03)
T WAVE AXIS: -28 DEGREES
THC UR QL: NEGATIVE
UROBILINOGEN UR STRIP-ACNC: <2 MG/DL
VENTRICULAR RATE: 139 BPM
WBC # BLD AUTO: 15.32 THOUSAND/UL (ref 4.31–10.16)
WBC #/AREA URNS AUTO: NORMAL /HPF

## 2024-03-27 PROCEDURE — 93005 ELECTROCARDIOGRAM TRACING: CPT

## 2024-03-27 PROCEDURE — 71046 X-RAY EXAM CHEST 2 VIEWS: CPT

## 2024-03-27 PROCEDURE — 84100 ASSAY OF PHOSPHORUS: CPT

## 2024-03-27 PROCEDURE — 82948 REAGENT STRIP/BLOOD GLUCOSE: CPT

## 2024-03-27 PROCEDURE — 36415 COLL VENOUS BLD VENIPUNCTURE: CPT

## 2024-03-27 PROCEDURE — 83735 ASSAY OF MAGNESIUM: CPT

## 2024-03-27 PROCEDURE — 83036 HEMOGLOBIN GLYCOSYLATED A1C: CPT

## 2024-03-27 PROCEDURE — 83880 ASSAY OF NATRIURETIC PEPTIDE: CPT

## 2024-03-27 PROCEDURE — 83690 ASSAY OF LIPASE: CPT

## 2024-03-27 PROCEDURE — 83605 ASSAY OF LACTIC ACID: CPT

## 2024-03-27 PROCEDURE — 96375 TX/PRO/DX INJ NEW DRUG ADDON: CPT

## 2024-03-27 PROCEDURE — 96368 THER/DIAG CONCURRENT INF: CPT

## 2024-03-27 PROCEDURE — 81001 URINALYSIS AUTO W/SCOPE: CPT

## 2024-03-27 PROCEDURE — 96366 THER/PROPH/DIAG IV INF ADDON: CPT

## 2024-03-27 PROCEDURE — 93010 ELECTROCARDIOGRAM REPORT: CPT | Performed by: INTERNAL MEDICINE

## 2024-03-27 PROCEDURE — 82550 ASSAY OF CK (CPK): CPT

## 2024-03-27 PROCEDURE — 84484 ASSAY OF TROPONIN QUANT: CPT

## 2024-03-27 PROCEDURE — 80053 COMPREHEN METABOLIC PANEL: CPT

## 2024-03-27 PROCEDURE — 80048 BASIC METABOLIC PNL TOTAL CA: CPT

## 2024-03-27 PROCEDURE — 87040 BLOOD CULTURE FOR BACTERIA: CPT

## 2024-03-27 PROCEDURE — 80307 DRUG TEST PRSMV CHEM ANLYZR: CPT

## 2024-03-27 PROCEDURE — 84145 PROCALCITONIN (PCT): CPT

## 2024-03-27 PROCEDURE — 80074 ACUTE HEPATITIS PANEL: CPT

## 2024-03-27 PROCEDURE — 86140 C-REACTIVE PROTEIN: CPT

## 2024-03-27 PROCEDURE — 87806 HIV AG W/HIV1&2 ANTB W/OPTIC: CPT

## 2024-03-27 PROCEDURE — 99223 1ST HOSP IP/OBS HIGH 75: CPT | Performed by: STUDENT IN AN ORGANIZED HEALTH CARE EDUCATION/TRAINING PROGRAM

## 2024-03-27 PROCEDURE — 99285 EMERGENCY DEPT VISIT HI MDM: CPT

## 2024-03-27 PROCEDURE — 96376 TX/PRO/DX INJ SAME DRUG ADON: CPT

## 2024-03-27 PROCEDURE — 96365 THER/PROPH/DIAG IV INF INIT: CPT

## 2024-03-27 PROCEDURE — 85027 COMPLETE CBC AUTOMATED: CPT

## 2024-03-27 PROCEDURE — 82805 BLOOD GASES W/O2 SATURATION: CPT

## 2024-03-27 PROCEDURE — 82010 KETONE BODYS QUAN: CPT

## 2024-03-27 RX ORDER — POTASSIUM CHLORIDE 20 MEQ/1
40 TABLET, EXTENDED RELEASE ORAL ONCE
Status: DISCONTINUED | OUTPATIENT
Start: 2024-03-27 | End: 2024-03-27

## 2024-03-27 RX ORDER — DILTIAZEM HYDROCHLORIDE 5 MG/ML
10 INJECTION INTRAVENOUS ONCE
Status: COMPLETED | OUTPATIENT
Start: 2024-03-27 | End: 2024-03-27

## 2024-03-27 RX ORDER — AMOXICILLIN 250 MG
1 CAPSULE ORAL DAILY PRN
Status: DISCONTINUED | OUTPATIENT
Start: 2024-03-27 | End: 2024-03-31 | Stop reason: HOSPADM

## 2024-03-27 RX ORDER — SODIUM CHLORIDE, SODIUM GLUCONATE, SODIUM ACETATE, POTASSIUM CHLORIDE, MAGNESIUM CHLORIDE, SODIUM PHOSPHATE, DIBASIC, AND POTASSIUM PHOSPHATE .53; .5; .37; .037; .03; .012; .00082 G/100ML; G/100ML; G/100ML; G/100ML; G/100ML; G/100ML; G/100ML
1000 INJECTION, SOLUTION INTRAVENOUS ONCE
Status: COMPLETED | OUTPATIENT
Start: 2024-03-27 | End: 2024-03-27

## 2024-03-27 RX ORDER — MAGNESIUM SULFATE HEPTAHYDRATE 40 MG/ML
2 INJECTION, SOLUTION INTRAVENOUS ONCE
Status: COMPLETED | OUTPATIENT
Start: 2024-03-27 | End: 2024-03-27

## 2024-03-27 RX ORDER — GABAPENTIN 100 MG/1
100 CAPSULE ORAL 3 TIMES DAILY
Status: DISCONTINUED | OUTPATIENT
Start: 2024-03-27 | End: 2024-03-31 | Stop reason: HOSPADM

## 2024-03-27 RX ORDER — POLYETHYLENE GLYCOL 3350 17 G/17G
17 POWDER, FOR SOLUTION ORAL DAILY PRN
Status: DISCONTINUED | OUTPATIENT
Start: 2024-03-27 | End: 2024-03-31 | Stop reason: HOSPADM

## 2024-03-27 RX ORDER — SODIUM CHLORIDE, SODIUM LACTATE, POTASSIUM CHLORIDE, CALCIUM CHLORIDE 600; 310; 30; 20 MG/100ML; MG/100ML; MG/100ML; MG/100ML
250 INJECTION, SOLUTION INTRAVENOUS CONTINUOUS
Status: DISCONTINUED | OUTPATIENT
Start: 2024-03-27 | End: 2024-03-27

## 2024-03-27 RX ORDER — METOPROLOL TARTRATE 1 MG/ML
5 INJECTION, SOLUTION INTRAVENOUS ONCE
Status: COMPLETED | OUTPATIENT
Start: 2024-03-27 | End: 2024-03-27

## 2024-03-27 RX ORDER — DEXTROSE AND SODIUM CHLORIDE 5; .45 G/100ML; G/100ML
150 INJECTION, SOLUTION INTRAVENOUS CONTINUOUS
Status: DISCONTINUED | OUTPATIENT
Start: 2024-03-27 | End: 2024-03-27

## 2024-03-27 RX ORDER — POTASSIUM CHLORIDE 14.9 MG/ML
20 INJECTION INTRAVENOUS
Qty: 200 ML | Refills: 0 | Status: DISPENSED | OUTPATIENT
Start: 2024-03-27 | End: 2024-03-27

## 2024-03-27 RX ORDER — HEPARIN SODIUM 5000 [USP'U]/ML
5000 INJECTION, SOLUTION INTRAVENOUS; SUBCUTANEOUS EVERY 8 HOURS SCHEDULED
Status: DISCONTINUED | OUTPATIENT
Start: 2024-03-27 | End: 2024-03-29

## 2024-03-27 RX ORDER — ACETAMINOPHEN 325 MG/1
650 TABLET ORAL EVERY 6 HOURS PRN
Status: DISCONTINUED | OUTPATIENT
Start: 2024-03-27 | End: 2024-03-31 | Stop reason: HOSPADM

## 2024-03-27 RX ORDER — ALBUTEROL SULFATE 90 UG/1
1 AEROSOL, METERED RESPIRATORY (INHALATION) EVERY 4 HOURS PRN
Status: DISCONTINUED | OUTPATIENT
Start: 2024-03-27 | End: 2024-03-28

## 2024-03-27 RX ORDER — DEXTROSE MONOHYDRATE, SODIUM CHLORIDE, AND POTASSIUM CHLORIDE 50; 1.49; 4.5 G/1000ML; G/1000ML; G/1000ML
150 INJECTION, SOLUTION INTRAVENOUS CONTINUOUS
Status: DISCONTINUED | OUTPATIENT
Start: 2024-03-27 | End: 2024-03-28

## 2024-03-27 RX ORDER — SODIUM CHLORIDE, SODIUM LACTATE, POTASSIUM CHLORIDE, CALCIUM CHLORIDE 600; 310; 30; 20 MG/100ML; MG/100ML; MG/100ML; MG/100ML
500 INJECTION, SOLUTION INTRAVENOUS CONTINUOUS
Status: DISCONTINUED | OUTPATIENT
Start: 2024-03-27 | End: 2024-03-27

## 2024-03-27 RX ORDER — CHLORHEXIDINE GLUCONATE ORAL RINSE 1.2 MG/ML
15 SOLUTION DENTAL EVERY 12 HOURS SCHEDULED
Status: DISCONTINUED | OUTPATIENT
Start: 2024-03-27 | End: 2024-03-31 | Stop reason: HOSPADM

## 2024-03-27 RX ORDER — SODIUM CHLORIDE 9 MG/ML
3 INJECTION INTRAVENOUS
Status: DISCONTINUED | OUTPATIENT
Start: 2024-03-27 | End: 2024-03-28

## 2024-03-27 RX ORDER — POTASSIUM CHLORIDE 20MEQ/15ML
40 LIQUID (ML) ORAL ONCE
Status: DISCONTINUED | OUTPATIENT
Start: 2024-03-27 | End: 2024-03-27

## 2024-03-27 RX ADMIN — POTASSIUM PHOSPHATE, MONOBASIC POTASSIUM PHOSPHATE, DIBASIC 30 MMOL: 224; 236 INJECTION, SOLUTION, CONCENTRATE INTRAVENOUS at 18:06

## 2024-03-27 RX ADMIN — METOROPROLOL TARTRATE 5 MG: 5 INJECTION, SOLUTION INTRAVENOUS at 14:31

## 2024-03-27 RX ADMIN — SODIUM CHLORIDE, SODIUM GLUCONATE, SODIUM ACETATE, POTASSIUM CHLORIDE, MAGNESIUM CHLORIDE, SODIUM PHOSPHATE, DIBASIC, AND POTASSIUM PHOSPHATE 1000 ML: .53; .5; .37; .037; .03; .012; .00082 INJECTION, SOLUTION INTRAVENOUS at 11:51

## 2024-03-27 RX ADMIN — HEPARIN SODIUM 5000 UNITS: 5000 INJECTION INTRAVENOUS; SUBCUTANEOUS at 18:08

## 2024-03-27 RX ADMIN — SODIUM CHLORIDE, SODIUM GLUCONATE, SODIUM ACETATE, POTASSIUM CHLORIDE, MAGNESIUM CHLORIDE, SODIUM PHOSPHATE, DIBASIC, AND POTASSIUM PHOSPHATE 1000 ML: .53; .5; .37; .037; .03; .012; .00082 INJECTION, SOLUTION INTRAVENOUS at 11:50

## 2024-03-27 RX ADMIN — POTASSIUM CHLORIDE 20 MEQ: 14.9 INJECTION, SOLUTION INTRAVENOUS at 14:07

## 2024-03-27 RX ADMIN — SODIUM CHLORIDE, SODIUM GLUCONATE, SODIUM ACETATE, POTASSIUM CHLORIDE, MAGNESIUM CHLORIDE, SODIUM PHOSPHATE, DIBASIC, AND POTASSIUM PHOSPHATE 1000 ML: .53; .5; .37; .037; .03; .012; .00082 INJECTION, SOLUTION INTRAVENOUS at 13:40

## 2024-03-27 RX ADMIN — GABAPENTIN 100 MG: 100 CAPSULE ORAL at 20:02

## 2024-03-27 RX ADMIN — SODIUM CHLORIDE, SODIUM GLUCONATE, SODIUM ACETATE, POTASSIUM CHLORIDE, MAGNESIUM CHLORIDE, SODIUM PHOSPHATE, DIBASIC, AND POTASSIUM PHOSPHATE 1000 ML: .53; .5; .37; .037; .03; .012; .00082 INJECTION, SOLUTION INTRAVENOUS at 21:05

## 2024-03-27 RX ADMIN — DEXTROSE, SODIUM CHLORIDE, AND POTASSIUM CHLORIDE 150 ML/HR: 5; .45; .15 INJECTION INTRAVENOUS at 19:34

## 2024-03-27 RX ADMIN — TRIMETHOBENZAMIDE HYDROCHLORIDE 200 MG: 100 INJECTION INTRAMUSCULAR at 18:06

## 2024-03-27 RX ADMIN — MAGNESIUM SULFATE HEPTAHYDRATE 2 G: 40 INJECTION, SOLUTION INTRAVENOUS at 21:14

## 2024-03-27 RX ADMIN — POTASSIUM CHLORIDE: 2 INJECTION, SOLUTION, CONCENTRATE INTRAVENOUS at 17:14

## 2024-03-27 RX ADMIN — CHLORHEXIDINE GLUCONATE 15 ML: 1.2 SOLUTION ORAL at 20:01

## 2024-03-27 RX ADMIN — METOROPROLOL TARTRATE 5 MG: 5 INJECTION, SOLUTION INTRAVENOUS at 13:36

## 2024-03-27 RX ADMIN — SODIUM CHLORIDE 6.5 UNITS/HR: 9 INJECTION, SOLUTION INTRAVENOUS at 14:08

## 2024-03-27 RX ADMIN — DILTIAZEM HYDROCHLORIDE 10 MG: 5 INJECTION, SOLUTION INTRAVENOUS at 19:49

## 2024-03-27 NOTE — PROGRESS NOTES
Critical Care Attending Note; Gil Tran   Note Date: 24  Note Time: 4:18 PM    Patient: Simone Reyes  Age, : 63 y.o., 1960 MRN: [id number] Code Status: Level 1 - Full Code Patient Location: ED 18/ED 18   Hospital LOS:0 days  ]   Patient seen and examined, medical record reviewed, discussed with house staff and nursing staff.     HPI   CC: DKA   63M with a PMH IVDU, COPD(active smoker) and DM who presents with 3 days of malaise found to have DKA.    Main ICU Plans:     #DKA   - Insulin gtt  - Volume resuscitation   - Potassium repletion  - BMP q4hr    #Aflutter   - Monitor - if does not respond to fluid resuscitation recommend rate control agents    #COPD   - Continue home regimen     #SUSAN   - Prerenal - renally dose medications     #IVDU  - Continue Methadone    #DVT/GI ppx  SQH    #Lines/Tubes/Drains:   Invasive Devices       Peripheral Intravenous Line  Duration             Peripheral IV 24 Left Antecubital <1 day    Peripheral IV 24 Right Antecubital <1 day                    #Nutrition:   Diet Rohan/CHO Controlled; Consistent Carbohydrate Diet Level 2 (5 carb servings/75 grams CHO/meal)        #Code Status:   Level 1 - Full Code    #Dispo:   SD1        Gil Tran MD  Pulmonary, Critical Care    Critical care time, excluding procedures, teaching, family meetings, and excludes any prior time recorded by the AP/resident, 35 minutes. Upon my evaluation, this patient has a high probability of imminent or life-threatening deterioration due to above problems which required my direct attention, intervention, and personal management.   Impression/Active Problems:    DKA   Gastroenteritis   COPD   SUSAN   IVDU      Physical Exam:     Vital Signs:   Weight:    IBW: Ideal body weight: 68.4 kg (150 lb 12.7 oz)  Temp:  [97.4 °F (36.3 °C)] 97.4 °F (36.3 °C)  HR:  [136-140] 140  Resp:  [20-22] 20  BP: (146-154)/(80-95) 146/85  General: NAD  Neuro: AxO3  Heart:RRR   Lungs: CTAB  Abdomen: Soft  mild tendernes  Extremities: No edema                Ventilator Settings:         Results from last 7 days   Lab Units 03/27/24  1135   PO2 SEAN mm Hg 38.5     Radiologic Images Reviewed:    CXR  - Bilateral clear  Input / Output:     Intake/Output Summary (Last 24 hours) at 3/27/2024 1618  Last data filed at 3/27/2024 1340  Gross per 24 hour   Intake 2000 ml   Output --   Net 2000 ml            Infusions:  insulin regular (HumuLIN R,NovoLIN R) 1 Units/mL in sodium chloride 0.9 % 100 mL infusion, 0.1-30 Units/hr, Last Rate: 13 Units/hr (03/27/24 1608)  potassium chloride 20 mEq in lactated ringers 1,000 mL infusion,       Scheduled Medications:  Current Facility-Administered Medications   Medication Dose Route Frequency Provider Last Rate    acetaminophen  650 mg Oral Q6H PRN Love Brito MD      albuterol  1 puff Inhalation Q4H PRN Love Brito MD      cefTRIAXone  1,000 mg Intravenous Q24H Love Brito MD      chlorhexidine  15 mL Mouth/Throat Q12H [facility] Love Brito MD      doxycycline  100 mg Intravenous Q12H Love Brito MD      gabapentin  100 mg Oral TID Love Brito MD      heparin (porcine)  5,000 Units Subcutaneous Q8H [facility] Love Brito MD      insulin regular (HumuLIN R,NovoLIN R) 1 Units/mL in sodium chloride 0.9 % 100 mL infusion  0.1-30 Units/hr Intravenous Continuous Nicholas Thompson, DO 13 Units/hr (03/27/24 1608)    polyethylene glycol  17 g Oral Daily PRN Love Brito MD      potassium chloride 20 mEq in lactated ringers 1,000 mL infusion   Intravenous Continuous Love Brito MD      potassium chloride  20 mEq Intravenous Q2H Nicholas Thompson DO 20 mEq (03/27/24 1407)    potassium chloride  40 mEq Oral Once Love Brito MD      senna-docusate sodium  1 tablet Oral Daily PRN Love Brito MD      sodium chloride (PF)  3 mL Intravenous Q1H PRN Nicholas Thompson DO         PRN Medications:    acetaminophen    albuterol    polyethylene glycol    senna-docusate sodium    Insert peripheral IV  "**AND** sodium chloride (PF)    Labs Reviewed:  Results from last 7 days   Lab Units 03/27/24  1143   WBC Thousand/uL 15.32*   HEMOGLOBIN g/dL 16.5   HEMATOCRIT % 46.3   PLATELETS Thousands/uL 269      Results from last 7 days   Lab Units 03/27/24  1135   SODIUM mmol/L 129*   CO2 mmol/L 17*   BUN mg/dL 42*   CALCIUM mg/dL 9.2   MAGNESIUM mg/dL 2.3   PHOSPHORUS mg/dL 3.2         Invalid input(s): \"ASTSGOT\", \"ALTSGPT\"LABRCNTIP@ ,alkphos:3,tbilirubin:3,dbilirubin:3)@            Invalid input(s): \"TROPT\", \"PBNP\"             I have personally seen and examined the patient on (03/27/24 between 2932-3417). I discussed the patient with the AP/resident including, but not limited to, verifying findings; reviewing labs and x-rays; discussing with consultants; developing the plan of care with the bedside nurse; and discussing treatment plan with patient or surrogate.  I have reviewed the note and assessment performed by the AP/resident and agree with the AP/resident’s documented findings and plan of care with the above additions/exceptions. Please see my comments for details and adjustments.                 "

## 2024-03-27 NOTE — PLAN OF CARE
Problem: PAIN - ADULT  Goal: Verbalizes/displays adequate comfort level or baseline comfort level  Description: Interventions:  - Encourage patient to monitor pain and request assistance  - Assess pain using appropriate pain scale  - Administer analgesics based on type and severity of pain and evaluate response  - Implement non-pharmacological measures as appropriate and evaluate response  - Consider cultural and social influences on pain and pain management  - Notify physician/advanced practitioner if interventions unsuccessful or patient reports new pain  Outcome: Progressing     Problem: INFECTION - ADULT  Goal: Absence or prevention of progression during hospitalization  Description: INTERVENTIONS:  - Assess and monitor for signs and symptoms of infection  - Monitor lab/diagnostic results  - Monitor all insertion sites, i.e. indwelling lines, tubes, and drains  - Monitor endotracheal if appropriate and nasal secretions for changes in amount and color  - [place] appropriate cooling/warming therapies per order  - Administer medications as ordered  - Instruct and encourage patient and family to use good hand hygiene technique  - Identify and instruct in appropriate isolation precautions for identified infection/condition  Outcome: Progressing     Problem: INFECTION - ADULT  Goal: Absence of fever/infection during neutropenic period  Description: INTERVENTIONS:  - Monitor WBC    Outcome: Progressing     Problem: SAFETY ADULT  Goal: Patient will remain free of falls  Description: INTERVENTIONS:  - Educate patient/family on patient safety including physical limitations  - Instruct patient to call for assistance with activity   - Consult OT/PT to assist with strengthening/mobility   - Keep Call bell within reach  - Keep bed low and locked with side rails adjusted as appropriate  - Keep care items and personal belongings within reach  - Initiate and maintain comfort rounds  - Make Fall Risk Sign visible to staff  -  Offer Toileting every 2 Hours, in advance of need  - Initiate/Maintain bed alarm  - Obtain necessary fall risk management equipment:    Problem: SAFETY ADULT  Goal: Patient will remain free of falls  Description: INTERVENTIONS:  - Educate patient/family on patient safety including physical limitations  - Instruct patient to call for assistance with activity   - Consult OT/PT to assist with strengthening/mobility   - Keep Call bell within reach  - Keep bed low and locked with side rails adjusted as appropriate  - Keep care items and personal belongings within reach  - Initiate and maintain comfort rounds  - Make Fall Risk Sign visible to staff  - Offer Toileting every 2 Hours, in advance of need  - Initiate/Maintain bed alarm  - Obtain necessary fall risk management equipment:    Problem: DISCHARGE PLANNING  Goal: Discharge to home or other facility with appropriate resources  Description: INTERVENTIONS:  - Identify barriers to discharge w/patient and caregiver  - Arrange for needed discharge resources and transportation as appropriate  - Identify discharge learning needs (meds, wound care, etc.)  - Arrange for interpretive services to assist at discharge as needed  - Refer to Case Management Department for coordinating discharge planning if the patient needs post-hospital services based on physician/advanced practitioner order or complex needs related to functional status, cognitive ability, or social support system  Outcome: Progressing     Problem: Knowledge Deficit  Goal: Patient/family/caregiver demonstrates understanding of disease process, treatment plan, medications, and discharge instructions  Description: Complete learning assessment and assess knowledge base.  Interventions:  - Provide teaching at level of understanding  - Provide teaching via preferred learning methods  Outcome: Progressing     Problem: NEUROSENSORY - ADULT  Goal: Achieves stable or improved neurological status  Description:  INTERVENTIONS  - Monitor and report changes in neurological status  - Monitor vital signs such as temperature, blood pressure, glucose, and any other labs ordered   - Initiate measures to prevent increased intracranial pressure  - Monitor for seizure activity and implement precautions if appropriate      Outcome: Progressing     Problem: NEUROSENSORY - ADULT  Goal: Remains free of injury related to seizures activity  Description: INTERVENTIONS  - Maintain airway, patient safety  and administer oxygen as ordered  - Monitor patient for seizure activity, document and report duration and description of seizure to physician/advanced practitioner  - If seizure occurs,  ensure patient safety during seizure  - Reorient patient post seizure  - Seizure pads on all 4 side rails  - Instruct patient/family to notify RN of any seizure activity including if an aura is experienced  - Instruct patient/family to call for assistance with activity based on nursing assessment  - Administer anti-seizure medications if ordered    Outcome: Progressing     Problem: NEUROSENSORY - ADULT  Goal: Achieves maximal functionality and self care  Description: INTERVENTIONS  - Monitor swallowing and airway patency with patient fatigue and changes in neurological status  - Encourage and assist patient to increase activity and self care.   - Encourage visually impaired, hearing impaired and aphasic patients to use assistive/communication devices  Outcome: Progressing     Problem: CARDIOVASCULAR - ADULT  Goal: Maintains optimal cardiac output and hemodynamic stability  Description: INTERVENTIONS:  - Monitor I/O, vital signs and rhythm  - Monitor for S/S and trends of decreased cardiac output  - Administer and titrate ordered vasoactive medications to optimize hemodynamic stability  - Assess quality of pulses, skin color and temperature  - Assess for signs of decreased coronary artery perfusion  - Instruct patient to report change in severity of  symptoms  Outcome: Progressing     Problem: CARDIOVASCULAR - ADULT  Goal: Absence of cardiac dysrhythmias or at baseline rhythm  Description: INTERVENTIONS:  - Continuous cardiac monitoring, vital signs, obtain 12 lead EKG if ordered  - Administer antiarrhythmic and heart rate control medications as ordered  - Monitor electrolytes and administer replacement therapy as ordered  Outcome: Progressing     Problem: RESPIRATORY - ADULT  Goal: Achieves optimal ventilation and oxygenation  Description: INTERVENTIONS:  - Assess for changes in respiratory status  - Assess for changes in mentation and behavior  - Position to facilitate oxygenation and minimize respiratory effort  - Oxygen administered by appropriate delivery if ordered  - Initiate smoking cessation education as indicated  - Encourage broncho-pulmonary hygiene including cough, deep breathe, Incentive Spirometry  - Assess the need for suctioning and aspirate as needed  - Assess and instruct to report SOB or any respiratory difficulty  - Respiratory Therapy support as indicated  Outcome: Progressing     Problem: GASTROINTESTINAL - ADULT  Goal: Maintains or returns to baseline bowel function  Description: INTERVENTIONS:  - Assess bowel function  - Encourage oral fluids to ensure adequate hydration  - Administer IV fluids if ordered to ensure adequate hydration  - Administer ordered medications as needed  - Encourage mobilization and activity  - Consider nutritional services referral to assist patient with adequate nutrition and appropriate food choices  Outcome: Progressing     Problem: GASTROINTESTINAL - ADULT  Goal: Maintains adequate nutritional intake  Description: INTERVENTIONS:  - Monitor percentage of each meal consumed  - Identify factors contributing to decreased intake, treat as appropriate  - Assist with meals as needed  - Monitor I&O, weight, and lab values if indicated  - Obtain nutrition services referral as needed  Outcome: Progressing     Problem:  GASTROINTESTINAL - ADULT  Goal: Oral mucous membranes remain intact  Description: INTERVENTIONS  - Assess oral mucosa and hygiene practices  - Implement preventative oral hygiene regimen  - Implement oral medicated treatments as ordered  - Initiate Nutrition services referral as needed  Outcome: Progressing     Problem: GENITOURINARY - ADULT  Goal: Maintains or returns to baseline urinary function  Description: INTERVENTIONS:  - Assess urinary function  - Encourage oral fluids to ensure adequate hydration if ordered  - Administer IV fluids as ordered to ensure adequate hydration  - Administer ordered medications as needed  - Offer frequent toileting  - Follow urinary retention protocol if ordered  Outcome: Progressing     Problem: GENITOURINARY - ADULT  Goal: Absence of urinary retention  Description: INTERVENTIONS:  - Assess patient’s ability to void and empty bladder  - Monitor I/O  - Bladder scan as needed  - Discuss with physician/AP medications to alleviate retention as needed  - Discuss catheterization for long term situations as appropriate  Outcome: Progressing     Problem: METABOLIC, FLUID AND ELECTROLYTES - ADULT  Goal: Electrolytes maintained within normal limits  Description: INTERVENTIONS:  - Monitor labs and assess patient for signs and symptoms of electrolyte imbalances  - Administer electrolyte replacement as ordered  - Monitor response to electrolyte replacements, including repeat lab results as appropriate  - Instruct patient on fluid and nutrition as appropriate  Outcome: Progressing     Problem: METABOLIC, FLUID AND ELECTROLYTES - ADULT  Goal: Fluid balance maintained  Description: INTERVENTIONS:  - Monitor labs   - Monitor I/O and WT  - Instruct patient on fluid and nutrition as appropriate  - Assess for signs & symptoms of volume excess or deficit  Outcome: Progressing     Problem: METABOLIC, FLUID AND ELECTROLYTES - ADULT  Goal: Glucose maintained within target range  Description:  INTERVENTIONS:  - Monitor Blood Glucose as ordered  - Assess for signs and symptoms of hyperglycemia and hypoglycemia  - Administer ordered medications to maintain glucose within target range  - Assess nutritional intake and initiate nutrition service referral as needed  Outcome: Progressing     Problem: SKIN/TISSUE INTEGRITY - ADULT  Goal: Skin Integrity remains intact(Skin Breakdown Prevention)  Description: Assess:  -Perform Josias assessment every shift   -Clean and moisturize skin every shift   -Inspect skin when repositioning, toileting, and assisting with ADLS  -Assess under medical devices  -Assess extremities for adequate circulation and sensation     Bed Management:  -Have minimal linens on bed & keep smooth, unwrinkled  -Change linens as needed when moist or perspiring  -Avoid sitting or lying in one position for more than 2 hours while in bed  -Keep HOB at 30 degrees     Toileting:  -Offer bedside commode  -Assess for incontinence every q 2   -Use incontinent care products after each incontinent episode      Activity:  -Mobilize patient 2  times a day  -Encourage activity and walks on unit  -Encourage or provide ROM exercises   -Turn and reposition patient every 2  Hours  -Use appropriate equipment to lift or move patient in bed  -Instruct/ Assist with weight shifting every 2  when out of bed in chair  -Consider limitation of chair time 2  hour intervals    Skin Care:  -Avoid use of baby powder, tape, friction and shearing, hot water or constrictive clothing  -Relieve pressure over bony prominences using pillows  -Do not massage red bony areas    Next Steps:  -Teach patient strategies to minimize risks  -Consider consults to  interdisciplinary teams such   Outcome: Progressing     Problem: HEMATOLOGIC - ADULT  Goal: Maintains hematologic stability  Description: INTERVENTIONS  - Assess for signs and symptoms of bleeding or hemorrhage  - Monitor labs  - Administer supportive blood products/factors as  ordered and appropriate  Outcome: Progressing     - Apply yellow socks and bracelet for high fall risk patients  - Consider moving patient to room near nurses station  Outcome: Progressing     - Apply yellow socks and bracelet for high fall risk patients  - Consider moving patient to room near nurses station  Outcome: Progressing

## 2024-03-27 NOTE — ED PROVIDER NOTES
"History  Chief Complaint   Patient presents with    Weakness - Generalized     Pt states, \"I haven't taken insulin in 3 days. I haven't really eaten in 3 days either or checked my blood sugar.\" Pt appears pale and weak in triage, polydipsia. Pt reports being methadone dependent and hasn't taken it in 3 days d/t being too weak to get out of bed.     63-year-old male with past medical history of insulin-dependent diabetes, COPD, chronic methadone use, presents complaining of weakness the last 3 days.  He denies any inciting event however states he has been lying in bed for the last 3 days because he has had no energy.  He has not been taking his methadone and has not been taking his insulin for diabetes.  He has occasional tenderness in his belly however no nausea or vomiting.  Complaining of increased urinary output and increased thirst.         Prior to Admission Medications   Prescriptions Last Dose Informant Patient Reported? Taking?   Advair Diskus 250-50 MCG/ACT inhaler   No No   Sig: Rinse mouth after use.   Blood Glucose Monitoring Suppl KIT   No No   Sig: Use 1 each as needed (Monitor blood glucose)   Insulin Glargine Solostar (Lantus SoloStar) 100 UNIT/ML SOPN   No No   Sig: Inject 0.45 mL (45 Units total) under the skin daily at bedtime   Insulin Pen Needle 31G X 4 MM MISC   No No   Sig: Use daily   Lancets (OneTouch Delica Plus [id number]) MISC   No No   Sig: Use to check blood sugar 4 times per day   albuterol (PROVENTIL HFA,VENTOLIN HFA) 90 mcg/act inhaler   No No   Sig: INHALE 1 PUFF BY MOUTH EVERY 4 HOURS AS NEEDED FOR WHEEZING   gabapentin (NEURONTIN) 100 mg capsule   No No   Sig: Take 1 capsule (100 mg total) by mouth 3 (three) times a day   glucose blood test strip   No No   Sig: Test sugar 4 times per day   ketoconazole (NIZORAL) 2 % cream   No No   Sig: Apply topically daily   metFORMIN (GLUCOPHAGE-XR) 500 mg 24 hr tablet   No No   Sig: TAKE 1 TABLET BY MOUTH EVERY DAY WITH DINNER   methadone " (DOLOPHINE) 10 mg tablet   Yes No   Sig: Take 80 mg by mouth daily,      Facility-Administered Medications: None       Past Medical History:   Diagnosis Date    Cough 2/4/2019    Diabetes mellitus (HCC)     Methadone dependence (HCC) 02/16/2022    Migraines 02/16/2022       Past Surgical History:   Procedure Laterality Date    TONSILLECTOMY         Family History   Problem Relation Age of Onset    Thyroid disease Mother     Cancer Mother      I have reviewed and agree with the history as documented.    E-Cigarette/Vaping    E-Cigarette Use Never User      E-Cigarette/Vaping Substances    Nicotine No     THC No     CBD No     Flavoring No     Other No     Unknown No      Social History     Tobacco Use    Smoking status: Every Day     Current packs/day: 0.50     Types: Cigarettes    Smokeless tobacco: Never    Tobacco comments:     half a pack every other day   Vaping Use    Vaping status: Never Used   Substance Use Topics    Alcohol use: Not Currently     Comment: rarely    Drug use: Yes     Types: Marijuana, Heroin, Fentanyl     Comment: currently smoking marijuana and occasional heroin, one bag a week        Review of Systems   Gastrointestinal:  Positive for abdominal pain and nausea. Negative for vomiting.   Endocrine: Positive for polydipsia and polyuria.   All other systems reviewed and are negative.      Physical Exam  ED Triage Vitals [03/27/24 1110]   Temperature Pulse Respirations Blood Pressure SpO2   (!) 97.4 °F (36.3 °C) (!) 139 22 147/95 98 %      Temp Source Heart Rate Source Patient Position - Orthostatic VS BP Location FiO2 (%)   Oral Monitor Lying Left arm --      Pain Score       --             Orthostatic Vital Signs  Vitals:    03/27/24 1110 03/27/24 1126 03/27/24 1242 03/27/24 1429   BP: 147/95 150/80 154/80 146/85   Pulse: (!) 139 (!) 138 (!) 136 (!) 140   Patient Position - Orthostatic VS: Lying          Physical Exam  Vitals and nursing note reviewed.   Constitutional:       General: He is in  acute distress.      Appearance: He is well-developed.   HENT:      Head: Normocephalic and atraumatic.      Nose: Nose normal.      Mouth/Throat:      Mouth: Mucous membranes are moist.      Pharynx: No oropharyngeal exudate or posterior oropharyngeal erythema.   Eyes:      Conjunctiva/sclera: Conjunctivae normal.   Cardiovascular:      Rate and Rhythm: Regular rhythm. Tachycardia present.      Heart sounds: No murmur heard.  Pulmonary:      Effort: Pulmonary effort is normal. Tachypnea present.      Breath sounds: Normal breath sounds.      Comments: Kussmaul respiration   Abdominal:      Palpations: Abdomen is soft.      Tenderness: There is generalized abdominal tenderness. There is no right CVA tenderness or left CVA tenderness.   Musculoskeletal:         General: No swelling.      Cervical back: Neck supple.      Right lower leg: No edema.      Left lower leg: No edema.   Skin:     General: Skin is warm and dry.      Capillary Refill: Capillary refill takes less than 2 seconds.   Neurological:      Mental Status: He is alert.   Psychiatric:         Mood and Affect: Mood normal.         ED Medications  Medications   sodium chloride (PF) 0.9 % injection 3 mL (has no administration in time range)   insulin regular (HumuLIN R,NovoLIN R) 1 Units/mL in sodium chloride 0.9 % 100 mL infusion (6.5 Units/hr Intravenous New Bag 3/27/24 1408)   potassium chloride 20 mEq IVPB (premix) (20 mEq Intravenous New Bag 3/27/24 1407)   chlorhexidine (PERIDEX) 0.12 % oral rinse 15 mL (has no administration in time range)   heparin (porcine) subcutaneous injection 5,000 Units (has no administration in time range)   lactated ringers infusion (has no administration in time range)     Followed by   lactated ringers infusion (has no administration in time range)   potassium chloride oral solution 40 mEq (has no administration in time range)   gabapentin (NEURONTIN) capsule 100 mg (has no administration in time range)   acetaminophen  (TYLENOL) tablet 650 mg (has no administration in time range)   multi-electrolyte (ISOLYTE-S PH 7.4) bolus 1,000 mL (0 mL Intravenous Stopped 3/27/24 1340)   multi-electrolyte (ISOLYTE-S PH 7.4) bolus 1,000 mL (0 mL Intravenous Stopped 3/27/24 1340)   multi-electrolyte (ISOLYTE-S PH 7.4) bolus 1,000 mL (1,000 mL Intravenous New Bag 3/27/24 1340)   metoprolol (LOPRESSOR) injection 5 mg (5 mg Intravenous Given 3/27/24 1336)   metoprolol (LOPRESSOR) injection 5 mg (5 mg Intravenous Given 3/27/24 1431)       Diagnostic Studies  Results Reviewed       Procedure Component Value Units Date/Time    Phosphorus [[id number]]     Lab Status: No result Specimen: Blood     Rapid drug screen, urine [[id number]]     Lab Status: No result Specimen: Urine     Blood culture [[id number]]     Lab Status: No result Specimen: Blood from Arm, Left     Blood culture [[id number]]     Lab Status: No result Specimen: Blood from Arm, Right     Basic metabolic panel [[id number]]     Lab Status: No result Specimen: Blood     Magnesium [[id number]]     Lab Status: No result Specimen: Blood     HS Troponin I 2hr [[id number]]  (Normal) Collected: 03/27/24 1432    Lab Status: Final result Specimen: Blood from Arm, Right Updated: 03/27/24 1500     hs TnI 2hr 26 ng/L      Delta 2hr hsTnI 4 ng/L     Beta Hydroxybutyrate [[id number]]  (Abnormal) Collected: 03/27/24 1135    Lab Status: Final result Specimen: Blood from Arm, Left Updated: 03/27/24 1424     Beta- Hydroxybutyrate 8.96 mmol/L     Urine Microscopic [[id number]]  (Normal) Collected: 03/27/24 1231    Lab Status: Final result Specimen: Urine, Clean Catch Updated: 03/27/24 1416     RBC, UA 1-2 /hpf      WBC, UA None Seen /hpf      Epithelial Cells None Seen /hpf      Bacteria, UA None Seen /hpf     UA w Reflex to Microscopic w Reflex to Culture [[id number]]  (Abnormal) Collected: 03/27/24 1231    Lab Status: Final result Specimen: Urine, Clean Catch Updated: 03/27/24 1413     Color, UA Colorless      Clarity, UA Clear     Specific Gravity, UA 1.022     pH, UA 5.0     Leukocytes, UA Elevated glucose may cause decreased leukocyte values. See urine microscopic for UWBC result     Nitrite, UA Negative     Protein, UA Trace mg/dl      Glucose, UA >=1000 (1%) mg/dl      Ketones,  (3+) mg/dl      Urobilinogen, UA <2.0 mg/dl      Bilirubin, UA Negative     Occult Blood, UA Trace    Fingerstick Glucose (POCT) [[id number]]  (Abnormal) Collected: 03/27/24 1345    Lab Status: Final result Specimen: Blood Updated: 03/27/24 1347     POC Glucose 480 mg/dl     Hemoglobin A1c w/EAG Estimation [[id number]]  (Abnormal) Collected: 03/27/24 1135    Lab Status: Final result Specimen: Blood from Arm, Left Updated: 03/27/24 1313     Hemoglobin A1C 16.2 %       mg/dl     Comprehensive metabolic panel [[id number]]  (Abnormal) Collected: 03/27/24 1135    Lab Status: Final result Specimen: Blood from Arm, Left Updated: 03/27/24 1218     Sodium 129 mmol/L      Potassium 4.5 mmol/L      Chloride 88 mmol/L      CO2 17 mmol/L      ANION GAP 24 mmol/L      BUN 42 mg/dL      Creatinine 1.04 mg/dL      Glucose 538 mg/dL      Calcium 9.2 mg/dL      AST 20 U/L      ALT 18 U/L      Alkaline Phosphatase 115 U/L      Total Protein 7.5 g/dL      Albumin 3.8 g/dL      Total Bilirubin 0.75 mg/dL      eGFR 74 ml/min/1.73sq m     Narrative:      [employer] guidelines for Chronic Kidney Disease (CKD):     Stage 1 with normal or high GFR (GFR > 90 mL/min/1.73 square meters)    Stage 2 Mild CKD (GFR = 60-89 mL/min/1.73 square meters)    Stage 3A Moderate CKD (GFR = 45-59 mL/min/1.73 square meters)    Stage 3B Moderate CKD (GFR = 30-44 mL/min/1.73 square meters)    Stage 4 Severe CKD (GFR = 15-29 mL/min/1.73 square meters)    Stage 5 End Stage CKD (GFR <15 mL/min/1.73 square meters)  Note: GFR calculation is accurate only with a steady state creatinine    Lactic acid, plasma (w/reflex if result > 2.0) [[id number]]  (Normal)  Collected: 03/27/24 1143    Lab Status: Final result Specimen: Blood from Arm, Left Updated: 03/27/24 1213     LACTIC ACID 1.7 mmol/L     Narrative:      Result may be elevated if tourniquet was used during collection.    HS Troponin 0hr (reflex protocol) [[id number]]  (Normal) Collected: 03/27/24 1135    Lab Status: Final result Specimen: Blood from Arm, Left Updated: 03/27/24 1210     hs TnI 0hr 22 ng/L     HS Troponin I 4hr [[id number]]     Lab Status: No result Specimen: Blood     Lipase [[id number]]  (Abnormal) Collected: 03/27/24 1135    Lab Status: Final result Specimen: Blood from Arm, Left Updated: 03/27/24 1206     Lipase <6 u/L     Magnesium [[id number]]  (Normal) Collected: 03/27/24 1135    Lab Status: Final result Specimen: Blood from Arm, Left Updated: 03/27/24 1206     Magnesium 2.3 mg/dL     Phosphorus [[id number]]  (Normal) Collected: 03/27/24 1135    Lab Status: Final result Specimen: Blood from Arm, Left Updated: 03/27/24 1206     Phosphorus 3.2 mg/dL     CBC [[id number]]  (Abnormal) Collected: 03/27/24 1143    Lab Status: Final result Specimen: Blood from Arm, Left Updated: 03/27/24 1151     WBC 15.32 Thousand/uL      RBC 5.19 Million/uL      Hemoglobin 16.5 g/dL      Hematocrit 46.3 %      MCV 89 fL      MCH 31.8 pg      MCHC 35.6 g/dL      RDW 11.9 %      Platelets 269 Thousands/uL      MPV 9.5 fL     Blood gas, venous [[id number]]  (Abnormal) Collected: 03/27/24 1135    Lab Status: Final result Specimen: Blood from Arm, Left Updated: 03/27/24 1145     pH, Haresh 7.331     pCO2, Haresh 28.8 mm Hg      pO2, Haresh 38.5 mm Hg      HCO3, Haresh 14.9 mmol/L      Base Excess, Haresh -9.2 mmol/L      O2 Content, Haresh 17.6 ml/dL      O2 HGB, VENOUS 70.2 %     Fingerstick Glucose (POCT) [[id number]]  (Abnormal) Collected: 03/27/24 1126    Lab Status: Final result Specimen: Blood Updated: 03/27/24 1129     POC Glucose 548 mg/dl                    XR chest 2 views   ED Interpretation by Nicholas Thompson DO (03/27 6047)   No  acute cardiopulmonary abnormality      Final Result by Sung Marie MD (03/27 1516)      Small pleural effusions at the costophrenic angles.            Workstation performed: [id number]               Procedures  ECG 12 Lead Documentation Only    Date/Time: 3/27/2024 1:53 PM    Performed by: Nicholas Thompson DO  Authorized by: Nicholas Thompson DO    Indications / Diagnosis:  Tachycardia  ECG reviewed by me, the ED Provider: no    Patient location:  ED  Previous ECG:     Previous ECG:  Compared to current    Similarity:  Changes noted    Comparison to cardiac monitor: Yes    Interpretation:     Interpretation: abnormal    Rate:     ECG rate:  36    ECG rate assessment: tachycardic    Rhythm:     Rhythm: atrial flutter    Ectopy:     Ectopy: none    QRS:     QRS axis:  Normal    QRS intervals:  Normal  Conduction:     Conduction: normal    ST segments:     ST segments:  Normal  T waves:     T waves: normal    Comments:      A flutter with a 2-1 conduction        ED Course  ED Course as of 03/27/24 1520   Wed Mar 27, 2024   1138 Concern for aflutter   1218 LACTIC ACID: 1.7   1218 hs TnI 0hr: 22   1218 Phosphorus: 3.2   1218 MAGNESIUM: 2.3   1218 LIPASE(!): <6   1218 WBC(!): 15.32   1218 pH, Haresh: 7.331   1218 pCO2, Haresh(!): 28.8   1218 HCO3, Haresh(!): 14.9   1248 Patient updated and evaluated at this time.  Patient still complaining of occasional palpitations.  Patient has received approximately 2 L of IV fluid at this point.   1248 Sodium(!): 129   1248 Chloride(!): 88   1248 Carbon Dioxide(!): 17   1248 ANION GAP(!): 24   1248 BUN(!): 42   1248 Creatinine: 1.04   1248 ALK PHOS(!): 115   1248 GLUCOSE(!!): 538   1312 Lopressor ordered    1319 Hemoglobin A1C(!): 16.2   1340 Insulin and K+ ordered   1341 Working at third liter of fluid   1341 Will treat as DKA   1351 Sodium(!): 129  Corrected sodium of 136   1355 Blood gas, venous(!)  Compensated metabolic acidosis with bicarb of 14.9 and pCO2 of 28.  Base  deficit 9.2   1356 Likely require ICU admission   1411 Insulin started   1416 Leukocytes, UA(!): Elevated glucose may cause decreased leukocyte values. See urine microscopic for [facility] result   1416 Ketones, UA(!): 100 (3+)   1416 Glucose, UA(!): >=1000 (1%)   1416 Call to pacs     1419 States he takes 130 mg of methadone per day.  This has not been verified at this time.   1433 Beta- Hydroxybutyrate(!): 8.96   1433 His presentation is very concerning for diabetic ketoacidosis.  Is possible that the tachycardia and atrial flutter is compensatory due to his state of increased metabolic demand   1501 hs TnI 2hr: 26   1501 Delta 2hr hsTnI: 4             HEART Risk Score      Flowsheet Row Most Recent Value   Heart Score Risk Calculator    History 0 Filed at: 03/27/2024 1516   ECG 0 Filed at: 03/27/2024 1516   Age 2 Filed at: 03/27/2024 1516   Risk Factors 1 Filed at: 03/27/2024 1516   Troponin 1 Filed at: 03/27/2024 1516   HEART Score 4 Filed at: 03/27/2024 1516                        SBIRT 22yo+      Flowsheet Row Most Recent Value   Initial Alcohol Screen: US AUDIT-C     1. How often do you have a drink containing alcohol? 0 Filed at: 03/27/2024 1110   2. How many drinks containing alcohol do you have on a typical day you are drinking?  0 Filed at: 03/27/2024 1110   3a. Male UNDER 65: How often do you have five or more drinks on one occasion? 0 Filed at: 03/27/2024 1110   3b. FEMALE Any Age, or MALE 65+: How often do you have 4 or more drinks on one occassion? 0 Filed at: 03/27/2024 1110   Audit-C Score 0 Filed at: 03/27/2024 1110   CHARISSE: How many times in the past year have you...    Used an illegal drug or used a prescription medication for non-medical reasons? Never Filed at: 03/27/2024 1110                  Medical Decision Making  63-year-old male presents emergency department with increased respiration, generalized abdominal pain and tachycardia    DDx: DKA, doubt HHS, possible hyperglycemia, tachydysrhythmia  such as atrial flutter versus atrial fibrillation    Plan: DKA labs, IV fluids, consider rate control, insulin infusion    Workup performed in the emergency department is very concerning for diabetic ketoacidosis.  Patient has elevated anion gap of 24, bicarb of 17, corrected sodium of 136.  Glucose is elevated.  pH of 7.3 on venous blood gas which is corrected.  Hypocapnia which is to be expected in the setting of a metabolic acidosis.  Urine is concerning for elevated glucose and ketones.  Attempted to rate control with beta-blockers however unsuccessful.  Case discussed with medical critical care and accepted to their service, SD1.        Amount and/or Complexity of Data Reviewed  Labs: ordered. Decision-making details documented in ED Course.  Radiology: ordered and independent interpretation performed.    Risk  Prescription drug management.  Decision regarding hospitalization.          Disposition  Final diagnoses:   DKA (diabetic ketoacidosis) (HCC)   Atrial flutter (HCC)     Time reflects when diagnosis was documented in both MDM as applicable and the Disposition within this note       Time User Action Codes Description Comment    3/27/2024  1:56 PM Nicholas Thompson [E11.10] DKA (diabetic ketoacidosis) (HCC)     3/27/2024  1:56 PM Nicholas Thompson [I48.92] Atrial flutter (HCC)           ED Disposition       ED Disposition   Admit    Condition   Stable    Date/Time   Wed Mar 27, 2024 1513    Comment   Case was discussed with [facility] and the patient's admission status was agreed to be Admission Status: inpatient status to the service of Dr. Tran .               Follow-up Information    None         Patient's Medications   Discharge Prescriptions    No medications on file     No discharge procedures on file.    PDMP Review       None             ED Provider  Attending physically available and evaluated Simone Conway I managed the patient along with the ED Attending.    Electronically Signed by            Nicholas Thompson, DO  03/27/24 1520

## 2024-03-27 NOTE — H&P
[facility]  H&P: Critical Care  Name: Simone Reyes 63 y.o. male I MRN: [id number]  Unit/Bed#: ED 18 I Date of Admission: 3/27/2024   Date of Service: 3/27/2024 I Hospital Day: 0      Assessment/Plan   Neuro:   Diagnosis: Hx current heroine use via insufflation in addition to methadone use  Patient using 3 bags heroine/day. States he also goes to a methadone clinic and missed his methadone dose today  Chart review - 80mg Methadone daily  Plan:  Call methadone clinic to confirm 80mg dose prior to prescribing  Rapid HIV  Acute Hepatitis panel  Urine drug screen    Analgesia  Plan:   Continue home gabapentin 100mg TID  650 mg q6h Tylenol PRN    CV:   Diagnosis: New onset 2:1 A-flutter  Received Lopressor 5mg x2 in the ED without improvement; HR fixed at 135-140  Suspect provoked by dehydration and acidosis   Initial troponin 22; will delta. Patient not complaining of chest pain. If elevated, demand related elevation with no suspicion for ischemia at this time.   Plan:   Fluid resuscitation  Will consider Lopressor vs Cardizem vs Amio if needed; favor rate control   BNP     DVT prophylaxis: SC heparin 5,000 units BID     Pulm:  Diagnosis: CAP vs Bronchitis vs COPD exacerbation   Patchy opacity in LLL on CXR 3/27/24; patient reports increasing sputum production over the last week  Plan:  Will monitor for now  Continue home Proventil  Monitor fever curve and trend white count     Diagnosis: COPD  Takes albuterol at home; states he has not been taking inhalers lately  Not requiring supplemental O2  Plan:   Continue home Proventil as above  Maintain SpO2 88-92%.    GI:   No active issues  PRN Miralax and Senakot daily PRN    :   Diagnosis: Pre-renal SUSAN  Baseline Cr 0.58-0.9; 1.04 at admission  Plan:   Fluid resuscitation     F/E/N:    F: LR with 20mEq K/L at 150cc/h  E: Maintain K > 4.5, Phos > 3, Mg > 2.   Q4h BMP, Phos, Mg  N: NPO       Heme/Onc:   No active issues    Endo:    Diagnosis: Insulin-dependent DM; Unspecified vs T2DM per chart review   Patient on 45 units Lantus qhs and 500mg metformin at home   A1c on admission 16.2  Three days of medication non-compliance   K 4.5 in the ED  Received 3L Isolyte and 20mEq K in the ED  Plan:   Insulin infusion  Fluid resuscitation with 150 cc/hr LR with 20mEq potassium/L  40mEq K via oral solution  Maintain K > 4.5   Begin D5W if blood glucose <250   Q4h BMP, Mg, and phos   Q1h blood glucose checks       ID:   No active issues  Blood cultures ordered; will monitor for infectious etiology of DKA      MSK/Skin:   Diagnosis: UA positive for blood w/o RBC on microscopic  R/o rhabdomyolysis with CK    Disposition: Stepdown Level 1       History of Present Illness     HPI: Simone Reyes is a 63 y.o. PMH COPD, insulin-dependent diabetes, active heroine use with concurrent methadone use who presents with three days of generalized malaise and vomiting. Patient states that he has not taken his medication for his diabetes over the last three days. He endorses three days of vomiting and mild cough with increased sputum production. He denies any diaphoresis, tremulousness, agitation, abdominal pain, diarrhea, or chest pain. He states that he felt weak and unable to walk today. Patient states he felt so week he was unable to make it to his methadone clinic today, which ultimately prompted him to come to the ER. On arrival, patient was found to have a blood glucose of 538, anion gap 25, ketonuria, and no lactic acidosis.     History obtained from the patient.  Review of Systems   Constitutional:  Positive for appetite change and fatigue. Negative for activity change, chills, diaphoresis and fever.   Respiratory:  Positive for cough. Negative for shortness of breath.    Cardiovascular:  Negative for chest pain and leg swelling.   Gastrointestinal:  Positive for abdominal pain, nausea and vomiting. Negative for abdominal distention, constipation and diarrhea.    Genitourinary:  Positive for frequency.   Skin:  Negative for color change.   Neurological:  Positive for weakness. Negative for dizziness and light-headedness.      Historical Information   Past Medical History:  2/4/2019: Cough  No date: Diabetes mellitus ([facility])  02/16/2022: Methadone dependence ([facility])  02/16/2022: Migraines Past Surgical History:  No date: TONSILLECTOMY   Current Outpatient Medications   Medication Instructions    Advair Diskus 250-50 MCG/ACT inhaler Rinse mouth after use.    albuterol (PROVENTIL HFA,VENTOLIN HFA) 90 mcg/act inhaler INHALE 1 PUFF BY MOUTH EVERY 4 HOURS AS NEEDED FOR WHEEZING    Blood Glucose Monitoring Suppl KIT 1 each, Does not apply, As needed    gabapentin (NEURONTIN) 100 mg, Oral, 3 times daily    glucose blood test strip Test sugar 4 times per day    Insulin Glargine Solostar (LANTUS SOLOSTAR) 45 Units, Subcutaneous, Daily at bedtime    Insulin Pen Needle 31G X 4 MM MISC Does not apply, Daily    ketoconazole (NIZORAL) 2 % cream Topical, Daily    Lancets (OneTouch Delica Plus [id number]) MISC Use to check blood sugar 4 times per day    metFORMIN (GLUCOPHAGE-XR) 500 mg, Oral, Daily with dinner    methadone (DOLOPHINE) 80 mg, Oral, Daily    Allergies   Allergen Reactions    Imitrex [Sumatriptan] Hallucinations     Annotation - [id number]: metal taste and spacey    Immune Globulin      Other reaction(s): Other (See Comments)  Tripping, spacing      Social History     Tobacco Use    Smoking status: Every Day     Current packs/day: 0.50     Types: Cigarettes    Smokeless tobacco: Never    Tobacco comments:     half a pack every other day   Vaping Use    Vaping status: Never Used   Substance Use Topics    Alcohol use: Not Currently     Comment: rarely    Drug use: Yes     Types: Marijuana, Heroin, Fentanyl     Comment: currently smoking marijuana and occasional heroin, one bag a week    Family History   Problem Relation Age of Onset    Thyroid disease Mother     Cancer Mother            Objective                            Vitals I/O      Most Recent Min/Max in 24hrs   Temp (!) 97.4 °F (36.3 °C) Temp  Min: 97.4 °F (36.3 °C)  Max: 97.4 °F (36.3 °C)   Pulse (!) 140 Pulse  Min: 136  Max: 140   Resp 20 Resp  Min: 20  Max: 22   /85 BP  Min: 146/85  Max: 154/80   O2 Sat 99 % SpO2  Min: 98 %  Max: 99 %      Intake/Output Summary (Last 24 hours) at 3/27/2024 1516  Last data filed at 3/27/2024 1340  Gross per 24 hour   Intake 2000 ml   Output --   Net 2000 ml       Diet Rohan/CHO Controlled; Consistent Carbohydrate Diet Level 2 (5 carb servings/75 grams CHO/meal)    Invasive Monitoring           Physical Exam   Physical Exam  Eyes:      Extraocular Movements: Extraocular movements intact.      Pupils: Pupils are equal, round, and reactive to light.   Skin:     General: Skin is warm.   HENT:      Head: Normocephalic and atraumatic.      Nose: No congestion.      Mouth/Throat:      Mouth: Mucous membranes are moist.   Cardiovascular:      Rate and Rhythm: Regular rhythm. Tachycardia present.   Musculoskeletal:      Right lower leg: No edema.      Left lower leg: No edema.   Abdominal: General: There is no distension.      Palpations: Abdomen is soft.      Tenderness: There is no abdominal tenderness. There is no guarding.   Constitutional:       General: He is not in acute distress.     Appearance: He is not toxic-appearing.   Pulmonary:      Effort: No respiratory distress.      Breath sounds: No wheezing.      Comments: Diminished breath sounds throughout  Neurological:      General: No focal deficit present.      Mental Status: He is alert and oriented to person, place and time.      Motor: Strength full and intact in all extremities.            Diagnostic Studies      EK:1 a-flutter   Imaging:  I have personally reviewed pertinent reports.       Medications:  Scheduled PRN   chlorhexidine, 15 mL, Q12H MITCHELL  heparin (porcine), 5,000 Units, Q8H MITCHELL  potassium chloride, 20 mEq, Q2H  potassium  chloride, 40 mEq, Once      sodium chloride (PF), 3 mL, Q1H PRN       Continuous    insulin regular (HumuLIN R,NovoLIN R) 1 Units/mL in sodium chloride 0.9 % 100 mL infusion, 0.1-30 Units/hr, Last Rate: 6.5 Units/hr (03/27/24 1408)  lactated ringers, 500 mL/hr   Followed by  lactated ringers, 250 mL/hr         Labs:    CBC    Recent Labs     03/27/24  1143   WBC 15.32*   HGB 16.5   HCT 46.3        BMP    Recent Labs     03/27/24  1135   SODIUM 129*   K 4.5   CL 88*   CO2 17*   AGAP 24*   BUN 42*   CREATININE 1.04   CALCIUM 9.2       Coags    No recent results     Additional Electrolytes  Recent Labs     03/27/24  1135   MG 2.3   PHOS 3.2          Blood Gas    No recent results  Recent Labs     03/27/24  1135   PHVEN 7.331   VTC9AJA 28.8*   PO2VEN 38.5   KCK2AZK 14.9*   BEVEN -9.2   P8EXXMP 70.2    LFTs  Recent Labs     03/27/24  1135   ALT 18   AST 20   ALKPHOS 115*   ALB 3.8   TBILI 0.75       Infectious  No recent results  Glucose  Recent Labs     03/27/24  1135   GLUC 538*               Love Brito MD

## 2024-03-28 PROBLEM — I48.92 ATRIAL FLUTTER (HCC): Status: ACTIVE | Noted: 2024-03-28

## 2024-03-28 PROBLEM — J44.9 CHRONIC OBSTRUCTIVE PULMONARY DISEASE (HCC): Chronic | Status: ACTIVE | Noted: 2020-04-30

## 2024-03-28 PROBLEM — E11.10 DKA (DIABETIC KETOACIDOSIS) (HCC): Status: ACTIVE | Noted: 2024-03-28

## 2024-03-28 PROBLEM — J43.8 OTHER EMPHYSEMA (HCC): Status: ACTIVE | Noted: 2019-01-16

## 2024-03-28 PROBLEM — E43 SEVERE PROTEIN-CALORIE MALNUTRITION (HCC): Status: ACTIVE | Noted: 2024-03-28

## 2024-03-28 PROBLEM — F11.20 METHADONE DEPENDENCE (HCC): Chronic | Status: ACTIVE | Noted: 2020-04-30

## 2024-03-28 LAB
ANION GAP SERPL CALCULATED.3IONS-SCNC: 7 MMOL/L (ref 4–13)
ANION GAP SERPL CALCULATED.3IONS-SCNC: 8 MMOL/L (ref 4–13)
ANION GAP SERPL CALCULATED.3IONS-SCNC: 9 MMOL/L (ref 4–13)
ATRIAL RATE: 144 BPM
ATRIAL RATE: 181 BPM
ATRIAL RATE: 84 BPM
BASOPHILS # BLD AUTO: 0.03 THOUSANDS/ÂΜL (ref 0–0.1)
BASOPHILS NFR BLD AUTO: 0 % (ref 0–1)
BUN SERPL-MCNC: 15 MG/DL (ref 5–25)
BUN SERPL-MCNC: 19 MG/DL (ref 5–25)
BUN SERPL-MCNC: 22 MG/DL (ref 5–25)
CA-I BLD-SCNC: 1.06 MMOL/L (ref 1.12–1.32)
CALCIUM SERPL-MCNC: 7.4 MG/DL (ref 8.4–10.2)
CALCIUM SERPL-MCNC: 7.4 MG/DL (ref 8.4–10.2)
CALCIUM SERPL-MCNC: 8.2 MG/DL (ref 8.4–10.2)
CHLORIDE SERPL-SCNC: 102 MMOL/L (ref 96–108)
CHLORIDE SERPL-SCNC: 102 MMOL/L (ref 96–108)
CHLORIDE SERPL-SCNC: 103 MMOL/L (ref 96–108)
CO2 SERPL-SCNC: 21 MMOL/L (ref 21–32)
CO2 SERPL-SCNC: 23 MMOL/L (ref 21–32)
CO2 SERPL-SCNC: 26 MMOL/L (ref 21–32)
CREAT SERPL-MCNC: 0.51 MG/DL (ref 0.6–1.3)
CREAT SERPL-MCNC: 0.61 MG/DL (ref 0.6–1.3)
CREAT SERPL-MCNC: 0.66 MG/DL (ref 0.6–1.3)
EOSINOPHIL # BLD AUTO: 0.08 THOUSAND/ÂΜL (ref 0–0.61)
EOSINOPHIL NFR BLD AUTO: 1 % (ref 0–6)
ERYTHROCYTE [DISTWIDTH] IN BLOOD BY AUTOMATED COUNT: 11.9 % (ref 11.6–15.1)
GFR SERPL CREATININE-BSD FRML MDRD: 101 ML/MIN/1.73SQ M
GFR SERPL CREATININE-BSD FRML MDRD: 104 ML/MIN/1.73SQ M
GFR SERPL CREATININE-BSD FRML MDRD: 112 ML/MIN/1.73SQ M
GLUCOSE SERPL-MCNC: 105 MG/DL (ref 65–140)
GLUCOSE SERPL-MCNC: 126 MG/DL (ref 65–140)
GLUCOSE SERPL-MCNC: 130 MG/DL (ref 65–140)
GLUCOSE SERPL-MCNC: 133 MG/DL (ref 65–140)
GLUCOSE SERPL-MCNC: 142 MG/DL (ref 65–140)
GLUCOSE SERPL-MCNC: 153 MG/DL (ref 65–140)
GLUCOSE SERPL-MCNC: 193 MG/DL (ref 65–140)
GLUCOSE SERPL-MCNC: 208 MG/DL (ref 65–140)
GLUCOSE SERPL-MCNC: 220 MG/DL (ref 65–140)
GLUCOSE SERPL-MCNC: 220 MG/DL (ref 65–140)
GLUCOSE SERPL-MCNC: 249 MG/DL (ref 65–140)
GLUCOSE SERPL-MCNC: 268 MG/DL (ref 65–140)
GLUCOSE SERPL-MCNC: 274 MG/DL (ref 65–140)
GLUCOSE SERPL-MCNC: 86 MG/DL (ref 65–140)
GLUCOSE SERPL-MCNC: 86 MG/DL (ref 65–140)
GLUCOSE SERPL-MCNC: 99 MG/DL (ref 65–140)
HCT VFR BLD AUTO: 41.7 % (ref 36.5–49.3)
HGB BLD-MCNC: 14.4 G/DL (ref 12–17)
IMM GRANULOCYTES # BLD AUTO: 0.03 THOUSAND/UL (ref 0–0.2)
IMM GRANULOCYTES NFR BLD AUTO: 0 % (ref 0–2)
LYMPHOCYTES # BLD AUTO: 1.72 THOUSANDS/ÂΜL (ref 0.6–4.47)
LYMPHOCYTES NFR BLD AUTO: 15 % (ref 14–44)
MAGNESIUM SERPL-MCNC: 2.2 MG/DL (ref 1.9–2.7)
MAGNESIUM SERPL-MCNC: 2.3 MG/DL (ref 1.9–2.7)
MAGNESIUM SERPL-MCNC: 2.5 MG/DL (ref 1.9–2.7)
MCH RBC QN AUTO: 31.2 PG (ref 26.8–34.3)
MCHC RBC AUTO-ENTMCNC: 34.5 G/DL (ref 31.4–37.4)
MCV RBC AUTO: 90 FL (ref 82–98)
MONOCYTES # BLD AUTO: 0.56 THOUSAND/ÂΜL (ref 0.17–1.22)
MONOCYTES NFR BLD AUTO: 5 % (ref 4–12)
NEUTROPHILS # BLD AUTO: 8.82 THOUSANDS/ÂΜL (ref 1.85–7.62)
NEUTS SEG NFR BLD AUTO: 79 % (ref 43–75)
NRBC BLD AUTO-RTO: 0 /100 WBCS
P AXIS: -89 DEGREES
P AXIS: 68 DEGREES
P AXIS: 90 DEGREES
PHOSPHATE SERPL-MCNC: 3 MG/DL (ref 2.3–4.1)
PHOSPHATE SERPL-MCNC: 3.1 MG/DL (ref 2.3–4.1)
PHOSPHATE SERPL-MCNC: 3.3 MG/DL (ref 2.3–4.1)
PLATELET # BLD AUTO: 199 THOUSANDS/UL (ref 149–390)
PMV BLD AUTO: 9.2 FL (ref 8.9–12.7)
POTASSIUM SERPL-SCNC: 3.8 MMOL/L (ref 3.5–5.3)
POTASSIUM SERPL-SCNC: 4.4 MMOL/L (ref 3.5–5.3)
POTASSIUM SERPL-SCNC: 4.6 MMOL/L (ref 3.5–5.3)
PR INTERVAL: 154 MS
PR INTERVAL: 154 MS
PR INTERVAL: 163 MS
QRS AXIS: 76 DEGREES
QRS AXIS: 77 DEGREES
QRS AXIS: 82 DEGREES
QRSD INTERVAL: 121 MS
QRSD INTERVAL: 71 MS
QRSD INTERVAL: 75 MS
QT INTERVAL: 246 MS
QT INTERVAL: 267 MS
QT INTERVAL: 363 MS
QTC INTERVAL: 414 MS
QTC INTERVAL: 427 MS
QTC INTERVAL: 430 MS
RBC # BLD AUTO: 4.62 MILLION/UL (ref 3.88–5.62)
SODIUM SERPL-SCNC: 132 MMOL/L (ref 135–147)
SODIUM SERPL-SCNC: 134 MMOL/L (ref 135–147)
SODIUM SERPL-SCNC: 135 MMOL/L (ref 135–147)
T WAVE AXIS: -14 DEGREES
T WAVE AXIS: -81 DEGREES
T WAVE AXIS: 82 DEGREES
VENTRICULAR RATE: 144 BPM
VENTRICULAR RATE: 181 BPM
VENTRICULAR RATE: 84 BPM
WBC # BLD AUTO: 11.24 THOUSAND/UL (ref 4.31–10.16)

## 2024-03-28 PROCEDURE — 93005 ELECTROCARDIOGRAM TRACING: CPT

## 2024-03-28 PROCEDURE — 82948 REAGENT STRIP/BLOOD GLUCOSE: CPT

## 2024-03-28 PROCEDURE — 84100 ASSAY OF PHOSPHORUS: CPT

## 2024-03-28 PROCEDURE — NC001 PR NO CHARGE: Performed by: PODIATRIST

## 2024-03-28 PROCEDURE — 0HBRXZZ EXCISION OF TOE NAIL, EXTERNAL APPROACH: ICD-10-PCS | Performed by: INTERNAL MEDICINE

## 2024-03-28 PROCEDURE — 80048 BASIC METABOLIC PNL TOTAL CA: CPT

## 2024-03-28 PROCEDURE — 93010 ELECTROCARDIOGRAM REPORT: CPT | Performed by: INTERNAL MEDICINE

## 2024-03-28 PROCEDURE — 83735 ASSAY OF MAGNESIUM: CPT

## 2024-03-28 PROCEDURE — 99232 SBSQ HOSP IP/OBS MODERATE 35: CPT | Performed by: STUDENT IN AN ORGANIZED HEALTH CARE EDUCATION/TRAINING PROGRAM

## 2024-03-28 PROCEDURE — 82330 ASSAY OF CALCIUM: CPT | Performed by: STUDENT IN AN ORGANIZED HEALTH CARE EDUCATION/TRAINING PROGRAM

## 2024-03-28 PROCEDURE — 85025 COMPLETE CBC W/AUTO DIFF WBC: CPT

## 2024-03-28 RX ORDER — INSULIN LISPRO 100 [IU]/ML
5 INJECTION, SOLUTION INTRAVENOUS; SUBCUTANEOUS
Status: DISCONTINUED | OUTPATIENT
Start: 2024-03-28 | End: 2024-03-29

## 2024-03-28 RX ORDER — ALBUTEROL SULFATE 90 UG/1
2 AEROSOL, METERED RESPIRATORY (INHALATION) EVERY 4 HOURS PRN
Status: DISCONTINUED | OUTPATIENT
Start: 2024-03-28 | End: 2024-03-31 | Stop reason: HOSPADM

## 2024-03-28 RX ORDER — CALCIUM GLUCONATE 20 MG/ML
2 INJECTION, SOLUTION INTRAVENOUS ONCE
Status: COMPLETED | OUTPATIENT
Start: 2024-03-28 | End: 2024-03-28

## 2024-03-28 RX ORDER — INSULIN LISPRO 100 [IU]/ML
1-5 INJECTION, SOLUTION INTRAVENOUS; SUBCUTANEOUS
Status: DISCONTINUED | OUTPATIENT
Start: 2024-03-28 | End: 2024-03-29

## 2024-03-28 RX ORDER — INSULIN LISPRO 100 [IU]/ML
5 INJECTION, SOLUTION INTRAVENOUS; SUBCUTANEOUS
Status: DISCONTINUED | OUTPATIENT
Start: 2024-03-29 | End: 2024-03-29

## 2024-03-28 RX ORDER — POTASSIUM CHLORIDE 20 MEQ/1
40 TABLET, EXTENDED RELEASE ORAL ONCE
Status: COMPLETED | OUTPATIENT
Start: 2024-03-28 | End: 2024-03-28

## 2024-03-28 RX ORDER — ALBUTEROL SULFATE 90 UG/1
2 AEROSOL, METERED RESPIRATORY (INHALATION) EVERY 4 HOURS PRN
Status: DISCONTINUED | OUTPATIENT
Start: 2024-03-28 | End: 2024-03-28

## 2024-03-28 RX ORDER — ALBUTEROL SULFATE 90 UG/1
2 AEROSOL, METERED RESPIRATORY (INHALATION)
Status: DISCONTINUED | OUTPATIENT
Start: 2024-03-28 | End: 2024-03-31 | Stop reason: HOSPADM

## 2024-03-28 RX ORDER — INSULIN GLARGINE 100 [IU]/ML
14 INJECTION, SOLUTION SUBCUTANEOUS EVERY MORNING
Status: DISCONTINUED | OUTPATIENT
Start: 2024-03-28 | End: 2024-03-29

## 2024-03-28 RX ADMIN — ALBUTEROL SULFATE 2 PUFF: 90 AEROSOL, METERED RESPIRATORY (INHALATION) at 16:36

## 2024-03-28 RX ADMIN — CALCIUM GLUCONATE 2 G: 20 INJECTION, SOLUTION INTRAVENOUS at 05:15

## 2024-03-28 RX ADMIN — INSULIN LISPRO 1 UNITS: 100 INJECTION, SOLUTION INTRAVENOUS; SUBCUTANEOUS at 12:15

## 2024-03-28 RX ADMIN — INSULIN LISPRO 2 UNITS: 100 INJECTION, SOLUTION INTRAVENOUS; SUBCUTANEOUS at 21:18

## 2024-03-28 RX ADMIN — DEXTROSE, SODIUM CHLORIDE, AND POTASSIUM CHLORIDE 150 ML/HR: 5; .45; .15 INJECTION INTRAVENOUS at 09:39

## 2024-03-28 RX ADMIN — HEPARIN SODIUM 5000 UNITS: 5000 INJECTION INTRAVENOUS; SUBCUTANEOUS at 17:21

## 2024-03-28 RX ADMIN — INSULIN LISPRO 5 UNITS: 100 INJECTION, SOLUTION INTRAVENOUS; SUBCUTANEOUS at 19:51

## 2024-03-28 RX ADMIN — POTASSIUM PHOSPHATE, MONOBASIC POTASSIUM PHOSPHATE, DIBASIC 30 MMOL: 224; 236 INJECTION, SOLUTION, CONCENTRATE INTRAVENOUS at 00:15

## 2024-03-28 RX ADMIN — INSULIN GLARGINE 14 UNITS: 100 INJECTION, SOLUTION SUBCUTANEOUS at 12:13

## 2024-03-28 RX ADMIN — HEPARIN SODIUM 5000 UNITS: 5000 INJECTION INTRAVENOUS; SUBCUTANEOUS at 02:42

## 2024-03-28 RX ADMIN — HEPARIN SODIUM 5000 UNITS: 5000 INJECTION INTRAVENOUS; SUBCUTANEOUS at 11:24

## 2024-03-28 RX ADMIN — CALCIUM GLUCONATE 2 G: 20 INJECTION, SOLUTION INTRAVENOUS at 03:51

## 2024-03-28 RX ADMIN — CHLORHEXIDINE GLUCONATE 15 ML: 1.2 SOLUTION ORAL at 21:17

## 2024-03-28 RX ADMIN — GABAPENTIN 100 MG: 100 CAPSULE ORAL at 08:18

## 2024-03-28 RX ADMIN — DEXTROSE, SODIUM CHLORIDE, AND POTASSIUM CHLORIDE 150 ML/HR: 5; .45; .15 INJECTION INTRAVENOUS at 02:42

## 2024-03-28 RX ADMIN — GABAPENTIN 100 MG: 100 CAPSULE ORAL at 16:37

## 2024-03-28 RX ADMIN — METHADONE HYDROCHLORIDE 135 MG: 10 TABLET ORAL at 17:11

## 2024-03-28 RX ADMIN — INSULIN LISPRO 1 UNITS: 100 INJECTION, SOLUTION INTRAVENOUS; SUBCUTANEOUS at 19:51

## 2024-03-28 RX ADMIN — GABAPENTIN 100 MG: 100 CAPSULE ORAL at 21:17

## 2024-03-28 RX ADMIN — SODIUM CHLORIDE 3.25 UNITS/HR: 9 INJECTION, SOLUTION INTRAVENOUS at 03:36

## 2024-03-28 RX ADMIN — CHLORHEXIDINE GLUCONATE 15 ML: 1.2 SOLUTION ORAL at 08:18

## 2024-03-28 RX ADMIN — POTASSIUM CHLORIDE 40 MEQ: 1500 TABLET, EXTENDED RELEASE ORAL at 03:51

## 2024-03-28 RX ADMIN — INSULIN LISPRO 5 UNITS: 100 INJECTION, SOLUTION INTRAVENOUS; SUBCUTANEOUS at 12:16

## 2024-03-28 NOTE — UTILIZATION REVIEW
NOTIFICATION OF INPATIENT ADMISSION   AUTHORIZATION REQUEST   SERVICING FACILITY:   [facility]  Address: [address] [place] [address]  Tax ID: [id number]  NPI: [id number] ATTENDING PROVIDER:  Attending Name and NPI#: Marybeth Sanderson Md [[id number]]  Address: [address] [place] [address]  Phone: [phone]   ADMISSION INFORMATION:  Place of Service: Inpatient [facility]  Place of Service Code: 21  Inpatient Admission Date/Time: 3/27/24  3:08 PM  Discharge Date/Time: No discharge date for patient encounter.  Admitting Diagnosis Code/Description:  Atrial flutter (HCC) [I48.92]  DKA (diabetic ketoacidosis) (HCC) [E11.10]  Elevated blood sugar [R73.9]     UTILIZATION REVIEW CONTACT:  Sunil James Utilization   Network Utilization Review Department  Phone: [phone]  Fax: [phone]  Email: [email or web address]  Contact for approvals/pending authorizations, clinical reviews, and discharge.     PHYSICIAN ADVISORY SERVICES:  Medical Necessity Denial & Tqer-nf-Svns Review  Phone: [phone]  Fax: [phone]  Email: [email or web address]     DISCHARGE SUPPORT TEAM:  For Patients Discharge Needs & Updates  Phone: [phone] opt. 2 Fax: [phone]  Email: [email or web address]

## 2024-03-28 NOTE — PROGRESS NOTES
INTERNAL MEDICINE RESIDENCY TRANSFER ACCEPTANCE NOTE     Name: Simone Reyes   Age & Sex: 63 y.o. male   MRN: [id number]  [facility]   Encounter: [id number]  Hospital Stay Days: 1    Accepting team: SOD Team A  Code Status: Level 1 - Full Code  Disposition: Patient requires Med/Surg with Telemetry    ASSESSMENT/PLAN     Active Problems:    Type 2 diabetes mellitus, without long-term current use of insulin (HCC)    Chronic obstructive pulmonary disease (HCC)    Methadone dependence ([facility])    DKA (diabetic ketoacidosis) ([facility])    Atrial flutter (HCC)      Atrial flutter (HCC)  Assessment & Plan  Patient found to have 2:1 Atrial flutter in the ED. Patient has no history of A-fib/flutter per chart review.   Received 10mg lopressor x2 in the ED without improvement     Plan:  Thought to be provoked in setting of DKA and profound dehydration; planned to observe patient unless increasingly tachycardic.   Spontaneously resolved this morning; now NSR with HR .     DKA (diabetic ketoacidosis) ([facility])  Assessment & Plan  Lab Results   Component Value Date    HGBA1C 16.2 (H) 03/27/2024       Recent Labs     03/28/24  0823 03/28/24  1005 03/28/24  1213 03/28/24  1406   POCGLU 268* 249* 220* 133       Blood Sugar Average: Last 72 hrs:  (P) 209.2163092013870556    Diagnosis: Insulin-dependent DM; Unspecified vs T2DM per chart review   Patient on 45 units Lantus qhs and 500mg metformin at home   A1c on admission 16.2  Three days of medication non-compliance prior to presenting to the ED  K 4.5 in the ED; Received 3L Isolyte and 20mEq K. Switched to LR with 20mEq K/Liter on admission.   Anion gap closed to 9; closed x2  Plan:   Start carb controlled diet today  Begin basal bolus insulin with 14 Lantus q24h and 5 units Lispro TID with meals  Daily labs  Q6h blood glucose checks once insulin gtt discontinued        Methadone dependence ([facility])  Assessment & Plan  Hx of current heroin use via insufflation in addition to  "methadone maintenance therapy. Patient stated he uses 3 bags heroin/day. He has not had any withdrawal symptoms during admission and has not received methadone for the past 24-36 hours. Methadone clinic ([facility] at [phone] ) was called and dose of 135mg daily was confirmed.      Plan:  HIV, hepatitis panel negative  UDS positive for Methadone   Consider Tox c/s for Methadone maintenance     Chronic obstructive pulmonary disease (HCC)  Assessment & Plan  Hx of COPD not requiring home O2; reports increased sputum production over the last week.      Plan:  Continue home albuterol q4h PRN  Ipratropium nebs q4h        Type 2 diabetes mellitus, without long-term current use of insulin ([facility])  Assessment & Plan  Lab Results   Component Value Date    HGBA1C 16.2 (H) 03/27/2024       Recent Labs     03/28/24  0823 03/28/24  1005 03/28/24  1213 03/28/24  1406   POCGLU 268* 249* 220* 133       Blood Sugar Average: Last 72 hrs:  (P) 209.6424369445233946    T2DM vs unspecified type per chart review. A1C on admission 16.2; poorly controlled.  - Home medications: Lantus 45 units qhs; metformin 500mg once daily  - Plan as per \"DKA\"   - Patient to [place] seen by podiatry during admission (consult placed)              VTE Pharmacologic Prophylaxis: Enoxaparin (Lovenox)  VTE Mechanical Prophylaxis: sequential compression device    HOSPITAL COURSE     Per ICU Transfer Note:    \"Simone Reyes is a 63 y.o. PMH COPD, insulin-dependent diabetes, active heroine use with concurrent methadone use who presents with three days of generalized malaise and vomiting. Patient states that he has not taken his medication for his diabetes over the last three days. He endorses three days of vomiting and mild cough with increased sputum production. He denies any diaphoresis, tremulousness, agitation, abdominal pain, diarrhea, or chest pain. He states that he felt weak and unable to walk today. Patient states he felt so week he " "was unable to make it to his methadone clinic today, which ultimately prompted him to come to the ER. On arrival, patient was found to have a blood glucose of 538, anion gap 25, ketonuria, and no lactic acidosis.      Patient continued insulin drip on admission and was transitioned to LR with 20mEq K//L; later was transitioned to D5 1/2 NS as blood glucose fell below 250. Anion gap closed on repeat labs and patient was transitioned to basal bolus insulin with a carb controlled diet 3/28 AM.      Barriers to discharge:   Basal bolus insulin adjustments; transition to regular diet; daily BMP checks to ensure anion gap remains closed. \"    SUBJECTIVE     NAEO, patient seen earlier this afternoon resting comfortably in hospital bed.  He states that he is generally fatigued and feeling \"worn down\", and that he has not had an appetite since coming to the hospital, but otherwise doing well.  He notes that he had not been taking his home insulin for the past 3 days because of a feeling of generalized illness, including N/V.  However those ill feelings have since abated.    Patient would like to be restarted on methadone, and notes some COWS symptoms such as feeling jittery and clammy.    Otherwise denies any CP/palpitations, abdominal pain, SOB/MUNOZ, N/V/D, fever/chills, nor any dizziness/lightheadedness.    OBJECTIVE     Vitals:    03/28/24 0800 03/28/24 0828 03/28/24 1215 03/28/24 1530   BP:  158/76 161/63 152/71   BP Location:  Right arm  Right arm   Pulse:  78 88 92   Resp:  20 21 19   Temp: 98.6 °F (37 °C)  98 °F (36.7 °C) 98.2 °F (36.8 °C)   TempSrc: Oral  Oral Oral   SpO2:  99% 99% 99%   Weight:       Height:         I/O last 24 hours:  In: 7216.8 [P.O.:780; I.V.:5681; IV Piggyback:755.8]  Out: 3400 [Urine:3400]    Physical Exam  LABORATORY DATA     Labs: I have personally reviewed pertinent reports.    Results from last 7 days   Lab Units 03/28/24  0813 03/27/24  1143   WBC Thousand/uL 11.24* 15.32*   HEMOGLOBIN g/dL " 14.4 16.5   HEMATOCRIT % 41.7 46.3   PLATELETS Thousands/uL 199 269   NEUTROS PCT % 79*  --    MONOS PCT % 5  --    EOS PCT % 1  --       Results from last 7 days   Lab Units 03/28/24  0813 03/28/24  0411 03/28/24  0000 03/27/24  1607 03/27/24  1135   POTASSIUM mmol/L 4.6 4.4 3.8   < > 4.5   CHLORIDE mmol/L 103 102 102   < > 88*   CO2 mmol/L 21 23 26   < > 17*   BUN mg/dL 15 19 22   < > 42*   CREATININE mg/dL 0.51* 0.61 0.66   < > 1.04   CALCIUM mg/dL 8.2* 7.4* 7.4*   < > 9.2   ALK PHOS U/L  --   --   --   --  115*   ALT U/L  --   --   --   --  18   AST U/L  --   --   --   --  20    < > = values in this interval not displayed.     Results from last 7 days   Lab Units 03/28/24  0813 03/28/24  0411 03/28/24  0000   MAGNESIUM mg/dL 2.2 2.5 2.3     Results from last 7 days   Lab Units 03/28/24  0813 03/28/24  0411 03/28/24  0000   PHOSPHORUS mg/dL 3.0 3.3 3.1          Results from last 7 days   Lab Units 03/27/24  1143   LACTIC ACID mmol/L 1.7         Micro:  Lab Results   Component Value Date    BLOODCX Received in Microbiology Lab. Culture in Progress. 03/27/2024    BLOODCX Received in Microbiology Lab. Culture in Progress. 03/27/2024    BLOODCX No Growth After 5 Days. 01/16/2019     IMAGING & DIAGNOSTIC TESTING     Imaging: I have personally reviewed pertinent reports.    XR chest 2 views    Result Date: 3/27/2024  Impression: Small pleural effusions at the costophrenic angles. Workstation performed: [id number]     EKG, Pathology, and Other Studies: I have personally reviewed pertinent reports.     ALLERGIES     Allergies   Allergen Reactions    Imitrex [Sumatriptan] Hallucinations     Annotation - [id number]: metal taste and spacey    Immune Globulin      Other reaction(s): Other (See Comments)  Tripping, spacing     MEDICATIONS     Current Facility-Administered Medications   Medication Dose Route Frequency Provider Last Rate    acetaminophen  650 mg Oral Q6H PRN Love Brito MD      albuterol  2 puff Inhalation  "After Lunch Marybeth Sanderson MD      albuterol  2 puff Inhalation Q4H PRN Marybeth Sanderson MD      chlorhexidine  15 mL Mouth/Throat Q12H [facility] Love Brito MD      gabapentin  100 mg Oral TID Love Brito MD      heparin (porcine)  5,000 Units Subcutaneous Q8H MITCHELL Love Brito MD      insulin glargine  14 Units Subcutaneous QAM Love Brito MD      insulin lispro  1-5 Units Subcutaneous TID AC Love Brito MD      insulin lispro  1-5 Units Subcutaneous HS Love Brito MD      [START ON 3/29/2024] insulin lispro  5 Units Subcutaneous Daily With Breakfast Love Brito MD      insulin lispro  5 Units Subcutaneous Daily With Lunch Love Briot MD      insulin lispro  5 Units Subcutaneous Daily With Dinner Love Brito MD      methadone  135 mg Oral Daily Duane German MD      polyethylene glycol  17 g Oral Daily PRN Love Brito MD      senna-docusate sodium  1 tablet Oral Daily PRN Love Brito MD          acetaminophen, 650 mg, Q6H PRN  albuterol, 2 puff, Q4H PRN  polyethylene glycol, 17 g, Daily PRN  senna-docusate sodium, 1 tablet, Daily PRN        Portions of the record may have been created with voice recognition software.  Occasional wrong word or \"sound a like\" substitutions may have occurred due to the inherent limitations of voice recognition software.  Read the chart carefully and recognize, using context, where substitutions have occurred.    ==  Ihsan Roman MD  [facility]  Internal Medicine Residency PGY-2   "

## 2024-03-28 NOTE — CONSULTS
Podiatry - Consultation    Patient Information:   Simone Reyes 63 y.o. male MRN: [id number]  [facility] Encounter: [id number]  PCP: Nicholas Blanc DO  Date of Admission:  3/27/2024  Date of Consultation: 03/28/24  Requesting Physician: Gil Tran MD      ASSESSMENT:    Simone Reyes is a 63 y.o. male with:    Onychomycosis  Uncontrolled T2DM (HbA1c 16.2% on 3/27/24)  Tobacco dependence  Methadone dependence    PLAN:    Patient seen at bedside. No acute pedal concerns at this time.    Elongated toenails x10 were excisionally debrided. See procedure note below.  Rest of Medical care per primary team.  Podiatry signing off at this time, please re-consult with any questions or concerns as needed.    Procedure Note: Nail Debridement   After verbal consent was obtained, patient's elongated nails x10 were sharply debrided to near normal length and thickness using a large nail nipper. The patient tolerated this well and without incident.     Weight bearing Status: Weightbearing as tolerated    SUBJECTIVE:    History of Present Illness:    Simone Reyes is a 63 y.o. male who is originally admitted 3/27/2024 due to DKA with a past medical history of T2DM, tobacco dependence, COPD, methadone dependence.    We are consulted for painful, elongated toenails x 10 and poor foot hygiene. Patient states that they have difficulty applying their socks and shoes due to the elongation of the nails. He has not seen podiatry in a few years. He has been unable to cut his nails adequately. Patient has no further pedal complaints at this time.     Patient denies nausea, vomiting, fever, chills, shortness of breath, chest pain. Patient was quite sleepy during our encounter    Review of Systems:    Constitutional: Fatigue.    HENT: Negative.    Eyes: Negative.    Respiratory: Negative.    Cardiovascular: Negative.    Gastrointestinal: Negative.    Musculoskeletal: Negative   Skin: Elongated nails    Neurological: Negative  "  Psych: Negative.     Past Medical and Surgical History:     Past Medical History:   Diagnosis Date    Cough 2/4/2019    Diabetes mellitus (HCC)     Methadone dependence (HCC) 02/16/2022    Migraines 02/16/2022       Past Surgical History:   Procedure Laterality Date    TONSILLECTOMY         Meds/Allergies:    Medications Prior to Admission   Medication    Advair Diskus 250-50 MCG/ACT inhaler    albuterol (PROVENTIL HFA,VENTOLIN HFA) 90 mcg/act inhaler    Blood Glucose Monitoring Suppl KIT    gabapentin (NEURONTIN) 100 mg capsule    glucose blood test strip    Insulin Glargine Solostar (Lantus SoloStar) 100 UNIT/ML SOPN    Insulin Pen Needle 31G X 4 MM MISC    ketoconazole (NIZORAL) 2 % cream    Lancets (OneTouch Delica Plus [id number]) MISC    metFORMIN (GLUCOPHAGE-XR) 500 mg 24 hr tablet    methadone (DOLOPHINE) 10 mg tablet       Allergies   Allergen Reactions    Imitrex [Sumatriptan] Hallucinations     Annotation - [id number]: metal taste and spacey    Immune Globulin      Other reaction(s): Other (See Comments)  Tripping, spacing       Social History:     Marital Status: Single    Substance Use History:   Social History     Substance and Sexual Activity   Alcohol Use Not Currently    Comment: rarely     Social History     Tobacco Use   Smoking Status Every Day    Current packs/day: 0.50    Types: Cigarettes   Smokeless Tobacco Never   Tobacco Comments    half a pack every other day     Social History     Substance and Sexual Activity   Drug Use Yes    Types: Marijuana, Heroin, Fentanyl    Comment: currently smoking marijuana and occasional heroin, one bag a week       Family History:    Family History   Problem Relation Age of Onset    Thyroid disease Mother     Cancer Mother          OBJECTIVE:    Vitals:   Blood Pressure: 158/76 (03/28/24 0828)  Pulse: 78 (03/28/24 0828)  Temperature: 98.6 °F (37 °C) (03/28/24 0800)  Temp Source: Oral (03/28/24 0800)  Respirations: 20 (03/28/24 0828)  Height: 5' 9\" (175.3 cm) " (03/27/24 1550)  Weight - Scale: 55.5 kg (122 lb 6.4 oz) (03/28/24 0600)  SpO2: 99 % (03/28/24 0828)    Physical Exam:    General Appearance: Alert, cooperative, no distress, fatigued  HEENT: Head normocephalic, atraumatic, without obvious abnormality.  Heart: Normal rate and rhythm.  Lungs: Non-labored breathing. No respiratory distress.  Abdomen: Without distension.  Psychiatric: AAOx3  Lower Extremity:    Vascular:   DP: Right: 2+   Left: 2+  PT: Right: 2+   Left: 2+  CRT < 3 seconds at the digits.   +0/4 edema noted at bilateral lower extremities.   Skin temperature is WNL bilaterally.    Musculoskeletal:  MMT is 5/5 in all muscle compartments bilaterally.   ROM at the 1st MPJ and ankle joint are within normal limits bilaterally with the leg extended.   No Pain on palpation of foot or ankle bilaterally.   No gross deformities noted.     Dermatological:  Elongated, dystrophic, thickened toenails x10 that are tender to palpation with notable subungual debris. Mild dryness and scaling of plantar feet    Neurological:  Gross sensation is intact.   Light touch is intact.   Protective sensation is diminished.    Additional data:     Lab Results: I have personally reviewed pertinent labs including:    Results from last 7 days   Lab Units 03/28/24  0813   WBC Thousand/uL 11.24*   HEMOGLOBIN g/dL 14.4   HEMATOCRIT % 41.7   PLATELETS Thousands/uL 199   NEUTROS PCT % 79*   LYMPHS PCT % 15   MONOS PCT % 5   EOS PCT % 1     Results from last 7 days   Lab Units 03/28/24  0813 03/27/24  1607 03/27/24  1135   POTASSIUM mmol/L 4.6   < > 4.5   CHLORIDE mmol/L 103   < > 88*   CO2 mmol/L 21   < > 17*   BUN mg/dL 15   < > 42*   CREATININE mg/dL 0.51*   < > 1.04   CALCIUM mg/dL 8.2*   < > 9.2   ALK PHOS U/L  --   --  115*   ALT U/L  --   --  18   AST U/L  --   --  20    < > = values in this interval not displayed.           Cultures: I have personally reviewed pertinent cultures including:    Results from last 7 days   Lab Units  "03/27/24  1607 03/27/24  1539   BLOOD CULTURE  Received in Microbiology Lab. Culture in Progress. Received in Microbiology Lab. Culture in Progress.           Imaging: I have personally reviewed pertinent reports in PACS.  EKG, Pathology, and Other Studies: I have personally reviewed pertinent reports.    Time Spent for Care: 30 minutes.  More than 50% of total time spent on counseling and coordination of care as described above.      ** Please Note: Portions of the record may have been created with voice recognition software. Occasional wrong word or \"sound a like\" substitutions may have occurred due to the inherent limitations of voice recognition software. Read the chart carefully and recognize, using context, where substitutions have occurred. **    "

## 2024-03-28 NOTE — ASSESSMENT & PLAN NOTE
Patient found to have 2:1 Atrial flutter in the ED. Patient has no history of A-fib/flutter per chart review.   Received 10mg lopressor x2 in the ED without improvement    Plan:  Thought to be provoked in setting of DKA and profound dehydration; planned to observe patient unless increasingly tachycardic.   Spontaneously resolved this morning; now NSR with HR .

## 2024-03-28 NOTE — ASSESSMENT & PLAN NOTE
Hx of current heroin use via insufflation in addition to methadone maintenance therapy. Patient stated he uses 3 bags heroin/day. He has not had any withdrawal symptoms during admission and has not received methadone for the past 24-36 hours. Methadone clinic ([facility] at [phone] ) was called and dose of 135mg daily was confirmed.      Plan:  Continue 135 mg methadone daily  HIV, hepatitis panel negative  UDS positive for Methadone

## 2024-03-28 NOTE — QUICK NOTE
Called [facility] at [phone] and confirmed patient has been on methadone at 135mg daily since January. Acute pain service consulted. No evidence of withdrawal at this time.

## 2024-03-28 NOTE — ASSESSMENT & PLAN NOTE
Hx of COPD not requiring home O2; reports increased sputum production over the last week.      Plan:  Continue home albuterol q4h PRN  Ipratropium nebs q4h

## 2024-03-28 NOTE — MALNUTRITION/BMI
This medical record reflects one or more clinical indicators suggestive of malnutrition.    Malnutrition Findings:   Adult Malnutrition type:  (acute on chronic illness)  Adult Degree of Malnutrition: Other severe protein calorie malnutrition  Malnutrition Characteristics: Muscle loss, Inadequate energy, Weight loss                  360 Statement: severe malnutrition r/t acute on chronic illness as evidenced by Weight loss of 20 lbs / 14% since 4 months ago, protruding clavicle bone, PO intake <75% estimated needs >1  month. Treatment: oral diet, oral nutrition supplements.    BMI Findings:  Adult BMI Classifications: Underweight < 18.5        Body mass index is 18.08 kg/m².     See Nutrition note dated 3/28/24 for additional details.  Completed nutrition assessment is viewable in the nutrition documentation.

## 2024-03-28 NOTE — CASE MANAGEMENT
Case Management Assessment & Discharge Planning Note    Patient name Simone [place]  [facility] MRN [id number]  : 1960 Date 3/28/2024       Current Admission Date: 3/27/2024  Current Admission Diagnosis:DKA (diabetic ketoacidosis) ([facility])   Patient Active Problem List    Diagnosis Date Noted    DKA (diabetic ketoacidosis) ([facility]) 2024    Atrial flutter (HCC) 2024    Chronic obstructive pulmonary disease (HCC) 2020    Methadone dependence ([facility]) 2020    Smoker 2020    Tobacco dependence 2019    Other emphysema ([facility]) 2019    Type 2 diabetes mellitus, without long-term current use of insulin ([facility]) 2016      LOS (days): 1  Geometric Mean LOS (GMLOS) (days): 3  Days to GMLOS:1.9     OBJECTIVE:    Risk of Unplanned Readmission Score: 17.82         Current admission status: Inpatient       [facility] Pharmacy:   [id number] - [place], [place] - [address]  [address]  [facility] [place] [address]  Phone: [phone] Fax: [phone]    Primary Care Provider: Nicholas Blanc DO    Primary Insurance: [employer] REP  Secondary Insurance:     ASSESSMENT:  Active Health Care Proxies    There are no active Health Care Proxies on file.       Advance Directives  Does patient have a Health Care POA?: No  Was patient offered paperwork?: Yes (Declined)  Does patient have Advance Directives?: No  Was patient offered paperwork?: Yes (Declined)         Readmission Root Cause  30 Day Readmission: No    Patient Information  Admitted from:: Home  Mental Status: Alert  During Assessment patient was accompanied by: Not accompanied during assessment  Assessment information provided by:: Patient  Primary Caregiver: Self  Support Systems: Self  Home entry access options. Select all that apply.: No steps to enter home  Type of Current Residence: 3 story home  Upon entering residence, is there a bedroom on the main floor (no further steps)?:  No  A bedroom is located on the following floor levels of residence (select all that apply):: Basement  Upon entering residence, is there a bathroom on the main floor (no further steps)?: Yes  Number of steps to basement from main floor: One Flight    Activities of Daily Living Prior to Admission  Functional Status: Independent  Completes ADLs independently?: Yes  Ambulates independently?: Yes  Does patient use assisted devices?: No  Does patient have a history of Outpatient Therapy (PT/OT)?: No  Does the patient have a history of Short-Term Rehab?: No  Does patient have a history of HHC?: No  Does patient currently have HHC?: No         Patient Information Continued  Income Source: SSI/SSD  Does patient receive dialysis treatments?: No  Does patient have a history of substance abuse?: Yes  Historical substance use preference: Heroin  Is patient currently in treatment for substance abuse?: Yes (Patient reports that he is currently on methadone. Patient denies other substance use at this time.)  Does patient have a history of Mental Health Diagnosis?: No         Means of Transportation  Means of Transport to Appts:: Drives Self      Social Determinants of Health (SDOH)      Flowsheet Row Most Recent Value   Housing Stability    In the last 12 months, was there a time when you were not able to pay the mortgage or rent on time? N   In the last 12 months, how many places have you lived? 1   In the last 12 months, was there a time when you did not have a steady place to sleep or slept in a shelter (including now)? N   Transportation Needs    In the past 12 months, has lack of transportation kept you from medical appointments or from getting medications? no   In the past 12 months, has lack of transportation kept you from meetings, work, or from getting things needed for daily living? No   Food Insecurity    Within the past 12 months, you worried that your food would run out before you got the money to buy more. Never  true   Within the past 12 months, the food you bought just didn't last and you didn't have money to get more. Never true   Utilities    In the past 12 months has the electric, gas, oil, or water company threatened to shut off services in your home? No            DISCHARGE DETAILS:    Discharge planning discussed with:: Patient  Freedom of Choice: Yes     CM contacted family/caregiver?: No- see comments (Patient is alert)

## 2024-03-28 NOTE — PROGRESS NOTES
[facility]  Progress Note: Critical Care  Name: Simone Reyes 63 y.o. male I MRN: [id number]  Unit/Bed#: [facility] I Date of Admission: 3/27/2024   Date of Service: 3/28/2024 I Hospital Day: 1    Assessment/Plan   Neuro:   Neuro:   Diagnosis: Hx current heroine use via insufflation in addition to methadone use  Patient using 3 bags heroine/day. States he also goes to a methadone clinic and missed his methadone dose today  HIV, hepatitis panel negative  UDS positive for Methadone   Chart review - 80mg Methadone daily  Not displaying withdrawal symptoms   Plan:  Call methadone clinic to confirm 80mg dose prior to prescribing  Analgesia  Plan:   Continue home gabapentin 100mg TID  650 mg q6h Tylenol PRN       CV:   Diagnosis: New onset 2:1 A-flutter  Received Lopressor 5mg x2 in the ED without improvement; HR fixed at 135-140  Suspect provoked by dehydration and acidosis   Initial troponin 22 --> 26 --> 27  Episode of SVT with HR 180s night of 3/27 that spontaneously resolved   Plan:   Fluid resuscitation  Will consider Lopressor vs Cardizem vs Amio if needed; favor rate control      DVT prophylaxis: SC heparin 5,000 units BID     Pulm:  Diagnosis: Suspicion CAP vs Bronchitis vs COPD exacerbation   Patchy opacity in LLL on CXR 3/27/24; patient reports increasing sputum production over the last week  Plan:  Will monitor for now  Continue home Proventil  Monitor fever curve and trend white count      Diagnosis: COPD  Takes albuterol at home; states he has not been taking inhalers lately  Lung screening CT 5/13/19 without lung nodules  Not requiring supplemental O2  Plan:   Continue home Proventil as above  Maintain SpO2 88-92%.    GI:   No active issues  PRN Miralax and Senakot daily PRN    :   Diagnosis: Pre-renal SUSAN - resolved  Baseline Cr 0.58-0.9; 1.04 at admission  Plan:   Fluid resuscitation     F/E/N:   F: Fluids changed from LR with 20mEq K/L to D51/2NS at 150cc/h;   E:  Maintain K > 4.5, Phos > 3, Mg > 2.   Q4h BMP, Phos, Mg  N:  Start diet today    Heme/Onc:   No active issues    Endo:   Diagnosis: Insulin-dependent DM; Unspecified vs T2DM per chart review   Patient on 45 units Lantus qhs and 500mg metformin at home   A1c on admission 16.2  Three days of medication non-compliance   K 4.5 in the ED  Received 3L Isolyte and 20mEq K in the ED  Anion gap closed to 9  Plan:   Insulin infusion; start diet, monitor insulin need, and switch to basal bolus  150cc/h D51/2NS  Maintain K > 4.5   Discontinue Q4h BMP, Mg, and phos   Q1h blood glucose checks     ID:   No active issues  Blood cultures ordered; will monitor for infectious etiology of DKA    MSK/Skin:   Diagnosis: Poor foot hygiene in setting of uncontrolled diabetes   Plan: consult podiatry       Disposition: Stepdown Level 1    ICU Core Measures     A: Assess, Prevent, and Manage Pain Has pain been assessed? Yes  Need for changes to pain regimen? No   B: Both SAT/SAT  N/A   C: Choice of Sedation RASS Goal: 0 Alert and Calm or N/A patient not on sedation  Need for changes to sedation or analgesia regimen? NA   D: Delirium CAM-ICU: Negative   E: Early Mobility  Plan for early mobility? Yes   F: Family Engagement Plan for family engagement today? Yes         Prophylaxis:  VTE VTE covered by:  heparin (porcine), Subcutaneous, 5,000 Units at 03/28/24 0242       Stress Ulcer  not ordered        Significant 24hr Events     24hr events: Patient placed on D5 1/2NS overnight as blood sugar began to improve. Anion gap closed x2. Patient found to be in SVT with HR 180s. He remained hemodynamically stable. SVT self-resolved and HR returned to 100-115.      Subjective     Review of Systems   Constitutional:  Negative for activity change, appetite change, diaphoresis and fatigue.   Respiratory:  Negative for apnea, choking, chest tightness and shortness of breath.    Cardiovascular:  Negative for chest pain.   Gastrointestinal:  Negative for  abdominal distention, abdominal pain, nausea and vomiting.   Neurological:  Negative for tremors and light-headedness.   Psychiatric/Behavioral:  Negative for agitation. The patient is not nervous/anxious and is not hyperactive.          Objective                            Vitals I/O      Most Recent Min/Max in 24hrs   Temp 98.2 °F (36.8 °C) Temp  Min: 97.4 °F (36.3 °C)  Max: 98.9 °F (37.2 °C)   Pulse (!) 106 Pulse  Min: 100  Max: 182   Resp 22 Resp  Min: 20  Max: 32   /72 BP  Min: 93/72  Max: 154/80   O2 Sat 99 % SpO2  Min: 98 %  Max: 100 %      Intake/Output Summary (Last 24 hours) at 3/28/2024 0607  Last data filed at 3/28/2024 0126  Gross per 24 hour   Intake 4280.73 ml   Output 2300 ml   Net 1980.73 ml       Diet Regular; Regular House    Invasive Monitoring           Physical Exam   Physical Exam  Eyes:      Extraocular Movements: Extraocular movements intact.      Pupils: Pupils are equal, round, and reactive to light.   Skin:     General: Skin is warm.   HENT:      Head: Normocephalic and atraumatic.      Nose: No congestion.      Mouth/Throat:      Mouth: Mucous membranes are moist.   Cardiovascular:      Rate and Rhythm: Normal rate and regular rhythm.   Musculoskeletal:         General: No swelling or tenderness.   Abdominal:      Palpations: Abdomen is soft.      Tenderness: There is no abdominal tenderness. There is no guarding.   Constitutional:       General: He is not in acute distress.     Appearance: He is not toxic-appearing.   Pulmonary:      Effort: Pulmonary effort is normal.      Breath sounds: Wheezing (Bilateral; L>R; worst at L mid and lower lung fields) present.   Neurological:      General: No focal deficit present.      Mental Status: He is alert and oriented to person, place and time. He is not agitated.      Motor: Strength full and intact in all extremities.            Diagnostic Studies      EKG: NSR 84bpm, normal axis, no ST segment changes   Imaging:  I have personally  reviewed pertinent reports.       Medications:  Scheduled PRN   calcium gluconate, 2 g, Once  chlorhexidine, 15 mL, Q12H MITCHELL  gabapentin, 100 mg, TID  heparin (porcine), 5,000 Units, Q8H MITCHELL  potassium phosphate, 30 mmol, Once      acetaminophen, 650 mg, Q6H PRN  albuterol, 1 puff, Q4H PRN  polyethylene glycol, 17 g, Daily PRN  senna-docusate sodium, 1 tablet, Daily PRN  sodium chloride (PF), 3 mL, Q1H PRN       Continuous    dextrose 5 % and sodium chloride 0.45 % with KCl 20 mEq/L, 150 mL/hr, Last Rate: 150 mL/hr (03/28/24 0242)  insulin regular (HumuLIN R,NovoLIN R) 1 Units/mL in sodium chloride 0.9 % 100 mL infusion, 0.3-21 Units/hr         Labs:    CBC    Recent Labs     03/27/24  1143   WBC 15.32*   HGB 16.5   HCT 46.3        BMP    Recent Labs     03/28/24  0000 03/28/24  0411   SODIUM 135 134*   K 3.8 4.4    102   CO2 26 23   AGAP 7 9   BUN 22 19   CREATININE 0.66 0.61   CALCIUM 7.4* 7.4*       Coags    No recent results     Additional Electrolytes  Recent Labs     03/28/24  0000 03/28/24  0411 03/28/24  0514   MG 2.3 2.5  --    PHOS 3.1 3.3  --    CAIONIZED  --   --  1.06*          Blood Gas    No recent results  Recent Labs     03/27/24  1135   PHVEN 7.331   NAR0YYX 28.8*   PO2VEN 38.5   YSU5OTO 14.9*   BEVEN -9.2   H7YIMWO 70.2    LFTs  Recent Labs     03/27/24  1135   ALT 18   AST 20   ALKPHOS 115*   ALB 3.8   TBILI 0.75       Infectious  Recent Labs     03/27/24  1607   PROCALCITONI 0.09     Glucose  Recent Labs     03/27/24  1607 03/27/24  1912 03/28/24  0000 03/28/24  0411   GLUC 318* 198* 142* 86               Love Brito MD

## 2024-03-28 NOTE — SEPSIS NOTE
Sepsis Note   Simone Reyes 63 y.o. male MRN: [id number]  [facility] Encounter: [id number]       Initial Sepsis Screening       Row Name 03/27/24 0068                Is the patient's history suggestive of a new or worsening infection? No  -AM                  User Key  (r) = Recorded By, (t) = Taken By, (c) = Cosigned By      Initials Name Provider Type    AM Ravinder Forde MD Resident                        Body mass index is 20.38 kg/m².  Wt Readings from Last 1 Encounters:   03/27/24 62.6 kg (138 lb)     IBW (Ideal Body Weight): 70.7 kg    Ideal body weight: 70.7 kg (155 lb 13.8 oz)      Tachycardia likely secondary to noninfectious source.  Patient does not have sepsis even though he does not meet criteria by white count and heart rate.

## 2024-03-28 NOTE — ASSESSMENT & PLAN NOTE
Hx of current heroin use via insufflation in addition to methadone maintenance therapy. Patient stated he uses 3 bags heroin/day. He has not had any withdrawal symptoms during admission and has not received methadone for the past 24-36 hours. Methadone clinic ([facility] at [phone] ) was called and dose of 135mg daily was confirmed.     Plan:  HIV, hepatitis panel negative  UDS positive for Methadone   Consult placed to [facility] for methadone - stated will need detox consult

## 2024-03-28 NOTE — ASSESSMENT & PLAN NOTE
Diagnosis: Insulin-dependent DM; Unspecified vs T2DM per chart review   Patient on 45 units Lantus qhs and 500mg metformin at home   A1c on admission 16.2  Three days of medication non-compliance prior to presenting to the ED  K 4.5 in the ED; Received 3L Isolyte and 20mEq K. Switched to LR with 20mEq K/Liter on admission.   Anion gap closed to 9; closed x2  Plan:   Start carb controlled diet today  Begin basal bolus insulin with 14 Lantus q24h and 5 units Lispro TID with meals  Daily labs  Q6h blood glucose checks once insulin gtt discontinued           Lab Results   Component Value Date    HGBA1C 16.2 (H) 03/27/2024       Recent Labs     03/28/24  0614 03/28/24  0823 03/28/24  1005 03/28/24  1213   POCGLU 193* 268* 249* 220*       Blood Sugar Average: Last 72 hrs:  (P) 213.25

## 2024-03-28 NOTE — ASSESSMENT & PLAN NOTE
Lab Results   Component Value Date    HGBA1C 16.2 (H) 03/27/2024       Recent Labs     03/28/24  0823 03/28/24  1005 03/28/24  1213 03/28/24  1406   POCGLU 268* 249* 220* 133       Blood Sugar Average: Last 72 hrs:  (P) 209.9603172999715447    Diagnosis: Insulin-dependent DM; Unspecified vs T2DM per chart review   Patient on 45 units Lantus qhs and 500mg metformin at home   A1c on admission 16.2     Patient discharged on 50 units Lantus and 1000 mg Metformin

## 2024-03-28 NOTE — PROGRESS NOTES
Critical Care Attending Note; Gil Tran   Note Date: 24  Note Time: 1:22 PM    Patient: Simone Reyes  Age, : 63 y.o., 1960 MRN: [id number] Code Status: Level 1 - Full Code Patient Location: [facility]   Hospital LOS:1 days  ]   Patient seen and examined, medical record reviewed, discussed with house staff and nursing staff.     HPI   CC: DKA   63M with a PMH IVDU, COPD(active smoker) and DM who presents with 3 days of malaise found to have DKA.    Main ICU Plans:     #DKA - Resolved  - Insulin gtt - transition to SQ - weight based dosing due to home regimen likely to high for inhouse and no stability on gtt.  - Tolerating PO diet    #Aflutter   - Monitor - if does not respond to fluid resuscitation recommend rate control agents  - will need further discussions about anticoagulation    #COPD   - Continue home regimen     #SUSAN   - Prerenal - Resolved     #IVDU  - Continue Methadone    #DVT/GI ppx  SQH    #Lines/Tubes/Drains:   Invasive Devices       Peripheral Intravenous Line  Duration             Peripheral IV 24 Left Antecubital 1 day    Peripheral IV 24 Right Antecubital 1 day    Peripheral IV 24 Right;Ventral (anterior) Forearm <1 day                    #Nutrition:   Diet Regular; Regular House; Consistent Carbohydrate Diet Level 2 (5 carb servings/75 grams CHO/meal)        #Code Status:   Level 1 - Full Code    #Dispo:   [place]        Gil Tran MD  Pulmonary, Critical Care    Critical care time, excluding procedures, teaching, family meetings, and excludes any prior time recorded by the AP/resident, 35 minutes. Upon my evaluation, this patient has a high probability of imminent or life-threatening deterioration due to above problems which required my direct attention, intervention, and personal management.   Impression/Active Problems:    DKA   Gastroenteritis   COPD   SUSAN   IVDU      Physical Exam:     Vital Signs:   Weight: 55.5 kg (122 lb 6.4 oz)  IBW:  Ideal body weight: 70.7 kg (155 lb 13.8 oz)  Temp:  [97.9 °F (36.6 °C)-98.9 °F (37.2 °C)] 98 °F (36.7 °C)  HR:  [] 88  Resp:  [20-32] 21  BP: ()/(58-85) 161/63  General: NAD  Neuro: AxO3  Heart:RRR   Lungs: CTAB  Abdomen: Soft  Extremities: No edema                Ventilator Settings:         Results from last 7 days   Lab Units 03/27/24  1135   PO2 SEAN mm Hg 38.5     Radiologic Images Reviewed:    CXR  - Bilateral clear  Input / Output:     Intake/Output Summary (Last 24 hours) at 3/28/2024 1322  Last data filed at 3/28/2024 1218  Gross per 24 hour   Intake 7206.36 ml   Output 2975 ml   Net 4231.36 ml            Infusions:  insulin regular (HumuLIN R,NovoLIN R) 1 Units/mL in sodium chloride 0.9 % 100 mL infusion, 0.3-21 Units/hr, Last Rate: 6 Units/hr (03/28/24 1012)      Scheduled Medications:  Current Facility-Administered Medications   Medication Dose Route Frequency Provider Last Rate    acetaminophen  650 mg Oral Q6H PRN Love Brito MD      albuterol  1 puff Inhalation Q4H PRN Love Brito MD      chlorhexidine  15 mL Mouth/Throat Q12H MITCHELL Brito MD      gabapentin  100 mg Oral TID Love Brito MD      heparin (porcine)  5,000 Units Subcutaneous Q8H [facility] Love Brito MD      insulin glargine  14 Units Subcutaneous QAM Ammar Alam, DO      insulin lispro  1-5 Units Subcutaneous TID AC Ammar Alam, DO      insulin lispro  1-5 Units Subcutaneous HS Ammar Alam, DO      [START ON 3/29/2024] insulin lispro  5 Units Subcutaneous Daily With Breakfast Ammar Alam, DO      insulin lispro  5 Units Subcutaneous Daily With Lunch Ammar Alam, DO      insulin lispro  5 Units Subcutaneous Daily With Dinner Ammar Alam, DO      insulin regular (HumuLIN R,NovoLIN R) 1 Units/mL in sodium chloride 0.9 % 100 mL infusion  0.3-21 Units/hr Intravenous Titrated Ravinder Forde MD 6 Units/hr (03/28/24 1012)    ipratropium  0.5 mg Nebulization 4x Daily García Mayer DO      polyethylene glycol  17 g Oral Daily PRN Love  "MD Alisha      senna-docusate sodium  1 tablet Oral Daily PRN Love Brito MD      sodium chloride (PF)  3 mL Intravenous Q1H PRN Nicholas Thompson,          PRN Medications:    acetaminophen    albuterol    polyethylene glycol    senna-docusate sodium    Insert peripheral IV **AND** sodium chloride (PF)    Labs Reviewed:  Results from last 7 days   Lab Units 03/28/24  0813 03/27/24  1143   WBC Thousand/uL 11.24* 15.32*   HEMOGLOBIN g/dL 14.4 16.5   HEMATOCRIT % 41.7 46.3   PLATELETS Thousands/uL 199 269      Results from last 7 days   Lab Units 03/28/24  0813 03/28/24  0411 03/28/24  0000   SODIUM mmol/L 132* 134* 135   CO2 mmol/L 21 23 26   BUN mg/dL 15 19 22   CALCIUM mg/dL 8.2* 7.4* 7.4*   MAGNESIUM mg/dL 2.2 2.5 2.3   PHOSPHORUS mg/dL 3.0 3.3 3.1         Invalid input(s): \"ASTSGOT\", \"ALTSGPT\"LABRCNTIP@ ,alkphos:3,tbilirubin:3,dbilirubin:3)@            Invalid input(s): \"TROPT\", \"PBNP\"             I have personally seen and examined the patient on (03/28/24 between 9830-1795). I discussed the patient with the AP/resident including, but not limited to, verifying findings; reviewing labs and x-rays; discussing with consultants; developing the plan of care with the bedside nurse; and discussing treatment plan with patient or surrogate.  I have reviewed the note and assessment performed by the AP/resident and agree with the AP/resident’s documented findings and plan of care with the above additions/exceptions. Please see my comments for details and adjustments.                 "

## 2024-03-28 NOTE — ASSESSMENT & PLAN NOTE
Lab Results   Component Value Date    HGBA1C 16.2 (H) 03/27/2024       Recent Labs     03/29/24 2026 03/30/24  0656 03/30/24  1028 03/30/24  1556   POCGLU 290* 140 396* 217*       Blood Sugar Average: Last 72 hrs:  (P) 236.0625    T2DM vs unspecified type per chart review. A1C on admission 16.2; poorly controlled.  Home medications: Lantus 45 units qhs; metformin 500mg once daily  Given hemoglobin A1c, plan is to increase at home Lantus from 45 units nightly to 50 units nightly  Currently on  40 U of Lantus q. morning, and 6 units of lispro TID with meals  Algorithm 3 sliding scale during day  Algorithm 2 sliding scale at bedtime  It would also be reasonable for the patient to follow-up with his PCP, as another medication may be necessary

## 2024-03-28 NOTE — PROGRESS NOTES
Critical Care Interval Transfer Note:    Please refer to progress note from earlier today for full details.     HPI: Simone Reyes is a 63 y.o. PMH COPD, insulin-dependent diabetes, active heroine use with concurrent methadone use who presents with three days of generalized malaise and vomiting. Patient states that he has not taken his medication for his diabetes over the last three days. He endorses three days of vomiting and mild cough with increased sputum production. He denies any diaphoresis, tremulousness, agitation, abdominal pain, diarrhea, or chest pain. He states that he felt weak and unable to walk today. Patient states he felt so week he was unable to make it to his methadone clinic today, which ultimately prompted him to come to the ER. On arrival, patient was found to have a blood glucose of 538, anion gap 25, ketonuria, and no lactic acidosis.     Patient continued insulin drip on admission and was transitioned to LR with 20mEq K//L; later was transitioned to D5 1/2 NS as blood glucose fell below 250. Anion gap closed on repeat labs and patient was transitioned to basal bolus insulin with a carb controlled diet 3/28 AM.     Barriers to discharge:   Basal bolus insulin adjustments; transition to regular diet; daily BMP checks to ensure anion gap remains closed.      Consults: IP CONSULT TO CASE MANAGEMENT  IP CONSULT TO PODIATRY  IP CONSULT TO ACUTE PAIN SERVICE    Recommended to review admission imaging for incidental findings and document in discharge navigator: Chart reviewed, no known incidental findings noted at this time.      Discharge Plan: Anticipate discharge in 48 hrs to home            Patient seen and evaluated by Critical Care today and deemed to be appropriate for transfer to Med Surg. Spoke to Dr. Roman from [facility] to accept transfer. Critical care can be contacted via [employer] with any questions or concerns.

## 2024-03-28 NOTE — ASSESSMENT & PLAN NOTE
Patient found to have 2:1 Atrial flutter in the ED. Patient has no history of A-fib/flutter per chart review.   Received 10mg lopressor x2 in the ED without improvement    Plan:  Thought to be provoked in setting of DKA and profound dehydration  Advised to follow up with Cardiology for loop recorder  Patient has not returned to a flutter since presentation.

## 2024-03-28 NOTE — UTILIZATION REVIEW
"Initial Clinical Review    Admission: Date/Time/Statement:   Admission Orders (From admission, onward)       Ordered        03/27/24 1518  Inpatient Admission  Once                          Orders Placed This Encounter   Procedures    Inpatient Admission     Standing Status:   Standing     Number of Occurrences:   1     Order Specific Question:   Level of Care     Answer:   Level 1 Stepdown [13]     Order Specific Question:   Estimated length of stay     Answer:   More than 2 Midnights     Order Specific Question:   Certification     Answer:   I certify that inpatient services are medically necessary for this patient for a duration of greater than two midnights. See H&P and MD Progress Notes for additional information about the patient's course of treatment.     ED Arrival Information       Expected   -    Arrival   3/27/2024 11:05    Acuity   Emergent              Means of arrival   Walk-In    Escorted by   Self    Service   Critical Care/ICU    Admission type   Emergency              Arrival complaint   HBS             Chief Complaint   Patient presents with    Weakness - Generalized     Pt states, \"I haven't taken insulin in 3 days. I haven't really eaten in 3 days either or checked my blood sugar.\" Pt appears pale and weak in triage, polydipsia. Pt reports being methadone dependent and hasn't taken it in 3 days d/t being too weak to get out of bed.       Initial Presentation: 63 y.o. male to ER from home:  PMH  IDDM, COPD, current heroine use via insufflation  (3 bags daily)  in addition to chronic methadone (80 mg daily)    Presents w/ c/o  N/V/weakness - lying in bed/ not taken any meds/insulin  for the last 3 days because he had no energy.    Complaining of increased urinary output and increased thirst.   IN ER,  blood glucose of 538, anion gap 25, ketonuria, and no lactic acidosis.   EKG -  New onset 2:1 A-flutter    Baseline Cr 0.58-0.9; 1.04 at admission.  Patchy opacity in LLL on CXR  - pt reports " increasing sputum production over the last week.  A1c =  16.2     UA positive for blood w/o RBC on microscopic     3/27    Admit  IP status,  Level 1 stepdown  for management of  new onset 2:1 A-flutter,  treatment of  hyperglycemia,  complete  infectious workup (CAP vs Bronchitis vs COPD exacerbation)   R/O rhabdomyolysis with CK,  pre renal SUSAN  Keep NPO,  Cont telemetry,   Trend trops to peak .  Initiate Insulin gtt  w/accucks q1H - titrate to off.  Fluid resuscitation with 150 cc/hr LR w/Kcl,  trend bmp/electrolyte repletion prn  - renal indices - volume status.  Begin D5W if blood glucose <250.   Provide analgesia prn.  NPO,  Q4h BMP, Phos, Mg  (replete prn)   Monitor fever curve, wbc, temp,  fup blood cx;   HOLD OFF abx for now.       Date: 3/28       Day 2:  TRANSFER TO MS LEVEL OF CARE          Patient placed on D5 1/2NS overnight as blood sugar began to improve. Anion gap closed x2.   SVT self-resolved and HR returned to 100-115.   Breath sounds = Wheezing (Bilateral; L>R; worst at L mid and lower lung fields)    No withdrawal symptoms. UDS POS for Methadone.   HIV, hepatitis panel negative.  Pre-renal SUSAN - resolved     Okay to start diet today.  Switch to basal bolus insulin.    IV Kphos repletion x1 and po Kdur x1 .   IVF D5 1/2 NS dc'ed @  1220.   Insulin gtt d'ed at  1345    3/28  PODIATRY:  consulted for  onychomycosis.  Elongated toenails x10 were excisionally debrided.     3/28  NUTRITION :   severe malnutrition r/t acute on chronic illness as evidenced by Weight loss of 20 lbs / 14% since 4 months ago, protruding clavicle bone, PO intake <75% estimated needs >1 month. Treatment: oral diet, oral nutrition supplements.     3/29     Day 3: Has surpassed a 2nd midnight with active treatments and services.       Telemetry  remains NSR - sinus tach -  HR improved this AM.   Blood glucose 251 this am - increased AC Insulin to 6 units,   electrolytes stable.       -------------------------------------------------------------------------------------------------------------------------------------------            ED Triage Vitals   Temperature Pulse Respirations Blood Pressure SpO2   03/27/24 1110 03/27/24 1110 03/27/24 1110 03/27/24 1110 03/27/24 1110   (!) 97.4 °F (36.3 °C) (!) 139 22 147/95 98 %      Temp Source Heart Rate Source Patient Position - Orthostatic VS BP Location FiO2 (%)   03/27/24 1110 03/27/24 1110 03/27/24 1110 03/27/24 1110 --   Oral Monitor Lying Left arm       Pain Score       03/27/24 1550       No Pain          Wt Readings from Last 1 Encounters:   03/28/24 55.5 kg (122 lb 6.4 oz)     Body mass index is 18.08 kg/m².        Additional Vital Signs:   03/28/24 1215 98 °F (36.7 °C) 88 21 161/63 90 99 % -- --   03/28/24 0828 -- 78 20 158/76 121 99 % None (Room air) Lying   03/28/24 0800 98.6 °F (37 °C) -- -- -- -- -- -- --   03/28/24 0330 -- 94 -- 133/59 87 98 % -- --   03/28/24 0238 98.2 °F (36.8 °C) -- -- -- -- -- -- --   03/28/24 0130 -- 104 -- 140/76 101 98 % -- --   03/28/24 0110 -- 106 Abnormal  -- 141/72 90 99 % -- --   03/28/24 0100 -- 106 Abnormal  -- 123/73 91 98 % -- --   03/28/24 0040 -- 104 -- 119/63 79 99 % -- --   03/28/24 0030 -- 100 -- 116/63 83 98 % -- --   03/28/24 0005 98.4 °F (36.9 °C) -- 22 -- -- -- -- Lying   03/27/24 2340 -- 110 Abnormal  -- 119/58 77 98 % -- --   03/27/24 2110 -- 112 Abnormal  -- 115/59 79 98 % -- --   03/27/24 2055 -- 122 Abnormal  -- 119/73 92 99 % -- --   03/27/24 2051 97.9 °F (36.6 °C) -- -- -- -- -- -- --   03/27/24 2040 -- 182 Abnormal  -- 104/76 88 99 % -- --   03/27/24 2038 -- 180 Abnormal  -- 93/72 78 98 % -- --   03/27/24 1939 98.6 °F (37 °C) -- -- -- -- -- -- --   03/27/24 1700 98.9 °F (37.2 °C) 148 Abnormal  -- 128/82 96 100 % -- --   03/27/24 1550 98.8 °F (37.1 °C) 142 Abnormal  32 Abnormal  136/81 103 99 % None (Room air) Lying   03/27/24 1126 -- 138 Abnormal  20 150/80 109 99 % --      Pertinent  Labs/Diagnostic Test Results:     3/27  EKG -    A flutter with a 2-1 conduction             XR chest 2 views   ED Interpretation by Nicholas Thompson DO (03/27 1333)   No acute cardiopulmonary abnormality   Small pleural effusions at the costophrenic angles.               Results from last 7 days   Lab Units 03/28/24  0813 03/27/24  1143   WBC Thousand/uL 11.24* 15.32*   HEMOGLOBIN g/dL 14.4 16.5   HEMATOCRIT % 41.7 46.3   PLATELETS Thousands/uL 199 269   NEUTROS ABS Thousands/µL 8.82*  --          Results from last 7 days   Lab Units 03/28/24  0813 03/28/24  0514 03/28/24  0411 03/28/24  0000 03/27/24  1912 03/27/24  1607   SODIUM mmol/L 132*  --  134* 135 135 134*   POTASSIUM mmol/L 4.6  --  4.4 3.8 3.9 4.2   CHLORIDE mmol/L 103  --  102 102 101 97   CO2 mmol/L 21  --  23 26 20* 21   ANION GAP mmol/L 8  --  9 7 14* 16*   BUN mg/dL 15  --  19 22 30* 33*   CREATININE mg/dL 0.51*  --  0.61 0.66 0.68 0.86   EGFR ml/min/1.73sq m 112  --  104 101 100 90   CALCIUM mg/dL 8.2*  --  7.4* 7.4* 8.2* 8.2*   CALCIUM, IONIZED mmol/L  --  1.06*  --   --   --   --    MAGNESIUM mg/dL 2.2  --  2.5 2.3 2.2 2.3   PHOSPHORUS mg/dL 3.0  --  3.3 3.1 2.1* 1.9*     Results from last 7 days   Lab Units 03/27/24  1135   AST U/L 20   ALT U/L 18   ALK PHOS U/L 115*   TOTAL PROTEIN g/dL 7.5   ALBUMIN g/dL 3.8   TOTAL BILIRUBIN mg/dL 0.75     Results from last 7 days   Lab Units 03/28/24  1213 03/28/24  1005 03/28/24  0823 03/28/24  0614 03/28/24  0511 03/28/24  0408 03/28/24  0309 03/28/24  0202 03/28/24  0058 03/28/24  0004 03/27/24  2256 03/27/24  2209   POC GLUCOSE mg/dl 220* 249* 268* 193* 126 99 86 105 130 153* 142* 117     Results from last 7 days   Lab Units 03/28/24  0813 03/28/24  0411 03/28/24  0000 03/27/24  1912 03/27/24  1607 03/27/24  1135   GLUCOSE RANDOM mg/dL 208* 86 142* 198* 318* 538*         Results from last 7 days   Lab Units 03/27/24  1135   HEMOGLOBIN A1C % 16.2*   EAG mg/dl 418     Beta- Hydroxybutyrate   Date Value  Ref Range Status   03/27/2024 8.96 (H) 0.02 - 0.27 mmol/L Final          Results from last 7 days   Lab Units 03/27/24  1135   PH SEAN  7.331   PCO2 SEAN mm Hg 28.8*   PO2 SEAN mm Hg 38.5   HCO3 SEAN mmol/L 14.9*   BASE EXC SEAN mmol/L -9.2   O2 CONTENT SEAN ml/dL 17.6   O2 HGB, VENOUS % 70.2         Results from last 7 days   Lab Units 03/27/24  1607   CK TOTAL U/L 46     Results from last 7 days   Lab Units 03/27/24  1539 03/27/24  1432 03/27/24  1135   HS TNI 0HR ng/L  --   --  22   HS TNI 2HR ng/L  --  26  --    HSTNI D2 ng/L  --  4  --    HS TNI 4HR ng/L 27  --   --    HSTNI D4 ng/L 5  --   --        Results from last 7 days   Lab Units 03/27/24  1607   PROCALCITONIN ng/ml 0.09     Results from last 7 days   Lab Units 03/27/24  1143   LACTIC ACID mmol/L 1.7             Results from last 7 days   Lab Units 03/27/24  1607   BNP pg/mL 230*       Results from last 7 days   Lab Units 03/27/24  1607   HEP B S AG  Non-reactive   HEP C AB  Non-reactive   HEP B C IGM  Non-reactive     Results from last 7 days   Lab Units 03/27/24  1135   LIPASE u/L <6*     Results from last 7 days   Lab Units 03/27/24  1607   CRP mg/L 14.1*             Results from last 7 days   Lab Units 03/27/24  1231   CLARITY UA  Clear   COLOR UA  Colorless   SPEC GRAV UA  1.022   PH UA  5.0   GLUCOSE UA mg/dl >=1000 (1%)*   KETONES UA mg/dl 100 (3+)*   BLOOD UA  Trace*   PROTEIN UA mg/dl Trace*   NITRITE UA  Negative   BILIRUBIN UA  Negative   UROBILINOGEN UA (BE) mg/dl <2.0   LEUKOCYTES UA  Elevated glucose may cause decreased leukocyte values. See urine microscopic for UWBC result*   WBC UA /hpf None Seen   RBC UA /hpf 1-2   BACTERIA UA /hpf None Seen   EPITHELIAL CELLS WET PREP /hpf None Seen             Results from last 7 days   Lab Units 03/27/24  1539   AMPH/METH  Negative   BARBITURATE UR  Negative   BENZODIAZEPINE UR  Negative   COCAINE UR  Negative   METHADONE URINE  Positive*   OPIATE UR  Negative   PCP UR  Negative   THC UR  Negative        Results from last 7 days   Lab Units 03/27/24  1607 03/27/24  1539   BLOOD CULTURE  Received in Microbiology Lab. Culture in Progress. Received in Microbiology Lab. Culture in Progress.       ED Treatment:   Medication Administration from 03/27/2024 1105 to 03/27/2024 1646         Date/Time Order Dose Route Action     03/27/2024 1151 EDT multi-electrolyte (ISOLYTE-S PH 7.4) bolus 1,000 mL 1,000 mL Intravenous New Bag     03/27/2024 1150 EDT multi-electrolyte (ISOLYTE-S PH 7.4) bolus 1,000 mL 1,000 mL Intravenous New Bag     03/27/2024 1340 EDT multi-electrolyte (ISOLYTE-S PH 7.4) bolus 1,000 mL 1,000 mL Intravenous New Bag     03/27/2024 1336 EDT metoprolol (LOPRESSOR) injection 5 mg 5 mg Intravenous Given     03/27/2024 1408 EDT insulin regular (HumuLIN R,NovoLIN R) 1 Units/mL in sodium chloride 0.9 % 100 mL infusion 6.5 Units/hr Intravenous New Bag     03/27/2024 1407 EDT potassium chloride 20 mEq IVPB (premix) 20 mEq Intravenous New Bag     03/27/2024 1431 EDT metoprolol (LOPRESSOR) injection 5 mg 5 mg Intravenous Given          Past Medical History:   Diagnosis Date    Cough 2/4/2019    Diabetes mellitus (HCC)     Methadone dependence ([facility]) 02/16/2022    Migraines 02/16/2022     Present on Admission:   Methadone dependence (HCC)   Chronic obstructive pulmonary disease ([facility])      Admitting Diagnosis: Atrial flutter ([facility]) [I48.92]  DKA (diabetic ketoacidosis) ([facility]) [E11.10]  Elevated blood sugar [R73.9]  Age/Sex: 63 y.o. male    Admission Orders:    Continuous Cardiopulmonary monitoring;   routine vs,  consult acute Pain Service,   hourly VS/accuck ,  q4H BMP's,  neuro ck q shift,  I/O q2H      Scheduled Medications:  chlorhexidine, 15 mL, Mouth/Throat, Q12H MITCHELL  gabapentin, 100 mg, Oral, TID  heparin (porcine), 5,000 Units, Subcutaneous, Q8H MITCHELL  insulin glargine, 14 Units, Subcutaneous, QAM  insulin lispro, 1-5 Units, Subcutaneous, TID AC  insulin lispro, 1-5 Units, Subcutaneous, HS  [START ON 3/29/2024]  insulin lispro, 5 Units, Subcutaneous, Daily With Breakfast  insulin lispro, 5 Units, Subcutaneous, Daily With Lunch  insulin lispro, 5 Units, Subcutaneous, Daily With Dinner  ipratropium, 0.5 mg, Nebulization, 4x Daily      Continuous IV Infusions:  insulin regular (HumuLIN R,NovoLIN R) 1 Units/mL in sodium chloride 0.9 % 100 mL infusion, 0.3-21 Units/hr, Intravenous, Titrated      PRN Meds:  acetaminophen, 650 mg, Oral, Q6H PRN  albuterol, 1 puff, Inhalation, Q4H PRN  polyethylene glycol, 17 g, Oral, Daily PRN  senna-docusate sodium, 1 tablet, Oral, Daily PRN  sodium chloride (PF), 3 mL, Intravenous, Q1H PRN        IP CONSULT TO CASE MANAGEMENT  IP CONSULT TO PODIATRY  IP CONSULT TO ACUTE PAIN SERVICE    Network Utilization Review Department  ATTENTION: Please call with any questions or concerns to [phone] and carefully listen to the prompts so that you are directed to the right person. All voicemails are confidential.   For Discharge needs, contact Care Management DC Support Team at [phone] opt. [phone]  Send all requests for admission clinical reviews, approved or denied determinations and any other requests to dedicated fax number below belonging to the campus where the patient is receiving treatment. List of dedicated fax numbers for the Facilities:  FACILITY NAME UR FAX NUMBER   ADMISSION DENIALS (Administrative/Medical Necessity) [phone]   DISCHARGE SUPPORT TEAM (NETWORK) [phone]   PARENT CHILD HEALTH (Maternity/NICU/Pediatrics) [phone]   [facility] [phone]   [facility] [phone]   [facility] [phone]   [facility] [phone]   [facility] [phone]   [facility] [phone]   [facility] [phone]   [facility] [phone]   [facility]  HEART [place] [phone]   Pending sale to [facility] [phone]   [facility] [phone]   [facility] [phone]

## 2024-03-28 NOTE — RESPIRATORY THERAPY NOTE
RT Protocol Note  Simone Reyes 63 y.o. male MRN: [id number]  Unit/Bed#: [facility] 207-01 Encounter: [id number]    Assessment    Active Problems:    Type 2 diabetes mellitus, without long-term current use of insulin (HCC)    Chronic obstructive pulmonary disease (HCC)    Methadone dependence (HCC)    DKA (diabetic ketoacidosis) (HCC)    Atrial flutter (HCC)      Home Pulmonary Medications:  Advair, albuterol       Past Medical History:   Diagnosis Date    Cough 2/4/2019    Diabetes mellitus (HCC)     Methadone dependence (HCC) 02/16/2022    Migraines 02/16/2022     Social History     Socioeconomic History    Marital status: Registered Domestic Partner     Spouse name: Missy    Number of children: 3    Years of education: None    Highest education level: None   Occupational History    Occupation: unemployed/disabled   Tobacco Use    Smoking status: Every Day     Current packs/day: 0.50     Types: Cigarettes    Smokeless tobacco: Never    Tobacco comments:     half a pack every other day   Vaping Use    Vaping status: Never Used   Substance and Sexual Activity    Alcohol use: Not Currently     Comment: rarely    Drug use: Yes     Types: Marijuana, Heroin, Fentanyl     Comment: currently smoking marijuana and occasional heroin, one bag a week    Sexual activity: Never   Other Topics Concern    None   Social History Narrative    None     Social Determinants of Health     Financial Resource Strain: Low Risk  (12/7/2023)    Overall Financial Resource Strain (CARDIA)     Difficulty of Paying Living Expenses: Not hard at all   Food Insecurity: No Food Insecurity (12/7/2023)    Hunger Vital Sign     Worried About Running Out of Food in the Last Year: Never true     Ran Out of Food in the Last Year: Never true   Transportation Needs: No Transportation Needs (12/7/2023)    PRAPARE - Transportation     Lack of Transportation (Medical): No     Lack of Transportation (Non-Medical): No   Physical Activity: Sufficiently Active (4/30/2021)  "   Exercise Vital Sign     Days of Exercise per Week: 7 days     Minutes of Exercise per Session: 60 min   Stress: No Stress Concern Present (4/30/2021)    [employer] - Occupational Stress Questionnaire     Feeling of Stress : Not at all   Social Connections: Moderately Isolated (4/30/2021)    Social Connection and Isolation Panel [NHANES]     Frequency of Communication with Friends and Family: More than three times a week     Frequency of Social Gatherings with Friends and Family: More than three times a week     Attends [employer] Services: Never     Active Member of Clubs or Organizations: No     Attends Club or Organization Meetings: Never     Marital Status: Living with partner   Intimate Partner Violence: Not At Risk (4/30/2021)    Humiliation, Afraid, Rape, and Kick questionnaire     Fear of Current or Ex-Partner: No     Emotionally Abused: No     Physically Abused: No     Sexually Abused: No   Housing Stability: Not on file       Subjective         Objective    Physical Exam:   Assessment Type: Assess only  General Appearance: Drowsy, Sleeping  Respiratory Pattern: Normal  Chest Assessment: Chest expansion symmetrical  Bilateral Breath Sounds: Diminished, Clear    Vitals:  Blood pressure 161/63, pulse 88, temperature 98 °F (36.7 °C), temperature source Oral, resp. rate 21, height 5' 9\" (1.753 m), weight 55.5 kg (122 lb 6.4 oz), SpO2 99%.          Imaging and other studies: I have personally reviewed pertinent reports.            Plan    Respiratory Plan: (P) Home Bronchodilator Patient pathway, Discontinue Protocol        Resp Comments: pt assessed at this time for respiratory protocol. pt has hx of COPD  and states he uses an albuterol mdi1x daily. it is also listed that advair is ordered for the pt.  pt is currently in NAD. will dc atrovent order and order 1x daily albuterol mdi and make note for md to order advair.   "

## 2024-03-29 PROBLEM — E11.10 DKA (DIABETIC KETOACIDOSIS) (HCC): Status: RESOLVED | Noted: 2024-03-28 | Resolved: 2024-03-29

## 2024-03-29 LAB
ANION GAP SERPL CALCULATED.3IONS-SCNC: 8 MMOL/L (ref 4–13)
BASOPHILS # BLD AUTO: 0.02 THOUSANDS/ÂΜL (ref 0–0.1)
BASOPHILS NFR BLD AUTO: 0 % (ref 0–1)
BUN SERPL-MCNC: 14 MG/DL (ref 5–25)
CA-I BLD-SCNC: 1.14 MMOL/L (ref 1.12–1.32)
CALCIUM SERPL-MCNC: 8.1 MG/DL (ref 8.4–10.2)
CHLORIDE SERPL-SCNC: 100 MMOL/L (ref 96–108)
CO2 SERPL-SCNC: 26 MMOL/L (ref 21–32)
CREAT SERPL-MCNC: 0.62 MG/DL (ref 0.6–1.3)
EOSINOPHIL # BLD AUTO: 0.08 THOUSAND/ÂΜL (ref 0–0.61)
EOSINOPHIL NFR BLD AUTO: 1 % (ref 0–6)
ERYTHROCYTE [DISTWIDTH] IN BLOOD BY AUTOMATED COUNT: 11.9 % (ref 11.6–15.1)
GFR SERPL CREATININE-BSD FRML MDRD: 104 ML/MIN/1.73SQ M
GLUCOSE SERPL-MCNC: 247 MG/DL (ref 65–140)
GLUCOSE SERPL-MCNC: 251 MG/DL (ref 65–140)
GLUCOSE SERPL-MCNC: 268 MG/DL (ref 65–140)
GLUCOSE SERPL-MCNC: 276 MG/DL (ref 65–140)
GLUCOSE SERPL-MCNC: 290 MG/DL (ref 65–140)
GLUCOSE SERPL-MCNC: 396 MG/DL (ref 65–140)
GLUCOSE SERPL-MCNC: 432 MG/DL (ref 65–140)
HCT VFR BLD AUTO: 40.9 % (ref 36.5–49.3)
HGB BLD-MCNC: 14.3 G/DL (ref 12–17)
IMM GRANULOCYTES # BLD AUTO: 0.05 THOUSAND/UL (ref 0–0.2)
IMM GRANULOCYTES NFR BLD AUTO: 1 % (ref 0–2)
LYMPHOCYTES # BLD AUTO: 2.08 THOUSANDS/ÂΜL (ref 0.6–4.47)
LYMPHOCYTES NFR BLD AUTO: 27 % (ref 14–44)
MAGNESIUM SERPL-MCNC: 2 MG/DL (ref 1.9–2.7)
MCH RBC QN AUTO: 31.5 PG (ref 26.8–34.3)
MCHC RBC AUTO-ENTMCNC: 35 G/DL (ref 31.4–37.4)
MCV RBC AUTO: 90 FL (ref 82–98)
MONOCYTES # BLD AUTO: 0.55 THOUSAND/ÂΜL (ref 0.17–1.22)
MONOCYTES NFR BLD AUTO: 7 % (ref 4–12)
NEUTROPHILS # BLD AUTO: 5.04 THOUSANDS/ÂΜL (ref 1.85–7.62)
NEUTS SEG NFR BLD AUTO: 64 % (ref 43–75)
NRBC BLD AUTO-RTO: 0 /100 WBCS
PHOSPHATE SERPL-MCNC: 3.1 MG/DL (ref 2.3–4.1)
PLATELET # BLD AUTO: 167 THOUSANDS/UL (ref 149–390)
PMV BLD AUTO: 9.4 FL (ref 8.9–12.7)
POTASSIUM SERPL-SCNC: 4.4 MMOL/L (ref 3.5–5.3)
RBC # BLD AUTO: 4.54 MILLION/UL (ref 3.88–5.62)
SODIUM SERPL-SCNC: 134 MMOL/L (ref 135–147)
WBC # BLD AUTO: 7.82 THOUSAND/UL (ref 4.31–10.16)

## 2024-03-29 PROCEDURE — 82948 REAGENT STRIP/BLOOD GLUCOSE: CPT

## 2024-03-29 PROCEDURE — 82330 ASSAY OF CALCIUM: CPT

## 2024-03-29 PROCEDURE — 84100 ASSAY OF PHOSPHORUS: CPT

## 2024-03-29 PROCEDURE — 83735 ASSAY OF MAGNESIUM: CPT

## 2024-03-29 PROCEDURE — 80048 BASIC METABOLIC PNL TOTAL CA: CPT

## 2024-03-29 PROCEDURE — 85025 COMPLETE CBC W/AUTO DIFF WBC: CPT

## 2024-03-29 RX ORDER — METHADONE HYDROCHLORIDE 10 MG/ML
135 CONCENTRATE ORAL DAILY
Status: DISCONTINUED | OUTPATIENT
Start: 2024-03-29 | End: 2024-03-31 | Stop reason: HOSPADM

## 2024-03-29 RX ORDER — INSULIN GLARGINE 100 [IU]/ML
40 INJECTION, SOLUTION SUBCUTANEOUS
Status: DISCONTINUED | OUTPATIENT
Start: 2024-03-29 | End: 2024-03-30

## 2024-03-29 RX ORDER — INSULIN LISPRO 100 [IU]/ML
8 INJECTION, SOLUTION INTRAVENOUS; SUBCUTANEOUS
Status: DISCONTINUED | OUTPATIENT
Start: 2024-03-30 | End: 2024-03-29

## 2024-03-29 RX ORDER — INSULIN GLARGINE 100 [IU]/ML
25 INJECTION, SOLUTION SUBCUTANEOUS EVERY MORNING
Status: DISCONTINUED | OUTPATIENT
Start: 2024-03-29 | End: 2024-03-29

## 2024-03-29 RX ORDER — INSULIN LISPRO 100 [IU]/ML
6 INJECTION, SOLUTION INTRAVENOUS; SUBCUTANEOUS
Status: DISCONTINUED | OUTPATIENT
Start: 2024-03-30 | End: 2024-03-29

## 2024-03-29 RX ORDER — INSULIN LISPRO 100 [IU]/ML
8 INJECTION, SOLUTION INTRAVENOUS; SUBCUTANEOUS
Status: DISCONTINUED | OUTPATIENT
Start: 2024-03-29 | End: 2024-03-29

## 2024-03-29 RX ORDER — INSULIN LISPRO 100 [IU]/ML
6 INJECTION, SOLUTION INTRAVENOUS; SUBCUTANEOUS
Status: COMPLETED | OUTPATIENT
Start: 2024-03-29 | End: 2024-03-29

## 2024-03-29 RX ORDER — INSULIN LISPRO 100 [IU]/ML
6 INJECTION, SOLUTION INTRAVENOUS; SUBCUTANEOUS
Status: DISCONTINUED | OUTPATIENT
Start: 2024-03-29 | End: 2024-03-29

## 2024-03-29 RX ORDER — HEPARIN SODIUM 5000 [USP'U]/ML
5000 INJECTION, SOLUTION INTRAVENOUS; SUBCUTANEOUS EVERY 8 HOURS SCHEDULED
Status: DISCONTINUED | OUTPATIENT
Start: 2024-03-29 | End: 2024-03-31 | Stop reason: HOSPADM

## 2024-03-29 RX ORDER — INSULIN LISPRO 100 [IU]/ML
1-5 INJECTION, SOLUTION INTRAVENOUS; SUBCUTANEOUS
Status: DISCONTINUED | OUTPATIENT
Start: 2024-03-29 | End: 2024-03-31 | Stop reason: HOSPADM

## 2024-03-29 RX ORDER — INSULIN GLARGINE 100 [IU]/ML
20 INJECTION, SOLUTION SUBCUTANEOUS EVERY MORNING
Status: DISCONTINUED | OUTPATIENT
Start: 2024-03-29 | End: 2024-03-29

## 2024-03-29 RX ORDER — INSULIN LISPRO 100 [IU]/ML
1-5 INJECTION, SOLUTION INTRAVENOUS; SUBCUTANEOUS
Status: DISCONTINUED | OUTPATIENT
Start: 2024-03-29 | End: 2024-03-29

## 2024-03-29 RX ORDER — INSULIN LISPRO 100 [IU]/ML
1-6 INJECTION, SOLUTION INTRAVENOUS; SUBCUTANEOUS
Status: DISCONTINUED | OUTPATIENT
Start: 2024-03-29 | End: 2024-03-31 | Stop reason: HOSPADM

## 2024-03-29 RX ADMIN — INSULIN LISPRO 6 UNITS: 100 INJECTION, SOLUTION INTRAVENOUS; SUBCUTANEOUS at 10:53

## 2024-03-29 RX ADMIN — CHLORHEXIDINE GLUCONATE 15 ML: 1.2 SOLUTION ORAL at 08:56

## 2024-03-29 RX ADMIN — HEPARIN SODIUM 5000 UNITS: 5000 INJECTION INTRAVENOUS; SUBCUTANEOUS at 13:10

## 2024-03-29 RX ADMIN — INSULIN LISPRO 5 UNITS: 100 INJECTION, SOLUTION INTRAVENOUS; SUBCUTANEOUS at 10:52

## 2024-03-29 RX ADMIN — ALBUTEROL SULFATE 2 PUFF: 90 AEROSOL, METERED RESPIRATORY (INHALATION) at 13:06

## 2024-03-29 RX ADMIN — CHLORHEXIDINE GLUCONATE 15 ML: 1.2 SOLUTION ORAL at 20:46

## 2024-03-29 RX ADMIN — SODIUM CHLORIDE 500 ML: 0.9 INJECTION, SOLUTION INTRAVENOUS at 13:49

## 2024-03-29 RX ADMIN — INSULIN LISPRO 4 UNITS: 100 INJECTION, SOLUTION INTRAVENOUS; SUBCUTANEOUS at 17:05

## 2024-03-29 RX ADMIN — INSULIN LISPRO 3 UNITS: 100 INJECTION, SOLUTION INTRAVENOUS; SUBCUTANEOUS at 22:25

## 2024-03-29 RX ADMIN — INSULIN LISPRO 6 UNITS: 100 INJECTION, SOLUTION INTRAVENOUS; SUBCUTANEOUS at 17:04

## 2024-03-29 RX ADMIN — HEPARIN SODIUM 5000 UNITS: 5000 INJECTION INTRAVENOUS; SUBCUTANEOUS at 22:25

## 2024-03-29 RX ADMIN — GABAPENTIN 100 MG: 100 CAPSULE ORAL at 20:46

## 2024-03-29 RX ADMIN — INSULIN GLARGINE 20 UNITS: 100 INJECTION, SOLUTION SUBCUTANEOUS at 08:59

## 2024-03-29 RX ADMIN — GABAPENTIN 100 MG: 100 CAPSULE ORAL at 08:57

## 2024-03-29 RX ADMIN — INSULIN GLARGINE 40 UNITS: 100 INJECTION, SOLUTION SUBCUTANEOUS at 22:25

## 2024-03-29 RX ADMIN — METHADONE HYDROCHLORIDE 135 MG: 10 CONCENTRATE ORAL at 13:48

## 2024-03-29 RX ADMIN — GABAPENTIN 100 MG: 100 CAPSULE ORAL at 17:04

## 2024-03-29 RX ADMIN — HEPARIN SODIUM 5000 UNITS: 5000 INJECTION INTRAVENOUS; SUBCUTANEOUS at 02:50

## 2024-03-29 NOTE — PROGRESS NOTES
INTERNAL MEDICINE RESIDENCY PROGRESS NOTE     Name: Simone Reyes   Age & Sex: 63 y.o. male   MRN: [id number]  Unit/Bed#: [facility] 503-01   Encounter: [id number]  Team: SOD Team A    PATIENT INFORMATION     Name: Simone Reyes   Age & Sex: 63 y.o. male   MRN: [id number]  Hospital Stay Days: 2    ASSESSMENT/PLAN     Active Problems:    Type 2 diabetes mellitus, without long-term current use of insulin (HCC)    Chronic obstructive pulmonary disease (HCC)    Methadone dependence (HCC)    Atrial flutter (HCC)    Severe protein-calorie malnutrition (HCC)      Type 2 diabetes mellitus, without long-term current use of insulin (HCC)  Assessment & Plan  Lab Results   Component Value Date    HGBA1C 16.2 (H) 03/27/2024       Recent Labs     03/29/24  0010 03/29/24  0549 03/29/24  1040 03/29/24  1303   POCGLU 247* 268* 432* 396*       Blood Sugar Average: Last 72 hrs:  (P) 230.2493411252803995    T2DM vs unspecified type per chart review. A1C on admission 16.2; poorly controlled.  Home medications: Lantus 45 units qhs; metformin 500mg once daily  On discharge, given patient uses 45 units of Lantus at home, he will likely need to leave on that dose  Those 45 units will likely be distributed throughout the day.  40 U of Lantus q. morning, and 6 units of lispro TID with meals  Algorithm 3 sliding scale during day  Algorithm 2 sliding scale at bedtime  It would also be reasonable for the patient to follow-up with his PCP, as another medication may be necessary           Atrial flutter (HCC)  Assessment & Plan  Patient found to have 2:1 Atrial flutter in the ED. Patient has no history of A-fib/flutter per chart review.   Received 10mg lopressor x2 in the ED without improvement     Plan:  Thought to be provoked in setting of DKA and profound dehydration; planned to observe patient unless increasingly tachycardic.   Spontaneously resolved the next mornig; now NSR with HR .   Patient has not returned to a flutter since presentation.   Should follow-up with EP outpatient for possible loop recorder    Methadone dependence (HCC)  Assessment & Plan  Hx of current heroin use via insufflation in addition to methadone maintenance therapy. Patient stated he uses 3 bags heroin/day. He has not had any withdrawal symptoms during admission and has not received methadone for the past 24-36 hours. Methadone clinic ([facility] at [phone] ) was called and dose of 135mg daily was confirmed.      Plan:  Continue 135 mg methadone daily  HIV, hepatitis panel negative  UDS positive for Methadone       Chronic obstructive pulmonary disease (HCC)  Assessment & Plan  Hx of COPD not requiring home O2; reports increased sputum production over the last week.      Plan:  Continue home albuterol q4h PRN  Ipratropium nebs q4h        DKA (diabetic ketoacidosis) ([facility]resolved as of 3/29/2024  Assessment & Plan  Lab Results   Component Value Date    HGBA1C 16.2 (H) 03/27/2024       Recent Labs     03/28/24  0823 03/28/24  1005 03/28/24  1213 03/28/24  1406   POCGLU 268* 249* 220* 133       Blood Sugar Average: Last 72 hrs:  (P) 209.6785164929507552    Diagnosis: Insulin-dependent DM; Unspecified vs T2DM per chart review   Patient on 45 units Lantus qhs and 500mg metformin at home   A1c on admission 16.2  Three days of medication non-compliance prior to presenting to the ED  K 4.5 in the ED; Received 3L Isolyte and 20mEq K. Switched to LR with 20mEq K/Liter on admission.   Anion gap closed to 9; closed x2  Plan:   Start carb controlled diet today  Begin basal bolus insulin with 14 Lantus q24h and 5 units Lispro TID with meals  Daily labs  Q6h blood glucose checks once insulin gtt discontinued            Disposition: Discharge pending medical optimization and optimized at home insulin regimen    SUBJECTIVE     Patient seen and examined. No acute events overnight.  Patient was in NAD seated in bed during her encounter.  He endorsed sleeping well, and  denied dizziness, headache, polydipsia, polyphagia, and polyuria.  Says he feels well enough to go home, however, we discussed that we will keep him for another day in order to optimize his at home insulin regimen.    OBJECTIVE     Vitals:    24 2316 24 0600 24 0710 24 1042   BP: 130/79  135/79 137/79   BP Location:   Right arm Right arm   Pulse: (!) 106  (!) 108 (!) 111   Resp:    Temp: 98.5 °F (36.9 °C)  98.5 °F (36.9 °C) 98.6 °F (37 °C)   TempSrc:   Oral Oral   SpO2: 96%  95% 97%   Weight:  56.8 kg (125 lb 3.2 oz)     Height:          Temperature:   Temp (24hrs), Av.4 °F (36.9 °C), Min:98 °F (36.7 °C), Max:98.6 °F (37 °C)    Temperature: 98.6 °F (37 °C)  Intake & Output:  I/O          0701   0700  0701   0700  0701   0700    P.O. 240 777 118    I.V. (mL/kg) 4014.7 (72.3) 1666.3 (29.3)     IV Piggyback 282 473.8     Total Intake(mL/kg) 4536.6 (81.7) 2917.1 (51.4) 118 (2.1)    Urine (mL/kg/hr) 2600 1725 (1.3)     Stool  0     Total Output 2600 1725     Net +1936.6 +1192.1 +118           Unmeasured Stool Occurrence  0 x           Weights:   IBW (Ideal Body Weight): 70.7 kg    Body mass index is 18.49 kg/m².  Weight (last 2 days)       Date/Time Weight    24 0600 56.8 (125.2)    24 0600 55.5 (122.4)    24 1550 62.6 (138)          Physical Exam  Vitals and nursing note reviewed.   Constitutional:       General: He is not in acute distress.     Appearance: Normal appearance. He is not ill-appearing, toxic-appearing or diaphoretic.   Cardiovascular:      Rate and Rhythm: Normal rate and regular rhythm.      Pulses: Normal pulses.      Heart sounds: Normal heart sounds. No murmur heard.     No friction rub. No gallop.   Pulmonary:      Effort: Pulmonary effort is normal. No respiratory distress.      Breath sounds: No stridor. Wheezing present. No rhonchi or rales.   Chest:      Chest wall: No tenderness.   Abdominal:      General:  Abdomen is flat. Bowel sounds are normal. There is no distension.      Palpations: Abdomen is soft. There is no mass.      Tenderness: There is no abdominal tenderness. There is no guarding or rebound.      Hernia: No hernia is present.   Musculoskeletal:      Right lower leg: No edema.      Left lower leg: No edema.   Neurological:      Mental Status: He is alert and oriented to person, place, and time.      Gait: Gait normal.      Deep Tendon Reflexes: Reflexes normal.       LABORATORY DATA     Labs: I have personally reviewed pertinent reports.  Results from last 7 days   Lab Units 03/29/24  0511 03/28/24  0813 03/27/24  1143   WBC Thousand/uL 7.82 11.24* 15.32*   HEMOGLOBIN g/dL 14.3 14.4 16.5   HEMATOCRIT % 40.9 41.7 46.3   PLATELETS Thousands/uL 167 199 269   NEUTROS PCT % 64 79*  --    MONOS PCT % 7 5  --    EOS PCT % 1 1  --       Results from last 7 days   Lab Units 03/29/24  0511 03/28/24  0813 03/28/24  0411 03/27/24  1607 03/27/24  1135   POTASSIUM mmol/L 4.4 4.6 4.4   < > 4.5   CHLORIDE mmol/L 100 103 102   < > 88*   CO2 mmol/L 26 21 23   < > 17*   BUN mg/dL 14 15 19   < > 42*   CREATININE mg/dL 0.62 0.51* 0.61   < > 1.04   CALCIUM mg/dL 8.1* 8.2* 7.4*   < > 9.2   ALK PHOS U/L  --   --   --   --  115*   ALT U/L  --   --   --   --  18   AST U/L  --   --   --   --  20    < > = values in this interval not displayed.     Results from last 7 days   Lab Units 03/29/24  0511 03/28/24  0813 03/28/24  0411   MAGNESIUM mg/dL 2.0 2.2 2.5     Results from last 7 days   Lab Units 03/29/24  0511 03/28/24  0813 03/28/24  0411   PHOSPHORUS mg/dL 3.1 3.0 3.3          Results from last 7 days   Lab Units 03/27/24  1143   LACTIC ACID mmol/L 1.7           IMAGING & DIAGNOSTIC TESTING     Radiology Results: I have personally reviewed pertinent reports.  XR chest 2 views    Result Date: 3/27/2024  Impression: Small pleural effusions at the costophrenic angles. Workstation performed: [id number]     Other Diagnostic Testing: I  "have personally reviewed pertinent reports.    ACTIVE MEDICATIONS     Current Facility-Administered Medications   Medication Dose Route Frequency    acetaminophen (TYLENOL) tablet 650 mg  650 mg Oral Q6H PRN    albuterol (PROVENTIL HFA,VENTOLIN HFA) inhaler 2 puff  2 puff Inhalation After Lunch    albuterol (PROVENTIL HFA,VENTOLIN HFA) inhaler 2 puff  2 puff Inhalation Q4H PRN    chlorhexidine (PERIDEX) 0.12 % oral rinse 15 mL  15 mL Mouth/Throat Q12H [facility]    gabapentin (NEURONTIN) capsule 100 mg  100 mg Oral TID    heparin (porcine) subcutaneous injection 5,000 Units  5,000 Units Subcutaneous Q8H [facility]    insulin glargine (LANTUS) subcutaneous injection 40 Units 0.4 mL  40 Units Subcutaneous HS    insulin lispro (HumALOG/ADMELOG) 100 units/mL subcutaneous injection 1-5 Units  1-5 Units Subcutaneous HS    insulin lispro (HumALOG/ADMELOG) 100 units/mL subcutaneous injection 1-6 Units  1-6 Units Subcutaneous TID AC    insulin lispro (HumALOG/ADMELOG) 100 units/mL subcutaneous injection 6 Units  6 Units Subcutaneous Daily With Dinner    methadone (DOLOPHINE) oral concentrated solution 135 mg  135 mg Oral Daily    polyethylene glycol (MIRALAX) packet 17 g  17 g Oral Daily PRN    senna-docusate sodium (SENOKOT S) 8.6-50 mg per tablet 1 tablet  1 tablet Oral Daily PRN       VTE Pharmacologic Prophylaxis: Heparin  VTE Mechanical Prophylaxis: sequential compression device and foot pump applied    Portions of the record may have been created with voice recognition software.  Occasional wrong word or \"sound a like\" substitutions may have occurred due to the inherent limitations of voice recognition software.  Read the chart carefully and recognize, using context, where substitutions have occurred.  ==  Kenneth Ribera MD  [facility]  Internal Medicine Residency PGY-1      "

## 2024-03-30 PROBLEM — I48.92 ATRIAL FLUTTER (HCC): Status: RESOLVED | Noted: 2024-03-28 | Resolved: 2024-03-30

## 2024-03-30 LAB
ALBUMIN SERPL BCP-MCNC: 3.2 G/DL (ref 3.5–5)
ALP SERPL-CCNC: 71 U/L (ref 34–104)
ALT SERPL W P-5'-P-CCNC: 19 U/L (ref 7–52)
ANION GAP SERPL CALCULATED.3IONS-SCNC: 9 MMOL/L (ref 4–13)
AST SERPL W P-5'-P-CCNC: 24 U/L (ref 13–39)
BASOPHILS # BLD AUTO: 0.04 THOUSANDS/ÂΜL (ref 0–0.1)
BASOPHILS NFR BLD AUTO: 1 % (ref 0–1)
BILIRUB SERPL-MCNC: 0.68 MG/DL (ref 0.2–1)
BUN SERPL-MCNC: 13 MG/DL (ref 5–25)
CALCIUM ALBUM COR SERPL-MCNC: 9.3 MG/DL (ref 8.3–10.1)
CALCIUM SERPL-MCNC: 8.7 MG/DL (ref 8.4–10.2)
CHLORIDE SERPL-SCNC: 100 MMOL/L (ref 96–108)
CO2 SERPL-SCNC: 30 MMOL/L (ref 21–32)
CREAT SERPL-MCNC: 0.6 MG/DL (ref 0.6–1.3)
EOSINOPHIL # BLD AUTO: 0.11 THOUSAND/ÂΜL (ref 0–0.61)
EOSINOPHIL NFR BLD AUTO: 2 % (ref 0–6)
ERYTHROCYTE [DISTWIDTH] IN BLOOD BY AUTOMATED COUNT: 11.9 % (ref 11.6–15.1)
GFR SERPL CREATININE-BSD FRML MDRD: 105 ML/MIN/1.73SQ M
GLUCOSE SERPL-MCNC: 132 MG/DL (ref 65–140)
GLUCOSE SERPL-MCNC: 140 MG/DL (ref 65–140)
GLUCOSE SERPL-MCNC: 217 MG/DL (ref 65–140)
GLUCOSE SERPL-MCNC: 265 MG/DL (ref 65–140)
GLUCOSE SERPL-MCNC: 396 MG/DL (ref 65–140)
HCT VFR BLD AUTO: 42.8 % (ref 36.5–49.3)
HGB BLD-MCNC: 14.6 G/DL (ref 12–17)
IMM GRANULOCYTES # BLD AUTO: 0.04 THOUSAND/UL (ref 0–0.2)
IMM GRANULOCYTES NFR BLD AUTO: 1 % (ref 0–2)
LYMPHOCYTES # BLD AUTO: 2.37 THOUSANDS/ÂΜL (ref 0.6–4.47)
LYMPHOCYTES NFR BLD AUTO: 33 % (ref 14–44)
MCH RBC QN AUTO: 31.5 PG (ref 26.8–34.3)
MCHC RBC AUTO-ENTMCNC: 34.1 G/DL (ref 31.4–37.4)
MCV RBC AUTO: 92 FL (ref 82–98)
MONOCYTES # BLD AUTO: 0.53 THOUSAND/ÂΜL (ref 0.17–1.22)
MONOCYTES NFR BLD AUTO: 7 % (ref 4–12)
NEUTROPHILS # BLD AUTO: 4.12 THOUSANDS/ÂΜL (ref 1.85–7.62)
NEUTS SEG NFR BLD AUTO: 56 % (ref 43–75)
NRBC BLD AUTO-RTO: 0 /100 WBCS
PLATELET # BLD AUTO: 166 THOUSANDS/UL (ref 149–390)
PMV BLD AUTO: 9.8 FL (ref 8.9–12.7)
POTASSIUM SERPL-SCNC: 3.8 MMOL/L (ref 3.5–5.3)
PROT SERPL-MCNC: 6.2 G/DL (ref 6.4–8.4)
RBC # BLD AUTO: 4.63 MILLION/UL (ref 3.88–5.62)
SODIUM SERPL-SCNC: 139 MMOL/L (ref 135–147)
WBC # BLD AUTO: 7.21 THOUSAND/UL (ref 4.31–10.16)

## 2024-03-30 PROCEDURE — 80053 COMPREHEN METABOLIC PANEL: CPT

## 2024-03-30 PROCEDURE — 82948 REAGENT STRIP/BLOOD GLUCOSE: CPT

## 2024-03-30 PROCEDURE — 85025 COMPLETE CBC W/AUTO DIFF WBC: CPT

## 2024-03-30 RX ORDER — METHADONE HYDROCHLORIDE 10 MG/ML
135 CONCENTRATE ORAL ONCE
Qty: 13.5 ML | Refills: 0 | Status: SHIPPED | OUTPATIENT
Start: 2024-03-30 | End: 2024-03-30

## 2024-03-30 RX ORDER — INSULIN GLARGINE 100 [IU]/ML
50 INJECTION, SOLUTION SUBCUTANEOUS
Status: DISCONTINUED | OUTPATIENT
Start: 2024-03-30 | End: 2024-03-31 | Stop reason: HOSPADM

## 2024-03-30 RX ORDER — POTASSIUM CHLORIDE 20 MEQ/1
40 TABLET, EXTENDED RELEASE ORAL ONCE
Status: COMPLETED | OUTPATIENT
Start: 2024-03-30 | End: 2024-03-30

## 2024-03-30 RX ORDER — INSULIN GLARGINE 100 [IU]/ML
10 INJECTION, SOLUTION SUBCUTANEOUS ONCE
Status: COMPLETED | OUTPATIENT
Start: 2024-03-30 | End: 2024-03-30

## 2024-03-30 RX ADMIN — HEPARIN SODIUM 5000 UNITS: 5000 INJECTION INTRAVENOUS; SUBCUTANEOUS at 05:11

## 2024-03-30 RX ADMIN — GABAPENTIN 100 MG: 100 CAPSULE ORAL at 21:22

## 2024-03-30 RX ADMIN — GABAPENTIN 100 MG: 100 CAPSULE ORAL at 16:28

## 2024-03-30 RX ADMIN — SODIUM CHLORIDE 500 ML: 0.9 INJECTION, SOLUTION INTRAVENOUS at 08:24

## 2024-03-30 RX ADMIN — HEPARIN SODIUM 5000 UNITS: 5000 INJECTION INTRAVENOUS; SUBCUTANEOUS at 21:22

## 2024-03-30 RX ADMIN — HEPARIN SODIUM 5000 UNITS: 5000 INJECTION INTRAVENOUS; SUBCUTANEOUS at 14:05

## 2024-03-30 RX ADMIN — GABAPENTIN 100 MG: 100 CAPSULE ORAL at 08:13

## 2024-03-30 RX ADMIN — INSULIN LISPRO 2 UNITS: 100 INJECTION, SOLUTION INTRAVENOUS; SUBCUTANEOUS at 21:23

## 2024-03-30 RX ADMIN — CHLORHEXIDINE GLUCONATE 15 ML: 1.2 SOLUTION ORAL at 08:13

## 2024-03-30 RX ADMIN — METHADONE HYDROCHLORIDE 135 MG: 10 CONCENTRATE ORAL at 08:15

## 2024-03-30 RX ADMIN — INSULIN LISPRO 6 UNITS: 100 INJECTION, SOLUTION INTRAVENOUS; SUBCUTANEOUS at 11:19

## 2024-03-30 RX ADMIN — ALBUTEROL SULFATE 2 PUFF: 90 AEROSOL, METERED RESPIRATORY (INHALATION) at 14:06

## 2024-03-30 RX ADMIN — INSULIN LISPRO 2 UNITS: 100 INJECTION, SOLUTION INTRAVENOUS; SUBCUTANEOUS at 16:28

## 2024-03-30 RX ADMIN — POTASSIUM CHLORIDE 40 MEQ: 1500 TABLET, EXTENDED RELEASE ORAL at 08:13

## 2024-03-30 RX ADMIN — INSULIN GLARGINE 50 UNITS: 100 INJECTION, SOLUTION SUBCUTANEOUS at 21:22

## 2024-03-30 RX ADMIN — INSULIN GLARGINE 10 UNITS: 100 INJECTION, SOLUTION SUBCUTANEOUS at 11:18

## 2024-03-30 RX ADMIN — CHLORHEXIDINE GLUCONATE 15 ML: 1.2 SOLUTION ORAL at 21:22

## 2024-03-30 NOTE — PROGRESS NOTES
INTERNAL MEDICINE RESIDENCY PROGRESS NOTE     Name: Simone Reyes   Age & Sex: 63 y.o. male   MRN: [id number]  [facility]   Encounter: [id number]  Team: SOD Team A    PATIENT INFORMATION     Name: Simone Reyes   Age & Sex: 63 y.o. male   MRN: [id number]  Hospital Stay Days: 3    ASSESSMENT/PLAN     Principal Problem:    Type 2 diabetes mellitus, without long-term current use of insulin (HCC)  Active Problems:    Chronic obstructive pulmonary disease (HCC)    Methadone dependence (HCC)    Severe protein-calorie malnutrition (HCC)      * Type 2 diabetes mellitus, without long-term current use of insulin (HCC)  Assessment & Plan  Lab Results   Component Value Date    HGBA1C 16.2 (H) 03/27/2024       Recent Labs     03/29/24  2026 03/30/24  0656 03/30/24  1028 03/30/24  1556   POCGLU 290* 140 396* 217*       Blood Sugar Average: Last 72 hrs:  (P) 236.0625    T2DM vs unspecified type per chart review. A1C on admission 16.2; poorly controlled.  Home medications: Lantus 45 units qhs; metformin 500mg once daily  Given hemoglobin A1c, plan is to increase at home Lantus from 45 units nightly to 50 units nightly  Currently on  40 U of Lantus q. morning, and 6 units of lispro TID with meals  Algorithm 3 sliding scale during day  Algorithm 2 sliding scale at bedtime  It would also be reasonable for the patient to follow-up with his PCP, as another medication may be necessary           Methadone dependence (HCC)  Assessment & Plan  Hx of current heroin use via insufflation in addition to methadone maintenance therapy. Patient stated he uses 3 bags heroin/day. He has not had any withdrawal symptoms during admission and has not received methadone for the past 24-36 hours. Methadone clinic ([facility] at [phone] ) was called and dose of 135mg daily was confirmed.      Plan:  Continue 135 mg methadone daily  HIV, hepatitis panel negative  UDS positive for Methadone       Chronic obstructive  pulmonary disease (HCC)  Assessment & Plan  Hx of COPD not requiring home O2; reports increased sputum production over the last week.      Plan:  Continue home albuterol q4h PRN  Ipratropium nebs q4h        Atrial flutter (HCC)-resolved as of 3/30/2024  Assessment & Plan  Patient found to have 2:1 Atrial flutter in the ED. Patient has no history of A-fib/flutter per chart review.   Received 10mg lopressor x2 in the ED without improvement     Plan:  Thought to be provoked in setting of DKA and profound dehydration; planned to observe patient unless increasingly tachycardic.   Spontaneously resolved the next mornig; now NSR with HR .   Patient has not returned to a flutter since presentation.  Should follow-up with EP outpatient for possible loop recorder    DKA (diabetic ketoacidosis) (HCC)-resolved as of 3/29/2024  Assessment & Plan  Lab Results   Component Value Date    HGBA1C 16.2 (H) 03/27/2024       Recent Labs     03/28/24  0823 03/28/24  1005 03/28/24  1213 03/28/24  1406   POCGLU 268* 249* 220* 133       Blood Sugar Average: Last 72 hrs:  (P) 209.2758695847534778    Diagnosis: Insulin-dependent DM; Unspecified vs T2DM per chart review   Patient on 45 units Lantus qhs and 500mg metformin at home   A1c on admission 16.2  Three days of medication non-compliance prior to presenting to the ED  K 4.5 in the ED; Received 3L Isolyte and 20mEq K. Switched to LR with 20mEq K/Liter on admission.   Anion gap closed to 9; closed x2  Plan:   Start carb controlled diet today  Begin basal bolus insulin with 14 Lantus q24h and 5 units Lispro TID with meals  Daily labs  Q6h blood glucose checks once insulin gtt discontinued            Disposition: Discharge was originally planned for today, however, patient cannot be discharged in time to  tomorrow's methadone dose at treatment clinic --which is not open on Sundays.  Will be given methadone dose tomorrow morning and discharged with plans of returning to methadone  clinic on Monday.    SUBJECTIVE     Patient seen and examined. No acute events overnight.  Patient was in NAD, and ready to go home.  It was explained to patient that because his methadone clinic does not open on Sundays, we currently do not have a way to discharge him with methadone ([facility] would not fill his prescription and closest [facility] with methadone was in [place], [place]), plan would be to dose him tomorrow morning and discharge him, as he would be able to get continued methadone dose starting Monday.  Patient was agreeable to plan.  Denied polyphagia, polyuria, polydipsia, nausea and vomiting.    OBJECTIVE     Vitals:    24 0741 24 0825 24 1028 24 1504   BP:   105/71 133/72   BP Location:   Left arm Right arm   Pulse:  (!) 109 102 (!) 115   Resp:   18 17   Temp:   98.8 °F (37.1 °C) 98.3 °F (36.8 °C)   TempSrc:   Oral Oral   SpO2: 94%  95% 96%   Weight:       Height:          Temperature:   Temp (24hrs), Av.1 °F (37.3 °C), Min:98.3 °F (36.8 °C), Max:99.5 °F (37.5 °C)    Temperature: 98.3 °F (36.8 °C)  Intake & Output:  I/O          0701   0700  0701   0700  0701   0700    P.O. 777 2094 701    I.V. (mL/kg) 1666.3 (29.3)      IV Piggyback 473.8  500    Total Intake(mL/kg) 2917.1 (51.4) 2094 (37.2) 1201 (21.3)    Urine (mL/kg/hr) 1725 (1.3) 1525 (1.1) 500 (0.8)    Stool 0 0     Total Output 1725 1525 500    Net +1192.1 +569 +701           Unmeasured Urine Occurrence  3 x     Unmeasured Stool Occurrence 0 x 1 x           Weights:   IBW (Ideal Body Weight): 70.7 kg    Body mass index is 18.34 kg/m².  Weight (last 2 days)       Date/Time Weight    24 0500 56.3 (124.2)    24 0600 56.8 (125.2)    24 0600 55.5 (122.4)          Physical Exam  Vitals and nursing note reviewed.   Constitutional:       General: He is not in acute distress.     Appearance: Normal appearance. He is obese. He is not ill-appearing, toxic-appearing or  diaphoretic.   Cardiovascular:      Rate and Rhythm: Normal rate and regular rhythm.      Pulses: Normal pulses.      Heart sounds: Normal heart sounds. No murmur heard.     No friction rub. No gallop.   Pulmonary:      Effort: Pulmonary effort is normal. No respiratory distress.      Breath sounds: Normal breath sounds. No stridor. No wheezing, rhonchi or rales.   Chest:      Chest wall: No tenderness.   Abdominal:      General: Abdomen is flat. Bowel sounds are normal. There is no distension.      Palpations: Abdomen is soft. There is no mass.      Tenderness: There is no abdominal tenderness. There is no right CVA tenderness, left CVA tenderness, guarding or rebound.      Hernia: No hernia is present.   Musculoskeletal:      Right lower leg: No edema.      Left lower leg: No edema.   Skin:     Coloration: Skin is not jaundiced or pale.      Findings: No bruising, erythema, lesion or rash.   Neurological:      Mental Status: He is alert.       LABORATORY DATA     Labs: I have personally reviewed pertinent reports.  Results from last 7 days   Lab Units 03/30/24  0619 03/29/24  0511 03/28/24  0813   WBC Thousand/uL 7.21 7.82 11.24*   HEMOGLOBIN g/dL 14.6 14.3 14.4   HEMATOCRIT % 42.8 40.9 41.7   PLATELETS Thousands/uL 166 167 199   NEUTROS PCT % 56 64 79*   MONOS PCT % 7 7 5   EOS PCT % 2 1 1      Results from last 7 days   Lab Units 03/30/24  0619 03/29/24  0511 03/28/24  0813 03/27/24  1607 03/27/24  1135   POTASSIUM mmol/L 3.8 4.4 4.6   < > 4.5   CHLORIDE mmol/L 100 100 103   < > 88*   CO2 mmol/L 30 26 21   < > 17*   BUN mg/dL 13 14 15   < > 42*   CREATININE mg/dL 0.60 0.62 0.51*   < > 1.04   CALCIUM mg/dL 8.7 8.1* 8.2*   < > 9.2   ALK PHOS U/L 71  --   --   --  115*   ALT U/L 19  --   --   --  18   AST U/L 24  --   --   --  20    < > = values in this interval not displayed.     Results from last 7 days   Lab Units 03/29/24  0511 03/28/24  0813 03/28/24  0411   MAGNESIUM mg/dL 2.0 2.2 2.5     Results from last 7  "days   Lab Units 03/29/24  0511 03/28/24  0813 03/28/24  0411   PHOSPHORUS mg/dL 3.1 3.0 3.3          Results from last 7 days   Lab Units 03/27/24  1143   LACTIC ACID mmol/L 1.7           IMAGING & DIAGNOSTIC TESTING     Radiology Results: I have personally reviewed pertinent reports.  XR chest 2 views    Result Date: 3/27/2024  Impression: Small pleural effusions at the costophrenic angles. Workstation performed: [id number]     Other Diagnostic Testing: I have personally reviewed pertinent reports.    ACTIVE MEDICATIONS     Current Facility-Administered Medications   Medication Dose Route Frequency    acetaminophen (TYLENOL) tablet 650 mg  650 mg Oral Q6H PRN    albuterol (PROVENTIL HFA,VENTOLIN HFA) inhaler 2 puff  2 puff Inhalation After Lunch    albuterol (PROVENTIL HFA,VENTOLIN HFA) inhaler 2 puff  2 puff Inhalation Q4H PRN    chlorhexidine (PERIDEX) 0.12 % oral rinse 15 mL  15 mL Mouth/Throat Q12H [facility]    gabapentin (NEURONTIN) capsule 100 mg  100 mg Oral TID    heparin (porcine) subcutaneous injection 5,000 Units  5,000 Units Subcutaneous Q8H [facility]    insulin glargine (LANTUS) subcutaneous injection 50 Units 0.5 mL  50 Units Subcutaneous HS    insulin lispro (HumALOG/ADMELOG) 100 units/mL subcutaneous injection 1-5 Units  1-5 Units Subcutaneous HS    insulin lispro (HumALOG/ADMELOG) 100 units/mL subcutaneous injection 1-6 Units  1-6 Units Subcutaneous TID AC    methadone (DOLOPHINE) oral concentrated solution 135 mg  135 mg Oral Daily    polyethylene glycol (MIRALAX) packet 17 g  17 g Oral Daily PRN    senna-docusate sodium (SENOKOT S) 8.6-50 mg per tablet 1 tablet  1 tablet Oral Daily PRN       VTE Pharmacologic Prophylaxis: Heparin  VTE Mechanical Prophylaxis: sequential compression device    Portions of the record may have been created with voice recognition software.  Occasional wrong word or \"sound a like\" substitutions may have occurred due to the inherent limitations of voice recognition software.  Read " the chart carefully and recognize, using context, where substitutions have occurred.  ==  Kenneth Ribera MD  [facility]  Internal Medicine Residency PGY-1

## 2024-03-30 NOTE — CASE MANAGEMENT
Case Management Discharge Planning Note    Patient name Simone GAMBOA [place]  Location [facility] 503/[facility] 503-01 MRN [id number]  : 1960 Date 3/30/2024       Current Admission Date: 3/27/2024  Current Admission Diagnosis:Type 2 diabetes mellitus, without long-term current use of insulin (HCC)   Patient Active Problem List    Diagnosis Date Noted    Atrial flutter (HCC) 2024    Severe protein-calorie malnutrition (HCC) 2024    Chronic obstructive pulmonary disease (HCC) 2020    Methadone dependence (HCC) 2020    Smoker 2020    Tobacco dependence 2019    Other emphysema (HCC) 2019    Type 2 diabetes mellitus, without long-term current use of insulin (HCC) 2016      LOS (days): 3  Geometric Mean LOS (GMLOS) (days): 3  Days to GMLOS:0.1     OBJECTIVE:  Risk of Unplanned Readmission Score: 14.45         Current admission status: Inpatient   Preferred Pharmacy:   [id number] - [place], [place] - [address]  [address]  [facility] [place] [address]  Phone: [phone] Fax: [phone]    [facility] [place]  [place], [place] - [address]  [address]  [place] [address]  Phone: [phone] Fax: [phone]    Primary Care Provider: Nicholas Blanc DO    [facility]  Secondary Insurance:     DISCHARGE DETAILS:         Additional Comments: Per chart review, pt is not medically cleared for d/c. Discharge pending medical optimization and optimized at home insulin regimen.

## 2024-03-31 VITALS
RESPIRATION RATE: 20 BRPM | HEART RATE: 131 BPM | TEMPERATURE: 98.5 F | OXYGEN SATURATION: 96 % | SYSTOLIC BLOOD PRESSURE: 132 MMHG | BODY MASS INDEX: 18.61 KG/M2 | WEIGHT: 125.66 LBS | HEIGHT: 69 IN | DIASTOLIC BLOOD PRESSURE: 75 MMHG

## 2024-03-31 LAB
GLUCOSE SERPL-MCNC: 167 MG/DL (ref 65–140)
GLUCOSE SERPL-MCNC: 232 MG/DL (ref 65–140)
GLUCOSE SERPL-MCNC: 96 MG/DL (ref 65–140)

## 2024-03-31 PROCEDURE — 82948 REAGENT STRIP/BLOOD GLUCOSE: CPT

## 2024-03-31 RX ORDER — INSULIN GLARGINE 100 [IU]/ML
50 INJECTION, SOLUTION SUBCUTANEOUS
Qty: 15 ML | Refills: 0 | Status: SHIPPED | OUTPATIENT
Start: 2024-03-31 | End: 2024-04-03

## 2024-03-31 RX ORDER — METFORMIN HYDROCHLORIDE 500 MG/1
1000 TABLET, EXTENDED RELEASE ORAL
Qty: 90 TABLET | Refills: 0 | Status: SHIPPED | OUTPATIENT
Start: 2024-03-31 | End: 2024-04-03

## 2024-03-31 RX ADMIN — INSULIN LISPRO 3 UNITS: 100 INJECTION, SOLUTION INTRAVENOUS; SUBCUTANEOUS at 11:09

## 2024-03-31 RX ADMIN — HEPARIN SODIUM 5000 UNITS: 5000 INJECTION INTRAVENOUS; SUBCUTANEOUS at 05:15

## 2024-03-31 RX ADMIN — GABAPENTIN 100 MG: 100 CAPSULE ORAL at 08:51

## 2024-03-31 RX ADMIN — CHLORHEXIDINE GLUCONATE 15 ML: 1.2 SOLUTION ORAL at 08:51

## 2024-03-31 RX ADMIN — METHADONE HYDROCHLORIDE 135 MG: 10 CONCENTRATE ORAL at 08:51

## 2024-03-31 NOTE — DISCHARGE INSTR - AVS FIRST PAGE
Medication Changes  We advise you increase your night-time Lantus to 50 units daily. When you visit your PCP's office for a TCM visit, they may consider adding on meal-time insulin as well. Be careful with your carb intake as this will also affect your diabetes.  Your Metformin will be increased to 1,000 mg daily and may continue to be increased when you see your PCP if you are without GI symptoms    Follow Ups  Schedule a TCM visit with your PCP in 1-2 weeks after discharge  We recommend you schedule an appointment with a Cardiologist for a possible loop recorder given the arrhythmia when you first came to the hospital

## 2024-03-31 NOTE — DISCHARGE SUMMARY
INTERNAL MEDICINE RESIDENCY DISCHARGE SUMMARY     Simone Reyes   63 y.o. male  MRN: [id number]  Room/Bed: [facility] 503/[facility] 503-01     [facility]    Encounter: [id number]    Principal Problem:    Type 2 diabetes mellitus, without long-term current use of insulin (HCC)  Active Problems:    Chronic obstructive pulmonary disease (HCC)    Methadone dependence (HCC)    Severe protein-calorie malnutrition (HCC)      Methadone dependence (HCC)  Assessment & Plan  Hx of current heroin use via insufflation in addition to methadone maintenance therapy. Patient stated he uses 3 bags heroin/day. He has not had any withdrawal symptoms during admission and has not received methadone for the past 24-36 hours. Methadone clinic ([facility] at [phone] ) was called and dose of 135mg daily was confirmed.      Plan:  Continue 135 mg methadone daily  HIV, hepatitis panel negative  UDS positive for Methadone       Chronic obstructive pulmonary disease (HCC)  Assessment & Plan  Hx of COPD not requiring home O2; reports increased sputum production over the last week.      Plan:  Continue home albuterol q4h PRN  Ipratropium nebs q4h        * Type 2 diabetes mellitus, without long-term current use of insulin (HCC)  Assessment & Plan  Lab Results   Component Value Date    HGBA1C 16.2 (H) 03/27/2024       Recent Labs     03/29/24 2026 03/30/24  0656 03/30/24  1028 03/30/24  1556   POCGLU 290* 140 396* 217*       Blood Sugar Average: Last 72 hrs:  (P) 236.0625    T2DM vs unspecified type per chart review. A1C on admission 16.2; poorly controlled.  Home medications: Lantus 45 units qhs; metformin 500mg once daily  Given hemoglobin A1c, plan is to increase at home Lantus from 45 units nightly to 50 units nightly  Currently on  40 U of Lantus q. morning, and 6 units of lispro TID with meals  Algorithm 3 sliding scale during day  Algorithm 2 sliding scale at bedtime  It would also  be reasonable for the patient to follow-up with his PCP, as another medication may be necessary           Atrial flutter (HCC)-resolved as of 3/30/2024  Assessment & Plan  Patient found to have 2:1 Atrial flutter in the ED. Patient has no history of A-fib/flutter per chart review.   Received 10mg lopressor x2 in the ED without improvement    Plan:  Thought to be provoked in setting of DKA and profound dehydration  Advised to follow up with Cardiology for loop recorder  Patient has not returned to a flutter since presentation.     DKA (diabetic ketoacidosis) (HCC)-resolved as of 3/29/2024  Assessment & Plan  Lab Results   Component Value Date    HGBA1C 16.2 (H) 03/27/2024       Recent Labs     03/28/24  0823 03/28/24  1005 03/28/24  1213 03/28/24  1406   POCGLU 268* 249* 220* 133       Blood Sugar Average: Last 72 hrs:  (P) 209.1182322803832442    Diagnosis: Insulin-dependent DM; Unspecified vs T2DM per chart review   Patient on 45 units Lantus qhs and 500mg metformin at home   A1c on admission 16.2     Patient discharged on 50 units Lantus and 1000 mg Metformin        DETAILS OF HOSPITAL COURSE     63-year-old male with COPD, insulin-dependent diabetes, heroin use on methadone scented to the ED with malaise and vomiting and found to be in DKA secondary to medication noncompliance over the last 3 days prior to admission due to feeling unwell with flulike symptoms.  Patient admitted to the ICU for DKA management insulin drip.  Transition to the floor and started on basal insulin.  Patient medically clear for discharge on Sunday after receiving methadone dose and will follow-up [facility] Monday for continued treatment.    Notably during initial presentation patient was found to be in atrial flutter believed to be secondary to DKA. Since then, patient has not had any arrhythmic episodes during hospitalization    Patient seen and examined on day of discharge.  Feels well overall with no acute complaints.   Patient denies fevers, chills, nausea, vomiting, chest pain, shortness of breath, palpitations, abdominal pain, diarrhea.  Patient with good appetite and oral intake.    Discussed need for better diabetic control including lifestyle modifications and likeliness of mealtime insuline or third diabetic agent when he sees his PCP.    Follow-ups  Likely requires either mealtime insulin or third diabetic agent, to be discussed with PCP at TCM visit  Lantus increased to 50 units at bedtime and Metformin 1000 mg daily  Cardiology referral was placed for consideration of loop recorder    Physical Exam  Vitals reviewed.   Constitutional:       General: He is not in acute distress.     Appearance: Normal appearance. He is not ill-appearing.   HENT:      Head: Normocephalic and atraumatic.   Eyes:      Conjunctiva/sclera: Conjunctivae normal.   Cardiovascular:      Rate and Rhythm: Normal rate and regular rhythm.      Pulses: Normal pulses.      Heart sounds: Normal heart sounds. No murmur heard.  Pulmonary:      Effort: Pulmonary effort is normal. No respiratory distress.      Breath sounds: Normal breath sounds. No wheezing, rhonchi or rales.   Abdominal:      General: Abdomen is flat. Bowel sounds are normal. There is no distension.      Palpations: Abdomen is soft.      Tenderness: There is no abdominal tenderness. There is no guarding.   Musculoskeletal:         General: No swelling.      Right lower leg: No edema.      Left lower leg: No edema.   Skin:     General: Skin is warm and dry.      Capillary Refill: Capillary refill takes less than 2 seconds.      Coloration: Skin is not jaundiced.   Neurological:      General: No focal deficit present.      Mental Status: He is alert.   Psychiatric:         Mood and Affect: Mood normal.         Behavior: Behavior normal.           DISCHARGE INFORMATION     PCP at Discharge:  Nicholas Blanc MD    Admitting Provider: Gil Tran MD  Admission Date: 3/27/2024    Discharge  Provider: No att. providers found  Discharge Date: 3/31/2024    Discharge Disposition: Home/Self Care  Discharge Condition: good  Discharge with Lines: no    Discharge Diet: diabetic diet  Activity Restrictions: none  Test Results Pending at Discharge: N/A    Discharge Diagnoses:  Principal Problem:    Type 2 diabetes mellitus, without long-term current use of insulin (HCC)  Active Problems:    Chronic obstructive pulmonary disease (HCC)    Methadone dependence (HCC)    Severe protein-calorie malnutrition (HCC)  Resolved Problems:    DKA (diabetic ketoacidosis) ([facility])    Atrial flutter (HCC)      Consulting Providers:      Diagnostic & Therapeutic Procedures Performed:  XR chest 2 views    Result Date: 3/27/2024  Impression: Small pleural effusions at the costophrenic angles. Workstation performed: [id number]       Code Status: Prior  Advance Directive & Living Will: <no information>  Power of :    POLST:      Medications:  Discharge Medication List as of 3/31/2024 11:49 AM        STOP taking these medications       methadone (DOLOPHINE) 10 mg/mL oral concentrated solution Comments:   Reason for Stopping:             Discharge Medication List as of 3/31/2024 11:49 AM        Discharge Medication List as of 3/31/2024 11:49 AM        CONTINUE these medications which have NOT CHANGED    Details   Advair Diskus 250-50 MCG/ACT inhaler Rinse mouth after use., Normal      albuterol (PROVENTIL HFA,VENTOLIN HFA) 90 mcg/act inhaler INHALE 1 PUFF BY MOUTH EVERY 4 HOURS AS NEEDED FOR WHEEZING, Normal      Blood Glucose Monitoring Suppl KIT Use 1 each as needed (Monitor blood glucose), Starting Thu 12/7/2023, Normal      gabapentin (NEURONTIN) 100 mg capsule Take 1 capsule (100 mg total) by mouth 3 (three) times a day, Starting Thu 12/7/2023, Normal      glucose blood test strip Test sugar 4 times per day, Normal      Insulin Pen Needle 31G X 4 MM MISC Use daily, Starting Thu 12/7/2023, Normal      ketoconazole (NIZORAL) 2  "% cream Apply topically daily, Starting Thu 12/7/2023, Normal      Lancets (OneTouch Delica Plus [id number]) MISC Use to check blood sugar 4 times per day, Normal      methadone (DOLOPHINE) 10 mg tablet Take 80 mg by mouth daily,, Historical Med             Allergies:  Allergies   Allergen Reactions   • Imitrex [Sumatriptan] Hallucinations     Annotation - [id number]: metal taste and spacey   • Immune Globulin      Other reaction(s): Other (See Comments)  Tripping, spacing       FOLLOW-UP     PCP Outpatient Follow-up:  TCM visit in 1-2 weeks    Consulting Providers Follow-up:  Cardiology referral placed      Active Issues Requiring Follow-up:   Atrial flutter episode, Insulin-Dependent Diabetes Mellitus     Discharge Statement:   I spent 45 minutes minutes discharging the patient. This time was spent on the day of discharge. I had direct contact with the patient on the day of discharge. Additional documentation is required if more than 30 minutes were spent on discharge.    Portions of the record may have been created with voice recognition software.  Occasional wrong word or \"sound a like\" substitutions may have occurred due to the inherent limitations of voice recognition software.  Read the chart carefully and recognize, using context, where substitutions have occurred.    ==  Jessy Lovell DO  [facility]  Internal Medicine Resident PGY-3    "

## 2024-04-01 LAB
BACTERIA BLD CULT: NORMAL
BACTERIA BLD CULT: NORMAL

## 2024-04-01 NOTE — UTILIZATION REVIEW
NOTIFICATION OF ADMISSION DISCHARGE   This is a Notification of Discharge from [facility]. Please be advised that this patient has been discharge from our facility. Below you will find the admission and discharge date and time including the patient’s disposition.   UTILIZATION REVIEW CONTACT:  Lenka Bolanos  Utilization   Network Utilization Review Department  Phone: [phone] x carefully listen to the prompts. All voicemails are confidential.  Email: [email or web address]     ADMISSION INFORMATION  PRESENTATION DATE: 3/27/2024 11:12 AM  OBERVATION ADMISSION DATE:   INPATIENT ADMISSION DATE: 3/27/24  3:08 PM   DISCHARGE DATE: 3/31/2024 12:54 PM   DISPOSITION:Home/Self Care    Network Utilization Review Department  ATTENTION: Please call with any questions or concerns to [phone] and carefully listen to the prompts so that you are directed to the right person. All voicemails are confidential.   For Discharge needs, contact Care Management DC Support Team at [phone] opt. 2  Send all requests for admission clinical reviews, approved or denied determinations and any other requests to dedicated fax number below belonging to the campus where the patient is receiving treatment. List of dedicated fax numbers for the Facilities:  FACILITY NAME UR FAX NUMBER   ADMISSION DENIALS (Administrative/Medical Necessity) [phone]   DISCHARGE SUPPORT TEAM ([facility]) [phone]   PARENT CHILD HEALTH (Maternity/NICU/Pediatrics) [phone]   General acute hospital [phone]   [facility] [phone]   [facility] [phone]   [facility] [phone]   [facility] [phone]   [facility] [phone]   [facility] [phone]   [facility] [phone]    [facility] [phone]   [facility] [phone]   [facility] [phone]   [facility] [phone]

## 2024-04-02 ENCOUNTER — TRANSITIONAL CARE MANAGEMENT (OUTPATIENT)
Dept: INTERNAL MEDICINE CLINIC | Facility: CLINIC | Age: 64
End: 2024-04-02

## 2024-04-02 DIAGNOSIS — E11.42 DIABETIC POLYNEUROPATHY ASSOCIATED WITH TYPE 2 DIABETES MELLITUS (HCC): ICD-10-CM

## 2024-04-02 RX ORDER — GABAPENTIN 100 MG/1
100 CAPSULE ORAL 3 TIMES DAILY
Qty: 90 CAPSULE | Refills: 3 | Status: SHIPPED | OUTPATIENT
Start: 2024-04-02

## 2024-04-03 ENCOUNTER — PATIENT OUTREACH (OUTPATIENT)
Dept: INTERNAL MEDICINE CLINIC | Facility: CLINIC | Age: 64
End: 2024-04-03

## 2024-04-03 ENCOUNTER — OFFICE VISIT (OUTPATIENT)
Dept: INTERNAL MEDICINE CLINIC | Facility: CLINIC | Age: 64
End: 2024-04-03

## 2024-04-03 VITALS
HEART RATE: 96 BPM | TEMPERATURE: 97.8 F | BODY MASS INDEX: 20.97 KG/M2 | SYSTOLIC BLOOD PRESSURE: 138 MMHG | WEIGHT: 142 LBS | DIASTOLIC BLOOD PRESSURE: 72 MMHG | OXYGEN SATURATION: 98 %

## 2024-04-03 DIAGNOSIS — R06.02 SHORTNESS OF BREATH: ICD-10-CM

## 2024-04-03 DIAGNOSIS — Z23 NEED FOR VACCINATION AGAINST HEPATITIS A: ICD-10-CM

## 2024-04-03 DIAGNOSIS — E11.69 TYPE 2 DIABETES MELLITUS WITH OTHER SPECIFIED COMPLICATION, WITHOUT LONG-TERM CURRENT USE OF INSULIN (HCC): Primary | ICD-10-CM

## 2024-04-03 DIAGNOSIS — J44.9 CHRONIC OBSTRUCTIVE PULMONARY DISEASE, UNSPECIFIED COPD TYPE (HCC): ICD-10-CM

## 2024-04-03 DIAGNOSIS — Z11.59 ENCOUNTER FOR SCREENING FOR OTHER VIRAL DISEASES: ICD-10-CM

## 2024-04-03 DIAGNOSIS — Z23 NEED FOR HEPATITIS B BOOSTER VACCINATION: ICD-10-CM

## 2024-04-03 DIAGNOSIS — Z71.89 COMPLEX CARE COORDINATION: Primary | ICD-10-CM

## 2024-04-03 DIAGNOSIS — R01.1 SYSTOLIC MURMUR: ICD-10-CM

## 2024-04-03 PROCEDURE — 99496 TRANSJ CARE MGMT HIGH F2F 7D: CPT | Performed by: INTERNAL MEDICINE

## 2024-04-03 PROCEDURE — 99214 OFFICE O/P EST MOD 30 MIN: CPT | Performed by: INTERNAL MEDICINE

## 2024-04-03 RX ORDER — METFORMIN HYDROCHLORIDE 500 MG/1
2000 TABLET, EXTENDED RELEASE ORAL
Qty: 360 TABLET | Refills: 1 | Status: SHIPPED | OUTPATIENT
Start: 2024-04-03

## 2024-04-03 RX ORDER — FLUTICASONE PROPIONATE AND SALMETEROL 50; 250 UG/1; UG/1
POWDER RESPIRATORY (INHALATION)
Qty: 60 BLISTER | Refills: 5 | Status: CANCELLED | OUTPATIENT
Start: 2024-04-03

## 2024-04-03 RX ORDER — INSULIN LISPRO 100 [IU]/ML
5 INJECTION, SOLUTION SUBCUTANEOUS 2 TIMES DAILY WITH MEALS
Qty: 3 ML | Refills: 5 | Status: SHIPPED | OUTPATIENT
Start: 2024-04-03

## 2024-04-03 RX ORDER — INSULIN GLARGINE 100 [IU]/ML
40 INJECTION, SOLUTION SUBCUTANEOUS
Qty: 15 ML | Refills: 3 | Status: SHIPPED | OUTPATIENT
Start: 2024-04-03

## 2024-04-03 NOTE — PROGRESS NOTES
Outpatient Care Management Note:    Re:  direct provider referral - diabetes    Patient at PCP office today for hospital follow up for DKA. Patient was at [facility] from 3/27-3/31/24. Patient has history of COPD, type 2 diabetes, and substance abuse and on Methadone with [facility], A-flutter this admission and to follow up with Cardiology for loop recorder.     Patient's A1C was 16.2% on 3/27/24. Patient on Lantus and to start on Humalog today. Attending Dr. Wall requesting RN care managers help with patient obtaining a CGM.     I met with patient and his son. I explained my role and reason for outreach. I reviewed CGM information and will request provider to send Dexcom G7 to [facility] and patient will use phone as reader. Spoke with patient about diabetic education and how this can be beneficial for him. Patient agreeable to attending and will request for provider to place order.     Reviewed with patient insulin pen and using insulin pen and injecting himself and where to injection himself, rotating sites, and cleaning injection site.     Reviewed food label reading, healthy plate method, general carbohydrate information and provided handouts to him to take and further review.        Patient to return in 2 weeks to follow up with PCP office.     Patient aware of follow up with cardiology on 5/9. Patient to complete echo before. Will request referral specialists to help schedule.      Patient ordered labs today.     Patient does not have any further questions, concerns, or other needs at this time. Patient has my contact number [phone] and PCP office number [phone] if needed. Patient is agreeable for further outreach.     Please see physician note for additional information. Patient to taking Humalog 5 units twice a day with 2 largest meals. Lantus decreased from 50 units to 40 units and Metformin increased to 1000 mg twice a day or 2,000 mg daily.

## 2024-04-03 NOTE — PATIENT INSTRUCTIONS
Take 40 units of LANTUS (Insulin Glargine) once a day at night.    Take 5 units of HUMALOG (Insulin Lispro) twice a day with your two biggest meals (lunch and dinner usually).    Increase your METFORMIN to 2000mg total per day (4 pills) - you can take them all at the same time OR 2 pills in the morning and 2 pills in the afternoon.    Use the glucose monitor to check your blood sugar once a day in the morning before you eat anything, as well as immediately before each meal. Write these numbers down and bring this log with you to your next visit.    Get the ultrasound done of your heart in the next 2-4 weeks (before your visit with the Cardiologist). Call [phone] to schedule this.    Get blood work done when you are able to.

## 2024-04-03 NOTE — PROGRESS NOTES
Assessment & Plan     1. Shortness of breath  2. Chronic obstructive pulmonary disease, unspecified COPD type ([facility])   Will hold off on refilling Advair given atrial flutter on recent admission.    3. Systolic murmur  -     Echo complete w/ contrast if indicated; Future; Expected date: 04/03/2024  Patient with systolic murmur on exam today. No prior echo done so will order that today and have the patient complete it prior to his appointment with Cardiology on 5/9.    4. Type 2 diabetes mellitus with other specified complication, without long-term current use of insulin ([facility])  -     metFORMIN (GLUCOPHAGE-XR) 500 mg 24 hr tablet; Take 4 tablets (2,000 mg total) by mouth daily with dinner  -     One Touch Verio IQ  -     Lancets  -     Glucometer test strips  -     Insulin Glargine Solostar (Lantus SoloStar) 100 UNIT/ML SOPN; Inject 0.4 mL (40 Units total) under the skin daily at bedtime  -     insulin lispro (HumaLOG Ba KwikPen) 100 units/mL injection pen; Inject 5 Units under the skin 2 (two) times a day with meals - take with your 2 largest meals of the day (usually lunch and dinner)  -     Glutamic acid decarboxylase; Future  -     Anti-islet cell antibody; Future  Patient with severely uncontrolled diabetes with recent A1c 16.2. Patient reports compliance with Lantus since hospital discharge. However, the patient is likely noncompliant with his insulin at baseline given his A1c. I stressed the importance of diabetes control as it pertains to his risk for significant comorbidities in the future, which the patient expressed understanding of. Instead of increasing his Lantus dose further vs transitioning to U-300 insulin, patient is agreeable to using meal time insulin. So will decrease Lantus dose to 40 units nightly and provide 5 units of lispro with his 2 largest meals of the day. Additionally, I have increased his metformin dose to 2000mg daily (which can be taken as 1000 BID or 2000 once daily as it is XR).  If his BG control improves, would consider increasing insulin regimen as well as adding SGLT2i in the future. Additionally, will check CELESTE and islet cell antibodies given his lack of family history of diabetes.    5. Need for vaccination against hepatitis A  -     Hepatitis A antibody, total; Future    6. Need for hepatitis B booster vaccination  -     Hepatitis B core antibody, IgM; Future  -     Hepatitis B core antibody, total; Future  -     Hepatitis B surface antigen; Future    7. Encounter for screening for other viral diseases  -     Hepatitis B core antibody, total; Future  -     Hepatitis B surface antigen; Future         Subjective     Transitional Care Management Review:   Simone Reyes is a 63 y.o. male here for TCM follow up.  He has a history of COPD, very poorly controlled insulin-dependent DM2, h/o heroin use on methadone.  Patient initially presented with malaise and vomiting. Found to be in DKA 2/2 med noncompliance prior to admission (acutely due to flu like symptoms but chronic given A1c 16.2). Initially in ICU on insulin drip but transitioned to floors on basal insulin. Of note, patient was in atrial flutter on admission which resolved with treatment of his DKA.  He was discharged on Lantus 50 units nightly as well as metformin 1000 mg daily.  Since discharge, patient has felt okay/at baseline.  Med rec completed with the patient.  He is taking 50 units of Lantus nightly, metformin 500 mg twice daily (1000 mg total daily), gabapentin 100 mg 3 times daily.  He has never taken Advair for his COPD, despite PFTs several years ago showing severe obstructive disease.  He has [facility] since discharge for methadone dosing, for which he is on 135 mg.  Patient has not been able to check his BG at home as his glucometer does not work. Most of today's visit was spent counseling the patient about the importance of better diabetes control and he is agreeable to changing his regimen as noted  above.    Regarding BG control, we discussed increasing metformin XR to max dose of 2000 daily + consideration of SGLT2i in the future given comorbidities (cannot calculate ASCVD given no recent lipid panel, but presumed high as patient actively uses tobacco + severely uncontrolled DM2). May also consider change to Tujeo given need for increasing doses of Lantus and concern for noncompliance preventing reliance on meal time insulin.      During the TCM phone call patient stated:  TCM Call       Date and time call was made  4/2/2024  4:14 PM    Hospital care reviewed  Records reviewed    Patient was hospitialized at  [facility]    Date of Admission  03/27/24    Date of discharge  03/31/24    Diagnosis  Type 2 diabetes mellitus, without long-term current use of insulin (HCC) E11.9    Disposition  Home    Were the patients medications reviewed and updated  Yes    Current Symptoms  None          TCM Call       Should patient be enrolled in anticoag monitoring?  No    Scheduled for follow up?  Yes    Did you obtain your prescribed medications  Yes    Do you need help managing your prescriptions or medications  No    Is transportation to your appointment needed  No    I have advised the patient to call PCP with any new or worsening symptoms  Missy Weaver MA    Have you fallen in the last 12 months  No    Interperter language line needed  No    Counseling  Patient    Counseling topics  instructions for management; Importance of RX compliance            Review of Systems - 12 point review of systems completed and negative unless stated above.    Objective     /72 (BP Location: Right arm, Patient Position: Sitting, Cuff Size: Standard)   Pulse 96   Temp 97.8 °F (36.6 °C) (Temporal)   Wt 64.4 kg (142 lb)   SpO2 98%   BMI 20.97 kg/m²      Physical Exam  Vitals reviewed.   Constitutional:       General: He is awake. He is not in acute distress.     Appearance: He is well-developed.   HENT:      Head:  Normocephalic and atraumatic.   Eyes:      Conjunctiva/sclera: Conjunctivae normal.   Cardiovascular:      Rate and Rhythm: Normal rate and regular rhythm.      Pulses:           Dorsalis pedis pulses are 2+ on the right side and 2+ on the left side.        Posterior tibial pulses are 2+ on the right side and 2+ on the left side.      Heart sounds: S1 normal and S2 normal. Murmur heard.      Systolic murmur is present.   Pulmonary:      Effort: Pulmonary effort is normal. No respiratory distress.      Breath sounds: Normal breath sounds. Decreased air movement present. No wheezing or rales.   Abdominal:      General: Bowel sounds are normal. There is no distension.      Palpations: Abdomen is soft.      Tenderness: There is no abdominal tenderness.   Musculoskeletal:         General: No swelling.      Cervical back: Neck supple.      Right lower leg: No edema.      Left lower leg: No edema.   Skin:     General: Skin is warm and dry.      Capillary Refill: Capillary refill takes less than 2 seconds.   Neurological:      Mental Status: He is alert and oriented to person, place, and time.   Psychiatric:         Mood and Affect: Mood normal.     Medications have been reviewed by provider in current encounter    Gary Hansen DO

## 2024-04-04 DIAGNOSIS — E11.69 TYPE 2 DIABETES MELLITUS WITH OTHER SPECIFIED COMPLICATION, WITHOUT LONG-TERM CURRENT USE OF INSULIN (HCC): Primary | ICD-10-CM

## 2024-04-04 NOTE — PROGRESS NOTES
Assessment/Plan:    No problem-specific Assessment & Plan notes found for this encounter.             Subjective:      Patient ID: Simone Reyes is a 63 y.o. male.    HPI    The following portions of the patient's history were reviewed and updated as appropriate: allergies, current medications, past family history, past medical history, past social history, past surgical history, and problem list.    Review of Systems      Objective:      There were no vitals taken for this visit.         Physical Exam

## 2024-04-05 ENCOUNTER — PATIENT OUTREACH (OUTPATIENT)
Dept: INTERNAL MEDICINE CLINIC | Facility: CLINIC | Age: 64
End: 2024-04-05

## 2024-04-05 DIAGNOSIS — E11.9 TYPE 2 DIABETES MELLITUS WITHOUT COMPLICATION, WITH LONG-TERM CURRENT USE OF INSULIN (HCC): Primary | ICD-10-CM

## 2024-04-05 DIAGNOSIS — Z79.4 TYPE 2 DIABETES MELLITUS WITHOUT COMPLICATION, WITH LONG-TERM CURRENT USE OF INSULIN (HCC): Primary | ICD-10-CM

## 2024-04-05 RX ORDER — ACYCLOVIR 400 MG/1
1 TABLET ORAL ONCE
Qty: 1 EACH | Refills: 0 | Status: SHIPPED | OUTPATIENT
Start: 2024-04-05 | End: 2024-04-05

## 2024-04-05 RX ORDER — ACYCLOVIR 400 MG/1
1 TABLET ORAL
Qty: 3 EACH | Refills: 5 | Status: SHIPPED | OUTPATIENT
Start: 2024-04-05

## 2024-04-08 ENCOUNTER — PATIENT OUTREACH (OUTPATIENT)
Dept: INTERNAL MEDICINE CLINIC | Facility: CLINIC | Age: 64
End: 2024-04-08

## 2024-04-08 NOTE — PROGRESS NOTES
Outpatient Care Management Note:    Re:  Dexcom G7 follow up     I called [facility] pharmacy and Dexcom G7 sensors are covered with a  $0 copay and patient picked them up this morning. I called patient to further follow up. He will have his son help him setup halley on his phone. I give him the Dexcom customer support number and left it in a voicemail [phone], along with my contact number [phone] and upcoming PCP office appointment on Wednesday 4/17 @ 4 pm and to bring phone and medication with him that day. I did make him aware if needed he can come into PCP office for a nurse visit to have Dexcom setup if needed. He reports understanding.     Patient reports he is taking Lantus and has not picked up Humalog from pharmacy but did report he believes it was covered by insurance when there today and will further follow up with pharmacy on why they did not give it to him or if they needed to order it or there was a copay and reports if copay will not be able to  until after the 15th.  Reviewed with him the instructions for Humalog.     Patient reports feeling ok at this time and denies any symptoms or complaints. Patient does not have any further questions, concerns, or other needs at this time. Patient has my contact number [phone] and PCP office number [phone] if needed. Patient is agreeable for further outreach.     I did follow up with pharmacy on Humalog and Humalog is ready to be picked up 15 ml (1 box of 5 pen and to last 140 days) and there is a copay of $11.20.

## 2024-04-08 NOTE — PROGRESS NOTES
Outpatient Care Management Note:    Re:  follow up with Dexcom G7 and diabetic education    Chart reviewed and followed up with  in person who saw patient on 4/3/24 to request that script for Dexcom G7 (CGM) be sent to [facility] pharmacy for patient and that order for diabetic education be placed that patient was agreeable to when I followed up with him in person at appointment on 4/3.

## 2024-04-17 ENCOUNTER — OFFICE VISIT (OUTPATIENT)
Dept: INTERNAL MEDICINE CLINIC | Facility: CLINIC | Age: 64
End: 2024-04-17

## 2024-04-17 VITALS
TEMPERATURE: 97.9 F | SYSTOLIC BLOOD PRESSURE: 128 MMHG | WEIGHT: 146 LBS | OXYGEN SATURATION: 98 % | DIASTOLIC BLOOD PRESSURE: 58 MMHG | BODY MASS INDEX: 21.56 KG/M2 | HEART RATE: 78 BPM

## 2024-04-17 DIAGNOSIS — J44.9 CHRONIC OBSTRUCTIVE PULMONARY DISEASE, UNSPECIFIED COPD TYPE (HCC): ICD-10-CM

## 2024-04-17 DIAGNOSIS — I50.9 ACUTE CONGESTIVE HEART FAILURE, UNSPECIFIED HEART FAILURE TYPE (HCC): Primary | ICD-10-CM

## 2024-04-17 DIAGNOSIS — B35.3 TINEA PEDIS OF BOTH FEET: ICD-10-CM

## 2024-04-17 DIAGNOSIS — E11.69 TYPE 2 DIABETES MELLITUS WITH OTHER SPECIFIED COMPLICATION, WITHOUT LONG-TERM CURRENT USE OF INSULIN (HCC): ICD-10-CM

## 2024-04-17 DIAGNOSIS — E11.42 DIABETIC POLYNEUROPATHY ASSOCIATED WITH TYPE 2 DIABETES MELLITUS (HCC): ICD-10-CM

## 2024-04-17 PROCEDURE — 99214 OFFICE O/P EST MOD 30 MIN: CPT | Performed by: INTERNAL MEDICINE

## 2024-04-17 PROCEDURE — G2211 COMPLEX E/M VISIT ADD ON: HCPCS | Performed by: INTERNAL MEDICINE

## 2024-04-17 RX ORDER — GABAPENTIN 100 MG/1
100 CAPSULE ORAL 3 TIMES DAILY
Qty: 90 CAPSULE | Refills: 3 | Status: SHIPPED | OUTPATIENT
Start: 2024-04-17

## 2024-04-17 RX ORDER — FUROSEMIDE 20 MG/1
20 TABLET ORAL 2 TIMES DAILY
Qty: 30 TABLET | Refills: 0 | Status: SHIPPED | OUTPATIENT
Start: 2024-04-17

## 2024-04-17 RX ORDER — KETOCONAZOLE 20 MG/G
CREAM TOPICAL DAILY
Qty: 15 G | Refills: 0 | Status: SHIPPED | OUTPATIENT
Start: 2024-04-17

## 2024-04-17 RX ORDER — METFORMIN HYDROCHLORIDE 500 MG/1
2000 TABLET, EXTENDED RELEASE ORAL
Qty: 360 TABLET | Refills: 1 | Status: SHIPPED | OUTPATIENT
Start: 2024-04-17

## 2024-04-17 RX ORDER — FLUTICASONE PROPIONATE AND SALMETEROL 50; 250 UG/1; UG/1
POWDER RESPIRATORY (INHALATION)
Qty: 60 BLISTER | Refills: 5 | Status: SHIPPED | OUTPATIENT
Start: 2024-04-17 | End: 2024-04-18 | Stop reason: SDUPTHER

## 2024-04-17 NOTE — ASSESSMENT & PLAN NOTE
"  Lab Results   Component Value Date    HGBA1C 16.2 (H) 03/27/2024   Patient has history of uncontrolled diabetes, was recently hospitalized for DKA.  Last A1c was 16.2% during our last encounter, patient's basal insulin was increased from 50 units nightly to 40 units nightly, and 5 units of bolus insulin was added twice daily with meals.  Patient endorses nonadherence to 5 units of bolus insulin as he thought he had to administer insulin on \"sliding scale.\"  Explained to patient that he does not need to try and follow sliding scale and should only inject the 5 unit of insulin before meals.    Patient claims that he has been checking his blood sugar 3 times daily since her last encounter, and claims that his fasting blood glucose is roughly 1 50-1 80, and his blood sugar in the afternoon and evenings are low 200s.    Patient will have Dexcom monitor placed on arm during this encounter.    Plan  Continue metformin 2000 mg daily  40 units of Lantus nightly, and 5 U of lispro before meals  Given increased risk of euglycemic DKA, will hold off on SLGT2i  "

## 2024-04-17 NOTE — PATIENT INSTRUCTIONS
Please inject 5 UNITS of HumaLog insulin twice per day BEFORE MEALS, only when plate of food is in front of you    Please measure your blood sugar when you wake up, before each meal, and before bed. Do NOT inject Humalog insulin if blood sugar is beneath 70.    Please start taking Lasix (Furosemide) 20 mg on

## 2024-04-17 NOTE — ASSESSMENT & PLAN NOTE
Wt Readings from Last 3 Encounters:   04/17/24 66.2 kg (146 lb)   04/03/24 64.4 kg (142 lb)   03/31/24 57 kg (125 lb 10.6 oz)     Patient was recently hospitalized for DKA, and during hospitalization had 1 run of A-fib in the ICU.  At that time, cardiology commented that this was likely in the setting of glycemic crisis.  Per telemetry during hospitalization, patient did not have any more runs of A-fib.     Today, patient presents with 4 pound weight gain in the past 2 weeks, and on physical exam has bibasilar crackles heard on auscultation, JVD present, and 1+ pitting edema bilateral lower extremities.    Although patient examines clinically hypervolemic, he denies exertional dyspnea, chest pain, palpitations and SOB at rest.    In the setting of uncontrolled diabetes, longstanding COPD, I suspect patient now has acutely reduced ejection fraction.  Patient will obtain echo on 5/1 in order to workup likely reduced ejection fraction, ischemic versus PAH.  Will follow-up with us within 12 days to monitor volume status before echo    Counseled patient on the importance of low-salt diet.    Plan  Start Lasix 20 mg daily  Follow-up with clinic in 12 days  Obtain BMP before follow-up appointment in order to monitor electrolyte status

## 2024-04-17 NOTE — ASSESSMENT & PLAN NOTE
Patient has prolonged history of COPD, in the setting of chronic smoking history.  Patient has decreased smoking to 0.5 packs/week.  Agreed to plan during her next encounter, he will attempt to decrease smoking intake to 1 to 3 cigarettes/week.    Patient COPD currently managed with Advair discus  Patient denies cough, SOB, and dyspnea on exertion    Plan  Continue Advair therapy  Given patient's acute retention of fluid, it is certainly possible patient may have some component of PAH in the setting of prolonged COPD.  Will obtain echo on 5/1

## 2024-04-17 NOTE — PROGRESS NOTES
"ADULT ANNUAL PHYSICAL  [facility]    NAME: Simone Reyes  AGE: 64 y.o. SEX: male  : 1960     DATE: 2024     Assessment and Plan:     Problem List Items Addressed This Visit       Chronic obstructive pulmonary disease (HCC)     Patient has prolonged history of COPD, in the setting of chronic smoking history.  Patient has decreased smoking to 0.5 packs/week.  Agreed to plan during her next encounter, he will attempt to decrease smoking intake to 1 to 3 cigarettes/week.    Patient COPD currently managed with Advair discus  Patient denies cough, SOB, and dyspnea on exertion    Plan  Continue Advair therapy  Given patient's acute retention of fluid, it is certainly possible patient may have some component of PAH in the setting of prolonged COPD.  Will obtain echo on          Relevant Medications    Advair Diskus 250-50 MCG/ACT inhaler    Type 2 diabetes mellitus with other specified complication, without long-term current use of insulin ([facility])       Lab Results   Component Value Date    HGBA1C 16.2 (H) 2024   Patient has history of uncontrolled diabetes, was recently hospitalized for DKA.  Last A1c was 16.2% during our last encounter, patient's basal insulin was increased from 50 units nightly to 40 units nightly, and 5 units of bolus insulin was added twice daily with meals.  Patient endorses nonadherence to 5 units of bolus insulin as he thought he had to administer insulin on \"sliding scale.\"  Explained to patient that he does not need to try and follow sliding scale and should only inject the 5 unit of insulin before meals.    Patient claims that he has been checking his blood sugar 3 times daily since her last encounter, and claims that his fasting blood glucose is roughly 1 50-1 80, and his blood sugar in the afternoon and evenings are low 200s.    Patient will have Dexcom monitor placed on arm during this " encounter.    Plan  Continue metformin 2000 mg daily  40 units of Lantus nightly, and 5 U of lispro before meals  Given increased risk of euglycemic DKA, will hold off on SLGT2i         Relevant Medications    metFORMIN (GLUCOPHAGE-XR) 500 mg 24 hr tablet    Acute congestive heart failure (HCC) - Primary     Wt Readings from Last 3 Encounters:   04/17/24 66.2 kg (146 lb)   04/03/24 64.4 kg (142 lb)   03/31/24 57 kg (125 lb 10.6 oz)     Patient was recently hospitalized for DKA, and during hospitalization had 1 run of A-fib in the ICU.  At that time, cardiology commented that this was likely in the setting of glycemic crisis.  Per telemetry during hospitalization, patient did not have any more runs of A-fib.     Today, patient presents with 4 pound weight gain in the past 2 weeks, and on physical exam has bibasilar crackles heard on auscultation, JVD present, and 1+ pitting edema bilateral lower extremities.    Although patient examines clinically hypervolemic, he denies exertional dyspnea, chest pain, palpitations and SOB at rest.    In the setting of uncontrolled diabetes, longstanding COPD, I suspect patient now has acutely reduced ejection fraction.  Patient will obtain echo on 5/1 in order to workup likely reduced ejection fraction, ischemic versus PAH.  Will follow-up with us within 12 days to monitor volume status before echo    Counseled patient on the importance of low-salt diet.    Plan  Start Lasix 20 mg daily  Follow-up with clinic in 12 days  Obtain BMP before follow-up appointment in order to monitor electrolyte status             Relevant Medications    furosemide (LASIX) 20 mg tablet    Other Relevant Orders    Basic metabolic panel     Other Visit Diagnoses       Diabetic polyneuropathy associated with type 2 diabetes mellitus (HCC)        Relevant Medications    gabapentin (NEURONTIN) 100 mg capsule    metFORMIN (GLUCOPHAGE-XR) 500 mg 24 hr tablet    Tinea pedis of both feet        Relevant  Medications    ketoconazole (NIZORAL) 2 % cream            Immunizations and preventive care screenings were discussed with patient today. Appropriate education was printed on patient's after visit summary.    Discussed risks and benefits of prostate cancer screening. We discussed the controversial history of PSA screening for prostate cancer in the [place] as well as the risk of over detection and over treatment of prostate cancer by way of PSA screening.  The patient understands that PSA blood testing is an imperfect way to screen for prostate cancer and that elevated PSA levels in the blood may also be caused by infection, inflammation, prostatic trauma or manipulation, urological procedures, or by benign prostatic enlargement.    The role of the digital rectal examination in prostate cancer screening was also discussed and I discussed with him that there is large interobserver variability in the findings of digital rectal examination.    Counseling:  Alcohol/drug use: discussed moderation in alcohol intake, the recommendations for healthy alcohol use, and avoidance of illicit drug use.          Return in about 12 days (around 4/29/2024) for Follow up with Dr. Wall on 4/29 to monitor volume status given pt is Lasix naive.     Chief Complaint:     Chief Complaint   Patient presents with    Follow-up    Diabetes      History of Present Illness:     Adult Annual Physical     64-year-old male patient past medical history significant for uncontrolled type 2 diabetes, recently hospitalized for DKA, COPD, heroin abuse on methadone maintenance presents for follow-up visit. Patient has history of uncontrolled diabetes, was recently hospitalized for DKA.  Last A1c was 16.2% during our last encounter, patient's basal insulin was increased from 50 units nightly to 40 units nightly, and 5 units of bolus insulin was added twice daily with meals.  Patient endorses nonadherence to 5 units of bolus insulin as he thought  "he had to administer insulin on \"sliding scale.\"  Explained to patient that he does not need to try and follow sliding scale and should only inject the 5 unit of insulin before meals. Patient claims that he has been checking his blood sugar 3 times daily since her last encounter, and claims that his fasting blood glucose is roughly 1 50-1 80, and his blood sugar in the afternoon and evenings are low 200s.     During hospitalization had 1 run of A-fib in the ICU.  At that time, cardiology commented that this was likely in the setting of glycemic crisis.  Per telemetry during hospitalization, patient did not have any more runs of A-fib. Today, patient presents with 4 pound weight gain in the past 2 weeks, and on physical exam has bibasilar crackles heard on auscultation, JVD present, and 1+ pitting edema bilateral lower extremities. Patient also has holosystolic murmur best heard at PMI. Although patient examines clinically hypervolemic, he denies exertional dyspnea, chest pain, palpitations and SOB at rest. In the setting of uncontrolled diabetes, longstanding COPD, I suspect patient now has acutely reduced ejection fraction.  Patient will obtain echo on 5/1 in order to workup likely reduced ejection fraction, ischemic versus PAH.  Will follow-up with us within 12 days to monitor volume status before echo. Counseled patient on the importance of low-salt diet.    Patient has prolonged history of COPD, in the setting of chronic smoking history.  Patient has decreased smoking to 0.5 packs/week.  Agreed to plan during her next encounter, he will attempt to decrease smoking intake to 1 to 3 cigarettes/week.      Diet and Physical Activity  Diet/Nutrition: poor diet.   Exercise: no formal exercise.      Depression Screening  PHQ-2/9 Depression Screening           General Health  Sleep: gets 7-8 hours of sleep on average.   Hearing: normal - bilateral.  Vision: no vision problems.   Dental: no dental visits for >1 year.   "      Health  Symptoms include: none    Advanced Care Planning  Do you have an advanced directive? no  Do you have a durable medical power of ? no  ACP document given to patient? no     Review of Systems:     Review of Systems   Constitutional:  Negative for activity change, appetite change, chills, diaphoresis, fatigue, fever and unexpected weight change.   HENT: Negative.     Eyes: Negative.    Respiratory:  Negative for apnea, cough, choking, chest tightness, shortness of breath, wheezing and stridor.    Cardiovascular:  Positive for leg swelling. Negative for chest pain and palpitations.   Gastrointestinal:  Negative for abdominal distention, abdominal pain, anal bleeding, blood in stool, constipation, diarrhea, nausea, rectal pain and vomiting.   Endocrine: Negative.    Genitourinary: Negative.       Past Medical History:     Past Medical History:   Diagnosis Date    Cough 2/4/2019    Diabetes mellitus (HCC)     Methadone dependence (HCC) 02/16/2022    Migraines 02/16/2022      Past Surgical History:     Past Surgical History:   Procedure Laterality Date    TONSILLECTOMY        Family History:     Family History   Problem Relation Age of Onset    Thyroid disease Mother     Cancer Mother       Social History:     Social History     Socioeconomic History    Marital status: Registered Domestic Partner     Spouse name: Missy    Number of children: 3    Years of education: None    Highest education level: None   Occupational History    Occupation: unemployed/disabled   Tobacco Use    Smoking status: Every Day     Current packs/day: 0.50     Types: Cigarettes    Smokeless tobacco: Never    Tobacco comments:     half a pack every other day   Vaping Use    Vaping status: Never Used   Substance and Sexual Activity    Alcohol use: Not Currently     Comment: rarely    Drug use: Yes     Types: Marijuana, Heroin, Fentanyl     Comment: currently smoking marijuana and occasional heroin, one bag a week    Sexual  activity: Never   Other Topics Concern    None   Social History Narrative    None     Social Determinants of Health     Financial Resource Strain: Low Risk  (4/3/2024)    Overall Financial Resource Strain (CARDIA)     Difficulty of Paying Living Expenses: Not hard at all   Food Insecurity: Food Insecurity Present (4/3/2024)    Hunger Vital Sign     Worried About Running Out of Food in the Last Year: Sometimes true     Ran Out of Food in the Last Year: Sometimes true   Transportation Needs: No Transportation Needs (3/28/2024)    PRAPARE - Transportation     Lack of Transportation (Medical): No     Lack of Transportation (Non-Medical): No   Physical Activity: Sufficiently Active (4/30/2021)    Exercise Vital Sign     Days of Exercise per Week: 7 days     Minutes of Exercise per Session: 60 min   Stress: No Stress Concern Present (4/30/2021)    [place] [employer] - Occupational Stress Questionnaire     Feeling of Stress : Not at all   Social Connections: Moderately Isolated (4/30/2021)    Social Connection and Isolation Panel [NHANES]     Frequency of Communication with Friends and Family: More than three times a week     Frequency of Social Gatherings with Friends and Family: More than three times a week     Attends [employer] Services: Never     Active Member of Clubs or Organizations: No     Attends Club or Organization Meetings: Never     Marital Status: Living with partner   Intimate Partner Violence: Not At Risk (4/30/2021)    Humiliation, Afraid, Rape, and Kick questionnaire     Fear of Current or Ex-Partner: No     Emotionally Abused: No     Physically Abused: No     Sexually Abused: No   Housing Stability: Low Risk  (3/28/2024)    Housing Stability Vital Sign     Unable to Pay for Housing in the Last Year: No     Number of Places Lived in the Last Year: 1     Unstable Housing in the Last Year: No      Current Medications:     Current Outpatient Medications   Medication Sig Dispense Refill     Advair Diskus 250-50 MCG/ACT inhaler Rinse mouth after use. 60 blister 5    furosemide (LASIX) 20 mg tablet Take 1 tablet (20 mg total) by mouth 2 (two) times a day 30 tablet 0    gabapentin (NEURONTIN) 100 mg capsule Take 1 capsule (100 mg total) by mouth 3 (three) times a day 90 capsule 3    ketoconazole (NIZORAL) 2 % cream Apply topically daily 15 g 0    metFORMIN (GLUCOPHAGE-XR) 500 mg 24 hr tablet Take 4 tablets (2,000 mg total) by mouth daily with dinner 360 tablet 1    albuterol (PROVENTIL HFA,VENTOLIN HFA) 90 mcg/act inhaler INHALE 1 PUFF BY MOUTH EVERY 4 HOURS AS NEEDED FOR WHEEZING 18 g 3    Blood Glucose Monitoring Suppl KIT Use 1 each as needed (Monitor blood glucose) 1 kit 2    Continuous Blood Gluc Sensor (Dexcom G7 Sensor) Use 1 Device every 10 days 3 each 5    glucose blood test strip Test sugar 4 times per day 200 each 2    Insulin Glargine Solostar (Lantus SoloStar) 100 UNIT/ML SOPN Inject 0.4 mL (40 Units total) under the skin daily at bedtime 15 mL 3    insulin lispro (HumaLOG Ba KwikPen) 100 units/mL injection pen Inject 5 Units under the skin 2 (two) times a day with meals - take with your 2 largest meals of the day (usually lunch and dinner) 3 mL 5    Insulin Pen Needle 31G X 4 MM MISC Use daily 100 each 0    Lancets (OneTouch Delica Plus Kodjsk13U) MISC Use to check blood sugar 4 times per day 100 each 5    methadone (DOLOPHINE) 10 mg tablet Take 135 mg by mouth daily,       No current facility-administered medications for this visit.      Allergies:     Allergies   Allergen Reactions    Imitrex [Sumatriptan] Hallucinations     Annotation - [id number]: metal taste and spacey    Immune Globulin      Other reaction(s): Other (See Comments)  Tripping, spacing      Physical Exam:     /58 (BP Location: Right arm, Patient Position: Sitting, Cuff Size: Standard)   Pulse 78   Temp 97.9 °F (36.6 °C) (Temporal)   Wt 66.2 kg (146 lb)   SpO2 98%   BMI 21.56 kg/m²     Physical  Exam  Vitals and nursing note reviewed.   Constitutional:       General: He is not in acute distress.     Appearance: Normal appearance. He is not ill-appearing, toxic-appearing or diaphoretic.   Cardiovascular:      Rate and Rhythm: Normal rate and regular rhythm.      Pulses: Normal pulses.           Dorsalis pedis pulses are 2+ on the right side and 2+ on the left side.        Posterior tibial pulses are 2+ on the right side and 2+ on the left side.      Heart sounds: Murmur heard.      No friction rub. No gallop.   Pulmonary:      Effort: Pulmonary effort is normal. No respiratory distress.      Breath sounds: No stridor. Rales present. No wheezing or rhonchi.   Chest:      Chest wall: No tenderness.   Abdominal:      General: Abdomen is flat. Bowel sounds are normal. There is no distension.      Palpations: Abdomen is soft. There is no mass.      Tenderness: There is no abdominal tenderness. There is no guarding or rebound.      Hernia: No hernia is present.   Musculoskeletal:      Right lower leg: Edema present.      Left lower leg: Edema present.        Feet:    Feet:      Right foot:      Skin integrity: No ulcer, skin breakdown, erythema, warmth, callus or dry skin.      Left foot:      Skin integrity: No ulcer, skin breakdown, erythema, warmth, callus or dry skin.   Neurological:      Mental Status: He is alert.        Patient's shoes and socks removed.    Right Foot/Ankle   Right Foot Inspection  Skin Exam: skin normal. Skin not intact, no dry skin, no warmth, no callus, no erythema, no maceration, no abnormal color, no pre-ulcer, no ulcer and no callus.     Toe Exam: No swelling, no tenderness, erythema and  no right toe deformity    Sensory   Vibration: intact  Proprioception: intact  Monofilament testing: diminished    Vascular  Capillary refills: < 3 seconds  The right DP pulse is 2+. The right PT pulse is 2+.     Left Foot/Ankle  Left Foot Inspection  Skin Exam: skin normal. Skin not intact, no dry  skin, no warmth, no erythema, no maceration, normal color, no pre-ulcer, no ulcer and no callus.     Toe Exam: No swelling, no tenderness, no erythema and no left toe deformity.     Sensory   Vibration: intact  Proprioception: intact  Monofilament testing: diminished    Vascular  Capillary refills: < 3 seconds  The left DP pulse is 2+. The left PT pulse is 2+.            Kenneth Ribera MD  [facility]

## 2024-04-18 ENCOUNTER — CLINICAL SUPPORT (OUTPATIENT)
Dept: INTERNAL MEDICINE CLINIC | Facility: CLINIC | Age: 64
End: 2024-04-18

## 2024-04-18 DIAGNOSIS — E11.69 TYPE 2 DIABETES MELLITUS WITH OTHER SPECIFIED COMPLICATION, WITHOUT LONG-TERM CURRENT USE OF INSULIN (HCC): Primary | ICD-10-CM

## 2024-04-18 DIAGNOSIS — J44.9 CHRONIC OBSTRUCTIVE PULMONARY DISEASE, UNSPECIFIED COPD TYPE (HCC): ICD-10-CM

## 2024-04-18 RX ORDER — FLUTICASONE PROPIONATE AND SALMETEROL 50; 250 UG/1; UG/1
1 POWDER RESPIRATORY (INHALATION) 2 TIMES DAILY
Qty: 60 BLISTER | Refills: 5 | Status: SHIPPED | OUTPATIENT
Start: 2024-04-18

## 2024-04-18 NOTE — TELEPHONE ENCOUNTER
Received call from pharmacy. Pharmacy requesting updated script for his Advair with instructions.     Script updated and resent to pharmacy.

## 2024-04-19 NOTE — PROGRESS NOTES
Patient came in today 04/19/24 as [place] for Dexcom G7 placement.  Education patient on the dexcom G7 works, when and how to place/change sensor. Patient understood and had no questions at this time.    Placed Dexcom G7 sensor on right deltoid. Patient tolerated well.

## 2024-04-26 NOTE — TELEPHONE ENCOUNTER
Patient's Advair approved on Cover My Meds.     Patient's pharmacy updated with prior authorization approval.     No other needs noted.

## 2024-05-01 ENCOUNTER — HOSPITAL ENCOUNTER (OUTPATIENT)
Dept: NON INVASIVE DIAGNOSTICS | Facility: CLINIC | Age: 64
Discharge: HOME | End: 2024-05-01
Payer: COMMERCIAL

## 2024-05-01 VITALS
WEIGHT: 146 LBS | HEART RATE: 95 BPM | SYSTOLIC BLOOD PRESSURE: 128 MMHG | BODY MASS INDEX: 21.62 KG/M2 | HEIGHT: 69 IN | DIASTOLIC BLOOD PRESSURE: 58 MMHG

## 2024-05-01 DIAGNOSIS — R01.1 SYSTOLIC MURMUR: ICD-10-CM

## 2024-05-01 LAB
AORTIC ROOT: 3.7 CM
AORTIC VALVE MEAN VELOCITY: 10 M/S
APICAL FOUR CHAMBER EJECTION FRACTION: 54 %
ASCENDING AORTA: 3 CM
AV AREA BY CONTINUOUS VTI: 2.6 CM2
AV AREA PEAK VELOCITY: 2.3 CM2
AV LVOT MEAN GRADIENT: 2 MMHG
AV LVOT PEAK GRADIENT: 3 MMHG
AV MEAN GRADIENT: 5 MMHG
AV PEAK GRADIENT: 8 MMHG
AV VALVE AREA: 2.55 CM2
AV VELOCITY RATIO: 0.6
BSA FOR ECHO PROCEDURE: 1.81 M2
DOP CALC AO PEAK VEL: 1.44 M/S
DOP CALC AO VTI: 30.73 CM
DOP CALC LVOT AREA: 3.8 CM2
DOP CALC LVOT CARDIAC INDEX: 3.95 L/MIN/M2
DOP CALC LVOT CARDIAC OUTPUT: 7.15 L/MIN
DOP CALC LVOT DIAMETER: 2.2 CM
DOP CALC LVOT PEAK VEL VTI: 20.63 CM
DOP CALC LVOT PEAK VEL: 0.86 M/S
DOP CALC LVOT STROKE INDEX: 43.1 ML/M2
DOP CALC LVOT STROKE VOLUME: 78.38
DOP CALC MV VTI: 35.52 CM
E WAVE DECELERATION TIME: 198 MS
E/A RATIO: 0.66
FRACTIONAL SHORTENING: 24 (ref 28–44)
INTERVENTRICULAR SEPTUM IN DIASTOLE (PARASTERNAL SHORT AXIS VIEW): 1.1 CM
INTERVENTRICULAR SEPTUM: 1.1 CM (ref 0.6–1.1)
LAAS-AP2: 23.6 CM2
LAAS-AP4: 26.8 CM2
LEFT ATRIUM SIZE: 4 CM
LEFT ATRIUM VOLUME (MOD BIPLANE): 91 ML
LEFT ATRIUM VOLUME INDEX (MOD BIPLANE): 50.3 ML/M2
LEFT INTERNAL DIMENSION IN SYSTOLE: 3.4 CM (ref 2.1–4)
LEFT VENTRICULAR INTERNAL DIMENSION IN DIASTOLE: 4.5 CM (ref 3.5–6)
LEFT VENTRICULAR POSTERIOR WALL IN END DIASTOLE: 1.2 CM
LEFT VENTRICULAR STROKE VOLUME: 47 ML
LVSV (TEICH): 47 ML
MITRAL REGURGITATION PEAK VELOCITY: 7.05 M/S
MITRAL VALVE MEAN INFLOW VELOCITY: 5.09 M/S
MITRAL VALVE REGURGITANT PEAK GRADIENT: 199 MMHG
MV E'TISSUE VEL-LAT: 9 CM/S
MV E'TISSUE VEL-SEP: 8 CM/S
MV MEAN GRADIENT: 7 MMHG
MV PEAK A VEL: 1.23 M/S
MV PEAK E VEL: 81 CM/S
MV PEAK GRADIENT: 15 MMHG
MV STENOSIS PRESSURE HALF TIME: 57 MS
MV VALVE AREA BY CONTINUITY EQUATION: 2.21 CM2
MV VALVE AREA P 1/2 METHOD: 3.86
PISA MRMAX VEL: 0.3 M/S
PISA RADIUS: 1.1 CM
RA PRESSURE ESTIMATED: 3 MMHG
RIGHT ATRIUM AREA SYSTOLE A4C: 14.2 CM2
RIGHT VENTRICLE ID DIMENSION: 3.7 CM
RV PSP: 45 MMHG
SL CV DOP CALC MV VTI RETROGRADE: 203.4 CM
SL CV LEFT ATRIUM LENGTH A2C: 5.3 CM
SL CV LV EF: 55
SL CV MV MEAN GRADIENT RETROGRADE: 118 MMHG
SL CV PED ECHO LEFT VENTRICLE DIASTOLIC VOLUME (MOD BIPLANE) 2D: 95 ML
SL CV PED ECHO LEFT VENTRICLE SYSTOLIC VOLUME (MOD BIPLANE) 2D: 47 ML
TR MAX PG: 42 MMHG
TR PEAK VELOCITY: 3.2 M/S
TRICUSPID ANNULAR PLANE SYSTOLIC EXCURSION: 2 CM
TRICUSPID VALVE PEAK REGURGITATION VELOCITY: 3.23 M/S

## 2024-05-01 PROCEDURE — 93306 TTE W/DOPPLER COMPLETE: CPT | Performed by: INTERNAL MEDICINE

## 2024-05-01 PROCEDURE — 93306 TTE W/DOPPLER COMPLETE: CPT

## 2024-06-06 ENCOUNTER — CONSULT (OUTPATIENT)
Dept: CARDIOLOGY CLINIC | Facility: CLINIC | Age: 64
End: 2024-06-06
Payer: COMMERCIAL

## 2024-06-06 VITALS
HEART RATE: 100 BPM | HEIGHT: 69 IN | BODY MASS INDEX: 19.86 KG/M2 | SYSTOLIC BLOOD PRESSURE: 130 MMHG | DIASTOLIC BLOOD PRESSURE: 50 MMHG | WEIGHT: 134.1 LBS

## 2024-06-06 DIAGNOSIS — Z79.4 TYPE 2 DIABETES MELLITUS WITH HYPERGLYCEMIA, WITH LONG-TERM CURRENT USE OF INSULIN (HCC): ICD-10-CM

## 2024-06-06 DIAGNOSIS — E11.65 TYPE 2 DIABETES MELLITUS WITH HYPERGLYCEMIA, WITH LONG-TERM CURRENT USE OF INSULIN (HCC): ICD-10-CM

## 2024-06-06 DIAGNOSIS — I34.0 NONRHEUMATIC MITRAL VALVE REGURGITATION: ICD-10-CM

## 2024-06-06 DIAGNOSIS — I48.92 ATRIAL FLUTTER (HCC): Primary | ICD-10-CM

## 2024-06-06 DIAGNOSIS — I34.1 MITRAL VALVE PROLAPSE: ICD-10-CM

## 2024-06-06 PROCEDURE — 99204 OFFICE O/P NEW MOD 45 MIN: CPT | Performed by: INTERNAL MEDICINE

## 2024-06-06 RX ORDER — ATORVASTATIN CALCIUM 40 MG/1
40 TABLET, FILM COATED ORAL DAILY
Qty: 90 TABLET | Refills: 3 | Status: SHIPPED | OUTPATIENT
Start: 2024-06-06

## 2024-06-06 RX ORDER — ASPIRIN 325 MG
325 TABLET, DELAYED RELEASE (ENTERIC COATED) ORAL EVERY 6 HOURS PRN
COMMUNITY

## 2024-06-06 NOTE — PROGRESS NOTES
Cardiology     Clinic Visit Note  Simone Reyes 64 y.o. male   MRN: [id number]    Assessment and Plan      Atrial Flutter   - Occurred in setting of acute illness  - EKG during admission demonstrated 2:1 typical atrial flutter with a rate 144.   - no anticoagulation initiated   - patient was in sinus rhythm on discharge from hospitalization in 3/2024   - No symptoms of palpitations or heart racing since being discharged  - [id number] 2 for age and DM history   - Patient states that he has been told that he snores and occasionally stops breathing at night however he has not had a sleep study  - Given that initial episode occurred in the setting of acute illness, we will monitor with extended monitoring for 2 weeks to assess for reoccurrence.  - If evidence of recurrence discussed starting anticoagulation and patient is amenable to this  - If there is evidence of recurrence can consider ablation as well  - 2 week zio ordered  - sleep study ordered     Hyperlipidemia   - unknown lipid control as last lipid panel was checked in 2016.  It was 130, triglycerides 90, LDL 76 and HDL 36  - Given patient's history of poorly controlled diabetes mellitus and ASCVD risk score of 22.5% based off outdated lipid panel, discussed with patient that he is at very high risk for cardiovascular event in the next 10 years.  Recommended treatment patient is on high intensity statin.  - Lipid panel ordered   - Atorvastatin 40 mg daily started     Mitral Regurgitation in the setting of mitral valve prolapse  - Patient has moderate to severe mitral regurgitation with very eccentric jet in the setting of mitral valve prolapse.  Mitral valve looks thickened with likely redundant mitral valve apparatus  - He does have evidence of E wave greater than 1.2 and mild pulmonary hypertension with RVSP of 45 mmHg.  He is currently asymptomatic.  Will monitor with echo in 6 months to assess for progression.  Discussed with patient that if this does  become severe we may need to talk about mitral valve repair.  However given his heroin use with last use being 4 days ago this may not be an option for him.  Discussed with him the necessity of abstinence from recreational drugs in relation to his cardiovascular health as well as overall health.  Patient states that he will make conscious effort to stop using.  - Currently no symptoms of heart failure.  I discussed with patient the symptoms to watch out for and asked him to call me if he has any heart failure symptoms.  - Follow-up echo in 6 months.  Low threshold to obtain BALBIR to further evaluate.    Poorly controlled DM   - A1c 16.2% as of 3/2024  - Patient states that since his last hospitalization he has been compliant with his medications and has no trouble getting his insulin.  His blood sugar seems to be improved and he states the he has only small spikes throughout the day.  - Defer management to PCP  - Started statin therapy as above       Schedule a follow-up appointment in 3 months.     Chief Complaint: Hospital follow up   Subjective     History of Present Illness:  Patient 64-year-old male with past medical history of uncontrolled diabetes mellitus, COPD, heroin abuse currently on methadone maintenance and paroxysmal atrial flutter who presented to clinic to establish care.  Patient had recent hospitalization on 3/31/2024 for diabetic ketoacidosis.  During his acute illness he was noted to be in 2:1 atrial flutter.  This subsequently resolved when his DKA had improved.  He had no further arrhythmias during his hospital stay.     Complete transthoracic echo performed on 5/1/2024 demonstrated EF of 55%, normal wall motion, grade 1 diastolic dysfunction, aortic sclerosis, diffuse thickening of the mitral valve with posterior leaflet prolapse in late systole and moderate regurgitation.  RVSP approximately 45 mmHg.    Since discharge has not felt any palpitations, chest pain, lightheadedness, dizziness.  Occasionally snores at night. Smokes 4 cigarettes a day, used to smoke 1 PPD for more 10 years. Drinks once in awhile. Has a shot of rum twice a month and a beer twice a month. Uses heroin, last time was about 4 days ago. He snorts it. He is on methadone.     Was told he had a heart murmur when he was a child. Denies any symptoms of shortness of breath, orthopnea, lower extremity edema, abdominal distention, syncope or presyncope.     Previous Cardiology Workup:  TREADMILL STRESS  No results found for this or any previous visit.     ----------------------------------------------------------------------------------------------  NUCLEAR STRESS TEST: No results found for this or any previous visit.    No results found for this or any previous visit.      --------------------------------------------------------------------------------  CATH:  No results found for this or any previous visit.    --------------------------------------------------------------------------------  ECHO:   No results found for this or any previous visit.    No results found for this or any previous visit.    --------------------------------------------------------------------------------  HOLTER  No results found for this or any previous visit.    --------------------------------------------------------------------------------  CAROTIDS  No results found for this or any previous visit.       ---------------------------------------------------------------------------------  Review of Systems   Constitutional:  Negative for chills and fever.   HENT:  Negative for ear pain and sore throat.    Eyes:  Negative for pain and visual disturbance.   Respiratory:  Negative for cough and shortness of breath.    Cardiovascular:  Negative for chest pain and palpitations.   Gastrointestinal:  Negative for abdominal pain and vomiting.   Genitourinary:  Negative for dysuria and hematuria.   Musculoskeletal:  Negative for arthralgias and back pain.   Skin:   Negative for color change and rash.   Neurological:  Negative for seizures and syncope.   All other systems reviewed and are negative.        Current Outpatient Medications:     Advair Diskus 250-50 MCG/ACT inhaler, Inhale 1 puff 2 (two) times a day Rinse mouth after use., Disp: 60 blister, Rfl: 5    albuterol (PROVENTIL HFA,VENTOLIN HFA) 90 mcg/act inhaler, INHALE 1 PUFF BY MOUTH EVERY 4 HOURS AS NEEDED FOR WHEEZING, Disp: 18 g, Rfl: 3    Blood Glucose Monitoring Suppl KIT, Use 1 each as needed (Monitor blood glucose), Disp: 1 kit, Rfl: 2    Continuous Blood Gluc Sensor (Dexcom G7 Sensor), Use 1 Device every 10 days, Disp: 3 each, Rfl: 5    furosemide (LASIX) 20 mg tablet, Take 1 tablet (20 mg total) by mouth 2 (two) times a day, Disp: 30 tablet, Rfl: 0    gabapentin (NEURONTIN) 100 mg capsule, Take 1 capsule (100 mg total) by mouth 3 (three) times a day, Disp: 90 capsule, Rfl: 3    glucose blood test strip, Test sugar 4 times per day, Disp: 200 each, Rfl: 2    Insulin Glargine Solostar (Lantus SoloStar) 100 UNIT/ML SOPN, Inject 0.4 mL (40 Units total) under the skin daily at bedtime, Disp: 15 mL, Rfl: 3    insulin lispro (HumaLOG Ba KwikPen) 100 units/mL injection pen, Inject 5 Units under the skin 2 (two) times a day with meals - take with your 2 largest meals of the day (usually lunch and dinner), Disp: 3 mL, Rfl: 5    Insulin Pen Needle 31G X 4 MM MISC, Use daily, Disp: 100 each, Rfl: 0    ketoconazole (NIZORAL) 2 % cream, Apply topically daily, Disp: 15 g, Rfl: 0    Lancets (OneTouch Delica Plus [id number]) MISC, Use to check blood sugar 4 times per day, Disp: 100 each, Rfl: 5    metFORMIN (GLUCOPHAGE-XR) 500 mg 24 hr tablet, Take 4 tablets (2,000 mg total) by mouth daily with dinner, Disp: 360 tablet, Rfl: 1    methadone (DOLOPHINE) 10 mg tablet, Take 135 mg by mouth daily,, Disp: , Rfl:   Past Medical History:   Diagnosis Date    Cough 2/4/2019    Diabetes mellitus (HCC)     Methadone dependence (HCC)  02/16/2022    Migraines 02/16/2022     Past Surgical History:   Procedure Laterality Date    TONSILLECTOMY       Social History     Socioeconomic History    Marital status: Registered Domestic Partner     Spouse name: Missy    Number of children: 3    Years of education: Not on file    Highest education level: Not on file   Occupational History    Occupation: unemployed/disabled   Tobacco Use    Smoking status: Every Day     Current packs/day: 0.50     Types: Cigarettes    Smokeless tobacco: Never    Tobacco comments:     half a pack every other day   Vaping Use    Vaping status: Never Used   Substance and Sexual Activity    Alcohol use: Not Currently     Comment: rarely    Drug use: Yes     Types: Marijuana, Heroin, Fentanyl     Comment: currently smoking marijuana and occasional heroin, one bag a week    Sexual activity: Never   Other Topics Concern    Not on file   Social History Narrative    Not on file     Social Determinants of Health     Financial Resource Strain: Low Risk  (4/3/2024)    Overall Financial Resource Strain (CARDIA)     Difficulty of Paying Living Expenses: Not hard at all   Food Insecurity: Food Insecurity Present (4/3/2024)    Hunger Vital Sign     Worried About Running Out of Food in the Last Year: Sometimes true     Ran Out of Food in the Last Year: Sometimes true   Transportation Needs: No Transportation Needs (3/28/2024)    PRAPARE - Transportation     Lack of Transportation (Medical): No     Lack of Transportation (Non-Medical): No   Physical Activity: Sufficiently Active (4/30/2021)    Exercise Vital Sign     Days of Exercise per Week: 7 days     Minutes of Exercise per Session: 60 min   Stress: No Stress Concern Present (4/30/2021)    [place] [employer] - Occupational Stress Questionnaire     Feeling of Stress : Not at all   Social Connections: Moderately Isolated (4/30/2021)    Social Connection and Isolation Panel [NHANES]     Frequency of Communication with  Friends and Family: More than three times a week     Frequency of Social Gatherings with Friends and Family: More than three times a week     Attends Scientology Services: Never     Active Member of Clubs or Organizations: No     Attends Club or Organization Meetings: Never     Marital Status: Living with partner   Intimate Partner Violence: Not At Risk (4/30/2021)    Humiliation, Afraid, Rape, and Kick questionnaire     Fear of Current or Ex-Partner: No     Emotionally Abused: No     Physically Abused: No     Sexually Abused: No   Housing Stability: Low Risk  (3/28/2024)    Housing Stability Vital Sign     Unable to Pay for Housing in the Last Year: No     Number of Places Lived in the Last Year: 1     Unstable Housing in the Last Year: No     Family History   Problem Relation Age of Onset    Thyroid disease Mother     Cancer Mother      Allergies   Allergen Reactions    Imitrex [Sumatriptan] Hallucinations     Annotation - [id number]: metal taste and spacey    Immune Globulin      Other reaction(s): Other (See Comments)  Tripping, spacing       Objective     There were no vitals filed for this visit.    Physical exam:     Physical Exam  Vitals and nursing note reviewed.   Constitutional:       General: He is not in acute distress.     Appearance: He is well-developed.   HENT:      Head: Normocephalic and atraumatic.   Eyes:      Conjunctiva/sclera: Conjunctivae normal.      Pupils: Pupils are equal, round, and reactive to light.   Neck:      Vascular: No carotid bruit.   Cardiovascular:      Rate and Rhythm: Normal rate and regular rhythm.      Heart sounds: Murmur heard.      No friction rub. No gallop.      Comments: 2/6 holosystolic murmur best auscultated at the base   Pulmonary:      Effort: Pulmonary effort is normal. No respiratory distress.      Breath sounds: Normal breath sounds.   Abdominal:      Palpations: Abdomen is soft.      Tenderness: There is no abdominal tenderness.   Musculoskeletal:          General: No swelling.      Cervical back: Neck supple.   Skin:     General: Skin is warm and dry.      Capillary Refill: Capillary refill takes less than 2 seconds.      Comments: 3 lesions on dorsal aspect of left hand that appear to be scabbed over and healing.    Neurological:      Mental Status: He is alert.   Psychiatric:         Mood and Affect: Mood normal.           ==  PLEASE NOTE:  This encounter was completed utilizing the [employer]/Fluency Direct Speech Voice Recognition Software. Grammatical errors, random word insertions, pronoun errors and incomplete sentences are occasional consequences of the system due to software limitations, ambient noise and hardware issues.These may be missed by proof reading prior to affixing electronic signature. Any questions or concerns about the content, text or information contained within the body of this dictation should be directly addressed to the physician for clarification. Please do not hesitate to call me directly if you have any any questions or concerns.

## 2024-06-07 ENCOUNTER — DOCUMENTATION (OUTPATIENT)
Dept: CARDIOLOGY CLINIC | Facility: CLINIC | Age: 64
End: 2024-06-07

## 2024-06-07 DIAGNOSIS — I48.92 ATRIAL FLUTTER, UNSPECIFIED TYPE (HCC): ICD-10-CM

## 2024-06-07 DIAGNOSIS — G47.8 OTHER SLEEP DISORDERS: Primary | ICD-10-CM

## 2024-06-10 ENCOUNTER — TELEPHONE (OUTPATIENT)
Dept: SLEEP CENTER | Facility: CLINIC | Age: 64
End: 2024-06-10

## 2024-06-10 DIAGNOSIS — G47.8 OTHER SLEEP DISORDERS: ICD-10-CM

## 2024-06-10 DIAGNOSIS — I48.92 ATRIAL FLUTTER, UNSPECIFIED TYPE (HCC): Primary | ICD-10-CM

## 2024-06-12 ENCOUNTER — PATIENT OUTREACH (OUTPATIENT)
Dept: INTERNAL MEDICINE CLINIC | Facility: CLINIC | Age: 64
End: 2024-06-12

## 2024-06-12 ENCOUNTER — CLINICAL SUPPORT (OUTPATIENT)
Dept: INTERNAL MEDICINE CLINIC | Facility: CLINIC | Age: 64
End: 2024-06-12

## 2024-06-12 ENCOUNTER — CONSULT (OUTPATIENT)
Dept: MULTI SPECIALTY CLINIC | Facility: CLINIC | Age: 64
End: 2024-06-12

## 2024-06-12 VITALS — HEIGHT: 69 IN | BODY MASS INDEX: 20.14 KG/M2 | TEMPERATURE: 98.4 F | WEIGHT: 136 LBS

## 2024-06-12 DIAGNOSIS — E11.69 TYPE 2 DIABETES MELLITUS WITH OTHER SPECIFIED COMPLICATION, WITHOUT LONG-TERM CURRENT USE OF INSULIN (HCC): ICD-10-CM

## 2024-06-12 DIAGNOSIS — L28.1 PRURIGO NODULARIS: Primary | ICD-10-CM

## 2024-06-12 DIAGNOSIS — I50.9 ACUTE CONGESTIVE HEART FAILURE, UNSPECIFIED HEART FAILURE TYPE (HCC): Primary | ICD-10-CM

## 2024-06-12 PROCEDURE — NC001 PR NO CHARGE: Performed by: DERMATOLOGY

## 2024-06-12 RX ORDER — CLOBETASOL PROPIONATE 0.5 MG/G
OINTMENT TOPICAL 2 TIMES DAILY
Qty: 60 G | Refills: 0 | Status: SHIPPED | OUTPATIENT
Start: 2024-06-12

## 2024-06-12 RX ORDER — ACYCLOVIR 400 MG/1
TABLET ORAL
COMMUNITY
Start: 2024-04-08

## 2024-06-12 NOTE — PROGRESS NOTES
"[facility] Dermatology Clinic Note     Patient Name: Simone Reyes  Encounter Date: 6/12/2024     Have you been cared for by a [facility] Dermatologist in the last 3 years and, if so, which description applies to you?    NO.   I am considered a \"new\" patient and must complete all patient intake questions. I am MALE/not capable of bearing children.    REVIEW OF SYSTEMS:  Have you recently had or currently have any of the following? Recent fever or chills? No  Any non-healing wound? No   PAST MEDICAL HISTORY:  Have you personally ever had or currently have any of the following?  If \"YES,\" then please provide more detail. Skin cancer (such as Melanoma, Basal Cell Carcinoma, Squamous Cell Carcinoma?  No  Tuberculosis, HIV/AIDS, Hepatitis B or C: No  Radiation Treatment No   HISTORY OF IMMUNOSUPPRESSION:   Do you have a history of any of the following:  Systemic Immunosuppression such as Diabetes, Biologic or Immunotherapy, Chemotherapy, Organ Transplantation, Bone Marrow Transplantation?  YES, Diabetes     Answering \"YES\" requires the addition of the dotphrase \"IMMUNOSUPPRESSED\" as the first diagnosis of the patient's visit.   FAMILY HISTORY:  Any \"first degree relatives\" (parent, brother, sister, or child) with the following?    Skin Cancer, Pancreatic or Other Cancer? No   PATIENT EXPERIENCE:    Do you want the Dermatologist to perform a COMPLETE skin exam today including a clinical examination under the \"bra and underwear\" areas?  NO  If necessary, do we have your permission to call and leave a detailed message on your Preferred Phone number that includes your specific medical information?  Yes      Allergies   Allergen Reactions   • Imitrex [Sumatriptan] [facility]     Annotation - [id number]: metal taste and spacey   • Immune Globulin      Other reaction(s): Other (See Comments)  Tripping, spacing      Current Outpatient Medications:   •  Advair Diskus 250-50 MCG/ACT inhaler, Inhale 1 puff 2 (two) times a day " Rinse mouth after use., Disp: 60 blister, Rfl: 5  •  albuterol (PROVENTIL HFA,VENTOLIN HFA) 90 mcg/act inhaler, INHALE 1 PUFF BY MOUTH EVERY 4 HOURS AS NEEDED FOR WHEEZING, Disp: 18 g, Rfl: 3  •  aspirin (ECOTRIN) 325 mg EC tablet, Take 325 mg by mouth every 6 (six) hours as needed for mild pain, Disp: , Rfl:   •  atorvastatin (LIPITOR) 40 mg tablet, Take 1 tablet (40 mg total) by mouth daily, Disp: 90 tablet, Rfl: 3  •  Blood Glucose Monitoring Suppl KIT, Use 1 each as needed (Monitor blood glucose), Disp: 1 kit, Rfl: 2  •  clobetasol (TEMOVATE) 0.05 % ointment, Apply topically 2 (two) times a day For 14 days. Please stop medication until seeing your doctor., Disp: 60 g, Rfl: 0  •  Continuous Blood Gluc Sensor (Dexcom G7 Sensor), Use 1 Device every 10 days, Disp: 3 each, Rfl: 5  •  Continuous Glucose  (Dexcom G7 ) CURLY, USE AS DIRECTED ONCE, Disp: , Rfl:   •  gabapentin (NEURONTIN) 100 mg capsule, Take 1 capsule (100 mg total) by mouth 3 (three) times a day, Disp: 90 capsule, Rfl: 3  •  glucose blood test strip, Test sugar 4 times per day, Disp: 200 each, Rfl: 2  •  Insulin Glargine Solostar (Lantus SoloStar) 100 UNIT/ML SOPN, Inject 0.4 mL (40 Units total) under the skin daily at bedtime, Disp: 15 mL, Rfl: 3  •  insulin lispro (HumaLOG Ba KwikPen) 100 units/mL injection pen, Inject 5 Units under the skin 2 (two) times a day with meals - take with your 2 largest meals of the day (usually lunch and dinner), Disp: 3 mL, Rfl: 5  •  Insulin Pen Needle 31G X 4 MM MISC, Use daily, Disp: 100 each, Rfl: 0  •  Lancets (OneTouch Delica Plus [id number]) MISC, Use to check blood sugar 4 times per day, Disp: 100 each, Rfl: 5  •  metFORMIN (GLUCOPHAGE-XR) 500 mg 24 hr tablet, Take 4 tablets (2,000 mg total) by mouth daily with dinner, Disp: 360 tablet, Rfl: 1  •  methadone (DOLOPHINE) 10 mg tablet, Take 135 mg by mouth daily,, Disp: , Rfl:   •  furosemide (LASIX) 20 mg tablet, Take 1 tablet (20 mg total) by  mouth 2 (two) times a day (Patient not taking: Reported on 6/12/2024), Disp: 30 tablet, Rfl: 0  •  ketoconazole (NIZORAL) 2 % cream, Apply topically daily (Patient not taking: Reported on 6/6/2024), Disp: 15 g, Rfl: 0          Whom besides the patient is providing clinical information about today's encounter?   NO ADDITIONAL HISTORIAN (patient alone provided history)    Physical Exam and Assessment/Plan by Diagnosis:            PRURIGO NODULARIS     Physical Exam:  (Anatomic Location); (Size and Morphological Description); (Differential Diagnosis):  Left forearm: lichenified dome shape papules with central crust; prurigo nodules vs lichen simplex chronicus     Pertinent Positives:  Pertinent Negatives:    Additional History of Present Condition:  Patient presents today with a rash on Left wrist and forearm.Patient states he has had this rash for 4 months  Patient has been treating rash with neosporin. Patient reports of itchiness and bleeding. Patient denies any pain with rash.     Plan:  Will treat with clobetasol 0.05% ointment for 14 days and recheck in 2 weeks at [facility].   If not improvement, would consider biopsy at this time.      PROCEDURES PERFORMED TODAY ASSOCIATED WITH THIS CONDITION:          NONE.     Medical Complexity:    UNDIAGNOSED NEW PROBLEM WITH UNCERTAIN DIAGNOSIS.  A condition included in the differential diagnosis represents a high risk of morbidity without treatment.          Scribe Attestation    I,:  Sissy Marte am acting as a scribe while in the presence of the attending physician.:       I,:  Rio Gonzales MD personally performed the services described in this documentation    as scribed in my presence.:          Patient was seen and discussed with MD Jackson Woodall,    PGY-IV Dermatology Resident

## 2024-06-12 NOTE — PROGRESS NOTES
Patient requesting script be sent for pen needles to pharmacy. Previous script had to use daily and updated it to use 4 times a day.      Patient also requesting alcohol pads to be sent. Not on med list

## 2024-06-12 NOTE — PROGRESS NOTES
Patient was in the office for a Derm appt and asked if we could place his Ziopatch that was mailed to him by Cardiology. I placed patch over left chest following all instructions on the package insert. I activated the patch by pushing the button and receiving a green flashing light. I advised patient if he has any issues or concerns with the patch then he is to reach out to Cardiology office as I am just placing it for him. Patient verbalized understanding.     Apply by : 11-12-24  Serial number: [id number]

## 2024-06-12 NOTE — PROGRESS NOTES
Outpatient Care Management Note:    Re:  in person follow up     Patient at PCP office today to see Dermatology clinic. I followed up with patient after his appointment. He reports feeling well at this time. He had Dexcom reader with him and clinical staff downloaded report and to be scanning into chart. Average glucose is 207. Patient reports better improvement with his blood sugars and reports adherence with Lantus 40 units at bedtime. Patient reports taking meal time insulin twice a day. Patient reports he has a light breakfast either eggs or oatmeal and has not been taking his mealtime insulin then. Patient instructed that he should be taking 5 units of meal time insulin with breakfast, lunch, and dinner. He reports understanding. Per patient blood sugars have improved compared to when he was hospitalized back in March.     Patient requesting refill on pen needles and requesting script for alcohol pads to be sent to pharmacy.     Last A1C was 16.2% on 3/27/24. Patient due to follow up with PCP and has clerical team assist patient with scheduling follow up appointment.     Please see Derm note for additional information regarding his visit today.     Patient see by Cardiology on 6/6/24 and to get echo completed in December and follow up on 12/19/24 in their office. Patient to have 2 week Zio patch and complete sleep study.     Patient does not have any further questions, concerns, or other needs at this time. Patient has my contact number [phone] and PCP office number [phone] if needed. Patient is agreeable for further outreach.

## 2024-06-13 ENCOUNTER — PATIENT OUTREACH (OUTPATIENT)
Dept: INTERNAL MEDICINE CLINIC | Facility: CLINIC | Age: 64
End: 2024-06-13

## 2024-06-13 ENCOUNTER — TELEPHONE (OUTPATIENT)
Dept: INTERNAL MEDICINE CLINIC | Facility: CLINIC | Age: 64
End: 2024-06-13

## 2024-06-13 DIAGNOSIS — E11.69 TYPE 2 DIABETES MELLITUS WITH OTHER SPECIFIED COMPLICATION, WITHOUT LONG-TERM CURRENT USE OF INSULIN (HCC): Primary | Chronic | ICD-10-CM

## 2024-06-13 RX ORDER — BLOOD SUGAR DIAGNOSTIC
STRIP MISCELLANEOUS 4 TIMES DAILY
Qty: 100 EACH | Refills: 3 | Status: SHIPPED | OUTPATIENT
Start: 2024-06-13

## 2024-06-13 NOTE — PROGRESS NOTES
Outpatient Care Management Note:    Re:  follow up call - diabetes    I called and spoke with patient and made him aware pen needles and alcohol pads were sent to [facility]. Reviewed script for pen needles states to use 4 times a day and has refills.     Patient reports yesterday when he left office he got a blood sugar in the 60's. He reports he drank orange juice and was able to get his blood sugar back up. Reviewed signs and symptoms or hypoglycemia and 15-15 rule to treat. Patient reports yesterday he did not eat breakfast and could of been related to this. He reports has been more active and walking his dog most days 1 mile. Instructed patient to call with further episodes of hypoglycemia and to not time of day it is occurring and what he had to eat and activity for that day. He reports understanding.     Patient scheduled 6/26 to follow up with PCP and Derm clinic. Patient will be due for A1C as last one was 3/27/24 and was 16.2% and 6/26/24 will be 91 days and can get A1C checked at next appointment. Instructed to bring Dexcom reader to next appointment.     Patient states he would like to attend diabetic education. I did review with him he missed appointment for DM education in May. He would like a call to further schedule. Will send to diabetic educator to further reach out to patient.

## 2024-06-13 NOTE — TELEPHONE ENCOUNTER
Received call from patient's pharmacy. Spoke with pharmacist. Pharmacy does not have 31 G pen needles. Pharmacy has 32 G x 4 mm pen needles. Pharmacy updated that is okay to change script to 32 G x 4 MM. All questions/concerns answered at this time.

## 2024-06-14 ENCOUNTER — TELEPHONE (OUTPATIENT)
Age: 64
End: 2024-06-14

## 2024-06-14 DIAGNOSIS — I48.3 TYPICAL ATRIAL FLUTTER (HCC): Primary | ICD-10-CM

## 2024-06-14 NOTE — TELEPHONE ENCOUNTER
Pt states that he only wore his zio monitor for about 1 day and it fell off. He states that he tried applying tape and that did not work.       Please reach out to pt in regards to another monitor.

## 2024-06-19 NOTE — TELEPHONE ENCOUNTER
Spoke with patient today.  Advised a new Zio patch was ordered and being delivered to his home.  He seemed hesitant about applying this himself.  Advised he needed assistance to call back and nurse visit could be scheduled if needed to assist with Zio application.

## 2024-06-26 ENCOUNTER — OFFICE VISIT (OUTPATIENT)
Dept: INTERNAL MEDICINE CLINIC | Facility: CLINIC | Age: 64
End: 2024-06-26

## 2024-06-26 ENCOUNTER — OFFICE VISIT (OUTPATIENT)
Dept: MULTI SPECIALTY CLINIC | Facility: CLINIC | Age: 64
End: 2024-06-26

## 2024-06-26 ENCOUNTER — PATIENT OUTREACH (OUTPATIENT)
Dept: INTERNAL MEDICINE CLINIC | Facility: CLINIC | Age: 64
End: 2024-06-26

## 2024-06-26 VITALS
TEMPERATURE: 98.4 F | WEIGHT: 141 LBS | DIASTOLIC BLOOD PRESSURE: 71 MMHG | SYSTOLIC BLOOD PRESSURE: 132 MMHG | HEART RATE: 94 BPM | OXYGEN SATURATION: 98 % | BODY MASS INDEX: 20.82 KG/M2

## 2024-06-26 VITALS — BODY MASS INDEX: 20.82 KG/M2 | TEMPERATURE: 97.6 F | WEIGHT: 141 LBS

## 2024-06-26 DIAGNOSIS — F11.20 METHADONE DEPENDENCE (HCC): ICD-10-CM

## 2024-06-26 DIAGNOSIS — I34.0 MITRAL REGURGITATION DUE TO CUSP PROLAPSE: ICD-10-CM

## 2024-06-26 DIAGNOSIS — Z12.11 SCREEN FOR COLON CANCER: ICD-10-CM

## 2024-06-26 DIAGNOSIS — I48.92 ATRIAL FLUTTER, UNSPECIFIED TYPE (HCC): ICD-10-CM

## 2024-06-26 DIAGNOSIS — E11.42 DIABETIC POLYNEUROPATHY ASSOCIATED WITH TYPE 2 DIABETES MELLITUS (HCC): ICD-10-CM

## 2024-06-26 DIAGNOSIS — Z11.59 ENCOUNTER FOR SCREENING FOR OTHER VIRAL DISEASES: ICD-10-CM

## 2024-06-26 DIAGNOSIS — I50.32 CHRONIC DIASTOLIC HEART FAILURE (HCC): ICD-10-CM

## 2024-06-26 DIAGNOSIS — I34.1 MITRAL REGURGITATION DUE TO CUSP PROLAPSE: ICD-10-CM

## 2024-06-26 DIAGNOSIS — Z23 ENCOUNTER FOR IMMUNIZATION: ICD-10-CM

## 2024-06-26 DIAGNOSIS — E11.69 TYPE 2 DIABETES MELLITUS WITH OTHER SPECIFIED COMPLICATION, WITHOUT LONG-TERM CURRENT USE OF INSULIN (HCC): Primary | Chronic | ICD-10-CM

## 2024-06-26 DIAGNOSIS — L28.0 LICHEN SIMPLEX CHRONICUS: Primary | ICD-10-CM

## 2024-06-26 PROCEDURE — NC001 PR NO CHARGE: Performed by: DERMATOLOGY

## 2024-06-26 PROCEDURE — 90632 HEPA VACCINE ADULT IM: CPT | Performed by: INTERNAL MEDICINE

## 2024-06-26 PROCEDURE — 99214 OFFICE O/P EST MOD 30 MIN: CPT | Performed by: INTERNAL MEDICINE

## 2024-06-26 PROCEDURE — G0010 ADMIN HEPATITIS B VACCINE: HCPCS | Performed by: INTERNAL MEDICINE

## 2024-06-26 PROCEDURE — 90471 IMMUNIZATION ADMIN: CPT | Performed by: INTERNAL MEDICINE

## 2024-06-26 PROCEDURE — 90750 HZV VACC RECOMBINANT IM: CPT | Performed by: INTERNAL MEDICINE

## 2024-06-26 RX ORDER — FUROSEMIDE 20 MG/1
20 TABLET ORAL DAILY
Qty: 30 TABLET | Refills: 2 | Status: SHIPPED | OUTPATIENT
Start: 2024-06-26 | End: 2024-09-24

## 2024-06-26 RX ORDER — ASPIRIN 81 MG/1
81 TABLET ORAL DAILY
Qty: 90 TABLET | Refills: 3 | Status: SHIPPED | OUTPATIENT
Start: 2024-06-26

## 2024-06-26 RX ORDER — GABAPENTIN 100 MG/1
200 CAPSULE ORAL 3 TIMES DAILY
Qty: 90 CAPSULE | Refills: 3 | Status: SHIPPED | OUTPATIENT
Start: 2024-06-26

## 2024-06-26 RX ORDER — INSULIN GLARGINE 100 [IU]/ML
50 INJECTION, SOLUTION SUBCUTANEOUS
Qty: 15 ML | Refills: 2 | Status: SHIPPED | OUTPATIENT
Start: 2024-06-26 | End: 2024-09-24

## 2024-06-26 RX ORDER — METFORMIN HYDROCHLORIDE 500 MG/1
2000 TABLET, EXTENDED RELEASE ORAL
Qty: 360 TABLET | Refills: 1 | Status: SHIPPED | OUTPATIENT
Start: 2024-06-26

## 2024-06-26 NOTE — PROGRESS NOTES
Outpatient Care Management Note:    Re:  follow up     Dr. Jones followed up with RN care manager after patient's visit. Overall patient is doing better and changes made to insulin today and Lantus increased from 40 units to 50 units at bedtime. Meal time insulin stopped. Patient using Dexcom G7 to check blood sugars and to have report downloaded in about 2 weeks.     Patient interested in diabetic education and missed previous appointment in May for diabetic education. Provider requesting patient get scheduled and will message diabetic educators to further reach out (message was sent previously on 6/13/24 when I spoke with patient and he expressed interest).     Patient did not have any further needs for RN care manager at this time.     Patient scheduled to follow up with Dr. Jones again on 8/14 @ 1 pm (Wednesday).

## 2024-06-26 NOTE — PROGRESS NOTES
"[facility] Note     Patient Name: Simone Reyes  Encounter Date: 6/26/24     Have you been cared for by a [facility] in the last 3 years and, if so, which description applies to you?    Yes.  I have been here within the last 3 years, and my medical history has NOT changed since that time.  I am MALE/not capable of bearing children.    REVIEW OF SYSTEMS:  Have you recently had or currently have any of the following? No changes in my recent health.   PAST MEDICAL HISTORY:  Have you personally ever had or currently have any of the following?  If \"YES,\" then please provide more detail. No changes in my medical history.   HISTORY OF IMMUNOSUPPRESSION: Do you have a history of any of the following:  Systemic Immunosuppression such as Diabetes, Biologic or Immunotherapy, Chemotherapy, Organ Transplantation, Bone Marrow Transplantation?  No     Answering \"YES\" requires the addition of the dotphrase \"IMMUNOSUPPRESSED\" as the first diagnosis of the patient's visit.   FAMILY HISTORY:  Any \"first degree relatives\" (parent, brother, sister, or child) with the following?    No changes in my family's known health.   PATIENT EXPERIENCE:    Do you want the Dermatologist to perform a COMPLETE skin exam today including a clinical examination under the \"bra and underwear\" areas?  NO  If necessary, do we have your permission to call and leave a detailed message on your Preferred Phone number that includes your specific medical information?  Yes      Allergies   Allergen Reactions   • Imitrex [Sumatriptan] Hallucinations     Annotation - [id number]: metal taste and spacey   • Immune Globulin      Other reaction(s): Other (See Comments)  Tripping, spacing      Current Outpatient Medications:   •  Advair Diskus 250-50 MCG/ACT inhaler, Inhale 1 puff 2 (two) times a day Rinse mouth after use., Disp: 60 blister, Rfl: 5  •  albuterol (PROVENTIL HFA,VENTOLIN HFA) 90 mcg/act inhaler, INHALE 1 PUFF BY MOUTH EVERY 4 " HOURS AS NEEDED FOR WHEEZING, Disp: 18 g, Rfl: 3  •  Alcohol Swabs (Alcohol Pads) 70 % PADS, Use 4 (four) times a day, Disp: 100 each, Rfl: 3  •  aspirin (ECOTRIN) 325 mg EC tablet, Take 325 mg by mouth every 6 (six) hours as needed for mild pain, Disp: , Rfl:   •  atorvastatin (LIPITOR) 40 mg tablet, Take 1 tablet (40 mg total) by mouth daily, Disp: 90 tablet, Rfl: 3  •  Blood Glucose Monitoring Suppl KIT, Use 1 each as needed (Monitor blood glucose), Disp: 1 kit, Rfl: 2  •  clobetasol (TEMOVATE) 0.05 % ointment, Apply topically 2 (two) times a day For 14 days. Please stop medication until seeing your doctor., Disp: 60 g, Rfl: 0  •  Continuous Blood Gluc Sensor (Dexcom G7 Sensor), Use 1 Device every 10 days, Disp: 3 each, Rfl: 5  •  Continuous Glucose  (Dexcom G7 ) CURLY, USE AS DIRECTED ONCE, Disp: , Rfl:   •  gabapentin (NEURONTIN) 100 mg capsule, Take 1 capsule (100 mg total) by mouth 3 (three) times a day, Disp: 90 capsule, Rfl: 3  •  glucose blood test strip, Test sugar 4 times per day, Disp: 200 each, Rfl: 2  •  Insulin Glargine Solostar (Lantus SoloStar) 100 UNIT/ML SOPN, Inject 0.4 mL (40 Units total) under the skin daily at bedtime, Disp: 15 mL, Rfl: 3  •  insulin lispro (HumaLOG Ba KwikPen) 100 units/mL injection pen, Inject 5 Units under the skin 2 (two) times a day with meals - take with your 2 largest meals of the day (usually lunch and dinner), Disp: 3 mL, Rfl: 5  •  Insulin Pen Needle 31G X 4 MM MISC, Use 4 (four) times a day (before meals and at bedtime), Disp: 100 each, Rfl: 0  •  Lancets (OneTouch Delica Plus [id number]) MISC, Use to check blood sugar 4 times per day, Disp: 100 each, Rfl: 5  •  metFORMIN (GLUCOPHAGE-XR) 500 mg 24 hr tablet, Take 4 tablets (2,000 mg total) by mouth daily with dinner, Disp: 360 tablet, Rfl: 1  •  methadone (DOLOPHINE) 10 mg tablet, Take 135 mg by mouth daily,, Disp: , Rfl:   •  furosemide (LASIX) 20 mg tablet, Take 1 tablet (20 mg total) by mouth 2  (two) times a day (Patient not taking: Reported on 6/12/2024), Disp: 30 tablet, Rfl: 0  •  ketoconazole (NIZORAL) 2 % cream, Apply topically daily (Patient not taking: Reported on 6/6/2024), Disp: 15 g, Rfl: 0          Whom besides the patient is providing clinical information about today's encounter?   NO ADDITIONAL HISTORIAN (patient alone provided history)    Physical Exam and Assessment/Plan by Diagnosis:    LICHEN SIMPLEX CHRONICUS - Resolving    Physical Exam:  (Anatomic Location); (Size and Morphological Description); (Differential Diagnosis):  Left forearm: lichenified dome shape papules with central crust; prurigo nodules vs lichen simplex chronicus      Pertinent Positives:  Pertinent Negatives:     Additional History of Present Condition:   Patient has been treating rash with clobetasol 0.05% ointment and neosporin. Patient reports of itchiness and bleeding. Patient denies any pain with rash.      Plan:  During today's visit, we reviewed the nature of Lichen Simplex Chronicus  Significant improvement with topical steroids. Can continue topical steroids for another 2 weeks.   Reviewed medication instructions with the patient. Return precautions provided.

## 2024-06-27 ENCOUNTER — PREP FOR PROCEDURE (OUTPATIENT)
Age: 64
End: 2024-06-27

## 2024-06-27 ENCOUNTER — TELEPHONE (OUTPATIENT)
Age: 64
End: 2024-06-27

## 2024-06-27 DIAGNOSIS — Z12.11 SCREENING FOR COLON CANCER: Primary | ICD-10-CM

## 2024-06-27 PROBLEM — I34.0 MITRAL REGURGITATION DUE TO CUSP PROLAPSE: Status: ACTIVE | Noted: 2024-06-27

## 2024-06-27 PROBLEM — I34.1 MITRAL REGURGITATION DUE TO CUSP PROLAPSE: Status: ACTIVE | Noted: 2024-06-27

## 2024-06-27 NOTE — PROGRESS NOTES
Ambulatory Visit  Name: Simone Reyes      : 1960      MRN: [id number]  Encounter Provider: Rodrigo Jones MD  Encounter Date: 2024   Encounter department: [facility]    Assessment & Plan   1. Type 2 diabetes mellitus with other specified complication, without long-term current use of insulin (HCC)  2. Diabetic polyneuropathy associated with type 2 diabetes mellitus (HCC)  Patient with uncontrolled diabetes     Current regimen   Metformin 1000 mg BID  Lantus 40 units HS  Lispro 5 units with lunch and dinner    Dexcom readings reviewed   Some episodes of low sugars in 70s at around 3pm and 9pm  Hyperglycemia in the morning at 4am-8am    Last A1c : 16.2; not due for repeat  Foot and eye exam pending  Albumin creat ratio ordered but not done yet  Uncontrolled neuropathy on gabapentin 100 mg BID    Plan  Increase lantus to 50 units HS and stop meal time insulin for now  Continue metformin 1000 mg BID  Follow up in 6 weeks  Advised patient to send glucose readings in 2 weeks for further adjustment of insulin  To setup diabetes education class  Lifestyle modifications advised  Increase gabapentin to 200 mg TID    3. Methadone dependence (HCC)  Mentions continued abstinence from illicit substances and continues on methadone    4. Chronic diastolic heart failure (HCC)  5. Mitral regurgitation due to cusp prolapse  6. Atrial flutter, unspecified type (HCC)  Patient presented with acute congestive heart failure last visit and was started on lasix 20 mg OD and sent for ECHO and cardiology follow up  He also had atrial flutter during admission for DKA recently without any prior history of the same    ECHO showed EF 55% with G1DD, posterior mitral leaflet prolaps with MR     Currently doing well; no shortness of breath; does mention mild leg swelling  He stopped taking lasix 20 mg after 4 weeks     On exam today mild rales, slight elevated JVD, +2 pedal edema    Plan  Continue  cardiology follow with zio patch results  Continue lasix 20 mg OD    7. Screen for colon cancer  -     Ambulatory Referral to Gastroenterology; Future  8. Encounter for immunization  -     Zoster Vaccine Recombinant IM  -     HEPATITIS A VACCINE ADULT IM    Health Maintenance  Eye exam ordered; foot exam to be done next visit  Hep A and Shingrix done today  Referred for colonoscopy    Follow up in 6 weeks    History of Present Illness     Simone Reyes is a 64/M with PMH of opioid use on methadone maintenance, uncontrolled DM2, congestive heart failure, recent DKA and atrial flutter who presents for routine follow up.       Review of Systems   Constitutional:  Negative for chills and fever.   HENT:  Negative for ear pain and sore throat.    Eyes:  Negative for pain and visual disturbance.   Respiratory:  Negative for cough and shortness of breath.    Cardiovascular:  Positive for leg swelling. Negative for chest pain and palpitations.   Gastrointestinal:  Negative for abdominal pain and vomiting.   Genitourinary:  Negative for dysuria and hematuria.   Musculoskeletal:  Negative for arthralgias and back pain.   Skin:  Negative for color change and rash.   Neurological:  Negative for seizures and syncope.   All other systems reviewed and are negative.    Past Medical History:   Diagnosis Date    Cough 2/4/2019    Diabetes mellitus (HCC)     Methadone dependence (HCC) 02/16/2022    Migraines 02/16/2022     Past Surgical History:   Procedure Laterality Date    TONSILLECTOMY       Family History   Problem Relation Age of Onset    Thyroid disease Mother     Cancer Mother      Social History     Tobacco Use    Smoking status: Every Day     Current packs/day: 0.50     Types: Cigarettes    Smokeless tobacco: Never    Tobacco comments:     half a pack every other day   Vaping Use    Vaping status: Never Used   Substance and Sexual Activity    Alcohol use: Not Currently     Comment: rarely    Drug use: Yes     Types: Marijuana,  Heroin, Fentanyl     Comment: currently smoking marijuana and occasional heroin, one bag a week    Sexual activity: Never     Current Outpatient Medications on File Prior to Visit   Medication Sig    Advair Diskus 250-50 MCG/ACT inhaler Inhale 1 puff 2 (two) times a day Rinse mouth after use.    albuterol (PROVENTIL HFA,VENTOLIN HFA) 90 mcg/act inhaler INHALE 1 PUFF BY MOUTH EVERY 4 HOURS AS NEEDED FOR WHEEZING    Alcohol Swabs (Alcohol Pads) 70 % PADS Use 4 (four) times a day    atorvastatin (LIPITOR) 40 mg tablet Take 1 tablet (40 mg total) by mouth daily    Blood Glucose Monitoring Suppl KIT Use 1 each as needed (Monitor blood glucose)    clobetasol (TEMOVATE) 0.05 % ointment Apply topically 2 (two) times a day For 14 days. Please stop medication until seeing your doctor.    Continuous Blood Gluc Sensor (Dexcom G7 Sensor) Use 1 Device every 10 days    Continuous Glucose  (Dexcom G7 ) CURLY USE AS DIRECTED ONCE    glucose blood test strip Test sugar 4 times per day    Insulin Pen Needle 31G X 4 MM MISC Use 4 (four) times a day (before meals and at bedtime)    ketoconazole (NIZORAL) 2 % cream Apply topically daily (Patient not taking: Reported on 6/6/2024)    Lancets (OneTouch Delica Plus [id number]) MISC Use to check blood sugar 4 times per day    methadone (DOLOPHINE) 10 mg tablet Take 135 mg by mouth daily,     Allergies   Allergen Reactions    Imitrex [Sumatriptan] Hallucinations     Annotation - 06Jun2016: metal taste and spacey    Immune Globulin      Other reaction(s): Other (See Comments)  Tripping, spacing     Immunization History   Administered Date(s) Administered    COVID-19 PFIZER VACCINE 0.3 ML IM 04/15/2021, 05/08/2021    Hep A, adult 06/26/2024    Influenza, high dose seasonal 0.7 mL 12/07/2023    Influenza, recombinant, quadrivalent,injectable, preservative free 04/12/2019, 01/13/2020, 12/07/2021    Pneumococcal Conjugate Vaccine 20-valent (Pcv20), Polysace 12/07/2023    Pneumococcal  Polysaccharide PPV23 04/12/2019    Tdap 06/04/2016    Zoster Vaccine Recombinant 06/26/2024     Objective     /71 (BP Location: Left arm, Patient Position: Sitting, Cuff Size: Standard)   Pulse 94   Temp 98.4 °F (36.9 °C) (Temporal)   Wt 64 kg (141 lb)   SpO2 98%   BMI 20.82 kg/m²     Physical Exam  Constitutional:       General: He is not in acute distress.     Appearance: Normal appearance. He is not diaphoretic.   HENT:      Head: Normocephalic and atraumatic.      Mouth/Throat:      Mouth: Mucous membranes are moist.   Eyes:      Pupils: Pupils are equal, round, and reactive to light.   Neck:      Comments: + JVD  Cardiovascular:      Rate and Rhythm: Normal rate and regular rhythm.      Pulses: Normal pulses.      Heart sounds: Normal heart sounds.   Pulmonary:      Effort: Pulmonary effort is normal.      Breath sounds: Normal breath sounds.   Abdominal:      General: Bowel sounds are normal.      Palpations: Abdomen is soft.   Musculoskeletal:      Right lower leg: Edema present.      Left lower leg: Edema present.   Skin:     General: Skin is warm.      Capillary Refill: Capillary refill takes less than 2 seconds.      Coloration: Skin is not jaundiced or pale.      Findings: No rash.   Neurological:      General: No focal deficit present.      Mental Status: He is alert and oriented to person, place, and time.   Psychiatric:         Mood and Affect: Mood normal.         Behavior: Behavior normal.       Administrative Statements   I have spent a total time of 40 minutes on 06/27/24 In caring for this patient including Diagnostic results, Prognosis, Risks and benefits of tx options, Instructions for management, Patient and family education, Importance of tx compliance, Risk factor reductions, Impressions, Counseling / Coordination of care, Documenting in the medical record, Reviewing / ordering tests, medicine, procedures  , and Obtaining or reviewing history  .    Rodrigo Jones MD  PGY-2,  Internal Medicine  [facility]

## 2024-06-27 NOTE — TELEPHONE ENCOUNTER
06/27/24  Screened by: Mary Grace Gilman    Referring Provider Dr Blanc    Pre- Screening:     There is no height or weight on file to calculate BMI. 141lbs 20.82  Has patient been referred for a routine screening Colonoscopy? yes  Is the patient between 45-75 years old? yes      Previous Colonoscopy no   If yes:    Date:     Facility:     Reason:     Does the patient want to see a Gastroenterologist prior to their procedure OR are they having any GI symptoms? no    Has the patient been hospitalized or had abdominal surgery in the past 6 months? no    Does the patient use supplemental oxygen? no    Does the patient take Coumadin, Lovenox, Plavix, Elliquis, Xarelto, or other blood thinning medication? no    Has the patient had a stroke, cardiac event, or stent placed in the past year? no      If patient is between 45yrs - 49yrs, please advise patient that we will have to confirm benefits & coverage with their insurance company for a routine screening colonoscopy.

## 2024-06-27 NOTE — TELEPHONE ENCOUNTER
Scheduled date of colonoscopy (as of today):10/14/2024  Physician performing colonoscopy:Dr Yepez  Location of colonoscopy:[place]  Bowel prep reviewed with patient:Miralax/Dulcolax Diabetic  Instructions reviewed with patient by:office to mail pt instructions  Clearances:     Patients GI provider:  Dr. Yepez    Number to return call: [phone]    Reason for call: Pt is scheduled for a colonoscopy. Needs Dulcolax/Miralax and Diabetic instructions mailed.    Scheduled procedure/appointment date if applicable: 10/14/2024

## 2024-06-28 ENCOUNTER — TELEPHONE (OUTPATIENT)
Dept: CARDIOLOGY CLINIC | Facility: CLINIC | Age: 64
End: 2024-06-28

## 2024-06-28 NOTE — TELEPHONE ENCOUNTER
Pt's results came back for FADUMO, I seen in his chart that this one fell off after one day of wearing. I am going to attach those results to this encounter. Since it was ordered for 14 days but pt wore for 1.

## 2024-07-03 DIAGNOSIS — E11.69 TYPE 2 DIABETES MELLITUS WITH OTHER SPECIFIED COMPLICATION, WITHOUT LONG-TERM CURRENT USE OF INSULIN (HCC): Primary | Chronic | ICD-10-CM

## 2024-07-03 DIAGNOSIS — E11.69 TYPE 2 DIABETES MELLITUS WITH OTHER SPECIFIED COMPLICATION, WITHOUT LONG-TERM CURRENT USE OF INSULIN (HCC): ICD-10-CM

## 2024-07-03 RX ORDER — ACYCLOVIR 400 MG/1
TABLET ORAL
Qty: 1 EACH | Refills: 0 | Status: SHIPPED | OUTPATIENT
Start: 2024-07-03

## 2024-07-03 RX ORDER — PEN NEEDLE, DIABETIC 32GX 5/32"
NEEDLE, DISPOSABLE MISCELLANEOUS
Qty: 100 EACH | Refills: 0 | Status: SHIPPED | OUTPATIENT
Start: 2024-07-03

## 2024-07-08 ENCOUNTER — CLINICAL SUPPORT (OUTPATIENT)
Dept: CARDIOLOGY CLINIC | Facility: CLINIC | Age: 64
End: 2024-07-08

## 2024-07-08 DIAGNOSIS — I48.3 TYPICAL ATRIAL FLUTTER (HCC): Primary | ICD-10-CM

## 2024-07-08 PROCEDURE — RECHECK: Performed by: INTERNAL MEDICINE

## 2024-07-08 NOTE — PROGRESS NOTES
Pt brought his monitor that was delivered to him. 14 day monitor placed on pt.     Provided pt tape just in case the monitor starts to come off.

## 2024-07-29 ENCOUNTER — TELEPHONE (OUTPATIENT)
Dept: INTERNAL MEDICINE CLINIC | Facility: CLINIC | Age: 64
End: 2024-07-29

## 2024-07-29 NOTE — TELEPHONE ENCOUNTER
Received call from patient. Patient requesting physician to reorder Claritin to [facility]'s pharmacy. Attempted to reach patient at . Left voice message with callback number to follow up on symptoms/concern regarding need for medication.

## 2024-07-31 DIAGNOSIS — E11.69 TYPE 2 DIABETES MELLITUS WITH OTHER SPECIFIED COMPLICATION, WITHOUT LONG-TERM CURRENT USE OF INSULIN (HCC): ICD-10-CM

## 2024-07-31 DIAGNOSIS — T78.40XA ALLERGY, INITIAL ENCOUNTER: Primary | ICD-10-CM

## 2024-07-31 RX ORDER — PEN NEEDLE, DIABETIC 32GX 5/32"
NEEDLE, DISPOSABLE MISCELLANEOUS
Qty: 100 EACH | Refills: 0 | Status: SHIPPED | OUTPATIENT
Start: 2024-07-31

## 2024-07-31 RX ORDER — LORATADINE 10 MG/1
10 TABLET ORAL DAILY
Qty: 30 TABLET | Refills: 3 | Status: SHIPPED | OUTPATIENT
Start: 2024-07-31

## 2024-07-31 NOTE — TELEPHONE ENCOUNTER
Received call from patient. Patient stating he has itchy eyes, sneezing and runny nose. Patient stating usual seasonal symptoms. Patient requesting script for Claritin be sent to [facility]'s pharmacy.     Please review and advise.

## 2024-08-01 LAB
LEFT EYE DIABETIC RETINOPATHY: NORMAL
RIGHT EYE DIABETIC RETINOPATHY: NORMAL

## 2024-08-01 NOTE — TELEPHONE ENCOUNTER
Spoke with patient on the phone. Introduced self and role. Patient updated script for Claritin was sent to [facility] pharmacy for fill. All questions/concerns answered at this time.

## 2024-08-08 ENCOUNTER — CLINICAL SUPPORT (OUTPATIENT)
Dept: CARDIOLOGY CLINIC | Facility: CLINIC | Age: 64
End: 2024-08-08
Payer: COMMERCIAL

## 2024-08-08 DIAGNOSIS — I48.92 ATRIAL FLUTTER (HCC): ICD-10-CM

## 2024-08-08 DIAGNOSIS — I48.3 TYPICAL ATRIAL FLUTTER (HCC): Primary | ICD-10-CM

## 2024-08-08 PROCEDURE — 93248 EXT ECG>7D<15D REV&INTERPJ: CPT | Performed by: INTERNAL MEDICINE

## 2024-09-06 DIAGNOSIS — I50.32 CHRONIC DIASTOLIC HEART FAILURE (HCC): ICD-10-CM

## 2024-09-06 RX ORDER — FUROSEMIDE 20 MG
20 TABLET ORAL DAILY
Qty: 30 TABLET | Refills: 2 | Status: SHIPPED | OUTPATIENT
Start: 2024-09-06 | End: 2024-12-05

## 2024-09-13 ENCOUNTER — TELEPHONE (OUTPATIENT)
Dept: INTERNAL MEDICINE CLINIC | Facility: CLINIC | Age: 64
End: 2024-09-13

## 2024-09-18 DIAGNOSIS — Z79.4 TYPE 2 DIABETES MELLITUS WITHOUT COMPLICATION, WITH LONG-TERM CURRENT USE OF INSULIN (HCC): ICD-10-CM

## 2024-09-18 DIAGNOSIS — E11.9 TYPE 2 DIABETES MELLITUS WITHOUT COMPLICATION, WITH LONG-TERM CURRENT USE OF INSULIN (HCC): ICD-10-CM

## 2024-09-19 RX ORDER — ACYCLOVIR 400 MG/1
TABLET ORAL
Qty: 3 EACH | Refills: 5 | Status: SHIPPED | OUTPATIENT
Start: 2024-09-19

## 2024-10-10 ENCOUNTER — PATIENT OUTREACH (OUTPATIENT)
Dept: INTERNAL MEDICINE CLINIC | Facility: CLINIC | Age: 64
End: 2024-10-10

## 2024-10-10 NOTE — PROGRESS NOTES
Outpatient Care Management Note:    Re:  follow up call     I called and spoke with patient and he reports feeling ok at this time. He is aware of upcoming PCP appointment on 10/6 at 3 pm. Patient is using Dexcom G7 (CGM) to check blood sugars. He denies any difficulty with changing sensors or using device. He reports he has an episode of hypoglycemia about 2 months ago but nothing since. Instructed to call with any blood sugars less than 70 or if blood sugars are consistently over 300. Patient reports blood sugars have been around 250. Patient confirms taking Lantus 50 units at bedtime and Metformin as prescribed. Patient not taking meal time insulin as previously instructed. Patient instructed to bring Dexcom reader to PCP appointment so report can be downloaded.     Reviewed with patient 15-15 rule for treating hypoglycemia and signs and symptoms.     Patient due for repeat A1C at PCP visit. Last A1C was 3/27/24 and was 16.2% and previously 13.5%.     Encouraged patient to complete labwork before appointment next week and reviewed purpose of labwork and what it is checking. Reviewed to be fasting. (Lipid panel, BMP. Hep B labwork, C-peptide, anti-islet cell antibody, glutamic acid decarboxylase.)     Patient would benefit from diabetic education and was referred in the past and did not show to appointment in May. Unclear from patient if he would attend.     Patient wants to discuss further with provider and bleeding easily and bruising. I did review with patient he taking Aspirin 81 mg daily and that this is a blood thinner and may bruise easier if he bumps himself. He reports his dog causes some brusing at times. Patient reports he had a scab on his shin and it came off and was bleeding for 15 minutes. Reviewed with patient with taking aspirin may take longer than normal for bleeding to stop and reviewed to apply pressure and if unable to stop after 30 minutes to seek further medical attention. He voiced  "understanding and still wants to discuss with physician at his appointment.     Patient canceled upcoming colonoscopy. He declines to reschedule and wants to do an \"at home test.\" Will make provider aware to further discuss.     Patient requesting to talk further to provider about obtaining medical marijuana to help with his appetite. I did make patient aware providers at PCP office do not prescribe this but can further discuss with them.     Patient aware of Cardiology appointment on 12/19 and echo on 12/6.     Patient does not have any further questions, concerns, or other needs at this time. Patient has my contact number [phone] and PCP office number [phone] if needed. Patient is agreeable for further outreach.     Assessment and careplan updated.               "

## 2024-10-16 ENCOUNTER — TELEPHONE (OUTPATIENT)
Dept: INTERNAL MEDICINE CLINIC | Facility: CLINIC | Age: 64
End: 2024-10-16

## 2024-10-21 DIAGNOSIS — E11.69 TYPE 2 DIABETES MELLITUS WITH OTHER SPECIFIED COMPLICATION, WITHOUT LONG-TERM CURRENT USE OF INSULIN (HCC): Chronic | ICD-10-CM

## 2024-10-21 RX ORDER — INSULIN GLARGINE 100 [IU]/ML
50 INJECTION, SOLUTION SUBCUTANEOUS
Qty: 15 ML | Refills: 2 | Status: SHIPPED | OUTPATIENT
Start: 2024-10-21 | End: 2025-01-19

## 2024-10-21 NOTE — TELEPHONE ENCOUNTER
Received request from patient's pharmacy for a refill of his Lantus 50 units at .     Script sent to pharmacy for refill.

## 2024-10-28 ENCOUNTER — PATIENT OUTREACH (OUTPATIENT)
Dept: INTERNAL MEDICINE CLINIC | Facility: CLINIC | Age: 64
End: 2024-10-28

## 2024-11-06 ENCOUNTER — OFFICE VISIT (OUTPATIENT)
Dept: INTERNAL MEDICINE CLINIC | Facility: CLINIC | Age: 64
End: 2024-11-06

## 2024-11-06 VITALS
OXYGEN SATURATION: 98 % | BODY MASS INDEX: 20 KG/M2 | DIASTOLIC BLOOD PRESSURE: 73 MMHG | HEART RATE: 114 BPM | WEIGHT: 135.4 LBS | SYSTOLIC BLOOD PRESSURE: 135 MMHG | TEMPERATURE: 97.6 F

## 2024-11-06 DIAGNOSIS — E11.42 DIABETIC POLYNEUROPATHY ASSOCIATED WITH TYPE 2 DIABETES MELLITUS (HCC): ICD-10-CM

## 2024-11-06 DIAGNOSIS — Z23 ENCOUNTER FOR IMMUNIZATION: Primary | ICD-10-CM

## 2024-11-06 DIAGNOSIS — S41.102A: ICD-10-CM

## 2024-11-06 DIAGNOSIS — F17.210 SMOKING GREATER THAN 20 PACK YEARS: ICD-10-CM

## 2024-11-06 DIAGNOSIS — I48.92 ATRIAL FLUTTER, UNSPECIFIED TYPE (HCC): ICD-10-CM

## 2024-11-06 DIAGNOSIS — Z23 NEED FOR COVID-19 VACCINE: ICD-10-CM

## 2024-11-06 DIAGNOSIS — Z12.11 SCREEN FOR COLON CANCER: ICD-10-CM

## 2024-11-06 LAB — SL AMB POCT HEMOGLOBIN AIC: 11.3 (ref ?–6.5)

## 2024-11-06 RX ORDER — MUPIROCIN 20 MG/G
OINTMENT TOPICAL 3 TIMES DAILY
Qty: 15 G | Refills: 3 | Status: SHIPPED | OUTPATIENT
Start: 2024-11-06

## 2024-11-07 NOTE — PROGRESS NOTES
Ambulatory Visit  Name: Simone Reyes      : 1960      MRN: [id number]  Encounter Provider: Kenneth Baltazar MD  Encounter Date: 2024   Encounter department: [facility]    Assessment & Plan  Diabetic polyneuropathy associated with type 2 diabetes mellitus (HCC)  A1c 11.3 from 16 today. Previously on basal + bolus insulin. Bolus d/cd due to hypoglycemia. Pt has CGM but did not bring BG logs with him today. Has had no hypoglycemia. He is unsure of his BG readings. I have counseled him to complete previously ordered antibody labs. Continue current insulin therapy lantus 50 units qhs and follow up in 2 weeks with BG logs.   Lab Results   Component Value Date    HGBA1C 11.3 (A) 2024       Orders:    POCT hemoglobin A1c    Lipid panel; Future    Ambulatory Referral to Nutrition Services; Future    Atrial flutter, unspecified type (HCC)  Aflutter vs SVT on outpatient cardiac monitor. Recommendation is for AC due to CHADS2-VASC score of 3. Patient agreeable. Will d/c ASA and start eliquis.   Orders:    apixaban (Eliquis) 5 mg; Take 1 tablet (5 mg total) by mouth 2 (two) times a day    Wounds, multiple open, arm, left, initial encounter  Diabetic Foot Exam    Orders:    Ambulatory Referral to Wound Care; Future    mupirocin (BACTROBAN) 2 % ointment; Apply topically 3 (three) times a day    Encounter for immunization    Orders:    influenza vaccine, high-dose, PF 0.5 mL (Fluzone High Dose)    Respiratory Syncytial Virus (RSV) vaccine (recombinant) (Abrysvo)    Smoking greater than 20 pack years    Orders:    CT lung screening program; Future    Screen for colon cancer    Orders:    Ambulatory Referral to Gastroenterology; Future    Need for COVID-19 vaccine    Orders:    COVID-19 Pfizer mRNA vaccine 12 yr and older (Comirnaty pre-filled syringe)       History of Present Illness     64M w/ hx of DM, COPD, HFpEF who presents for follow up of DM    DM - A1c 16.2 March and was  hospitalized at that time for DKA. Previously have sent auto-antibodies to rule out adult onset type 1 DM. Previously on basal + bolus insulin but bolus d/cd due to hypoglycemia. He has a CGM but did not bring logs today. No hypoglycemia reported. He is trying to eat healthier. He is also on metformin 2g daily without side effects. His atorvastatin was switched to crestor and he has had no side effects at this time.     Previously seen for volume overloaded state likely due to heart failure. He is compliant with lasix therapy and is doing well. Appears euvolemic today. Reports improved SOB.     Continues to smoke hand rolled cigarettes and I have counseled him to discontinue.             Review of Systems   Constitutional:  Negative for chills and fever.   HENT:  Negative for ear pain and sore throat.    Eyes:  Negative for pain and visual disturbance.   Respiratory:  Negative for cough and shortness of breath.    Cardiovascular:  Negative for chest pain and palpitations.   Gastrointestinal:  Negative for abdominal pain and vomiting.   Genitourinary:  Negative for dysuria and hematuria.   Musculoskeletal:  Negative for arthralgias and back pain.   Skin:  Positive for wound. Negative for color change and rash.   Neurological:  Negative for seizures and syncope.   All other systems reviewed and are negative.          Objective     /73 (BP Location: Left arm, Patient Position: Sitting, Cuff Size: Standard)   Pulse (!) 114   Temp 97.6 °F (36.4 °C) (Temporal)   Wt 61.4 kg (135 lb 6.4 oz)   SpO2 98%   BMI 20.00 kg/m²     Physical Exam  Vitals and nursing note reviewed.   Constitutional:       General: He is not in acute distress.     Appearance: He is well-developed.   HENT:      Head: Normocephalic and atraumatic.   Eyes:      Conjunctiva/sclera: Conjunctivae normal.   Cardiovascular:      Rate and Rhythm: Normal rate and regular rhythm.      Pulses: no weak pulses.           Dorsalis pedis pulses are 2+ on  the right side and 2+ on the left side.        Posterior tibial pulses are 2+ on the right side and 2+ on the left side.      Heart sounds: No murmur heard.  Pulmonary:      Effort: Pulmonary effort is normal. No respiratory distress.      Breath sounds: Normal breath sounds.   Abdominal:      Palpations: Abdomen is soft.      Tenderness: There is no abdominal tenderness.   Musculoskeletal:         General: No swelling.      Cervical back: Neck supple.   Feet:      Right foot:      Skin integrity: No ulcer, skin breakdown, erythema, warmth, callus or dry skin.      Left foot:      Skin integrity: No ulcer, skin breakdown, erythema, warmth, callus or dry skin.   Skin:     General: Skin is warm and dry.      Capillary Refill: Capillary refill takes less than 2 seconds.      Comments: Multiple superficial wounds present on the left forearm and hand   Neurological:      Mental Status: He is alert and oriented to person, place, and time.   Psychiatric:         Mood and Affect: Mood normal.       Diabetic Foot Exam    Patient's shoes and socks removed.    Right Foot/Ankle   Right Foot Inspection  Skin Exam: skin normal. Skin not intact, no dry skin, no warmth, no callus, no erythema, no maceration, no abnormal color, no pre-ulcer, no ulcer and no callus.     Toe Exam: No swelling, no tenderness, erythema and  no right toe deformity    Sensory   Proprioception: intact  Monofilament testing: intact    Vascular  Capillary refills: < 3 seconds  The right DP pulse is 2+. The right PT pulse is 2+.     Left Foot/Ankle  Left Foot Inspection  Skin Exam: skin normal. Skin not intact, no dry skin, no warmth, no erythema, no maceration, normal color, no pre-ulcer, no ulcer and no callus.     Toe Exam: No swelling, no tenderness, no erythema and no left toe deformity.     Sensory   Proprioception: intact  Monofilament testing: intact    Vascular  Capillary refills: < 3 seconds  The left DP pulse is 2+. The left PT pulse is 2+.      Assign Risk Category  No deformity present  No loss of protective sensation  No weak pulses  Risk: 0

## 2024-11-12 DIAGNOSIS — E11.42 DIABETIC POLYNEUROPATHY ASSOCIATED WITH TYPE 2 DIABETES MELLITUS (HCC): ICD-10-CM

## 2024-11-13 RX ORDER — GABAPENTIN 100 MG/1
200 CAPSULE ORAL 3 TIMES DAILY
Qty: 90 CAPSULE | Refills: 3 | Status: SHIPPED | OUTPATIENT
Start: 2024-11-13

## 2024-11-19 ENCOUNTER — PATIENT OUTREACH (OUTPATIENT)
Dept: INTERNAL MEDICINE CLINIC | Facility: CLINIC | Age: 64
End: 2024-11-19

## 2024-11-19 NOTE — PROGRESS NOTES
Outpatient Care Management Note:    Re:  follow up call -diabetes    I called and spoke with patient. He reports he has been feeling ok. Denies any complaints at this time. I reviewed with him PCP appointment tomorrow Wednesday 11/21 @ 4 pm. Patient was not aware of appointment but states he will plan to attend. Patient reports he is taking his medications as prescribed including Lantus 50 units at bedtime and Metformin. Patient using Dexcom G7 to check blood sugars and will bring reader with him to appointment so report can be downloaded. Patient reports blood sugars are sometime above 400 and reports stays elevated for 1-2 days before coming back down. Patient does not have back up glucometer for fingersticks and requesting glucometer and supplies be sent to his pharmacy. He denies any changes to his diet. He does share that he eats at least 3 meals a day sometimes 5 small meals. He reports he does not have set eating times and eats when he feels hungry. Patient drinking water, milk, and 1-2 glasses a day of orange juice. I did review with him orange juice is high in sugar and not best option. Suggested water with a splash of orange juice to have some flavor but cut back on sugar. He reports he will try this. Encouraged watching portion sizes and carbohydrate intake. Patient asking about diabetic education. Per chart review scheduled 1/22 with nutritionist. Will reach out to our diabetic educators to assist with scheduling patient.     Last A1C was 11.3% on 11/6/24 down from 16.2% on 3/27/24.     Patient did state he feels his blood sugars were better controlled with also having meal time insulin. I did review with him why it was stopped and will have provider further advise tomorrow.     Will follow up with patient and give further diabetic education material to patient along with info to sign up for Mychart.    I did review with patient upcoming echo on 12/6 @ 11 am and Cardiology appointment on 12/19 @ 3 pm.      Patient confirms he did stop his aspirin and is taking Eliquis 5 mg twice a day.     Reviewed he is to complete CT of lung.     Reviewed he missed wound care appointment yesterday for his left arm (left wrist wound). Patient shares he was not aware of this appointment. He reports he feels area is healing and continues to put mupirocin ointment on area. Will have provider further assess tomorrow and advise if still needs appointment with wound care and will help assist with scheduling at that time.     Patient does not have any further questions, concerns, or other needs at this time. Patient has my contact number [phone] and PCP office number [phone] if needed. Patient is agreeable for further outreach.

## 2024-11-25 ENCOUNTER — TELEPHONE (OUTPATIENT)
Dept: INTERNAL MEDICINE CLINIC | Facility: CLINIC | Age: 64
End: 2024-11-25

## 2024-11-26 DIAGNOSIS — E11.69 TYPE 2 DIABETES MELLITUS WITH OTHER SPECIFIED COMPLICATION, WITHOUT LONG-TERM CURRENT USE OF INSULIN (HCC): Primary | Chronic | ICD-10-CM

## 2024-12-04 DIAGNOSIS — E11.69 TYPE 2 DIABETES MELLITUS WITH OTHER SPECIFIED COMPLICATION, WITHOUT LONG-TERM CURRENT USE OF INSULIN (HCC): Chronic | ICD-10-CM

## 2024-12-04 RX ORDER — METFORMIN HYDROCHLORIDE 500 MG/1
2000 TABLET, EXTENDED RELEASE ORAL
Qty: 360 TABLET | Refills: 1 | Status: SHIPPED | OUTPATIENT
Start: 2024-12-04

## 2024-12-07 DIAGNOSIS — I50.32 CHRONIC DIASTOLIC HEART FAILURE (HCC): ICD-10-CM

## 2024-12-09 RX ORDER — FUROSEMIDE 20 MG/1
20 TABLET ORAL DAILY
Qty: 30 TABLET | Refills: 3 | Status: SHIPPED | OUTPATIENT
Start: 2024-12-09

## 2024-12-10 ENCOUNTER — PATIENT OUTREACH (OUTPATIENT)
Dept: INTERNAL MEDICINE CLINIC | Facility: CLINIC | Age: 64
End: 2024-12-10

## 2024-12-10 NOTE — PROGRESS NOTES
Outpatient Care Management Note:    Re:  follow up call - diabetes and cardiology     I called patient and no answer. Message left with reason for call and requested a return call and left contact number and PCP office number. I did remind patient of upcoming Cardiology appointment on 12/19 @ 3 pm and encouraged him to attend. I did mention he missed ultrasound of heart/echo last week and encouraged him to call central scheduling at [phone] to reschedule. Reviewed he is overdue to follow up with PCP office for diabetes follow up.     Per chart review patient canceled PCP appointment on 11/20 and rescheduled to 11/25 and no showed. Patient no showed to echo on 12/6/24. No future PCP appointment made.

## 2024-12-17 ENCOUNTER — PATIENT OUTREACH (OUTPATIENT)
Dept: INTERNAL MEDICINE CLINIC | Facility: CLINIC | Age: 64
End: 2024-12-17

## 2024-12-17 NOTE — PROGRESS NOTES
Outpatient Care Management Note:    Re:  follow up call     I called patient and no answer. Message left with my role and reason for outreach. I requested a call back and left my contact number and reminded him of cardiology appointment on Thursday 12/19 @ 3 pm and that he should reschedule missed echo by calling [phone] and overdue for PCP appointment.     I was calling to see how patient is managing with medications and how blood sugars have been and review the above info.     Will mail unable to reach letter. Patient not signed up for [email or web address]. Will also mail diabetic education info that I had planned on giving him and reviewing at last PCP appointment patient did not show too.

## 2024-12-17 NOTE — LETTER
Date: 12/17/24    Dear Simone Reyes,   My name is Malorie; I am a registered nurse care manager working with [facility], your primary care office. I have not been able to reach you and would like to set a time that I can talk with you over the phone or in-person.  My work is to help patients that have complex medical conditions get the care they need. This includes patients who may have been in the hospital or emergency room.    Please call me with any questions you may have. I look forward to hearing from you.  Sincerely,  Malorie MART    [phone]  Outpatient Care Manager

## 2024-12-22 DIAGNOSIS — E78.2 MIXED HYPERLIPIDEMIA: ICD-10-CM

## 2024-12-24 ENCOUNTER — TELEPHONE (OUTPATIENT)
Dept: INTERNAL MEDICINE CLINIC | Facility: CLINIC | Age: 64
End: 2024-12-24

## 2024-12-24 ENCOUNTER — HOSPITAL ENCOUNTER (INPATIENT)
Facility: HOSPITAL | Age: 64
LOS: 3 days | Discharge: HOME | DRG: 638 | End: 2024-12-27
Attending: EMERGENCY MEDICINE | Admitting: INTERNAL MEDICINE
Payer: COMMERCIAL

## 2024-12-24 ENCOUNTER — PATIENT OUTREACH (OUTPATIENT)
Age: 64
End: 2024-12-24

## 2024-12-24 DIAGNOSIS — Z87.828 H/O OPEN HAND WOUND: ICD-10-CM

## 2024-12-24 DIAGNOSIS — R73.9 HYPERGLYCEMIA: Primary | ICD-10-CM

## 2024-12-24 DIAGNOSIS — L97.509 DIABETIC FOOT ULCERS (HCC): ICD-10-CM

## 2024-12-24 DIAGNOSIS — E11.69 TYPE 2 DIABETES MELLITUS WITH OTHER SPECIFIED COMPLICATION, WITHOUT LONG-TERM CURRENT USE OF INSULIN (HCC): ICD-10-CM

## 2024-12-24 DIAGNOSIS — R21 RASH OF FOOT: ICD-10-CM

## 2024-12-24 DIAGNOSIS — S31.000A SACRAL WOUND, INITIAL ENCOUNTER: ICD-10-CM

## 2024-12-24 DIAGNOSIS — E11.621 DIABETIC FOOT ULCERS (HCC): ICD-10-CM

## 2024-12-24 DIAGNOSIS — S41.109A: ICD-10-CM

## 2024-12-24 PROBLEM — I48.91 A-FIB (HCC): Status: RESOLVED | Noted: 2024-12-24 | Resolved: 2024-12-24

## 2024-12-24 PROBLEM — E11.00 HYPEROSMOLAR HYPERGLYCEMIC STATE (HHS) (HCC): Status: ACTIVE | Noted: 2024-12-24

## 2024-12-24 PROBLEM — I48.91 A-FIB (HCC): Status: ACTIVE | Noted: 2024-12-24

## 2024-12-24 LAB
ALBUMIN SERPL BCG-MCNC: 3.4 G/DL (ref 3.5–5)
ALP SERPL-CCNC: 84 U/L (ref 34–104)
ALT SERPL W P-5'-P-CCNC: 17 U/L (ref 7–52)
ANION GAP SERPL CALCULATED.3IONS-SCNC: 6 MMOL/L (ref 4–13)
ANION GAP SERPL CALCULATED.3IONS-SCNC: 7 MMOL/L (ref 4–13)
AST SERPL W P-5'-P-CCNC: 14 U/L (ref 13–39)
B-OH-BUTYR SERPL-MCNC: 0.08 MMOL/L (ref 0.02–0.27)
BASE EX.OXY STD BLDV CALC-SCNC: 41.4 % (ref 60–80)
BASE EXCESS BLDV CALC-SCNC: 1.9 MMOL/L
BASOPHILS # BLD AUTO: 0.05 THOUSANDS/ÂΜL (ref 0–0.1)
BASOPHILS NFR BLD AUTO: 1 % (ref 0–1)
BILIRUB SERPL-MCNC: 0.38 MG/DL (ref 0.2–1)
BUN SERPL-MCNC: 20 MG/DL (ref 5–25)
BUN SERPL-MCNC: 22 MG/DL (ref 5–25)
CALCIUM ALBUM COR SERPL-MCNC: 9.2 MG/DL (ref 8.3–10.1)
CALCIUM SERPL-MCNC: 8.7 MG/DL (ref 8.4–10.2)
CALCIUM SERPL-MCNC: 9.2 MG/DL (ref 8.4–10.2)
CHLORIDE SERPL-SCNC: 91 MMOL/L (ref 96–108)
CHLORIDE SERPL-SCNC: 96 MMOL/L (ref 96–108)
CO2 SERPL-SCNC: 27 MMOL/L (ref 21–32)
CO2 SERPL-SCNC: 28 MMOL/L (ref 21–32)
CREAT SERPL-MCNC: 0.86 MG/DL (ref 0.6–1.3)
CREAT SERPL-MCNC: 0.97 MG/DL (ref 0.6–1.3)
EOSINOPHIL # BLD AUTO: 0.09 THOUSAND/ÂΜL (ref 0–0.61)
EOSINOPHIL NFR BLD AUTO: 1 % (ref 0–6)
ERYTHROCYTE [DISTWIDTH] IN BLOOD BY AUTOMATED COUNT: 12 % (ref 11.6–15.1)
GFR SERPL CREATININE-BSD FRML MDRD: 81 ML/MIN/1.73SQ M
GFR SERPL CREATININE-BSD FRML MDRD: 90 ML/MIN/1.73SQ M
GLUCOSE SERPL-MCNC: 283 MG/DL (ref 65–140)
GLUCOSE SERPL-MCNC: 635 MG/DL (ref 65–140)
GLUCOSE SERPL-MCNC: 834 MG/DL (ref 65–140)
GLUCOSE SERPL-MCNC: >600 MG/DL (ref 65–140)
HCO3 BLDV-SCNC: 27.8 MMOL/L (ref 24–30)
HCT VFR BLD AUTO: 36.4 % (ref 36.5–49.3)
HGB BLD-MCNC: 12.3 G/DL (ref 12–17)
IMM GRANULOCYTES # BLD AUTO: 0.04 THOUSAND/UL (ref 0–0.2)
IMM GRANULOCYTES NFR BLD AUTO: 1 % (ref 0–2)
LYMPHOCYTES # BLD AUTO: 1.89 THOUSANDS/ÂΜL (ref 0.6–4.47)
LYMPHOCYTES NFR BLD AUTO: 21 % (ref 14–44)
MAGNESIUM SERPL-MCNC: 1.9 MG/DL (ref 1.9–2.7)
MCH RBC QN AUTO: 31.2 PG (ref 26.8–34.3)
MCHC RBC AUTO-ENTMCNC: 33.8 G/DL (ref 31.4–37.4)
MCV RBC AUTO: 92 FL (ref 82–98)
MONOCYTES # BLD AUTO: 0.64 THOUSAND/ÂΜL (ref 0.17–1.22)
MONOCYTES NFR BLD AUTO: 7 % (ref 4–12)
NEUTROPHILS # BLD AUTO: 6.11 THOUSANDS/ÂΜL (ref 1.85–7.62)
NEUTS SEG NFR BLD AUTO: 69 % (ref 43–75)
NRBC BLD AUTO-RTO: 0 /100 WBCS
O2 CT BLDV-SCNC: 8 ML/DL
PCO2 BLDV: 48.7 MM HG (ref 42–50)
PH BLDV: 7.38 [PH] (ref 7.3–7.4)
PHOSPHATE SERPL-MCNC: 2.6 MG/DL (ref 2.3–4.1)
PLATELET # BLD AUTO: 191 THOUSANDS/UL (ref 149–390)
PMV BLD AUTO: 10.2 FL (ref 8.9–12.7)
PO2 BLDV: 22.5 MM HG (ref 35–45)
POTASSIUM SERPL-SCNC: 4.4 MMOL/L (ref 3.5–5.3)
POTASSIUM SERPL-SCNC: 4.8 MMOL/L (ref 3.5–5.3)
PROT SERPL-MCNC: 6.4 G/DL (ref 6.4–8.4)
RBC # BLD AUTO: 3.94 MILLION/UL (ref 3.88–5.62)
SODIUM SERPL-SCNC: 125 MMOL/L (ref 135–147)
SODIUM SERPL-SCNC: 130 MMOL/L (ref 135–147)
WBC # BLD AUTO: 8.82 THOUSAND/UL (ref 4.31–10.16)

## 2024-12-24 PROCEDURE — 36415 COLL VENOUS BLD VENIPUNCTURE: CPT

## 2024-12-24 PROCEDURE — 84100 ASSAY OF PHOSPHORUS: CPT

## 2024-12-24 PROCEDURE — 80048 BASIC METABOLIC PNL TOTAL CA: CPT

## 2024-12-24 PROCEDURE — 82010 KETONE BODYS QUAN: CPT

## 2024-12-24 PROCEDURE — 82948 REAGENT STRIP/BLOOD GLUCOSE: CPT

## 2024-12-24 PROCEDURE — 99285 EMERGENCY DEPT VISIT HI MDM: CPT | Performed by: EMERGENCY MEDICINE

## 2024-12-24 PROCEDURE — 85025 COMPLETE CBC W/AUTO DIFF WBC: CPT

## 2024-12-24 PROCEDURE — 81001 URINALYSIS AUTO W/SCOPE: CPT

## 2024-12-24 PROCEDURE — 99223 1ST HOSP IP/OBS HIGH 75: CPT | Performed by: INTERNAL MEDICINE

## 2024-12-24 PROCEDURE — 96360 HYDRATION IV INFUSION INIT: CPT

## 2024-12-24 PROCEDURE — 80053 COMPREHEN METABOLIC PANEL: CPT

## 2024-12-24 PROCEDURE — 83735 ASSAY OF MAGNESIUM: CPT

## 2024-12-24 PROCEDURE — 99285 EMERGENCY DEPT VISIT HI MDM: CPT

## 2024-12-24 PROCEDURE — NC001 PR NO CHARGE: Performed by: INTERNAL MEDICINE

## 2024-12-24 PROCEDURE — 82805 BLOOD GASES W/O2 SATURATION: CPT

## 2024-12-24 RX ORDER — SODIUM CHLORIDE 9 MG/ML
125 INJECTION, SOLUTION INTRAVENOUS CONTINUOUS
Status: DISCONTINUED | OUTPATIENT
Start: 2024-12-24 | End: 2024-12-25

## 2024-12-24 RX ORDER — POTASSIUM CHLORIDE 1500 MG/1
20 TABLET, EXTENDED RELEASE ORAL ONCE
Status: COMPLETED | OUTPATIENT
Start: 2024-12-24 | End: 2024-12-24

## 2024-12-24 RX ORDER — LORATADINE 10 MG/1
10 TABLET ORAL DAILY
Status: DISCONTINUED | OUTPATIENT
Start: 2024-12-25 | End: 2024-12-27 | Stop reason: HOSPADM

## 2024-12-24 RX ORDER — ALBUTEROL SULFATE 90 UG/1
1 INHALANT RESPIRATORY (INHALATION) EVERY 4 HOURS PRN
Status: DISCONTINUED | OUTPATIENT
Start: 2024-12-24 | End: 2024-12-27 | Stop reason: HOSPADM

## 2024-12-24 RX ORDER — GABAPENTIN 100 MG/1
200 CAPSULE ORAL 3 TIMES DAILY
Status: DISCONTINUED | OUTPATIENT
Start: 2024-12-24 | End: 2024-12-27 | Stop reason: HOSPADM

## 2024-12-24 RX ORDER — SODIUM CHLORIDE, SODIUM GLUCONATE, SODIUM ACETATE, POTASSIUM CHLORIDE, MAGNESIUM CHLORIDE, SODIUM PHOSPHATE, DIBASIC, AND POTASSIUM PHOSPHATE .53; .5; .37; .037; .03; .012; .00082 G/100ML; G/100ML; G/100ML; G/100ML; G/100ML; G/100ML; G/100ML
125 INJECTION, SOLUTION INTRAVENOUS CONTINUOUS
Status: DISCONTINUED | OUTPATIENT
Start: 2024-12-24 | End: 2024-12-24

## 2024-12-24 RX ORDER — FLUTICASONE FUROATE AND VILANTEROL 200; 25 UG/1; UG/1
1 POWDER RESPIRATORY (INHALATION) DAILY
Status: DISCONTINUED | OUTPATIENT
Start: 2024-12-25 | End: 2024-12-27 | Stop reason: HOSPADM

## 2024-12-24 RX ORDER — NYSTATIN 100000 U/G
CREAM TOPICAL 2 TIMES DAILY
Status: DISCONTINUED | OUTPATIENT
Start: 2024-12-24 | End: 2024-12-27 | Stop reason: HOSPADM

## 2024-12-24 RX ORDER — FUROSEMIDE 20 MG/1
20 TABLET ORAL DAILY
Status: DISCONTINUED | OUTPATIENT
Start: 2024-12-25 | End: 2024-12-24

## 2024-12-24 RX ORDER — ATORVASTATIN CALCIUM 40 MG/1
40 TABLET, FILM COATED ORAL
Status: DISCONTINUED | OUTPATIENT
Start: 2024-12-25 | End: 2024-12-27 | Stop reason: HOSPADM

## 2024-12-24 RX ADMIN — SODIUM CHLORIDE 125 ML/HR: 0.9 INJECTION, SOLUTION INTRAVENOUS at 23:08

## 2024-12-24 RX ADMIN — AMOXICILLIN AND CLAVULANATE POTASSIUM 1 TABLET: 875; 125 TABLET, COATED ORAL at 20:02

## 2024-12-24 RX ADMIN — SODIUM CHLORIDE 1000 ML: 0.9 INJECTION, SOLUTION INTRAVENOUS at 21:57

## 2024-12-24 RX ADMIN — GABAPENTIN 200 MG: 100 CAPSULE ORAL at 21:25

## 2024-12-24 RX ADMIN — APIXABAN 5 MG: 5 TABLET, FILM COATED ORAL at 21:25

## 2024-12-24 RX ADMIN — SODIUM CHLORIDE 1000 ML: 0.9 INJECTION, SOLUTION INTRAVENOUS at 19:00

## 2024-12-24 RX ADMIN — POTASSIUM CHLORIDE 20 MEQ: 1500 TABLET, EXTENDED RELEASE ORAL at 21:50

## 2024-12-24 RX ADMIN — SODIUM CHLORIDE 10 UNITS/HR: 9 INJECTION, SOLUTION INTRAVENOUS at 21:27

## 2024-12-24 NOTE — TELEPHONE ENCOUNTER
Patient called saying that he is going to admit himself later due to his blood sugar being crazy. He is not feeling well, very sick. Wanted to let Malorie know what is going on.     I told him she was OOO that I would relay the message to her and her covering team. If they need to reach out to him.    [phone]

## 2024-12-24 NOTE — PROGRESS NOTES
Received in basket message from clerical staff. Outreach to patient. Pt reports his BS are out of control. Pt reports that sugars are in the high 400s. Questioned if he was taking his lantus insulin. States he is taking it when he can. Pt reports he is vomiting. No diarrhea. Taking ibuprofen as needed. Pt reports he is going to go the ED tonight after his daughter gets home. He feels really bad and believes he needs to be evaluated.     Note routed to Malorie Mcknight RN CM.

## 2024-12-24 NOTE — ED PROVIDER NOTES
"Time reflects when diagnosis was documented in both MDM as applicable and the Disposition within this note       Time User Action Codes Description Comment    12/24/2024  8:07 PM Love Brito Add [R73.9] Hyperglycemia     12/24/2024  8:08 PM Love Brito Add [S41.109A] Non-healing wound of upper extremity     12/24/2024 11:13 PM Colleen Brar Add [E11.69] Type 2 diabetes mellitus with other specified complication, without long-term current use of insulin (HCC)     12/24/2024 11:13 PM Colleen Brar Add [E11.621,  L97.509] Diabetic foot ulcers (HCC)           ED Disposition       ED Disposition   Admit    Condition   Stable    Date/Time   Tue Dec 24, 2024  8:24 PM    Comment   Case was discussed with Dr. Brar and the patient's admission status was agreed to be Admission Status: observation status to the service of Dr. Sánchez .               Assessment & Plan       Medical Decision Making  Amount and/or Complexity of Data Reviewed  Labs: ordered. Decision-making details documented in ED Course.    Risk  OTC drugs.  Prescription drug management.  Decision regarding hospitalization.      Patient is a 64 y.o. male  PMH T2DM, CHF, COPD, Aflutter vs atrial tachycardia on Zio per note 8/8/24 supposed to be on Eliquis last taken 5 days ago  who presents to the ED with CC L hand wound and \"diabetes problem\". States that the hand wound started when he scratched it and then it \"blew up\". Has been prescribed mupirocin ointment but has not been applying. States that he has been picking at the wound which is worsening it. Has had the wound for a couple of months. Does not believe he's had any fevers from it but isn't sure. States he does not drink alcohol but did snort 1 bag heroin a few days ago. Also smokes marijuana.  Has seen dermatology for L forearm wound that was lichen simplex chronicus.    Also states he has not had access to his meds and has not been taking insulin.States that last week he was vomiting a lot and had " "chills but hasn't been able to take his medicine because he's been \"sick.\"  Up until five days ago has been taking all of his medications. Did not have any pain at the time of feeling sick - just felt dehydrated and has been vomiting since that time    Vital signs stable, afebrile. Exam as listed below.    Differential diagnosis includes but is not limited to DKA, HHS, isolated hyperglycemia, lichen simplex chronicus vs infected/non-healing wound    Plan CBC, CMP, VBG, beta hydroxybutyrate, 1L NS, Augmentin      View ED course above for further discussion on patient workup.     On review of previous records see chart review above.    All labs reviewed and utilized in the medical decision making process  All radiology studies independently viewed by me and interpreted by the radiologist.  I reviewed all testing with the patient.     Upon re-evaluation - no acidosis but glucose significantly elevated - 10u Subcutaneous insulin given and patient admitted for hyperglycemia and chronic wound.    ED Course as of 12/24/24 2348 Tue Dec 24, 2024   1913 pH, Haresh: 7.375  Not acidotic. No negative base excess. Likely isolated hyperglycemia and not DKA   1918 CBC and differential(!)  No leukocytosis    1951 Beta- Hydroxybutyrate: 0.08       Medications   albuterol (PROVENTIL HFA,VENTOLIN HFA) inhaler 1 puff (has no administration in time range)   apixaban (ELIQUIS) tablet 5 mg (has no administration in time range)   gabapentin (NEURONTIN) capsule 200 mg (has no administration in time range)   loratadine (CLARITIN) tablet 10 mg (has no administration in time range)   atorvastatin (LIPITOR) tablet 40 mg (has no administration in time range)   fluticasone-vilanterol 200-25 mcg/actuation 1 puff (has no administration in time range)   sodium chloride 0.9 % bolus 1,000 mL (0 mL Intravenous Stopped 12/24/24 2002)   amoxicillin-clavulanate (AUGMENTIN) 875-125 mg per tablet 1 tablet (1 tablet Oral Given 12/24/24 2002)       ED Risk " Strat Scores                          SBIRT 20yo+      Flowsheet Row Most Recent Value   Initial Alcohol Screen: US AUDIT-C     1. How often do you have a drink containing alcohol? 0 Filed at: 12/24/2024 1838   2. How many drinks containing alcohol do you have on a typical day you are drinking?  0 Filed at: 12/24/2024 1838   3b. FEMALE Any Age, or MALE 65+: How often do you have 4 or more drinks on one occassion? 0 Filed at: 12/24/2024 1838   Audit-C Score 0 Filed at: 12/24/2024 1838   CHARISSE: How many times in the past year have you...    Used an illegal drug or used a prescription medication for non-medical reasons? Never Filed at: 12/24/2024 1838                            History of Present Illness       Chief Complaint   Patient presents with    Wound Check     L hand wounds, didn't finish abx    Hyperglycemia - Symptomatic     BG reading up to 400, lethargic, increased urination, increased thirst       Past Medical History:   Diagnosis Date    Cough 2/4/2019    Diabetes mellitus (HCC)     Methadone dependence (HCC) 02/16/2022    Migraines 02/16/2022      Past Surgical History:   Procedure Laterality Date    TONSILLECTOMY        Family History   Problem Relation Age of Onset    Thyroid disease Mother     Cancer Mother       Social History     Tobacco Use    Smoking status: Every Day     Current packs/day: 0.50     Types: Cigarettes    Smokeless tobacco: Never    Tobacco comments:     half a pack every other day   Vaping Use    Vaping status: Never Used   Substance Use Topics    Alcohol use: Not Currently     Comment: rarely    Drug use: Yes     Types: Marijuana, Heroin, Fentanyl     Comment: currently smoking marijuana and occasional heroin, one bag a week      E-Cigarette/Vaping    E-Cigarette Use Never User       E-Cigarette/Vaping Substances    Nicotine No     THC No     CBD No     Flavoring No     Other No     Unknown No       I have reviewed and agree with the history as documented.     64 y.o. male with CC  not taking medications, vomiting, and wound to left hand         Review of Systems   Constitutional:  Positive for appetite change, chills and fatigue. Negative for activity change and fever.   Respiratory:  Negative for apnea, shortness of breath and stridor.    Cardiovascular:  Negative for chest pain.   Gastrointestinal:  Positive for nausea and vomiting. Negative for abdominal distention and diarrhea.   Skin:  Positive for rash and wound. Negative for color change.   Neurological:  Negative for dizziness, syncope, weakness and light-headedness.           Objective       ED Triage Vitals [12/24/24 1837]   Temperature Pulse Blood Pressure Respirations SpO2 Patient Position - Orthostatic VS   97.5 °F (36.4 °C) 87 139/61 18 99 % Sitting      Temp Source Heart Rate Source BP Location FiO2 (%) Pain Score    Tympanic Monitor Left arm -- --      Vitals      Date and Time Temp Pulse SpO2 Resp BP Pain Score FACES Pain Rating User   12/24/24 2320 98.2 °F (36.8 °C) 86 100 % 18 120/84 -- -- DII   12/24/24 2129 98.3 °F (36.8 °C) 74 97 % -- 132/58 -- -- DII   12/24/24 1837 97.5 °F (36.4 °C) 87 99 % 18 139/61 -- -- JS            Physical Exam  Constitutional:       Appearance: Normal appearance.   HENT:      Head: Normocephalic and atraumatic.      Nose: Nose normal.   Eyes:      Pupils: Pupils are equal, round, and reactive to light.   Cardiovascular:      Rate and Rhythm: Normal rate.   Pulmonary:      Effort: Pulmonary effort is normal.   Abdominal:      General: Abdomen is flat. There is no distension.      Palpations: Abdomen is soft.      Tenderness: There is no guarding.   Skin:     General: Skin is warm and dry.      Capillary Refill: Capillary refill takes less than 2 seconds.      Comments: See media for L hand skin changes    Neurological:      General: No focal deficit present.      Mental Status: He is alert and oriented to person, place, and time. Mental status is at baseline.      Cranial Nerves: No cranial nerve  "deficit.          Media Information      Document Information    Clinical Image - Mobile Device      12/24/2024 18:54   Attached To:   Hospital Encounter on 12/24/24   Source Information    Love Brito MD  Be Ed   Document History      Results Reviewed       Procedure Component Value Units Date/Time    Phosphorus [[id number]]  (Normal) Collected: 12/24/24 2226    Lab Status: Final result Specimen: Blood from Arm, Left Updated: 12/24/24 2328     Phosphorus 2.6 mg/dL     Magnesium [[id number]]  (Normal) Collected: 12/24/24 2226    Lab Status: Final result Specimen: Blood from Arm, Left Updated: 12/24/24 2328     Magnesium 1.9 mg/dL     Osmolality-\"If this is regarding a toxic alcohol, STOP. Test is not routinely indicated. Please consult medical  on call for further guidance.\" [[id number]]     Lab Status: No result Specimen: Blood     Urinalysis with microscopic [[id number]]     Lab Status: No result Specimen: Urine     Fingerstick Glucose (POCT) [[id number]]  (Abnormal) Collected: 12/24/24 2008    Lab Status: Final result Specimen: Blood Updated: 12/24/24 2009     POC Glucose >600 mg/dl     Comprehensive metabolic panel [[id number]]  (Abnormal) Collected: 12/24/24 1859    Lab Status: Final result Specimen: Blood from Arm, Right Updated: 12/24/24 1954     Sodium 125 mmol/L      Potassium 4.4 mmol/L      Chloride 91 mmol/L      CO2 27 mmol/L      ANION GAP 7 mmol/L      BUN 22 mg/dL      Creatinine 0.97 mg/dL      Glucose 834 mg/dL      Calcium 8.7 mg/dL      Corrected Calcium 9.2 mg/dL      AST 14 U/L      ALT 17 U/L      Alkaline Phosphatase 84 U/L      Total Protein 6.4 g/dL      Albumin 3.4 g/dL      Total Bilirubin 0.38 mg/dL      eGFR 81 ml/min/1.73sq m     Narrative:      [employer] guidelines for Chronic Kidney Disease (CKD):     Stage 1 with normal or high GFR (GFR > 90 mL/min/1.73 square meters)    Stage 2 Mild CKD (GFR = 60-89 mL/min/1.73 square meters)    Stage 3A Moderate " CKD (GFR = 45-59 mL/min/1.73 square meters)    Stage 3B Moderate CKD (GFR = 30-44 mL/min/1.73 square meters)    Stage 4 Severe CKD (GFR = 15-29 mL/min/1.73 square meters)    Stage 5 End Stage CKD (GFR <15 mL/min/1.73 square meters)  Note: GFR calculation is accurate only with a steady state creatinine    Beta Hydroxybutyrate [[id number]]  (Normal) Collected: 12/24/24 1859    Lab Status: Final result Specimen: Blood from Arm, Right Updated: 12/24/24 1949     Beta- Hydroxybutyrate 0.08 mmol/L     CBC and differential [[id number]]  (Abnormal) Collected: 12/24/24 1859    Lab Status: Final result Specimen: Blood from Arm, Right Updated: 12/24/24 1914     WBC 8.82 Thousand/uL      RBC 3.94 Million/uL      Hemoglobin 12.3 g/dL      Hematocrit 36.4 %      MCV 92 fL      MCH 31.2 pg      MCHC 33.8 g/dL      RDW 12.0 %      MPV 10.2 fL      Platelets 191 Thousands/uL      nRBC 0 /100 WBCs      Segmented % 69 %      Immature Grans % 1 %      Lymphocytes % 21 %      Monocytes % 7 %      Eosinophils Relative 1 %      Basophils Relative 1 %      Absolute Neutrophils 6.11 Thousands/µL      Absolute Immature Grans 0.04 Thousand/uL      Absolute Lymphocytes 1.89 Thousands/µL      Absolute Monocytes 0.64 Thousand/µL      Eosinophils Absolute 0.09 Thousand/µL      Basophils Absolute 0.05 Thousands/µL     Blood gas, venous [[id number]]  (Abnormal) Collected: 12/24/24 1859    Lab Status: Final result Specimen: Blood from Arm, Right Updated: 12/24/24 1908     pH, Haresh 7.375     pCO2, Haresh 48.7 mm Hg      pO2, Haresh 22.5 mm Hg      HCO3, Haresh 27.8 mmol/L      Base Excess, Haresh 1.9 mmol/L      O2 Content, Haresh 8.0 ml/dL      O2 HGB, VENOUS 41.4 %     Fingerstick Glucose (POCT) [[id number]]  (Abnormal) Collected: 12/24/24 1843    Lab Status: Final result Specimen: Blood Updated: 12/24/24 1844     POC Glucose >600 mg/dl             No orders to display       Procedures    ED Medication and Procedure Management   Prior to Admission Medications    Prescriptions Last Dose Informant Patient Reported? Taking?   Advair Diskus 250-50 MCG/ACT inhaler Not Taking Self No No   Sig: Inhale 1 puff 2 (two) times a day Rinse mouth after use.   Patient not taking: Reported on 12/24/2024   Alcohol Swabs (Alcohol Pads) 70 % PADS Unknown  No No   Sig: Use 4 (four) times a day   BD Pen Needle Judi 2nd Gen 32G X 4 MM MISC Unknown  No No   Sig: USE AS DIRECTED FOUR TIMES DAILY BEFORE MEALS AND AT BEDTIME   Blood Glucose Monitoring Suppl KIT Unknown Self No No   Sig: Use 1 each as needed (Monitor blood glucose)   Continuous Glucose  (Dexcom G7 ) CURLY Unknown  No No   Sig: USE AS DIRECTED ONCE   Continuous Glucose Sensor (Dexcom G7 Sensor) Unknown  No No   Sig: USE AS DIRECTED EVERY 10 DAYS   Insulin Glargine Solostar (Lantus SoloStar) 100 UNIT/ML SOPN Not Taking  No No   Sig: Inject 0.5 mL (50 Units total) under the skin daily at bedtime   Patient not taking: Reported on 12/24/2024   Lancets (OneTouch Delica Plus [id number]) MISC Unknown Self No No   Sig: Use to check blood sugar 4 times per day   albuterol (PROVENTIL HFA,VENTOLIN HFA) 90 mcg/act inhaler Past Month Self No Yes   Sig: INHALE 1 PUFF BY MOUTH EVERY 4 HOURS AS NEEDED FOR WHEEZING   apixaban (Eliquis) 5 mg Past Week  No Yes   Sig: Take 1 tablet (5 mg total) by mouth 2 (two) times a day   furosemide (LASIX) 20 mg tablet Past Week  No Yes   Sig: TAKE 1 TABLET BY MOUTH EVERY DAY   gabapentin (NEURONTIN) 100 mg capsule 12/23/2024  No Yes   Sig: Take 2 capsules (200 mg total) by mouth 3 (three) times a day   glucose blood test strip Unknown Self No No   Sig: Test sugar 4 times per day   loratadine (CLARITIN) 10 mg tablet More than a month  No No   Sig: Take 1 tablet (10 mg total) by mouth daily   Patient not taking: Reported on 12/24/2024   metFORMIN (GLUCOPHAGE-XR) 500 mg 24 hr tablet Past Week  No Yes   Sig: Take 4 tablets (2,000 mg total) by mouth daily with dinner   methadone (DOLOPHINE) 10 mg tablet  Past Week Self Yes Yes   Sig: Take 135 mg by mouth daily,   mupirocin (BACTROBAN) 2 % ointment 12/23/2024  No Yes   Sig: Apply topically 3 (three) times a day   rosuvastatin (CRESTOR) 20 MG tablet Past Week  No Yes   Sig: Take 1 tablet (20 mg total) by mouth daily      Facility-Administered Medications: None     Current Discharge Medication List        CONTINUE these medications which have NOT CHANGED    Details   albuterol (PROVENTIL HFA,VENTOLIN HFA) 90 mcg/act inhaler INHALE 1 PUFF BY MOUTH EVERY 4 HOURS AS NEEDED FOR WHEEZING  Qty: 18 g, Refills: 3    Comments: Substitution to a formulary equivalent within the same pharmaceutical class is authorized.  Associated Diagnoses: Shortness of breath; Chronic obstructive pulmonary disease, unspecified COPD type ([facility])      apixaban (Eliquis) 5 mg Take 1 tablet (5 mg total) by mouth 2 (two) times a day  Qty: 60 tablet, Refills: 3    Associated Diagnoses: Atrial flutter, unspecified type ([facility])      furosemide (LASIX) 20 mg tablet TAKE 1 TABLET BY MOUTH EVERY DAY  Qty: 30 tablet, Refills: 3    Associated Diagnoses: Chronic diastolic heart failure ([facility])      gabapentin (NEURONTIN) 100 mg capsule Take 2 capsules (200 mg total) by mouth 3 (three) times a day  Qty: 90 capsule, Refills: 3    Associated Diagnoses: Diabetic polyneuropathy associated with type 2 diabetes mellitus ([facility])      metFORMIN (GLUCOPHAGE-XR) 500 mg 24 hr tablet Take 4 tablets (2,000 mg total) by mouth daily with dinner  Qty: 360 tablet, Refills: 1    Associated Diagnoses: Type 2 diabetes mellitus with other specified complication, without long-term current use of insulin ([facility])      methadone (DOLOPHINE) 10 mg tablet Take 135 mg by mouth daily,      mupirocin (BACTROBAN) 2 % ointment Apply topically 3 (three) times a day  Qty: 15 g, Refills: 3    Associated Diagnoses: Wounds, multiple open, arm, left, initial encounter      rosuvastatin (CRESTOR) 20 MG tablet Take 1 tablet (20 mg total) by mouth daily  Qty:  30 tablet, Refills: 3    Associated Diagnoses: Mixed hyperlipidemia      Advair Diskus 250-50 MCG/ACT inhaler Inhale 1 puff 2 (two) times a day Rinse mouth after use.  Qty: 60 blister, Refills: 5    Comments: Substitution to a formulary equivalent within the same pharmaceutical class is authorized.  Associated Diagnoses: Chronic obstructive pulmonary disease, unspecified COPD type ([facility])      Alcohol Swabs (Alcohol Pads) 70 % PADS Use 4 (four) times a day  Qty: 100 each, Refills: 3    Associated Diagnoses: Type 2 diabetes mellitus with other specified complication, without long-term current use of insulin ([facility])      BD Pen Needle Judi 2nd Gen 32G X 4 MM MISC USE AS DIRECTED FOUR TIMES DAILY BEFORE MEALS AND AT BEDTIME  Qty: 100 each, Refills: 0    Associated Diagnoses: Type 2 diabetes mellitus with other specified complication, without long-term current use of insulin ([facility])      Blood Glucose Monitoring Suppl KIT Use 1 each as needed (Monitor blood glucose)  Qty: 1 kit, Refills: 2    Associated Diagnoses: Type 2 diabetes mellitus with other specified complication, without long-term current use of insulin ([facility])      Continuous Glucose  (Dexcom G7 ) CURLY USE AS DIRECTED ONCE  Qty: 1 each, Refills: 0    Associated Diagnoses: Type 2 diabetes mellitus with other specified complication, without long-term current use of insulin ([facility])      Continuous Glucose Sensor (Dexcom G7 Sensor) USE AS DIRECTED EVERY 10 DAYS  Qty: 3 each, Refills: 5    Associated Diagnoses: Type 2 diabetes mellitus without complication, with long-term current use of insulin ([facility])      glucose blood test strip Test sugar 4 times per day  Qty: 200 each, Refills: 2    Comments: Please dispense brand that insurance covers.  Associated Diagnoses: Type 2 diabetes mellitus with complication ([facility])      Insulin Glargine Solostar (Lantus SoloStar) 100 UNIT/ML SOPN Inject 0.5 mL (50 Units total) under the skin daily at bedtime  Qty: 15 mL, Refills:  2    Associated Diagnoses: Type 2 diabetes mellitus with other specified complication, without long-term current use of insulin (HCC)      Lancets (OneTouch Delica Plus [id number]) MISC Use to check blood sugar 4 times per day  Qty: 100 each, Refills: 5    Associated Diagnoses: Type 2 diabetes mellitus with complication (HCC)      loratadine (CLARITIN) 10 mg tablet Take 1 tablet (10 mg total) by mouth daily  Qty: 30 tablet, Refills: 3    Associated Diagnoses: Allergy, initial encounter           No discharge procedures on file.  ED SEPSIS DOCUMENTATION   Time reflects when diagnosis was documented in both MDM as applicable and the Disposition within this note       Time User Action Codes Description Comment    12/24/2024  8:07 PM Love Brito [R73.9] Hyperglycemia     12/24/2024  8:08 PM Love Brito [S41.109A] Non-healing wound of upper extremity     12/24/2024 11:13 PM Colleen Brar Add [E11.69] Type 2 diabetes mellitus with other specified complication, without long-term current use of insulin (HCC)     12/24/2024 11:13 PM Colleen Brar Add [E11.621,  L97.509] Diabetic foot ulcers ([facility])                  Love Brito MD  12/24/24 6090

## 2024-12-25 LAB
ANION GAP SERPL CALCULATED.3IONS-SCNC: 5 MMOL/L (ref 4–13)
ANION GAP SERPL CALCULATED.3IONS-SCNC: 5 MMOL/L (ref 4–13)
BACTERIA UR QL AUTO: ABNORMAL /HPF
BASOPHILS # BLD AUTO: 0.06 THOUSANDS/ÂΜL (ref 0–0.1)
BASOPHILS NFR BLD AUTO: 1 % (ref 0–1)
BILIRUB UR QL STRIP: NEGATIVE
BUN SERPL-MCNC: 18 MG/DL (ref 5–25)
BUN SERPL-MCNC: 18 MG/DL (ref 5–25)
CALCIUM SERPL-MCNC: 8.3 MG/DL (ref 8.4–10.2)
CALCIUM SERPL-MCNC: 8.4 MG/DL (ref 8.4–10.2)
CHLORIDE SERPL-SCNC: 104 MMOL/L (ref 96–108)
CHLORIDE SERPL-SCNC: 104 MMOL/L (ref 96–108)
CLARITY UR: CLEAR
CO2 SERPL-SCNC: 26 MMOL/L (ref 21–32)
CO2 SERPL-SCNC: 27 MMOL/L (ref 21–32)
COLOR UR: COLORLESS
CREAT SERPL-MCNC: 0.73 MG/DL (ref 0.6–1.3)
CREAT SERPL-MCNC: 0.75 MG/DL (ref 0.6–1.3)
EOSINOPHIL # BLD AUTO: 0.18 THOUSAND/ÂΜL (ref 0–0.61)
EOSINOPHIL NFR BLD AUTO: 2 % (ref 0–6)
ERYTHROCYTE [DISTWIDTH] IN BLOOD BY AUTOMATED COUNT: 11.9 % (ref 11.6–15.1)
FERRITIN SERPL-MCNC: 88 NG/ML (ref 24–336)
FOLATE SERPL-MCNC: 12.5 NG/ML
GFR SERPL CREATININE-BSD FRML MDRD: 95 ML/MIN/1.73SQ M
GFR SERPL CREATININE-BSD FRML MDRD: 96 ML/MIN/1.73SQ M
GLUCOSE SERPL-MCNC: 184 MG/DL (ref 65–140)
GLUCOSE SERPL-MCNC: 194 MG/DL (ref 65–140)
GLUCOSE SERPL-MCNC: 196 MG/DL (ref 65–140)
GLUCOSE SERPL-MCNC: 203 MG/DL (ref 65–140)
GLUCOSE SERPL-MCNC: 212 MG/DL (ref 65–140)
GLUCOSE SERPL-MCNC: 224 MG/DL (ref 65–140)
GLUCOSE SERPL-MCNC: 235 MG/DL (ref 65–140)
GLUCOSE SERPL-MCNC: 263 MG/DL (ref 65–140)
GLUCOSE SERPL-MCNC: 263 MG/DL (ref 65–140)
GLUCOSE SERPL-MCNC: 279 MG/DL (ref 65–140)
GLUCOSE SERPL-MCNC: 342 MG/DL (ref 65–140)
GLUCOSE SERPL-MCNC: 395 MG/DL (ref 65–140)
GLUCOSE SERPL-MCNC: 419 MG/DL (ref 65–140)
GLUCOSE UR STRIP-MCNC: ABNORMAL MG/DL
HCT VFR BLD AUTO: 32.1 % (ref 36.5–49.3)
HGB BLD-MCNC: 10.9 G/DL (ref 12–17)
HGB UR QL STRIP.AUTO: NEGATIVE
IMM GRANULOCYTES # BLD AUTO: 0.02 THOUSAND/UL (ref 0–0.2)
IMM GRANULOCYTES NFR BLD AUTO: 0 % (ref 0–2)
IRON SATN MFR SERPL: 13 % (ref 15–50)
IRON SERPL-MCNC: 36 UG/DL (ref 50–212)
KETONES UR STRIP-MCNC: NEGATIVE MG/DL
LEUKOCYTE ESTERASE UR QL STRIP: ABNORMAL
LYMPHOCYTES # BLD AUTO: 2.26 THOUSANDS/ÂΜL (ref 0.6–4.47)
LYMPHOCYTES NFR BLD AUTO: 28 % (ref 14–44)
MCH RBC QN AUTO: 31 PG (ref 26.8–34.3)
MCHC RBC AUTO-ENTMCNC: 34 G/DL (ref 31.4–37.4)
MCV RBC AUTO: 91 FL (ref 82–98)
MONOCYTES # BLD AUTO: 0.54 THOUSAND/ÂΜL (ref 0.17–1.22)
MONOCYTES NFR BLD AUTO: 7 % (ref 4–12)
NEUTROPHILS # BLD AUTO: 5.16 THOUSANDS/ÂΜL (ref 1.85–7.62)
NEUTS SEG NFR BLD AUTO: 62 % (ref 43–75)
NITRITE UR QL STRIP: NEGATIVE
NON-SQ EPI CELLS URNS QL MICRO: ABNORMAL /HPF
NRBC BLD AUTO-RTO: 0 /100 WBCS
OSMOLALITY UR/SERPL-RTO: 298 MMOL/KG (ref 282–298)
OSMOLALITY UR/SERPL-RTO: 306 MMOL/KG (ref 282–298)
OSMOLALITY UR/SERPL-RTO: 311 MMOL/KG (ref 282–298)
PH UR STRIP.AUTO: 6 [PH]
PLATELET # BLD AUTO: 168 THOUSANDS/UL (ref 149–390)
PMV BLD AUTO: 10.2 FL (ref 8.9–12.7)
POTASSIUM SERPL-SCNC: 4 MMOL/L (ref 3.5–5.3)
POTASSIUM SERPL-SCNC: 4.1 MMOL/L (ref 3.5–5.3)
PROT UR STRIP-MCNC: NEGATIVE MG/DL
RBC # BLD AUTO: 3.52 MILLION/UL (ref 3.88–5.62)
RBC #/AREA URNS AUTO: ABNORMAL /HPF
SODIUM SERPL-SCNC: 135 MMOL/L (ref 135–147)
SODIUM SERPL-SCNC: 136 MMOL/L (ref 135–147)
SP GR UR STRIP.AUTO: 1.02 (ref 1–1.03)
TIBC SERPL-MCNC: 273 UG/DL (ref 250–450)
TRANSFERRIN SERPL-MCNC: 195 MG/DL (ref 203–362)
UIBC SERPL-MCNC: 237 UG/DL (ref 155–355)
UROBILINOGEN UR STRIP-ACNC: <2 MG/DL
VIT B12 SERPL-MCNC: 712 PG/ML (ref 180–914)
WBC # BLD AUTO: 8.22 THOUSAND/UL (ref 4.31–10.16)
WBC #/AREA URNS AUTO: ABNORMAL /HPF

## 2024-12-25 PROCEDURE — 82746 ASSAY OF FOLIC ACID SERUM: CPT

## 2024-12-25 PROCEDURE — 83550 IRON BINDING TEST: CPT

## 2024-12-25 PROCEDURE — 82607 VITAMIN B-12: CPT

## 2024-12-25 PROCEDURE — 85025 COMPLETE CBC W/AUTO DIFF WBC: CPT

## 2024-12-25 PROCEDURE — 83930 ASSAY OF BLOOD OSMOLALITY: CPT

## 2024-12-25 PROCEDURE — 82948 REAGENT STRIP/BLOOD GLUCOSE: CPT

## 2024-12-25 PROCEDURE — 82728 ASSAY OF FERRITIN: CPT

## 2024-12-25 PROCEDURE — 83540 ASSAY OF IRON: CPT

## 2024-12-25 PROCEDURE — 99232 SBSQ HOSP IP/OBS MODERATE 35: CPT | Performed by: INTERNAL MEDICINE

## 2024-12-25 PROCEDURE — 80048 BASIC METABOLIC PNL TOTAL CA: CPT

## 2024-12-25 RX ORDER — DEXTROSE MONOHYDRATE AND SODIUM CHLORIDE 5; .45 G/100ML; G/100ML
250 INJECTION, SOLUTION INTRAVENOUS CONTINUOUS
Status: DISPENSED | OUTPATIENT
Start: 2024-12-25 | End: 2024-12-25

## 2024-12-25 RX ORDER — INSULIN GLARGINE 100 [IU]/ML
25 INJECTION, SOLUTION SUBCUTANEOUS EVERY 12 HOURS SCHEDULED
Status: DISCONTINUED | OUTPATIENT
Start: 2024-12-25 | End: 2024-12-26

## 2024-12-25 RX ORDER — INSULIN GLARGINE 100 [IU]/ML
25 INJECTION, SOLUTION SUBCUTANEOUS ONCE
Status: COMPLETED | OUTPATIENT
Start: 2024-12-25 | End: 2024-12-25

## 2024-12-25 RX ORDER — INSULIN LISPRO 100 [IU]/ML
3 INJECTION, SOLUTION INTRAVENOUS; SUBCUTANEOUS ONCE
Status: COMPLETED | OUTPATIENT
Start: 2024-12-25 | End: 2024-12-25

## 2024-12-25 RX ORDER — MUPIROCIN 20 MG/G
OINTMENT TOPICAL 3 TIMES DAILY
Status: DISCONTINUED | OUTPATIENT
Start: 2024-12-25 | End: 2024-12-27 | Stop reason: HOSPADM

## 2024-12-25 RX ORDER — DOXYCYCLINE 100 MG/1
100 CAPSULE ORAL EVERY 12 HOURS SCHEDULED
Status: DISCONTINUED | OUTPATIENT
Start: 2024-12-25 | End: 2024-12-27 | Stop reason: HOSPADM

## 2024-12-25 RX ORDER — POTASSIUM CHLORIDE 1500 MG/1
20 TABLET, EXTENDED RELEASE ORAL ONCE
Status: COMPLETED | OUTPATIENT
Start: 2024-12-25 | End: 2024-12-25

## 2024-12-25 RX ORDER — POTASSIUM CHLORIDE 1500 MG/1
40 TABLET, EXTENDED RELEASE ORAL ONCE
Status: COMPLETED | OUTPATIENT
Start: 2024-12-25 | End: 2024-12-25

## 2024-12-25 RX ORDER — INSULIN LISPRO 100 [IU]/ML
1-5 INJECTION, SOLUTION INTRAVENOUS; SUBCUTANEOUS
Status: DISCONTINUED | OUTPATIENT
Start: 2024-12-25 | End: 2024-12-27 | Stop reason: HOSPADM

## 2024-12-25 RX ORDER — METHADONE HYDROCHLORIDE 10 MG/1
150 TABLET ORAL DAILY
Refills: 0 | Status: DISCONTINUED | OUTPATIENT
Start: 2024-12-25 | End: 2024-12-26

## 2024-12-25 RX ADMIN — ATORVASTATIN CALCIUM 40 MG: 40 TABLET, FILM COATED ORAL at 17:55

## 2024-12-25 RX ADMIN — INSULIN LISPRO 3 UNITS: 100 INJECTION, SOLUTION INTRAVENOUS; SUBCUTANEOUS at 17:56

## 2024-12-25 RX ADMIN — METHADONE HYDROCHLORIDE 150 MG: 10 TABLET ORAL at 11:41

## 2024-12-25 RX ADMIN — APIXABAN 5 MG: 5 TABLET, FILM COATED ORAL at 17:55

## 2024-12-25 RX ADMIN — DEXTROSE AND SODIUM CHLORIDE 125 ML/HR: 5; .45 INJECTION, SOLUTION INTRAVENOUS at 00:15

## 2024-12-25 RX ADMIN — LORATADINE 10 MG: 10 TABLET ORAL at 08:31

## 2024-12-25 RX ADMIN — NYSTATIN: 100000 CREAM TOPICAL at 00:19

## 2024-12-25 RX ADMIN — INSULIN GLARGINE 25 UNITS: 100 INJECTION, SOLUTION SUBCUTANEOUS at 17:57

## 2024-12-25 RX ADMIN — GABAPENTIN 200 MG: 100 CAPSULE ORAL at 17:56

## 2024-12-25 RX ADMIN — INSULIN GLARGINE 25 UNITS: 100 INJECTION, SOLUTION SUBCUTANEOUS at 06:46

## 2024-12-25 RX ADMIN — DOXYCYCLINE 100 MG: 100 CAPSULE ORAL at 21:57

## 2024-12-25 RX ADMIN — FLUTICASONE FUROATE AND VILANTEROL TRIFENATATE 1 PUFF: 200; 25 POWDER RESPIRATORY (INHALATION) at 08:31

## 2024-12-25 RX ADMIN — NYSTATIN: 100000 CREAM TOPICAL at 17:56

## 2024-12-25 RX ADMIN — APIXABAN 5 MG: 5 TABLET, FILM COATED ORAL at 08:31

## 2024-12-25 RX ADMIN — MUPIROCIN: 20 OINTMENT TOPICAL at 17:56

## 2024-12-25 RX ADMIN — POTASSIUM CHLORIDE 40 MEQ: 1500 TABLET, EXTENDED RELEASE ORAL at 06:46

## 2024-12-25 RX ADMIN — MUPIROCIN: 20 OINTMENT TOPICAL at 21:59

## 2024-12-25 RX ADMIN — POTASSIUM CHLORIDE 20 MEQ: 1500 TABLET, EXTENDED RELEASE ORAL at 01:57

## 2024-12-25 RX ADMIN — DOXYCYCLINE 100 MG: 100 CAPSULE ORAL at 11:42

## 2024-12-25 RX ADMIN — GABAPENTIN 200 MG: 100 CAPSULE ORAL at 08:31

## 2024-12-25 RX ADMIN — NYSTATIN: 100000 CREAM TOPICAL at 08:31

## 2024-12-25 RX ADMIN — INSULIN LISPRO 2 UNITS: 100 INJECTION, SOLUTION INTRAVENOUS; SUBCUTANEOUS at 14:19

## 2024-12-25 RX ADMIN — DEXTROSE AND SODIUM CHLORIDE 250 ML/HR: 5; .45 INJECTION, SOLUTION INTRAVENOUS at 10:18

## 2024-12-25 RX ADMIN — GABAPENTIN 200 MG: 100 CAPSULE ORAL at 21:57

## 2024-12-25 RX ADMIN — INSULIN LISPRO 3 UNITS: 100 INJECTION, SOLUTION INTRAVENOUS; SUBCUTANEOUS at 22:00

## 2024-12-25 NOTE — ASSESSMENT & PLAN NOTE
Lab Results   Component Value Date    HGBA1C 11.3 (A) 11/06/2024       Recent Labs     12/25/24  0435 12/25/24  0625 12/25/24  0800 12/25/24  0828   POCGLU 279* 224* 194* 196*       Blood Sugar Average: Last 72 hrs:  (P) [id number]  Assessment and plan:  -At home patient uses 50 units Lantus daily, and is also on metformin  -Has not taken insulin since 4 days ago due to his illness, but states that he was very compliant prior to this  -After presumed HHS is resolved may be restarted on insulin regimen  -Will begin Lantus 25 units twice daily.

## 2024-12-25 NOTE — ASSESSMENT & PLAN NOTE
"Wt Readings from Last 3 Encounters:   12/24/24 59 kg (130 lb)   11/06/24 61.4 kg (135 lb 6.4 oz)   06/26/24 64 kg (141 lb)     Assessment and plan:  -May 2024 TTE: \"The left ventricular ejection fraction is 55%. Systolic function is low normal. Wall motion is normal. Diastolic function is mildly abnormal, consistent with grade I (abnormal) relaxation.\"  -Patient on 20 mg Lasix oral at home, hold given fluid resuscitation  -Monitor weights daily  -Plan to restart on discharge    "

## 2024-12-25 NOTE — ASSESSMENT & PLAN NOTE
"Wt Readings from Last 3 Encounters:   12/24/24 59 kg (130 lb)   11/06/24 61.4 kg (135 lb 6.4 oz)   06/26/24 64 kg (141 lb)     Assessment and plan:  -May 2024 TTE: \"The left ventricular ejection fraction is 55%. Systolic function is low normal. Wall motion is normal. Diastolic function is mildly abnormal, consistent with grade I (abnormal) relaxation.\"  -Patient on 20 mg Lasix oral at home, hold given fluid resuscitation  -Monitor weights daily      "

## 2024-12-25 NOTE — PROGRESS NOTES
"Progress Note - Internal Medicine   Name: Simone Reyes 64 y.o. male I MRN: [id number]  Unit/Bed#: [facility] 924-01 I Date of Admission: 12/24/2024   Date of Service: 12/25/2024 I Hospital Day: 1    Assessment & Plan  Type 2 diabetes mellitus, without long-term current use of insulin ([facility])  Lab Results   Component Value Date    HGBA1C 11.3 (A) 11/06/2024       Recent Labs     12/25/24  0435 12/25/24  0625 12/25/24  0800 12/25/24  0828   POCGLU 279* 224* 194* 196*       Blood Sugar Average: Last 72 hrs:  (P) [id number]  Assessment and plan:  -At home patient uses 50 units Lantus daily, and is also on metformin  -Has not taken insulin since 4 days ago due to his illness, but states that he was very compliant prior to this  -After presumed HHS is resolved may be restarted on insulin regimen  -Will begin Lantus 25 units twice daily.  Chronic obstructive pulmonary disease (HCC)  Assessment and plan:  -Mild wheezing bilaterally on exam, low suspicion for COPD exacerbation  -Continue on fluticasone-vilanterol 200/25 mcg puff daily, which patient states he has not been taking at home.  -Albuterol inhaler every 4 hours as needed  Methadone dependence (HCC)  Assessment and plan:  -Patient states that he gets methadone from [facility]  -Reports fentanyl use approximately 4 days ago through nasal route.    -He states that he takes 150 mg a day, home medications listed as 135 mg a day  -Confirmed with new directions he is on 150 mg, will reinitiate.  Congestive heart disease (HCC)  Wt Readings from Last 3 Encounters:   12/24/24 59 kg (130 lb)   11/06/24 61.4 kg (135 lb 6.4 oz)   06/26/24 64 kg (141 lb)     Assessment and plan:  -May 2024 TTE: \"The left ventricular ejection fraction is 55%. Systolic function is low normal. Wall motion is normal. Diastolic function is mildly abnormal, consistent with grade I (abnormal) relaxation.\"  -Patient on 20 mg Lasix oral at home, hold given fluid " resuscitation  -Monitor weights daily  -Plan to restart on discharge    Hyperosmolar hyperglycemic state (HHS) ([facility])  Lab Results   Component Value Date    HGBA1C 11.3 (A) 11/06/2024       Recent Labs     12/25/24  0435 12/25/24  0625 12/25/24  0800 12/25/24  0828   POCGLU 279* 224* 194* 196*       Blood Sugar Average: Last 72 hrs:  (P) [id number]    Atrial flutter (HCC)  Assessment and plan:  -Per chart review prior episodes of A-fib and a flutter, not well-documented  -Cardiology evaluation of Zio patch 8/8/2024: Aflutter versus atrial tachycardia  -Getting Eliquis through office outpatient, continue on Eliquis 5 mg BID  H/O open hand wound  Assessment and plan:  -Patient endorses a 1-2 month history of open wound on left forearm.  It initially started on the forearm, but per the patient has since migrated to his hand.  It is not painful but it is pruritic and patient has been picking at it  -Was prescribed mupirocin, which he is using intermittently  -Image added to media.   -Wound care consulted.  -Will begin doxycycline for 7-day course with topical mupirocin.  Rash of foot  Assessment and plan:  -Bilateral  -Patient endorses no history of foot wounds  -Podiatry consulted  -Nystatin cream twice daily    Disposition: Pending medical optimization    Team: SOD TEAM A    Subjective   Patient seen and examined. No acute events overnight.  Feels much better this time.  Eating breakfast without difficulties.  No nausea/vomiting.  Feels well overall.  Still with left upper extremity wounds which she states been present for several months.     Objective :  Temp:  [97.5 °F (36.4 °C)-99.1 °F (37.3 °C)] 99.1 °F (37.3 °C)  HR:  [74-87] 85  BP: (120-139)/(58-84) 126/59  Resp:  [18] 18  SpO2:  [97 %-100 %] 97 %  O2 Device: None (Room air)    I/O         12/23 0701  12/24 0700 12/24 0701  12/25 0700 12/25 0701 12/26 0700    P.O.   100    IV Piggyback  1000     Total Intake(mL/kg)  1000 (16.9) 100 (1.7)    Urine  (mL/kg/hr)  600 650 (3)    Total Output  600 650    Net  +400 -550                 Weights:   IBW (Ideal Body Weight): 68.4 kg    Body mass index is 19.77 kg/m².  Weight (last 2 days)       Date/Time Weight    12/24/24 2123 59 (130)            Physical Exam  Vitals reviewed.   Constitutional:       General: He is not in acute distress.     Appearance: Normal appearance.   HENT:      Head: Normocephalic and atraumatic.   Eyes:      Extraocular Movements: Extraocular movements intact.   Cardiovascular:      Rate and Rhythm: Normal rate and regular rhythm.      Heart sounds: Murmur heard.      Systolic murmur is present with a grade of 3/6.      Comments: Murmur most prominent at apex towards axilla  Pulmonary:      Effort: Pulmonary effort is normal.      Breath sounds: Normal breath sounds.   Abdominal:      Palpations: Abdomen is soft.   Musculoskeletal:      Right lower leg: No edema.      Left lower leg: No edema.   Skin:     General: Skin is warm and dry.      Capillary Refill: Capillary refill takes less than 2 seconds.      Findings: Wound present. No abscess.      Comments: Left dorsal hand and dorsum of distal forearm with 2 nonhealing wounds.  No active drainage.  No abscess.   Neurological:      General: No focal deficit present.      Mental Status: He is alert and oriented to person, place, and time.           Lab Results: I have reviewed the following results:  Recent Labs     12/24/24  1859 12/24/24  2226 12/25/24  0530 12/25/24  0532   WBC 8.82  --  8.22  --    HGB 12.3  --  10.9*  --    HCT 36.4*  --  32.1*  --      --  168  --    SODIUM 125* 130* 136 135   K 4.4 4.8 4.0 4.1   CL 91* 96 104 104   CO2 27 28 27 26   BUN 22 20 18 18   CREATININE 0.97 0.86 0.73 0.75   GLUC 834* 635* 263* 263*   MG  --  1.9  --   --    PHOS  --  2.6  --   --    AST 14  --   --   --    ALT 17  --   --   --    ALB 3.4*  --   --   --    TBILI 0.38  --   --   --    ALKPHOS 84  --   --   --              Currently Ordered  Meds:   Current Facility-Administered Medications:     albuterol (PROVENTIL HFA,VENTOLIN HFA) inhaler 1 puff, Q4H PRN    apixaban (ELIQUIS) tablet 5 mg, BID    atorvastatin (LIPITOR) tablet 40 mg, Daily With Dinner    dextrose 5 % and sodium chloride 0.45 % infusion, Continuous, Last Rate: 250 mL/hr (12/25/24 1018)    doxycycline hyclate (VIBRAMYCIN) capsule 100 mg, Q12H MITCHELL    fluticasone-vilanterol 200-25 mcg/actuation 1 puff, Daily    gabapentin (NEURONTIN) capsule 200 mg, TID    loratadine (CLARITIN) tablet 10 mg, Daily    methadone (Dolophine) tablet 150 mg, Daily    mupirocin (BACTROBAN) 2 % ointment, TID    nystatin (MYCOSTATIN) cream, BID  VTE Pharmacologic Prophylaxis: VTE covered by:  apixaban, Oral, 5 mg at 12/25/24 0831     VTE Mechanical Prophylaxis: sequential compression device    Administrative Statements     Portions of the record may have been created with voice recognition software.

## 2024-12-25 NOTE — ASSESSMENT & PLAN NOTE
Assessment and plan:  -Mild wheezing bilaterally on exam, low suspicion for COPD exacerbation  -Continue on fluticasone-vilanterol 200/25 mcg puff daily, which patient states he has not been taking at home.  -Albuterol inhaler every 4 hours as needed

## 2024-12-25 NOTE — ED ATTENDING ATTESTATION
12/24/2024  I, Tk Marti MD, saw and evaluated the patient. I have discussed the patient with the resident/non-physician practitioner and agree with the resident's/non-physician practitioner's findings, Plan of Care, and MDM as documented in the resident's/non-physician practitioner's note, except where noted. All available labs and Radiology studies were reviewed.  I was present for key portions of any procedure(s) performed by the resident/non-physician practitioner and I was immediately available to provide assistance.       At this point I agree with the current assessment done in the Emergency Department.  I have conducted an independent evaluation of this patient a history and physical is as follows:    ED Course     64-year-old male, type 2 diabetes, CHF, COPD, atrial flutter, presenting to the emergency department for evaluation of 4-day history of nausea and vomiting.  Patient states that when he started to feel unwell, he stopped eating, and subsequently stopped taking his insulin in addition.  Patient denies any abdominal pain, fever but does report that he is chilled, chest pain, cough, shortness of breath.  No urinary symptoms.    On examination patient is resting comfortably in the stretcher.  He does not appear tachypneic or to have  small breathing.  Head is normocephalic and atraumatic.  Mucosal membranes are dry.  Neck is supple.  Lungs are clear bilaterally.  Heart is regular rate and rhythm with no murmurs rubs or gallops.  Abdomen is soft and nontender.  Extremities are significant for a multiple wounds to the dorsum of the left hand as pictured below.  GCS 15.  Motor is 5 out of 5 bilateral upper and lower extremities.    MEDICAL DECISION MAKING    Number and Complexity of Problems  Differential diagnosis: Diabetes and complications including hyperglycemia and diabetic ketoacidosis.    Medical Decision Making Data  External documents reviewed: Reviewed last office visit from  11/6/2024.      No orders to display       Labs Reviewed   CBC AND DIFFERENTIAL - Abnormal       Result Value Ref Range Status    WBC 8.82  4.31 - 10.16 Thousand/uL Final    RBC 3.94  3.88 - 5.62 Million/uL Final    Hemoglobin 12.3  12.0 - 17.0 g/dL Final    Hematocrit 36.4 (*) 36.5 - 49.3 % Final    MCV 92  82 - 98 fL Final    MCH 31.2  26.8 - 34.3 pg Final    MCHC 33.8  31.4 - 37.4 g/dL Final    RDW 12.0  11.6 - 15.1 % Final    MPV 10.2  8.9 - 12.7 fL Final    Platelets 191  149 - 390 Thousands/uL Final    nRBC 0  /100 WBCs Final    Segmented % 69  43 - 75 % Final    Immature Grans % 1  0 - 2 % Final    Lymphocytes % 21  14 - 44 % Final    Monocytes % 7  4 - 12 % Final    Eosinophils Relative 1  0 - 6 % Final    Basophils Relative 1  0 - 1 % Final    Absolute Neutrophils 6.11  1.85 - 7.62 Thousands/µL Final    Absolute Immature Grans 0.04  0.00 - 0.20 Thousand/uL Final    Absolute Lymphocytes 1.89  0.60 - 4.47 Thousands/µL Final    Absolute Monocytes 0.64  0.17 - 1.22 Thousand/µL Final    Eosinophils Absolute 0.09  0.00 - 0.61 Thousand/µL Final    Basophils Absolute 0.05  0.00 - 0.10 Thousands/µL Final   COMPREHENSIVE METABOLIC PANEL - Abnormal    Sodium 125 (*) 135 - 147 mmol/L Final    Potassium 4.4  3.5 - 5.3 mmol/L Final    Chloride 91 (*) 96 - 108 mmol/L Final    CO2 27  21 - 32 mmol/L Final    ANION GAP 7  4 - 13 mmol/L Final    BUN 22  5 - 25 mg/dL Final    Creatinine 0.97  0.60 - 1.30 mg/dL Final    Comment: Standardized to IDMS reference method    Glucose 834 (*) 65 - 140 mg/dL Final    Comment: If the patient is fasting, the ADA then defines impaired fasting glucose as > 100 mg/dL and diabetes as > or equal to 123 mg/dL.    Calcium 8.7  8.4 - 10.2 mg/dL Final    Corrected Calcium 9.2  8.3 - 10.1 mg/dL Final    AST 14  13 - 39 U/L Final    ALT 17  7 - 52 U/L Final    Comment: Specimen collection should occur prior to Sulfasalazine administration due to the potential for falsely depressed results.      Alkaline Phosphatase 84  34 - 104 U/L Final    Total Protein 6.4  6.4 - 8.4 g/dL Final    Albumin 3.4 (*) 3.5 - 5.0 g/dL Final    Total Bilirubin 0.38  0.20 - 1.00 mg/dL Final    Comment: Use of this assay is not recommended for patients undergoing treatment with eltrombopag due to the potential for falsely elevated results.  N-acetyl-p-benzoquinone imine (metabolite of Acetaminophen) will generate erroneously low results in samples for patients that have taken an overdose of Acetaminophen.    eGFR 81  ml/min/1.73sq m Final    Narrative:     National Kidney Disease Foundation guidelines for Chronic Kidney Disease (CKD):     Stage 1 with normal or high GFR (GFR > 90 mL/min/1.73 square meters)    Stage 2 Mild CKD (GFR = 60-89 mL/min/1.73 square meters)    Stage 3A Moderate CKD (GFR = 45-59 mL/min/1.73 square meters)    Stage 3B Moderate CKD (GFR = 30-44 mL/min/1.73 square meters)    Stage 4 Severe CKD (GFR = 15-29 mL/min/1.73 square meters)    Stage 5 End Stage CKD (GFR <15 mL/min/1.73 square meters)  Note: GFR calculation is accurate only with a steady state creatinine   BLOOD GAS, VENOUS - Abnormal    pH, Haresh 7.375  7.300 - 7.400 Final    pCO2, Haresh 48.7  42.0 - 50.0 mm Hg Final    pO2, Haresh 22.5 (*) 35.0 - 45.0 mm Hg Final    HCO3, Haresh 27.8  24 - 30 mmol/L Final    Base Excess, Haresh 1.9  mmol/L Final    O2 Content, Haresh 8.0  ml/dL Final    O2 HGB, VENOUS 41.4 (*) 60.0 - 80.0 % Final   POCT GLUCOSE - Abnormal    POC Glucose >600 (*) 65 - 140 mg/dl Final    Comment: CRITICAL VALUE NOTED-   POCT GLUCOSE - Abnormal    POC Glucose >600 (*) 65 - 140 mg/dl Final    Comment: CRITICAL VALUE NOTED-   BETA HYDROXYBUTYRATE - Normal    Beta- Hydroxybutyrate 0.08  0.02 - 0.27 mmol/L Final   URINALYSIS WITH MICROSCOPIC   OSMOLALITY   PHOSPHORUS   MAGNESIUM       Labs reviewed by me are significant for:  Blood glucose 834.    Discussed case with: SOD medicine.  Considered admission for: Hyperglycemia.    Treatment and Disposition  ED  course: Patient remained hemodynamically stable in the emergency department.  Hyperglycemia was treated with IV fluids and regular insulin.  Due to degree of hyperglycemia, patient will require admission for management of his hyperglycemia.  Shared decision making: Patient agrees with plan.  Code status: Full code.              Critical Care Time  Procedures

## 2024-12-25 NOTE — ASSESSMENT & PLAN NOTE
Lab Results   Component Value Date    HGBA1C 11.3 (A) 11/06/2024       Recent Labs     12/25/24  0435 12/25/24  0625 12/25/24  0800 12/25/24  0828   POCGLU 279* 224* 194* 196*       Blood Sugar Average: Last 72 hrs:  (P) [id number]

## 2024-12-25 NOTE — ASSESSMENT & PLAN NOTE
Assessment and plan:  -Patient states that he gets methadone from [facility]  -Reports fentanyl use approximately 4 days ago through nasal route.    -He states that he takes 150 mg a day, home medications listed as 135 mg a day  -Confirmed with new directions he is on 150 mg, will reinitiate.

## 2024-12-25 NOTE — ASSESSMENT & PLAN NOTE
Wt Readings from Last 3 Encounters:   11/06/24 61.4 kg (135 lb 6.4 oz)   06/26/24 64 kg (141 lb)   06/26/24 64 kg (141 lb)   Assessment and plan:  -Per 6/24 echocardiogram ejection fraction of 55%.  Systolic function is low normal, diastolic function is mildly abnormal consistent with grade 1 relaxation  -On exam patient does not appear to be volume up, no rales or pitting edema noted  -At home patient on 20 mg Lasix tablet daily, hold in setting of volume resuscitation for likely HHS  -Can consider restarting diuresis once hyperglycemia addressed

## 2024-12-25 NOTE — ASSESSMENT & PLAN NOTE
Lab Results   Component Value Date    HGBA1C 11.3 (A) 11/06/2024       Recent Labs     12/24/24  1843 12/24/24 2008 12/24/24 2111 12/24/24  2342   POCGLU >600* >600* >600* 283*       Blood Sugar Average: Last 72 hrs:  (P) 70.75  Assessment and plan:  -At home patient uses 50 units Lantus daily, and is also on metformin  -Has not taken insulin since 4 days ago due to his illness, but states that he was very compliant prior to this  -After presumed HHS is resolved may be restarted on insulin regimen

## 2024-12-25 NOTE — ASSESSMENT & PLAN NOTE
Assessment and plan:  -Patient endorses a 1-2 month history of open wound on left forearm.  It initially started on the forearm, but per the patient has since migrated to his hand.  It is not painful but it is pruritic and patient has been picking at it  -Was prescribed mupirocin, which he is using intermittently  -Image added to media.   -Wound care consulted.  -Will begin doxycycline for 7-day course with topical mupirocin.

## 2024-12-25 NOTE — PLAN OF CARE
Problem: PAIN - ADULT  Goal: Verbalizes/displays adequate comfort level or baseline comfort level  Description: Interventions:  - Encourage patient to monitor pain and request assistance  - Assess pain using appropriate pain scale  - Administer analgesics based on type and severity of pain and evaluate response  - Implement non-pharmacological measures as appropriate and evaluate response  - Consider cultural and social influences on pain and pain management  - Notify physician/advanced practitioner if interventions unsuccessful or patient reports new pain  Outcome: Progressing     Problem: INFECTION - ADULT  Goal: Absence or prevention of progression during hospitalization  Description: INTERVENTIONS:  - Assess and monitor for signs and symptoms of infection  - Monitor lab/diagnostic results  - Monitor all insertion sites, i.e. indwelling lines, tubes, and drains  - Monitor endotracheal if appropriate and nasal secretions for changes in amount and color  - [place] appropriate cooling/warming therapies per order  - Administer medications as ordered  - Instruct and encourage patient and family to use good hand hygiene technique  - Identify and instruct in appropriate isolation precautions for identified infection/condition  Outcome: Progressing  Goal: Absence of fever/infection during neutropenic period  Description: INTERVENTIONS:  - Monitor WBC    Outcome: Progressing     Problem: SAFETY ADULT  Goal: Patient will remain free of falls  Description: INTERVENTIONS:  - Educate patient/family on patient safety including physical limitations  - Instruct patient to call for assistance with activity   - Consult OT/PT to assist with strengthening/mobility   - Keep Call bell within reach  - Keep bed low and locked with side rails adjusted as appropriate  - Keep care items and personal belongings within reach  - Initiate and maintain comfort rounds  - Make Fall Risk Sign visible to staff  - Offer Toileting every  Hours,  in advance of need  - Initiate/Maintainalarm  - Obtain necessary fall risk management equipment:   - Apply yellow socks and bracelet for high fall risk patients  - Consider moving patient to room near nurses station  Outcome: Progressing  Goal: Maintain or return to baseline ADL function  Description: INTERVENTIONS:  -  Assess patient's ability to carry out ADLs; assess patient's baseline for ADL function and identify physical deficits which impact ability to perform ADLs (bathing, care of mouth/teeth, toileting, grooming, dressing, etc.)  - Assess/evaluate cause of self-care deficits   - Assess range of motion  - Assess patient's mobility; develop plan if impaired  - Assess patient's need for assistive devices and provide as appropriate  - Encourage maximum independence but intervene and supervise when necessary  - Involve family in performance of ADLs  - Assess for home care needs following discharge   - Consider OT consult to assist with ADL evaluation and planning for discharge  - Provide patient education as appropriate  Outcome: Progressing  Goal: Maintains/Returns to pre admission functional level  Description: INTERVENTIONS:  - Perform AM-PAC 6 Click Basic Mobility/ Daily Activity assessment daily.  - Set and communicate daily mobility goal to care team and patient/family/caregiver.   - Collaborate with rehabilitation services on mobility goals if consulted  - Perform Range of Motion  times a day.  - Reposition patient every  hours.  - Dangle patient  times a day  - Stand patient  times a day  - Ambulate patient  times a day  - Out of bed to chair  times a day   - Out of bed for meals  times a day  - Out of bed for toileting  - Record patient progress and toleration of activity level   Outcome: Progressing     Problem: DISCHARGE PLANNING  Goal: Discharge to home or other facility with appropriate resources  Description: INTERVENTIONS:  - Identify barriers to discharge w/patient and caregiver  - Arrange for  needed discharge resources and transportation as appropriate  - Identify discharge learning needs (meds, wound care, etc.)  - Arrange for interpretive services to assist at discharge as needed  - Refer to Case Management Department for coordinating discharge planning if the patient needs post-hospital services based on physician/advanced practitioner order or complex needs related to functional status, cognitive ability, or social support system  Outcome: Progressing     Problem: Knowledge Deficit  Goal: Patient/family/caregiver demonstrates understanding of disease process, treatment plan, medications, and discharge instructions  Description: Complete learning assessment and assess knowledge base.  Interventions:  - Provide teaching at level of understanding  - Provide teaching via preferred learning methods  Outcome: Progressing

## 2024-12-25 NOTE — PROGRESS NOTES
"    INTERNAL MEDICINE RESIDENCY SENIOR ADMISSION NOTE     Name: Simone Reyes   Age & Sex: 64 y.o. male   MRN: [id number]  Unit/Bed#: [facility] 924-01   Encounter: [id number]  Primary Care Provider: Nicholas Blanc DO    Admit to team: SOD Team A    Patient seen and examined. Reviewed H&P per Dr. Chan. Agree with the assessment and plan with any exception/addition as noted below:    Principal Problem:    Hyperosmolar hyperglycemic state (HHS) (HCC)  Active Problems:    Type 2 diabetes mellitus, without long-term current use of insulin (HCC)    Chronic obstructive pulmonary disease (HCC)    Methadone dependence (HCC)    Atrial flutter (HCC)    Congestive heart disease (HCC)    H/O open hand wound    65 yo male, PMH T2DM, COPD, h/o opioid use on methadone, CHF, Aflutter on eliquis. He presents to the ED due to a few days of general malaise with nausea/ vomiting and high BG. He states that his CGM has been reading \"HI\" for the last few days. He is on insulin glargine 50U hs but was only taking it occasionally over the last few days due to not feeling well. He has not taken many of his other medications in the last few days either. On arrival to the ED he is HD stable. Labs significant for , Na 125 (corrected 137), K 4.4, BHB wnl, VBG 7.37/48/22/27. Physical exam overall unremarkable, does have nonhealing left hand wound which appears noninfected. He is mentating appropriately.     A/P:   Hyperglycemia: HHS vs simple symptomatic hyperglycemia. Pt has prior admissions for HHS and DKA, therefore will treat more aggressively as HHS. Most recent A1c 11.3, has been greatly above goal for many years. Will start insulin drip at algorithm 1. Check plasma osmolality and UA to check for ketonuria. Monitor q4h BMP for now and replete K aggressively. Received 1L NS in ED, give additional 1L bolus now then continue NS infusion. When BG <250 decrease rate of drip and add D5 to IVF. Assuming hyperosmolar, avoid rapid correction in " osmolarity. Hold home lasix iso volume depletion with HHS and giving IVF.   Chronic wound: Pt states he has had wound on left hand for the last month or so. Pt does not recall any injury. States that he frequently picks at it. Suspect nonhealing wound 2/2 uncontrolled diabetes. Wound appears noninfected. Did receive dose of augmentin in ED. Will hold further antibiotics for now. Wound care consulted.   Methadone use: Pt states dose is 150mg daily. [facility] closed at time of admission and methadone not on PDMP. Pt states he has not taken it for the last few days, so will continue to hold for now until dose can be confirmed.     Code Status: Level 1 - Full Code  Admission Status: OBSERVATION  Disposition: Patient requires Med/Surg  Expected Length of Stay: >2 midnights

## 2024-12-25 NOTE — ASSESSMENT & PLAN NOTE
Assessment and plan:  -Bilateral  -Patient endorses no history of foot wounds  -Podiatry consulted  -Nystatin cream twice daily

## 2024-12-25 NOTE — ASSESSMENT & PLAN NOTE
Assessment and plan:  -Mild wheezing bilaterally on exam, low suspicion for COPD exacerbation  -Continue on fluticasone-vilanterol 200/25 mcg puff daily  -Albuterol inhaler every 4 hours as needed

## 2024-12-25 NOTE — ASSESSMENT & PLAN NOTE
Lab Results   Component Value Date    HGBA1C 11.3 (A) 11/06/2024       Recent Labs     12/24/24  1843 12/24/24 2008 12/24/24 2111 12/24/24  2342   POCGLU >600* >600* >600* 283*       Blood Sugar Average: Last 72 hrs:  (P) 70.75  Assessment:  -May have been triggered by the patient not taking insulin for 4 days as well as potential URI given his endorsed rhinorrhea and chills  -Initial glucose over 600 (834), lack of acidosis (pH of 7.375), beta hydroxybutyrate within normal range at 0.08, and bicarbonate over 20 (27.8) suggest HHS over DKA  -Patient completed 2 L normal saline bolus and 20 mEq potassium  -In March 2024 patient was hospitalized for DKA, at that time was noted to have poor compliance with medications  Plan:  -Insulin infusion originally on algorithm 1 with goal to titrate to 0.5 to 0.8 units/kg  -Originally on normal saline infusion at a rate of 125 cc/hr  -Switch to D5 half-normal infusion at a rate of 125 cc/h given rapid decrease in blood glucose levels 283 before midnight on 12/24/2024  -Ordered urinalysis to rule out ketonuria  -Ordered plasma osmolality  -Replete potassium and other electrolytes

## 2024-12-25 NOTE — ASSESSMENT & PLAN NOTE
Assessment and plan:  -Patient states that he gets methadone from [facility]  -Negative opiate use with heroin consumption a few days ago  -He states that he takes 150 mg a day, home medications listed as 135 mg a day  -Treatment center cannot be contacted at this time, plan to call when they are reopened and restart patient on methadone

## 2024-12-25 NOTE — ASSESSMENT & PLAN NOTE
Assessment and plan:  -Per chart review prior episodes of A-fib and a flutter, not well-documented  -Cardiology evaluation of Zio patch 8/8/2024: Aflutter versus atrial tachycardia  -Getting Eliquis through office outpatient, continue on Eliquis 5 mg BID

## 2024-12-25 NOTE — ASSESSMENT & PLAN NOTE
Assessment and plan:  -Patient endorses a 1 month history of open wound on left forearm.  It initially started on the forearm, but per the patient has since migrated to his hand.  It is not painful but it is pruritic and patient has been picking at it  -Was prescribed mupirocin, which he is using intermittently  -Image added to media, per evaluation does not appear to be infected.  Antibiotics not needed at this time  -Wound care consulted

## 2024-12-25 NOTE — UTILIZATION REVIEW
NOTIFICATION OF INPATIENT ADMISSION   AUTHORIZATION REQUEST   SERVICING FACILITY:   [facility]  Address: [address] [place] [address]  Tax ID: [id number]  NPI: [id number] ATTENDING PROVIDER:  Attending Name and NPI#: Danielito Sánchez Md [[id number]]  Address: [address] [place] [address]  Phone: [phone]   ADMISSION INFORMATION:  Place of Service: Inpatient [facility]  Place of Service Code: 21  Inpatient Admission Date/Time: 12/24/24  9:22 PM  Discharge Date/Time: No discharge date for patient encounter.  Admitting Diagnosis Code/Description:  Hyperglycemia [R73.9]  Non-healing wound of upper extremity [S41.109A]     UTILIZATION REVIEW CONTACT:  Sunil James Utilization   Network Utilization Review Department  Phone: [phone]  Fax: [phone]  Email: [email or web address]  Contact for approvals/pending authorizations, clinical reviews, and discharge.     PHYSICIAN ADVISORY SERVICES:  Medical Necessity Denial & Gztv-tv-Ixdn Review  Phone: [phone]  Fax: [phone]  Email: [email or web address]     DISCHARGE SUPPORT TEAM:  For Patients Discharge Needs & Updates  Phone: [phone] opt. 2 Fax: [phone]  Email: [email or web address]

## 2024-12-25 NOTE — ASSESSMENT & PLAN NOTE
Lab Results   Component Value Date    HGBA1C 11.3 (A) 11/06/2024       Recent Labs     12/24/24  1843 12/24/24 2008   POCGLU >600* >600*       Blood Sugar Average: Last 72 hrs:  (P) 0  Assessment:  -pH of 7.3 (7.375) and serum bicarbonate over 20 (27.8) on VBG, plasma glucose over 600 (834) suggest HHS more likely than DKA  -Patient has missed 50 units home Lantus dose for 4 days  -May have been triggered by URI, as patient had endorsed runny nose and chills  -Patient has completed 2 L bolus of normal saline and 20 mEq potassium  Plan:  -Insulin drip started at algorithm outlined, with goal to titrate to 0.5 to 0.8 units/kg  -Normal saline with a rate of 125 cc/h juice and has fluid given initial sodium of 125  -Once glucose gets up to 250 will begin to decrease rate we will add D5  -Plasma osmolality ordered (look for 380 mosmol/kg)   -Urinalysis ordered to rule out ketonuria  -Replete electrolytes with Q4H BMP  -Cardiovascular/carb controlled diet

## 2024-12-25 NOTE — H&P
"H&P - Internal Medicine   Name: Simone Reyes 64 y.o. male I MRN: [id number]  Unit/Bed#: PPHP 924-01 I Date of Admission: 12/24/2024   Date of Service: 12/24/2024 I Hospital Day: 0     Assessment & Plan  Type 2 diabetes mellitus, without long-term current use of insulin (HCC)  Lab Results   Component Value Date    HGBA1C 11.3 (A) 11/06/2024       Recent Labs     12/24/24  1843 12/24/24 2008 12/24/24  2111 12/24/24  2342   POCGLU >600* >600* >600* 283*       Blood Sugar Average: Last 72 hrs:  (P) 70.75  Assessment and plan:  -At home patient uses 50 units Lantus daily, and is also on metformin  -Has not taken insulin since 4 days ago due to his illness, but states that he was very compliant prior to this  -After presumed HHS is resolved may be restarted on insulin regimen  Chronic obstructive pulmonary disease (HCC)  Assessment and plan:  -Mild wheezing bilaterally on exam, low suspicion for COPD exacerbation  -Continue on fluticasone-vilanterol 200/25 mcg puff daily  -Albuterol inhaler every 4 hours as needed  Methadone dependence (HCC)  Assessment and plan:  -Patient states that he gets methadone from [facility]  -Negative opiate use with heroin consumption a few days ago  -He states that he takes 150 mg a day, home medications listed as 135 mg a day  -Treatment center cannot be contacted at this time, plan to call when they are reopened and restart patient on methadone  Congestive heart disease (HCC)  Wt Readings from Last 3 Encounters:   12/24/24 59 kg (130 lb)   11/06/24 61.4 kg (135 lb 6.4 oz)   06/26/24 64 kg (141 lb)     Assessment and plan:  -May 2024 TTE: \"The left ventricular ejection fraction is 55%. Systolic function is low normal. Wall motion is normal. Diastolic function is mildly abnormal, consistent with grade I (abnormal) relaxation.\"  -Patient on 20 mg Lasix oral at home, hold given fluid resuscitation  -Monitor weights daily      Hyperosmolar hyperglycemic state (HHS) " (HCC)  Lab Results   Component Value Date    HGBA1C 11.3 (A) 11/06/2024       Recent Labs     12/24/24  1843 12/24/24 2008 12/24/24  2111 12/24/24  2342   POCGLU >600* >600* >600* 283*       Blood Sugar Average: Last 72 hrs:  (P) 70.75  Assessment:  -May have been triggered by the patient not taking insulin for 4 days as well as potential URI given his endorsed rhinorrhea and chills  -Initial glucose over 600 (834), lack of acidosis (pH of 7.375), beta hydroxybutyrate within normal range at 0.08, and bicarbonate over 20 (27.8) suggest HHS over DKA  -Patient completed 2 L normal saline bolus and 20 mEq potassium  -In March 2024 patient was hospitalized for DKA, at that time was noted to have poor compliance with medications  Plan:  -Insulin infusion originally on algorithm 1 with goal to titrate to 0.5 to 0.8 units/kg  -Originally on normal saline infusion at a rate of 125 cc/hr  -Switch to D5 half-normal infusion at a rate of 125 cc/h given rapid decrease in blood glucose levels 283 before midnight on 12/24/2024  -Ordered urinalysis to rule out ketonuria  -Ordered plasma osmolality  -Replete potassium and other electrolytes  Atrial flutter (HCC)  Assessment and plan:  -Per chart review prior episodes of A-fib and a flutter, not well-documented  -Cardiology evaluation of Zio patch 8/8/2024: Aflutter versus atrial tachycardia  -Getting Eliquis through office outpatient, continue on Eliquis 5 mg BID  H/O open hand wound  Assessment and plan:  -Patient endorses a 1 month history of open wound on left forearm.  It initially started on the forearm, but per the patient has since migrated to his hand.  It is not painful but it is pruritic and patient has been picking at it  -Was prescribed mupirocin, which he is using intermittently  -Image added to media, per evaluation does not appear to be infected.  Antibiotics not needed at this time  -Wound care consulted  Rash of foot  Assessment and plan:  -Bilateral  -Patient  "endorses no history of foot wounds  -Podiatry consulted  -Nystatin cream twice daily    Code Status: Level 1 - Full Code  Admission Status: OBSERVATION  Disposition: Patient requires Med Surg    Admit to team: SOD TEAM A    History of Present Illness   The patient is a 64-year-old male with a history of insulin-dependent type 2 diabetes, COPD, methadone dependence, HFpEF EF 55% in July 2024, tobacco dependence who presents with 5 days of nausea and vomiting.  He endorses that during the first 2 days the nausea and vomiting were so severe that he was unable to take any of his medications.  He stated that the last time he took his insulin was 4 days ago.  Since then the symptoms have improved and he has not vomited in 2 days.  He also endorses 5 days of shortness of breath with exertion, chills, and ankle swelling.  He states that both of his legs \"feel like lead.\" For about a month he has also had a left hand wound that he states migrated from the forearm. The wound is not painful but is pruritic. 5 days ago he had a runny nose which has now improved.  He stated that his blood glucose levels have been very high at home on his reader.  He denied having any chest pain, heart palpitations, abdominal pain, urinary frequency or dysuria, blurry vision, headache, lightheadedness, fevers. Of note the patient was hospitalized in the ICU in March 2024 for DKA.    In the ED the patient's vitals were unremarkable except for a blood pressure of 139/61.  CMP revealed a glucose of 834, sodium of 125, potassium of 4.4, lack of anion gap at 7, and a mildly low albumin of 3.4.  CBC was largely unremarkable.  VBG showed a low pO2 of 22.5, but pH and bicarbonate were normal at 7.375 and 27.8 respectively.  The patient was given 2 L of normal saline bolus and a dose of Augmentin.   Review of Systems   Constitutional:  Positive for chills. Negative for fever.   HENT:  Positive for rhinorrhea.    Eyes:  Negative for visual disturbance. "   Respiratory:  Positive for shortness of breath. Negative for cough.    Cardiovascular:  Positive for leg swelling. Negative for chest pain and palpitations.   Gastrointestinal:  Negative for abdominal pain, nausea and vomiting.   Genitourinary:  Negative for difficulty urinating, dysuria and frequency.   Skin:  Positive for wound.   Neurological:  Negative for light-headedness and headaches.         Objective :  Temp:  [97.5 °F (36.4 °C)-98.3 °F (36.8 °C)] 98.2 °F (36.8 °C)  HR:  [74-87] 86  BP: (120-139)/(58-84) 120/84  Resp:  [18] 18  SpO2:  [97 %-100 %] 100 %  O2 Device: None (Room air)    Intake & Output:  I/O         12/23 0701 12/24 0700 12/24 0701 12/25 0700    IV Piggyback  1000    Total Intake  1000    Net  +1000                Weights:   IBW (Ideal Body Weight): 68.4 kg    Body mass index is 19.77 kg/m².  Weight (last 2 days)       Date/Time Weight    12/24/24 2123 59 (130)          Physical Exam  Constitutional:       General: He is not in acute distress.  Eyes:      General: No scleral icterus.  Cardiovascular:      Rate and Rhythm: Normal rate and regular rhythm.   Pulmonary:      Effort: Pulmonary effort is normal. No respiratory distress.      Breath sounds: Normal breath sounds.   Abdominal:      General: Abdomen is flat. Bowel sounds are normal.      Palpations: Abdomen is soft.   Musculoskeletal:         General: No swelling.      Right lower leg: No edema.      Left lower leg: No edema.   Skin:     General: Skin is warm and dry.      Capillary Refill: Capillary refill takes less than 2 seconds.      Coloration: Skin is not jaundiced or pale.      Findings: Lesion and rash present.      Comments: -Flaky rash on feet  -Superficial ulcers on patient's left hand, pink skin on left forearm   Neurological:      Mental Status: He is alert and oriented to person, place, and time.           Lab Results: I have reviewed the following results:  Recent Labs     12/24/24  1859 12/24/24 2226   WBC 8.82  --  "   HGB 12.3  --    HCT 36.4*  --      --    SODIUM 125* 130*   K 4.4 4.8   CL 91* 96   CO2 27 28   BUN 22 20   CREATININE 0.97 0.86   GLUC 834* 635*   MG  --  1.9   PHOS  --  2.6   AST 14  --    ALT 17  --    ALB 3.4*  --    TBILI 0.38  --    ALKPHOS 84  --      Micro:  Lab Results   Component Value Date    BLOODCX No Growth After 5 Days. 03/27/2024    BLOODCX No Growth After 5 Days. 03/27/2024    BLOODCX No Growth After 5 Days. 01/16/2019       Imaging Results Review: No pertinent imaging studies reviewed.  Other Study Results Review: No additional pertinent studies reviewed.    Currently Ordered Meds:   Current Facility-Administered Medications:     albuterol (PROVENTIL HFA,VENTOLIN HFA) inhaler 1 puff, Q4H PRN    apixaban (ELIQUIS) tablet 5 mg, BID    [START ON 12/25/2024] atorvastatin (LIPITOR) tablet 40 mg, Daily With Dinner    [START ON 12/25/2024] fluticasone-vilanterol 200-25 mcg/actuation 1 puff, Daily    gabapentin (NEURONTIN) capsule 200 mg, TID    insulin regular (HumuLIN R,NovoLIN R) 1 Units/mL in sodium chloride 0.9 % 100 mL infusion, Titrated, Last Rate: 10 Units/hr (12/24/24 2127)    [START ON 12/25/2024] loratadine (CLARITIN) tablet 10 mg, Daily    nystatin (MYCOSTATIN) cream, BID    [COMPLETED] sodium chloride 0.9 % bolus 1,000 mL, Once **FOLLOWED BY** sodium chloride 0.9 % infusion, Continuous, Last Rate: 125 mL/hr (12/24/24 2308)  VTE Pharmacologic Prophylaxis: Eliquis   VTE Mechanical Prophylaxis: sequential compression device    Administrative Statements     Portions of the record may have been created with voice recognition software.  Occasional wrong word or \"sound a like\" substitutions may have occurred due to the inherent limitations of voice recognition software.  Read the chart carefully and recognize, using context, where substitutions have occurred.  ==  Karson Chan MD  [facility]  Internal Medicine Residency PGY-1  "

## 2024-12-26 LAB
ANION GAP SERPL CALCULATED.3IONS-SCNC: 3 MMOL/L (ref 4–13)
BASOPHILS # BLD AUTO: 0.03 THOUSANDS/ÂΜL (ref 0–0.1)
BASOPHILS NFR BLD AUTO: 0 % (ref 0–1)
BUN SERPL-MCNC: 17 MG/DL (ref 5–25)
CALCIUM SERPL-MCNC: 8.8 MG/DL (ref 8.4–10.2)
CHLORIDE SERPL-SCNC: 102 MMOL/L (ref 96–108)
CO2 SERPL-SCNC: 27 MMOL/L (ref 21–32)
CREAT SERPL-MCNC: 0.73 MG/DL (ref 0.6–1.3)
EOSINOPHIL # BLD AUTO: 0.18 THOUSAND/ÂΜL (ref 0–0.61)
EOSINOPHIL NFR BLD AUTO: 3 % (ref 0–6)
ERYTHROCYTE [DISTWIDTH] IN BLOOD BY AUTOMATED COUNT: 12.1 % (ref 11.6–15.1)
EST. AVERAGE GLUCOSE BLD GHB EST-MCNC: 424 MG/DL
GFR SERPL CREATININE-BSD FRML MDRD: 96 ML/MIN/1.73SQ M
GLUCOSE SERPL-MCNC: 188 MG/DL (ref 65–140)
GLUCOSE SERPL-MCNC: 200 MG/DL (ref 65–140)
GLUCOSE SERPL-MCNC: 210 MG/DL (ref 65–140)
GLUCOSE SERPL-MCNC: 259 MG/DL (ref 65–140)
GLUCOSE SERPL-MCNC: 88 MG/DL (ref 65–140)
GLUCOSE SERPL-MCNC: 90 MG/DL (ref 65–140)
HBA1C MFR BLD: 16.4 %
HCT VFR BLD AUTO: 32.2 % (ref 36.5–49.3)
HGB BLD-MCNC: 10.7 G/DL (ref 12–17)
IMM GRANULOCYTES # BLD AUTO: 0.04 THOUSAND/UL (ref 0–0.2)
IMM GRANULOCYTES NFR BLD AUTO: 1 % (ref 0–2)
LYMPHOCYTES # BLD AUTO: 2.23 THOUSANDS/ÂΜL (ref 0.6–4.47)
LYMPHOCYTES NFR BLD AUTO: 32 % (ref 14–44)
MCH RBC QN AUTO: 30.8 PG (ref 26.8–34.3)
MCHC RBC AUTO-ENTMCNC: 33.2 G/DL (ref 31.4–37.4)
MCV RBC AUTO: 93 FL (ref 82–98)
MONOCYTES # BLD AUTO: 0.56 THOUSAND/ÂΜL (ref 0.17–1.22)
MONOCYTES NFR BLD AUTO: 8 % (ref 4–12)
NEUTROPHILS # BLD AUTO: 3.98 THOUSANDS/ÂΜL (ref 1.85–7.62)
NEUTS SEG NFR BLD AUTO: 56 % (ref 43–75)
NRBC BLD AUTO-RTO: 0 /100 WBCS
PLATELET # BLD AUTO: 161 THOUSANDS/UL (ref 149–390)
PMV BLD AUTO: 10 FL (ref 8.9–12.7)
POTASSIUM SERPL-SCNC: 4.2 MMOL/L (ref 3.5–5.3)
RBC # BLD AUTO: 3.47 MILLION/UL (ref 3.88–5.62)
SODIUM SERPL-SCNC: 132 MMOL/L (ref 135–147)
WBC # BLD AUTO: 7.02 THOUSAND/UL (ref 4.31–10.16)

## 2024-12-26 PROCEDURE — 0HBRXZZ EXCISION OF TOE NAIL, EXTERNAL APPROACH: ICD-10-PCS | Performed by: PODIATRIST

## 2024-12-26 PROCEDURE — 85025 COMPLETE CBC W/AUTO DIFF WBC: CPT | Performed by: INTERNAL MEDICINE

## 2024-12-26 PROCEDURE — 82948 REAGENT STRIP/BLOOD GLUCOSE: CPT

## 2024-12-26 PROCEDURE — NC001 PR NO CHARGE: Performed by: PODIATRIST

## 2024-12-26 PROCEDURE — 83036 HEMOGLOBIN GLYCOSYLATED A1C: CPT

## 2024-12-26 PROCEDURE — NC001 PR NO CHARGE: Performed by: INTERNAL MEDICINE

## 2024-12-26 PROCEDURE — 80048 BASIC METABOLIC PNL TOTAL CA: CPT | Performed by: INTERNAL MEDICINE

## 2024-12-26 RX ORDER — INSULIN GLARGINE 100 [IU]/ML
30 INJECTION, SOLUTION SUBCUTANEOUS ONCE
Status: COMPLETED | OUTPATIENT
Start: 2024-12-26 | End: 2024-12-26

## 2024-12-26 RX ORDER — INSULIN LISPRO 100 [IU]/ML
10 INJECTION, SOLUTION INTRAVENOUS; SUBCUTANEOUS
Status: DISCONTINUED | OUTPATIENT
Start: 2024-12-26 | End: 2024-12-26

## 2024-12-26 RX ORDER — INSULIN GLARGINE 100 [IU]/ML
30 INJECTION, SOLUTION SUBCUTANEOUS EVERY 12 HOURS SCHEDULED
Status: DISCONTINUED | OUTPATIENT
Start: 2024-12-26 | End: 2024-12-26

## 2024-12-26 RX ORDER — INSULIN LISPRO 100 [IU]/ML
5 INJECTION, SOLUTION INTRAVENOUS; SUBCUTANEOUS
Status: DISCONTINUED | OUTPATIENT
Start: 2024-12-26 | End: 2024-12-27 | Stop reason: HOSPADM

## 2024-12-26 RX ORDER — METHADONE HYDROCHLORIDE 10 MG/ML
150 CONCENTRATE ORAL DAILY
Refills: 0 | Status: DISCONTINUED | OUTPATIENT
Start: 2024-12-26 | End: 2024-12-27 | Stop reason: HOSPADM

## 2024-12-26 RX ORDER — INSULIN GLARGINE 100 [IU]/ML
25 INJECTION, SOLUTION SUBCUTANEOUS EVERY 12 HOURS SCHEDULED
Status: DISCONTINUED | OUTPATIENT
Start: 2024-12-26 | End: 2024-12-27 | Stop reason: HOSPADM

## 2024-12-26 RX ADMIN — DOXYCYCLINE 100 MG: 100 CAPSULE ORAL at 09:38

## 2024-12-26 RX ADMIN — ATORVASTATIN CALCIUM 40 MG: 40 TABLET, FILM COATED ORAL at 18:03

## 2024-12-26 RX ADMIN — APIXABAN 5 MG: 5 TABLET, FILM COATED ORAL at 18:04

## 2024-12-26 RX ADMIN — MUPIROCIN: 20 OINTMENT TOPICAL at 21:21

## 2024-12-26 RX ADMIN — NYSTATIN: 100000 CREAM TOPICAL at 09:39

## 2024-12-26 RX ADMIN — INSULIN LISPRO 1 UNITS: 100 INJECTION, SOLUTION INTRAVENOUS; SUBCUTANEOUS at 09:49

## 2024-12-26 RX ADMIN — NYSTATIN: 100000 CREAM TOPICAL at 18:05

## 2024-12-26 RX ADMIN — METHADONE HYDROCHLORIDE 150 MG: 10 CONCENTRATE ORAL at 09:38

## 2024-12-26 RX ADMIN — GABAPENTIN 200 MG: 100 CAPSULE ORAL at 18:03

## 2024-12-26 RX ADMIN — APIXABAN 5 MG: 5 TABLET, FILM COATED ORAL at 09:38

## 2024-12-26 RX ADMIN — INSULIN LISPRO 10 UNITS: 100 INJECTION, SOLUTION INTRAVENOUS; SUBCUTANEOUS at 09:50

## 2024-12-26 RX ADMIN — GABAPENTIN 200 MG: 100 CAPSULE ORAL at 21:10

## 2024-12-26 RX ADMIN — FLUTICASONE FUROATE AND VILANTEROL TRIFENATATE 1 PUFF: 200; 25 POWDER RESPIRATORY (INHALATION) at 09:38

## 2024-12-26 RX ADMIN — INSULIN GLARGINE 30 UNITS: 100 INJECTION, SOLUTION SUBCUTANEOUS at 09:39

## 2024-12-26 RX ADMIN — INSULIN LISPRO 1 UNITS: 100 INJECTION, SOLUTION INTRAVENOUS; SUBCUTANEOUS at 12:32

## 2024-12-26 RX ADMIN — LORATADINE 10 MG: 10 TABLET ORAL at 09:38

## 2024-12-26 RX ADMIN — INSULIN LISPRO 10 UNITS: 100 INJECTION, SOLUTION INTRAVENOUS; SUBCUTANEOUS at 12:32

## 2024-12-26 RX ADMIN — DOXYCYCLINE 100 MG: 100 CAPSULE ORAL at 21:11

## 2024-12-26 RX ADMIN — INSULIN LISPRO 5 UNITS: 100 INJECTION, SOLUTION INTRAVENOUS; SUBCUTANEOUS at 18:04

## 2024-12-26 RX ADMIN — GABAPENTIN 200 MG: 100 CAPSULE ORAL at 09:38

## 2024-12-26 RX ADMIN — INSULIN GLARGINE 25 UNITS: 100 INJECTION, SOLUTION SUBCUTANEOUS at 21:11

## 2024-12-26 NOTE — PLAN OF CARE
Problem: PAIN - ADULT  Goal: Verbalizes/displays adequate comfort level or baseline comfort level  Description: Interventions:  - Encourage patient to monitor pain and request assistance  - Assess pain using appropriate pain scale  - Administer analgesics based on type and severity of pain and evaluate response  - Implement non-pharmacological measures as appropriate and evaluate response  - Consider cultural and social influences on pain and pain management  - Notify physician/advanced practitioner if interventions unsuccessful or patient reports new pain  Outcome: Progressing     Problem: INFECTION - ADULT  Goal: Absence or prevention of progression during hospitalization  Description: INTERVENTIONS:  - Assess and monitor for signs and symptoms of infection  - Monitor lab/diagnostic results  - Monitor all insertion sites, i.e. indwelling lines, tubes, and drains  - Monitor endotracheal if appropriate and nasal secretions for changes in amount and color  - [place] appropriate cooling/warming therapies per order  - Administer medications as ordered  - Instruct and encourage patient and family to use good hand hygiene technique  - Identify and instruct in appropriate isolation precautions for identified infection/condition  Outcome: Progressing     Problem: SAFETY ADULT  Goal: Patient will remain free of falls  Description: INTERVENTIONS:  - Educate patient/family on patient safety including physical limitations  - Instruct patient to call for assistance with activity   - Consult OT/PT to assist with strengthening/mobility   - Keep Call bell within reach  - Keep bed low and locked with side rails adjusted as appropriate  - Keep care items and personal belongings within reach  - Initiate and maintain comfort rounds  - Make Fall Risk Sign visible to staff  - Apply yellow socks and bracelet for high fall risk patients  - Consider moving patient to room near nurses station  Outcome: Progressing     Problem: Knowledge  Deficit  Goal: Patient/family/caregiver demonstrates understanding of disease process, treatment plan, medications, and discharge instructions  Description: Complete learning assessment and assess knowledge base.  Interventions:  - Provide teaching at level of understanding  - Provide teaching via preferred learning methods  Outcome: Progressing

## 2024-12-26 NOTE — CONSULTS
Podiatry - Consultation    Patient Information:   Simone Reyes 64 y.o. male MRN: [id number]  Unit/Bed#: PPHP 924-01 Encounter: [id number]  PCP: Nicholas Blanc DO  Date of Admission:  12/24/2024  Date of Consultation: 12/26/24  Requesting Physician: Danielito Sánchez MD      ASSESSMENT:    Simone Reyes is a 64 y.o. male with:    Onychomycosis  Uncontrolled Type 2 diabetes  A. A1c of 11.3%  Methadone dependence  Tobacco abuse  Congestive heart disease    PLAN:    Patient seen and evaluated at bedside  Of note elongated incurvated nails x 10 with onychomycotic changes  Toenails x10 were excisionally debrided using a large nipper to normal length and thickness without incident.  Patient and primary team report to have rash to his bilateral feet and was given Nystatin cream. No rash found in this visit. Nystatin cream treatment per primary team.   Weight bearing as tolerated  Rest of Medical care per primary team.  Podiatry signing off. Please contact with any questions or concerns.    Nail debridement notation:  Nails x10 were sharply trimmed to normal length and thickness with a large nail nipper without incident.     SUBJECTIVE:    History of Present Illness:    Simone Reyes is a 64 y.o. male who is originally admitted 12/24/2024 due to hyperglycemia with a past medical history of T2DM, Methatone use, tobacco use, CHF.    We are consulted for painful, elongated toenails x 10. They state that they have difficulty applying their socks and shoes due to the elongation of the nails. They report pain with palpation and pressure within their shoe gear. They have been unable to cut their nails adequately. Patient has no further pedal complaints at this time.     Review of Systems:    Constitutional: Negative.    HENT: Negative.    Eyes: Negative.    Respiratory: Negative.    Cardiovascular: Negative.    Gastrointestinal: Negative.    Musculoskeletal: negative  Skin: Thickened, elongated toenails  Neurological:  negative  Psych: Negative.     Past Medical and Surgical History:     Past Medical History:   Diagnosis Date    Cough 2/4/2019    Diabetes mellitus (HCC)     Methadone dependence (HCC) 02/16/2022    Migraines 02/16/2022       Past Surgical History:   Procedure Laterality Date    TONSILLECTOMY         Meds/Allergies:      Medications Prior to Admission:     albuterol (PROVENTIL HFA,VENTOLIN HFA) 90 mcg/act inhaler    apixaban (Eliquis) 5 mg    furosemide (LASIX) 20 mg tablet    gabapentin (NEURONTIN) 100 mg capsule    metFORMIN (GLUCOPHAGE-XR) 500 mg 24 hr tablet    methadone (DOLOPHINE) 10 mg tablet    mupirocin (BACTROBAN) 2 % ointment    rosuvastatin (CRESTOR) 20 MG tablet    Advair Diskus 250-50 MCG/ACT inhaler    Alcohol Swabs (Alcohol Pads) 70 % PADS    BD Pen Needle Judi 2nd Gen 32G X 4 MM MISC    Blood Glucose Monitoring Suppl KIT    Continuous Glucose  (Dexcom G7 ) CURLY    Continuous Glucose Sensor (Dexcom G7 Sensor)    glucose blood test strip    Insulin Glargine Solostar (Lantus SoloStar) 100 UNIT/ML SOPN    Lancets (OneTouch Delica Plus Snogvy17N) MISC    loratadine (CLARITIN) 10 mg tablet    Allergies   Allergen Reactions    Imitrex [Sumatriptan] Hallucinations     Annotation - [id number]: metal taste and spacey    Immune Globulin      Other reaction(s): Other (See Comments)  Tripping, spacing       Social History:     Marital Status: Registered Domestic Partner    Substance Use History:   Social History     Substance and Sexual Activity   Alcohol Use Not Currently    Comment: rarely     Social History     Tobacco Use   Smoking Status Every Day    Current packs/day: 0.50    Types: Cigarettes   Smokeless Tobacco Never   Tobacco Comments    half a pack every other day     Social History     Substance and Sexual Activity   Drug Use Yes    Types: Marijuana, Heroin, Fentanyl    Comment: currently smoking marijuana and occasional heroin, one bag a week       Family History:    Family History  "  Problem Relation Age of Onset    Thyroid disease Mother     Cancer Mother          OBJECTIVE:    Vitals:   Blood Pressure: 133/66 (12/26/24 0702)  Pulse: 89 (12/26/24 0702)  Temperature: 99.3 °F (37.4 °C) (12/26/24 0702)  Temp Source: Tympanic (12/24/24 1837)  Respirations: 16 (12/26/24 0702)  Height: 5' 8\" (172.7 cm) (12/24/24 2123)  Weight - Scale: 56.6 kg (124 lb 12.5 oz) (12/26/24 0600)  SpO2: 98 % (12/26/24 0702)    Physical Exam:    General Appearance: Alert, cooperative, no distress.  HEENT: Head normocephalic, atraumatic, without obvious abnormality.  Heart: Normal rate and rhythm.  Lungs: Non-labored breathing. No respiratory distress.  Abdomen: Without distension.  Psychiatric: AAOx3  Lower Extremity:  Vascular:   Right DP and PT pulses are present. Left DP and PT pulses are present. CRT < 3 seconds at the digits. +1/4 edema noted at bilateral lower extremities. Pedal hair is absent. Skin temperature is WNL bilaterally.    Musculoskeletal:  MMT is 5/5 in all muscle compartments bilaterally. ROM at the ankle joint is reduced bilaterally with the leg extended. Pain on palpation of nails x 10. No gross deformities noted.     Dermatological:  Elongated, discolored, dystrophic nails x 10 that are positive for subungual debris.   No wounds noted     Neurological:  Gross sensation is diminished. Protective sensation is diminished.    "

## 2024-12-26 NOTE — DISCHARGE INSTR - OTHER ORDERS
Skin care Plan:  1-Cleanse sacro-buttocks with soap and water. Apply Melgisorb/Calcium Alginate over wound bed and cover with Silicone bordered foam. Simone with T for treatment. Change every other day and PRN.   2-Left posterior hand- Cleanse wounds with NSS, pat dry. Apply Dermagran over wound beds, cover with ABD pad, wrap with Rere. Change every other day and as needed for soilage/displacement.   3-Elevate heels to offload pressure  4-Moisturize skin daily with skin nourishing cream  5-Ehob cushion in chair when out of bed.  6-Hydraguard to bilateral heels BID and PRN.   7-Turn/reposition every 2 hours while in bed.        Schedule Follow-up Outpatient Wound Appointment.  Call Outpatient Wound and Ostomy Care Team @ [phone]   Option 1: [place], [place], [place], [place]  Option 2: [place], [place]

## 2024-12-26 NOTE — ASSESSMENT & PLAN NOTE
Assessment and plan:  -Bilateral  -Patient endorses no history of foot wounds  -Podiatry consulted, nails trimmed  -Nystatin cream twice daily

## 2024-12-26 NOTE — ASSESSMENT & PLAN NOTE
Lab Results   Component Value Date    HGBA1C 16.4 (H) 12/26/2024       Recent Labs     12/25/24  2230 12/26/24  0701 12/26/24  1109 12/26/24  1417   POCGLU 395* 200* 210* 90       Blood Sugar Average: Last 72 hrs:  (P) 203.8680418771656543  Assessment and plan:  -At home patient uses 50 units Lantus daily, and is also on metformin  -Has not taken insulin since 4 days ago due to his illness, but states that he was very compliant prior to this  -After presumed HHS is resolved may be restarted on insulin regimen  -Increase Lantus to 30 units twice daily.  Initially began on 10 units mealtime insulin, will reduce to 5 units mealtime insulin.

## 2024-12-26 NOTE — WOUND OSTOMY CARE
Consult Note - Wound   Simone Reyes 64 y.o. male MRN: [id number]  Unit/Bed#: [facility] 924-01 Encounter: [id number]        Assessment :   Patient admitted to [facility] due to hyperosmolar hyperglycemic state. History of diabetes, methadone dependence, COPD. Wound care nurse consulted for left hand and sacral wound. Patient is agreeable to assessment, alert and oriented x4, continent of bowel and bladder, independently turns for assessment, independently ambulates. Recommend patient follow-up with out-patient wound center- referral placed.    1. Pressure injury mid sacrum, unstageable-POA- Wound is oval in shape, partial thickness, approx. 30% yellow adhered tissue and 70% pink tissue,with small amount of serosanguineous drainage noted. Cecliia-wound is dry, intact, blanchable.     2. Left posterior hand- Unclear etiology. Wounds are scattered and irregular in shape, partial thickness, approx. 20% brown adhered scabbing, 20% yellow tissue, and 60% pink tissue, with scant amount of serosanguineous drainage noted. Cecilia-wound is dry, intact, blanchable.     3. Bilateral buttock- Skin is dry, intact, blanchable.     Educated patient on importance of frequent offloading of pressure via turning, repositioning, and weight-shifting. Verbalized understanding of plan of care.    No induration, fluctuance, odor, warmth, redness, or purulence noted to the above noted wounds. New dressings applied. Patient tolerated well, reports mild pain to the sacral wound. See flow sheets for more detailed assessment findings. Will follow along.      Skin care Plan:  1-Cleanse sacro-buttocks with soap and water. Apply Melgisorb/Calcium Alginate over wound bed and cover with Silicone bordered foam. Simone with T for treatment. Change every other day and PRN.   2-Left posterior hand- Cleanse wounds with NSS, pat dry. Apply Dermagran over wound beds, cover with ABD pad, wrap with Rere. Change every other day and as needed for soilage/displacement.   3-Elevate  heels to offload pressure  4-Moisturize skin daily with skin nourishing cream  5-Ehob cushion in chair when out of bed.  6-Hydraguard to bilateral heels BID and PRN.   7-Turn/reposition every 2 hours while in bed.      Wound 12/26/24 Hand Left;Posterior (Active)   Wound Image   12/26/24 0857   Wound Description Yellow;Pink;Brown 12/26/24 0857   Cecilia-wound Assessment Dry;Intact;Scaly 12/26/24 0857   Wound Length (cm) 7.5 cm 12/26/24 0857   Wound Width (cm) 7.5 cm 12/26/24 0857   Wound Depth (cm) 0.2 cm 12/26/24 0857   Wound Surface Area (cm^2) 56.25 cm^2 12/26/24 0857   Wound Volume (cm^3) 11.25 cm^3 12/26/24 0857   Calculated Wound Volume (cm^3) 11.25 cm^3 12/26/24 0857   Drainage Amount Scant 12/26/24 0857   Drainage Description Serosanguineous 12/26/24 0857   Non-staged Wound Description Partial thickness 12/26/24 0857   Treatments Cleansed;Irrigation with NSS;Site care 12/26/24 0857   Dressing Dermagran gauze;ABD;Dry dressing 12/26/24 0857   Wound packed? No 12/26/24 0857   Dressing Changed Changed 12/26/24 0857   Patient Tolerance Tolerated well 12/26/24 0857   Dressing Status Clean;Dry;Intact 12/26/24 0857       Wound 12/26/24 Pressure Injury Sacrum (Active)   Wound Image   12/26/24 0858   Wound Description Yellow;Pink 12/26/24 0858   Pressure Injury Stage U 12/26/24 0858   Cecilia-wound Assessment Dry;Intact 12/26/24 0858   Wound Length (cm) 1.5 cm 12/26/24 0858   Wound Width (cm) 0.5 cm 12/26/24 0858   Wound Depth (cm) 0.2 cm 12/26/24 0858   Wound Surface Area (cm^2) 0.75 cm^2 12/26/24 0858   Wound Volume (cm^3) 0.15 cm^3 12/26/24 0858   Calculated Wound Volume (cm^3) 0.15 cm^3 12/26/24 0858   Drainage Amount Small 12/26/24 0858   Drainage Description Serosanguineous 12/26/24 0858   Non-staged Wound Description Partial thickness 12/26/24 0858   Treatments Cleansed;Site care 12/26/24 0858   Dressing Calcium Alginate;Foam, Silicon (eg. Allevyn, etc) 12/26/24 0858   Wound packed? No 12/26/24 0858   Dressing Changed  Changed 12/26/24 0858   Patient Tolerance Tolerated well 12/26/24 0858   Dressing Status Clean;Dry;Intact 12/26/24 0858     Contact through [employer] Secure Chat with any questions  Wound Care will continue to follow while inpatient    Fauzai CLARKEN RN CWON  Wound and Ostomy care

## 2024-12-26 NOTE — ASSESSMENT & PLAN NOTE
Lab Results   Component Value Date    HGBA1C 16.4 (H) 12/26/2024       Recent Labs     12/25/24  2230 12/26/24  0701 12/26/24  1109 12/26/24  1417   POCGLU 395* 200* 210* 90       Blood Sugar Average: Last 72 hrs:  (P) 203.2678809640684423  Resolved

## 2024-12-26 NOTE — PROGRESS NOTES
"Progress Note - Internal Medicine   Name: Simone Reyes 64 y.o. male I MRN: [id number]  Unit/Bed#: [facility] 924-01 I Date of Admission: 12/24/2024   Date of Service: 12/26/2024 I Hospital Day: 2    Assessment & Plan  Type 2 diabetes mellitus, without long-term current use of insulin ([facility])  Lab Results   Component Value Date    HGBA1C 16.4 (H) 12/26/2024       Recent Labs     12/25/24  2230 12/26/24  0701 12/26/24  1109 12/26/24  1417   POCGLU 395* 200* 210* 90       Blood Sugar Average: Last 72 hrs:  (P) 203.6357731914997277  Assessment and plan:  -At home patient uses 50 units Lantus daily, and is also on metformin  -Has not taken insulin since 4 days ago due to his illness, but states that he was very compliant prior to this  -After presumed HHS is resolved may be restarted on insulin regimen  -Increase Lantus to 30 units twice daily.  Initially began on 10 units mealtime insulin, will reduce to 5 units mealtime insulin.  Chronic obstructive pulmonary disease (HCC)  Assessment and plan:  -Continue on fluticasone-vilanterol 200/25 mcg puff daily, which patient states he has not been taking at home.  -Albuterol inhaler every 4 hours as needed  Methadone dependence ([facility])  Assessment and plan:  -Patient states that he gets methadone from [facility]  -Reports fentanyl use approximately 4 days ago through nasal route.    -He states that he takes 150 mg a day, home medications listed as 135 mg a day  -Confirmed with [facility] he is on 150 mg, reinitiated.  Congestive heart disease (HCC)  Wt Readings from Last 3 Encounters:   12/26/24 56.6 kg (124 lb 12.5 oz)   11/06/24 61.4 kg (135 lb 6.4 oz)   06/26/24 64 kg (141 lb)     Assessment and plan:  -May 2024 TTE: \"The left ventricular ejection fraction is 55%. Systolic function is low normal. Wall motion is normal. Diastolic function is mildly abnormal, consistent with grade I (abnormal) relaxation.\"  -Patient on 20 mg Lasix oral at home, hold given fluid " resuscitation  -Monitor weights daily  -Plan to restart on discharge    Hyperosmolar hyperglycemic state (HHS) ([facility])  Lab Results   Component Value Date    HGBA1C 16.4 (H) 12/26/2024       Recent Labs     12/25/24  2230 12/26/24  0701 12/26/24  1109 12/26/24  1417   POCGLU 395* 200* 210* 90       Blood Sugar Average: Last 72 hrs:  (P) [id number]  Resolved  Atrial flutter (HCC)  Assessment and plan:  -Per chart review prior episodes of A-fib and a flutter, not well-documented  -Cardiology evaluation of Zio patch 8/8/2024: Aflutter versus atrial tachycardia  -Getting Eliquis through office outpatient, continue on Eliquis 5 mg BID  H/O open hand wound  Assessment and plan:  -Patient endorses a 1-2 month history of open wound on left forearm.  It initially started on the forearm, but per the patient has since migrated to his hand.  It is not painful but it is pruritic and patient has been picking at it  -Was prescribed mupirocin, which he is using intermittently  -Image added to media.   -Wound care consulted.  -Will begin doxycycline for 7-day course with topical mupirocin.  Rash of foot  Assessment and plan:  -Bilateral  -Patient endorses no history of foot wounds  -Podiatry consulted, nails trimmed  -Nystatin cream twice daily    Disposition: Pending medical optimization    Team: SOD TEAM A    Subjective   Patient seen and examined. No acute events overnight.  Eating breakfast without difficulties.  No nausea/vomiting.  Feels well overall.  Catching up on food intake.    Objective :  Temp:  [98 °F (36.7 °C)-99.3 °F (37.4 °C)] 99.3 °F (37.4 °C)  HR:  [67-89] 89  BP: ()/(53-66) 133/66  Resp:  [16-18] 16  SpO2:  [98 %-100 %] 98 %  O2 Device: None (Room air)    I/O         12/23 0701 12/24 0700 12/24 0701 12/25 0700 12/25 0701 12/26 0700    P.O.   100    IV Piggyback  1000     Total Intake(mL/kg)  1000 (16.9) 100 (1.7)    Urine (mL/kg/hr)  600 650 (3)    Total Output  600 650    Net  +400 -550                  Weights:   IBW (Ideal Body Weight): 68.4 kg    Body mass index is 18.97 kg/m².  Weight (last 2 days)       Date/Time Weight    12/26/24 0600 56.6 (124.78)    12/24/24 2123 59 (130)            Physical Exam  Vitals reviewed.   Constitutional:       General: He is not in acute distress.     Appearance: Normal appearance.   HENT:      Head: Normocephalic and atraumatic.   Eyes:      Extraocular Movements: Extraocular movements intact.   Cardiovascular:      Rate and Rhythm: Normal rate and regular rhythm.      Heart sounds: Murmur heard.      Systolic murmur is present with a grade of 3/6.      Comments: Murmur most prominent at apex towards axilla  Pulmonary:      Effort: Pulmonary effort is normal.      Breath sounds: Normal breath sounds.   Abdominal:      Palpations: Abdomen is soft.   Musculoskeletal:      Right lower leg: No edema.      Left lower leg: No edema.   Skin:     General: Skin is warm and dry.      Capillary Refill: Capillary refill takes less than 2 seconds.      Findings: Wound present. No abscess.      Comments: Left dorsal hand and dorsum of distal forearm with 2 nonhealing wounds.  No active drainage.  No abscess.   Neurological:      General: No focal deficit present.      Mental Status: He is alert and oriented to person, place, and time.           Lab Results: I have reviewed the following results:  Recent Labs     12/24/24  1859 12/24/24  2226 12/25/24  0530 12/26/24  0444   WBC 8.82  --    < > 7.02   HGB 12.3  --    < > 10.7*   HCT 36.4*  --    < > 32.2*     --    < > 161   SODIUM 125* 130*   < > 132*   K 4.4 4.8   < > 4.2   CL 91* 96   < > 102   CO2 27 28   < > 27   BUN 22 20   < > 17   CREATININE 0.97 0.86   < > 0.73   GLUC 834* 635*   < > 259*   MG  --  1.9  --   --    PHOS  --  2.6  --   --    AST 14  --   --   --    ALT 17  --   --   --    ALB 3.4*  --   --   --    TBILI 0.38  --   --   --    ALKPHOS 84  --   --   --     < > = values in this interval not displayed.              Currently Ordered Meds:   Current Facility-Administered Medications:     albuterol (PROVENTIL HFA,VENTOLIN HFA) inhaler 1 puff, Q4H PRN    apixaban (ELIQUIS) tablet 5 mg, BID    atorvastatin (LIPITOR) tablet 40 mg, Daily With Dinner    doxycycline hyclate (VIBRAMYCIN) capsule 100 mg, Q12H MITCHELL    fluticasone-vilanterol 200-25 mcg/actuation 1 puff, Daily    gabapentin (NEURONTIN) capsule 200 mg, TID    insulin glargine (LANTUS) subcutaneous injection 30 Units 0.3 mL, Q12H MITCHELL    insulin lispro (HumALOG/ADMELOG) 100 units/mL subcutaneous injection 1-5 Units, TID AC **AND** Fingerstick Glucose (POCT), TID AC    insulin lispro (HumALOG/ADMELOG) 100 units/mL subcutaneous injection 5 Units, TID With Meals    loratadine (CLARITIN) tablet 10 mg, Daily    methadone (DOLOPHINE) oral concentrated solution 150 mg, Daily    mupirocin (BACTROBAN) 2 % ointment, TID    nystatin (MYCOSTATIN) cream, BID  VTE Pharmacologic Prophylaxis: VTE covered by:  apixaban, Oral, 5 mg at 12/26/24 0938      VTE Mechanical Prophylaxis: sequential compression device    Administrative Statements     Portions of the record may have been created with voice recognition software.

## 2024-12-26 NOTE — ASSESSMENT & PLAN NOTE
"Wt Readings from Last 3 Encounters:   12/26/24 56.6 kg (124 lb 12.5 oz)   11/06/24 61.4 kg (135 lb 6.4 oz)   06/26/24 64 kg (141 lb)     Assessment and plan:  -May 2024 TTE: \"The left ventricular ejection fraction is 55%. Systolic function is low normal. Wall motion is normal. Diastolic function is mildly abnormal, consistent with grade I (abnormal) relaxation.\"  -Patient on 20 mg Lasix oral at home, hold given fluid resuscitation  -Monitor weights daily  -Plan to restart on discharge    "

## 2024-12-26 NOTE — ASSESSMENT & PLAN NOTE
Assessment and plan:  -Continue on fluticasone-vilanterol 200/25 mcg puff daily, which patient states he has not been taking at home.  -Albuterol inhaler every 4 hours as needed

## 2024-12-26 NOTE — CASE MANAGEMENT
Case Management Assessment & Discharge Planning Note    Patient name Simone GAMBOA [place]  Location [facility] 924/[facility] 924-01 MRN [id number]  : 1960 Date 2024       Current Admission Date: 2024  Current Admission Diagnosis:Hyperosmolar hyperglycemic state (HHS) (HCC)   Patient Active Problem List    Diagnosis Date Noted Date Diagnosed    Hyperosmolar hyperglycemic state (HHS) (HCC) 2024     H/O open hand wound 2024     Rash of foot 2024     Mitral regurgitation due to cusp prolapse 2024     Congestive heart disease ([facility]) 2024     Type 2 diabetes mellitus with other specified complication, without long-term current use of insulin ([facility]) 2024     Atrial flutter ([facility]) 2024     Severe protein-calorie malnutrition ([facility]) 2024     Chronic obstructive pulmonary disease ([facility]) 2020     Methadone dependence ([facility]) 2020     Smoker 2020     Tobacco dependence 2019     Other emphysema ([facility]) 2019     Type 2 diabetes mellitus, without long-term current use of insulin ([facility]) 2016       LOS (days): 2  Geometric Mean LOS (GMLOS) (days): 3  Days to GMLOS:1.2     OBJECTIVE:    Risk of Unplanned Readmission Score: 15.75         Current admission status: Inpatient       Preferred Pharmacy:   [id number] - [place], [place] - [address]  [address] [place] [address]  Phone: [phone] Fax: [phone]    [facility] [place]  [place], [place] - [address]  [address]  [place] [address]  Phone: [phone] Fax: [phone]    Primary Care Provider: Nicholas Blanc DO    Primary Insurance: [employer] RE[employer]  Secondary Insurance:     ASSESSMENT:  Active Health Care Proxies    There are no active Health Care Proxies on file.                 Readmission Root Cause  30 Day Readmission: No    Patient Information  Admitted from:: Home  Mental Status: Alert  During Assessment patient  was accompanied by: Not accompanied during assessment  Assessment information provided by:: Patient  Primary Caregiver: Self  Support Systems: Self, Son  County of Residence: [place]  What city do you live in?: [place]  Home entry access options. Select all that apply.: No steps to enter home  Type of Current Residence: 3 story home  Upon entering residence, is there a bedroom on the main floor (no further steps)?: No  A bedroom is located on the following floor levels of residence (select all that apply):: Basement  Upon entering residence, is there a bathroom on the main floor (no further steps)?: Yes  Number of steps to basement from main floor: One Flight  Living Arrangements: Lives w/ Son  Is patient a ?: No    Activities of Daily Living Prior to Admission  Functional Status: Independent  Completes ADLs independently?: Yes  Ambulates independently?: Yes  Does patient use assisted devices?: No  Does patient currently own DME?: No  Does patient have a history of Outpatient Therapy (PT/OT)?: No  Does the patient have a history of Short-Term Rehab?: No  Does patient have a history of HHC?: No  Does patient currently have HHC?: No         Patient Information Continued  Income Source: SSI/SSD  Does patient have prescription coverage?: Yes  Does patient receive dialysis treatments?: No  Does patient have a history of substance abuse?: Yes  Historical substance use preference: Fentanyl, Marijuana  Is patient currently in treatment for substance abuse?: No. Patient declined treatment information.  Does patient have a history of Mental Health Diagnosis?: No         Means of Transportation  Means of Transport to Appts:: Drives Self          DISCHARGE DETAILS:    Discharge planning discussed with:: pt at bedside  [place] of Choice: Yes  Comments - Freedom of Choice: Discussed FOC  CM contacted family/caregiver?: No- see comments  Were Treatment Team discharge recommendations reviewed with patient/caregiver?:  "Yes  Did patient/caregiver verbalize understanding of patient care needs?: Yes  Were patient/caregiver advised of the risks associated with not following Treatment Team discharge recommendations?: Yes         Requested Home Health Care         Is the patient interested in HHC at discharge?: No    DME Referral Provided  Referral made for DME?: No         Would you like to participate in our [facility] service program?  : No - Declined    Treatment Team Recommendation: Home  Discharge Destination Plan:: Home  Transport at Discharge : Ride Share                                      Additional Comments: CM introduced herself and role and completed initial assessment with pt at bedside. Pt reports he lives in a 3 story home with ex wife and son. Pt reports he uses fentanyl and marijuana. Per pt \" I will never stop smoking weed, but I last used fentanyl 2-3 weeks ago\". CM asked pt if he was interested in talking with CRS. Pt declined talking to CRS at this time. Pt reports he is independent at baseline. Pt will need ride home at [place]. CM will continue to follow as needed.                    "

## 2024-12-26 NOTE — ASSESSMENT & PLAN NOTE
Assessment and plan:  -Patient states that he gets methadone from [facility]  -Reports fentanyl use approximately 4 days ago through nasal route.    -He states that he takes 150 mg a day, home medications listed as 135 mg a day  -Confirmed with new directions he is on 150 mg, reinitiated.

## 2024-12-27 ENCOUNTER — DOCUMENTATION (OUTPATIENT)
Dept: CASE MANAGEMENT | Facility: HOSPITAL | Age: 64
End: 2024-12-27

## 2024-12-27 ENCOUNTER — PATIENT OUTREACH (OUTPATIENT)
Dept: INTERNAL MEDICINE CLINIC | Facility: CLINIC | Age: 64
End: 2024-12-27

## 2024-12-27 VITALS
OXYGEN SATURATION: 99 % | HEIGHT: 68 IN | TEMPERATURE: 99.5 F | SYSTOLIC BLOOD PRESSURE: 150 MMHG | BODY MASS INDEX: 18.91 KG/M2 | HEART RATE: 99 BPM | DIASTOLIC BLOOD PRESSURE: 75 MMHG | WEIGHT: 124.78 LBS | RESPIRATION RATE: 16 BRPM

## 2024-12-27 LAB
ANION GAP SERPL CALCULATED.3IONS-SCNC: 5 MMOL/L (ref 4–13)
BASOPHILS # BLD AUTO: 0.05 THOUSANDS/ÂΜL (ref 0–0.1)
BASOPHILS NFR BLD AUTO: 1 % (ref 0–1)
BUN SERPL-MCNC: 15 MG/DL (ref 5–25)
CALCIUM SERPL-MCNC: 8.9 MG/DL (ref 8.4–10.2)
CHLORIDE SERPL-SCNC: 101 MMOL/L (ref 96–108)
CO2 SERPL-SCNC: 32 MMOL/L (ref 21–32)
CREAT SERPL-MCNC: 0.66 MG/DL (ref 0.6–1.3)
EOSINOPHIL # BLD AUTO: 0.14 THOUSAND/ÂΜL (ref 0–0.61)
EOSINOPHIL NFR BLD AUTO: 2 % (ref 0–6)
ERYTHROCYTE [DISTWIDTH] IN BLOOD BY AUTOMATED COUNT: 12 % (ref 11.6–15.1)
GFR SERPL CREATININE-BSD FRML MDRD: 100 ML/MIN/1.73SQ M
GLUCOSE SERPL-MCNC: 129 MG/DL (ref 65–140)
GLUCOSE SERPL-MCNC: 170 MG/DL (ref 65–140)
GLUCOSE SERPL-MCNC: 185 MG/DL (ref 65–140)
HCT VFR BLD AUTO: 34.3 % (ref 36.5–49.3)
HGB BLD-MCNC: 11.8 G/DL (ref 12–17)
IMM GRANULOCYTES # BLD AUTO: 0.05 THOUSAND/UL (ref 0–0.2)
IMM GRANULOCYTES NFR BLD AUTO: 1 % (ref 0–2)
LYMPHOCYTES # BLD AUTO: 2.21 THOUSANDS/ÂΜL (ref 0.6–4.47)
LYMPHOCYTES NFR BLD AUTO: 25 % (ref 14–44)
MCH RBC QN AUTO: 32.1 PG (ref 26.8–34.3)
MCHC RBC AUTO-ENTMCNC: 34.4 G/DL (ref 31.4–37.4)
MCV RBC AUTO: 93 FL (ref 82–98)
MONOCYTES # BLD AUTO: 0.74 THOUSAND/ÂΜL (ref 0.17–1.22)
MONOCYTES NFR BLD AUTO: 8 % (ref 4–12)
NEUTROPHILS # BLD AUTO: 5.62 THOUSANDS/ÂΜL (ref 1.85–7.62)
NEUTS SEG NFR BLD AUTO: 63 % (ref 43–75)
NRBC BLD AUTO-RTO: 0 /100 WBCS
PLATELET # BLD AUTO: 201 THOUSANDS/UL (ref 149–390)
PMV BLD AUTO: 10 FL (ref 8.9–12.7)
POTASSIUM SERPL-SCNC: 4 MMOL/L (ref 3.5–5.3)
RBC # BLD AUTO: 3.68 MILLION/UL (ref 3.88–5.62)
SODIUM SERPL-SCNC: 138 MMOL/L (ref 135–147)
WBC # BLD AUTO: 8.81 THOUSAND/UL (ref 4.31–10.16)

## 2024-12-27 PROCEDURE — 82948 REAGENT STRIP/BLOOD GLUCOSE: CPT

## 2024-12-27 PROCEDURE — 80048 BASIC METABOLIC PNL TOTAL CA: CPT

## 2024-12-27 PROCEDURE — 99239 HOSP IP/OBS DSCHRG MGMT >30: CPT | Performed by: INTERNAL MEDICINE

## 2024-12-27 PROCEDURE — 85025 COMPLETE CBC W/AUTO DIFF WBC: CPT

## 2024-12-27 RX ORDER — DOXYCYCLINE 100 MG/1
100 CAPSULE ORAL EVERY 12 HOURS SCHEDULED
Qty: 9 CAPSULE | Refills: 0 | Status: SHIPPED | OUTPATIENT
Start: 2024-12-27 | End: 2025-01-01

## 2024-12-27 RX ORDER — INSULIN GLARGINE 100 [IU]/ML
25 INJECTION, SOLUTION SUBCUTANEOUS EVERY 12 HOURS SCHEDULED
Qty: 10 ML | Refills: 0 | Status: CANCELLED | OUTPATIENT
Start: 2024-12-27

## 2024-12-27 RX ORDER — NYSTATIN 100000 U/G
CREAM TOPICAL 2 TIMES DAILY
Qty: 15 G | Refills: 0 | Status: SHIPPED | OUTPATIENT
Start: 2024-12-27

## 2024-12-27 RX ORDER — INSULIN GLARGINE 100 [IU]/ML
25 INJECTION, SOLUTION SUBCUTANEOUS 2 TIMES DAILY
Qty: 15 ML | Refills: 0 | Status: SHIPPED | OUTPATIENT
Start: 2024-12-27 | End: 2025-01-26

## 2024-12-27 RX ORDER — INSULIN LISPRO 100 [IU]/ML
5 INJECTION, SOLUTION INTRAVENOUS; SUBCUTANEOUS
Qty: 4.5 ML | Refills: 0 | Status: SHIPPED | OUTPATIENT
Start: 2024-12-27

## 2024-12-27 RX ORDER — ROSUVASTATIN CALCIUM 20 MG/1
20 TABLET, COATED ORAL DAILY
Qty: 30 TABLET | Refills: 3 | Status: SHIPPED | OUTPATIENT
Start: 2024-12-27

## 2024-12-27 RX ADMIN — INSULIN LISPRO 1 UNITS: 100 INJECTION, SOLUTION INTRAVENOUS; SUBCUTANEOUS at 08:46

## 2024-12-27 RX ADMIN — INSULIN LISPRO 5 UNITS: 100 INJECTION, SOLUTION INTRAVENOUS; SUBCUTANEOUS at 12:02

## 2024-12-27 RX ADMIN — METHADONE HYDROCHLORIDE 150 MG: 10 CONCENTRATE ORAL at 08:42

## 2024-12-27 RX ADMIN — LORATADINE 10 MG: 10 TABLET ORAL at 08:42

## 2024-12-27 RX ADMIN — APIXABAN 5 MG: 5 TABLET, FILM COATED ORAL at 08:42

## 2024-12-27 RX ADMIN — DOXYCYCLINE 100 MG: 100 CAPSULE ORAL at 08:42

## 2024-12-27 RX ADMIN — INSULIN LISPRO 1 UNITS: 100 INJECTION, SOLUTION INTRAVENOUS; SUBCUTANEOUS at 12:01

## 2024-12-27 RX ADMIN — NYSTATIN: 100000 CREAM TOPICAL at 08:42

## 2024-12-27 RX ADMIN — GABAPENTIN 200 MG: 100 CAPSULE ORAL at 08:42

## 2024-12-27 RX ADMIN — FLUTICASONE FUROATE AND VILANTEROL TRIFENATATE 1 PUFF: 200; 25 POWDER RESPIRATORY (INHALATION) at 08:41

## 2024-12-27 RX ADMIN — INSULIN GLARGINE 25 UNITS: 100 INJECTION, SOLUTION SUBCUTANEOUS at 08:45

## 2024-12-27 RX ADMIN — INSULIN LISPRO 5 UNITS: 100 INJECTION, SOLUTION INTRAVENOUS; SUBCUTANEOUS at 08:45

## 2024-12-27 NOTE — ASSESSMENT & PLAN NOTE
Lab Results   Component Value Date    HGBA1C 16.4 (H) 12/26/2024       Recent Labs     12/26/24  1554 12/26/24  2041 12/27/24  0817 12/27/24  1121   POCGLU 88 188* 170* 185*       Blood Sugar Average: Last 72 hrs:  (P) 195.2255087734244547  Assessment and plan:  -At home patient uses 50 units Lantus daily, and is also on metformin  -Has not taken insulin since 4 days ago due to his illness, but states that he was very compliant prior to this  -Will be discharged on the hospital regimen that worked for him which is Lantus 25 units twice daily and lispro 5 units 3 times daily with meals.

## 2024-12-27 NOTE — ASSESSMENT & PLAN NOTE
Assessment and plan:  -Patient endorses a 1-2 month history of open wound on left forearm.  It initially started on the forearm, but per the patient has since migrated to his hand.  It is not painful but it is pruritic and patient has been picking at it  -Was prescribed mupirocin, which he is using intermittently  -Image added to media.   -Wound care consulted.  -Total of 7-day course of doxycycline to be completed as outpatient

## 2024-12-27 NOTE — ASSESSMENT & PLAN NOTE
Lab Results   Component Value Date    HGBA1C 16.4 (H) 12/26/2024       Recent Labs     12/26/24  1554 12/26/24  2041 12/27/24  0817 12/27/24  1121   POCGLU 88 188* 170* 185*       Blood Sugar Average: Last 72 hrs:  (P) [id number]  Resolved

## 2024-12-27 NOTE — PLAN OF CARE
Problem: PAIN - ADULT  Goal: Verbalizes/displays adequate comfort level or baseline comfort level  Description: Interventions:  - Encourage patient to monitor pain and request assistance  - Assess pain using appropriate pain scale  - Administer analgesics based on type and severity of pain and evaluate response  - Implement non-pharmacological measures as appropriate and evaluate response  - Consider cultural and social influences on pain and pain management  - Notify physician/advanced practitioner if interventions unsuccessful or patient reports new pain  Outcome: Progressing     Problem: INFECTION - ADULT  Goal: Absence or prevention of progression during hospitalization  Description: INTERVENTIONS:  - Assess and monitor for signs and symptoms of infection  - Monitor lab/diagnostic results  - Monitor all insertion sites, i.e. indwelling lines, tubes, and drains  - Monitor endotracheal if appropriate and nasal secretions for changes in amount and color  - [place] appropriate cooling/warming therapies per order  - Administer medications as ordered  - Instruct and encourage patient and family to use good hand hygiene technique  - Identify and instruct in appropriate isolation precautions for identified infection/condition  Outcome: Progressing  Goal: Absence of fever/infection during neutropenic period  Description: INTERVENTIONS:  - Monitor WBC    Outcome: Progressing     Problem: SAFETY ADULT  Goal: Patient will remain free of falls  Description: INTERVENTIONS:  - Educate patient/family on patient safety including physical limitations  - Instruct patient to call for assistance with activity   - Consult OT/PT to assist with strengthening/mobility   - Keep Call bell within reach  - Keep bed low and locked with side rails adjusted as appropriate  - Keep care items and personal belongings within reach  - Initiate and maintain comfort rounds  - Make Fall Risk Sign visible to staff  - Offer Toileting every 2 Hours,  in advance of need  - Initiate/Maintain bed alarm  - Obtain necessary fall risk management equipment  - Apply yellow socks and bracelet for high fall risk patients  - Consider moving patient to room near nurses station  Outcome: Progressing  Goal: Maintain or return to baseline ADL function  Description: INTERVENTIONS:  -  Assess patient's ability to carry out ADLs; assess patient's baseline for ADL function and identify physical deficits which impact ability to perform ADLs (bathing, care of mouth/teeth, toileting, grooming, dressing, etc.)  - Assess/evaluate cause of self-care deficits   - Assess range of motion  - Assess patient's mobility; develop plan if impaired  - Assess patient's need for assistive devices and provide as appropriate  - Encourage maximum independence but intervene and supervise when necessary  - Involve family in performance of ADLs  - Assess for home care needs following discharge   - Consider OT consult to assist with ADL evaluation and planning for discharge  - Provide patient education as appropriate  Outcome: Progressing  Goal: Maintains/Returns to pre admission functional level  Description: INTERVENTIONS:  - Perform AM-PAC 6 Click Basic Mobility/ Daily Activity assessment daily.  - Set and communicate daily mobility goal to care team and patient/family/caregiver.   - Collaborate with rehabilitation services on mobility goals if consulted  - Ambulate patient 3 times a day  - Out of bed to chair 3 times a day   - Out of bed for meals 3 times a day  - Out of bed for toileting  - Record patient progress and toleration of activity level   Outcome: Progressing     Problem: DISCHARGE PLANNING  Goal: Discharge to home or other facility with appropriate resources  Description: INTERVENTIONS:  - Identify barriers to discharge w/patient and caregiver  - Arrange for needed discharge resources and transportation as appropriate  - Identify discharge learning needs (meds, wound care, etc.)  - Arrange  for interpretive services to assist at discharge as needed  - Refer to Case Management Department for coordinating discharge planning if the patient needs post-hospital services based on physician/advanced practitioner order or complex needs related to functional status, cognitive ability, or social support system  Outcome: Progressing     Problem: Knowledge Deficit  Goal: Patient/family/caregiver demonstrates understanding of disease process, treatment plan, medications, and discharge instructions  Description: Complete learning assessment and assess knowledge base.  Interventions:  - Provide teaching at level of understanding  - Provide teaching via preferred learning methods  Outcome: Progressing     Problem: Nutrition/Hydration-ADULT  Goal: Nutrient/Hydration intake appropriate for improving, restoring or maintaining nutritional needs  Description: Monitor and assess patient's nutrition/hydration status for malnutrition. Collaborate with interdisciplinary team and initiate plan and interventions as ordered.  Monitor patient's weight and dietary intake as ordered or per policy. Utilize nutrition screening tool and intervene as necessary. Determine patient's food preferences and provide high-protein, high-caloric foods as appropriate.     INTERVENTIONS:  - Monitor oral intake, urinary output, labs, and treatment plans  - Assess nutrition and hydration status and recommend course of action  - Evaluate amount of meals eaten  - Assist patient with eating if necessary   - Allow adequate time for meals  - Recommend/ encourage appropriate diets, oral nutritional supplements, and vitamin/mineral supplements  - Order, calculate, and assess calorie counts as needed  - Recommend, monitor, and adjust tube feedings and TPN/PPN based on assessed needs  - Assess need for intravenous fluids  - Provide specific nutrition/hydration education as appropriate  - Include patient/family/caregiver in decisions related to  nutrition  Outcome: Progressing

## 2024-12-27 NOTE — UTILIZATION REVIEW
NOTIFICATION OF INPATIENT ADMISSION   AUTHORIZATION REQUEST   SERVICING FACILITY:   [facility]  Address: [address] [place] [address]  Tax ID: [id number]  NPI: [id number] ATTENDING PROVIDER:  Attending Name and NPI#: Danielito Sánchez Md [[id number]]  Address: [address] [place] [address]  Phone: [phone]   ADMISSION INFORMATION:  Place of Service: Inpatient [facility]  Place of Service Code: 21  Inpatient Admission Date/Time: 12/24/24  9:22 PM  Discharge Date/Time: No discharge date for patient encounter.  Admitting Diagnosis Code/Description:  Hyperglycemia [R73.9]  Non-healing wound of upper extremity [S41.109A]     UTILIZATION REVIEW CONTACT:  Sunil James Utilization   Network Utilization Review Department  Phone: [phone]  Fax: [phone]  Email: [email or web address]  Contact for approvals/pending authorizations, clinical reviews, and discharge.     PHYSICIAN ADVISORY SERVICES:  Medical Necessity Denial & Jktj-jg-Xywl Review  Phone: [phone]  Fax: [phone]  Email: [email or web address]     DISCHARGE SUPPORT TEAM:  For Patients Discharge Needs & Updates  Phone: [phone] opt. 2 Fax: [phone]  Email: [email or web address]

## 2024-12-27 NOTE — DISCHARGE SUMMARY
"Discharge Summary - Internal Medicine   Name: Simone Reyes 64 y.o. male I MRN: [id number]  Unit/Bed#: [facility] 924-01 I Date of Admission: 12/24/2024   Date of Service: 12/27/2024 I Hospital Day: 3    Assessment & Plan  Type 2 diabetes mellitus, without long-term current use of insulin ([facility])  Lab Results   Component Value Date    HGBA1C 16.4 (H) 12/26/2024       Recent Labs     12/26/24  1554 12/26/24  2041 12/27/24  0817 12/27/24  1121   POCGLU 88 188* 170* 185*       Blood Sugar Average: Last 72 hrs:  (P) 195.6022745986825665  Assessment and plan:  -At home patient uses 50 units Lantus daily, and is also on metformin  -Has not taken insulin since 4 days ago due to his illness, but states that he was very compliant prior to this  -Will be discharged on the hospital regimen that worked for him which is Lantus 25 units twice daily and lispro 5 units 3 times daily with meals.  Chronic obstructive pulmonary disease ([facility])  Assessment and plan:  -Continue home inhalers  Methadone dependence ([facility])  Assessment and plan:  -Patient states that he gets [facility]adone from [facility]  -Reports fentanyl use approximately 4 days ago through nasal route.    -He states that he takes 150 mg a day, home medications listed as 135 mg a day  -Confirmed with new directions he is on 150 mg, reinitiated.  Will follow-up with [facility] as an outpatient  Congestive heart disease ([facility])  Wt Readings from Last 3 Encounters:   12/26/24 56.6 kg (124 lb 12.5 oz)   11/06/24 61.4 kg (135 lb 6.4 oz)   06/26/24 64 kg (141 lb)     Assessment and plan:  -May 2024 TTE: \"The left ventricular ejection fraction is 55%. Systolic function is low normal. Wall motion is normal. Diastolic function is mildly abnormal, consistent with grade I (abnormal) relaxation.\"  -Patient on 20 mg Lasix oral at home, hold given fluid resuscitation  -Monitor weights daily  -Restarted on discharge    Hyperosmolar hyperglycemic state (HHS) ([facility])  Lab Results " "  Component Value Date    HGBA1C 16.4 (H) 12/26/2024       Recent Labs     12/26/24  1554 12/26/24  2041 12/27/24  0817 12/27/24  1121   POCGLU 88 188* 170* 185*       Blood Sugar Average: Last 72 hrs:  (P) 195.3162833727694041  Resolved  Atrial flutter (HCC)  Assessment and plan:  -Per chart review prior episodes of A-fib and a flutter, not well-documented  -Cardiology evaluation of Zio patch 8/8/2024: Aflutter versus atrial tachycardia  -Getting Eliquis through office outpatient, continue on Eliquis 5 mg BID  H/O open hand wound  Assessment and plan:  -Patient endorses a 1-2 month history of open wound on left forearm.  It initially started on the forearm, but per the patient has since migrated to his hand.  It is not painful but it is pruritic and patient has been picking at it  -Was prescribed mupirocin, which he is using intermittently  -Image added to media.   -Wound care consulted.  -Total of 7-day course of doxycycline to be completed as outpatient  Rash of foot  Assessment and plan:  -Bilateral  -Patient endorses no history of foot wounds  -Podiatry consulted, nails trimmed  -Nystatin cream twice daily, continue as outpatient    Admission Date: 12/24/2024 1839  Discharge Date: 12/27/24  Admitting Diagnosis: Hyperglycemia [R73.9]  Non-healing wound of upper extremity [S41.109A]  Discharge Diagnosis:   Medical Problems       Resolved Problems  Date Reviewed: 3/27/2024   None         HPI: Feels well.  Anxious to see his puppy at home.  Eating and drinking well with good appetite.  Voiding regularly.    Procedures Performed: No orders of the defined types were placed in this encounter.      Summary of Hospital Course:  63 yo male, PMH T2DM, COPD, h/o opioid use on methadone, CHF, Aflutter on eliquis. He presents to the ED due to a few days of general malaise with nausea/ vomiting and high BG. He states that his CGM has been reading \"HI\" for the last few days. He is on insulin glargine 50U hs but was only taking " "it occasionally over the last few days due to not feeling well. He has not taken many of his other medications in the last few days either. On arrival to the ED he is HD stable. Labs significant for , Na 125 (corrected 137), K 4.4, BHB wnl, VBG 7.37/48/22/27.  Hyperglycemia resolved on insulin drip and patient was transitioned to subcu insulin.  Achieve good glucose control without hypoglycemia on Lantus 25 units twice daily and 5 units lispro 3 times daily with meals.  Also started on doxycycline for left dorsal hand open wounds which appear infectious given his MRSA risk factors.  Wounds improved on doxycycline and will be continued outpatient for a total course of 7 days of doxycycline.  Will be discharged home on hospital regimen of insulin.  Follow-up with PCP for TCM and insulin adjustment.    Significant Findings, Care, Treatment and Services Provided: [facility]    Complications: None    Discharge Day Visit / Exam:   /75   Pulse 99   Temp 99.5 °F (37.5 °C)   Resp 16   Ht 5' 8\" (1.727 m)   Wt 56.6 kg (124 lb 12.5 oz)   SpO2 99%   BMI 18.97 kg/m²   Physical Exam  Vitals reviewed.   Constitutional:       General: He is not in acute distress.     Appearance: Normal appearance.   HENT:      Head: Normocephalic and atraumatic.   Eyes:      Extraocular Movements: Extraocular movements intact.   Cardiovascular:      Rate and Rhythm: Normal rate and regular rhythm.      Heart sounds: Murmur heard.      Systolic murmur is present with a grade of 3/6.      Comments: Murmur most prominent at apex towards axilla  Pulmonary:      Effort: Pulmonary effort is normal.      Breath sounds: Normal breath sounds.   Abdominal:      Palpations: Abdomen is soft.   Musculoskeletal:      Right lower leg: No edema.      Left lower leg: No edema.   Skin:     General: Skin is warm and dry.      Capillary Refill: Capillary refill takes less than 2 seconds.      Findings: Wound present. No abscess.      Comments: Left dorsal " hand and dorsum of distal forearm improved from prior.   Neurological:      General: No focal deficit present.      Mental Status: He is alert and oriented to person, place, and time.          Discussion with Family: Patient declined call to .     Condition at Discharge: fair       Discharge instructions/Information to patient and family:   See After Visit Summary (AVS) for information provided to patient and family.      Provisions for Follow-Up Care:  See after visit summary for information related to follow-up care and any pertinent home health orders.      PCP: Nicholas Blanc DO    Disposition: Home    Planned Readmission: No     Discharge Medications:  See after visit summary for reconciled discharge medications provided to patient and family.      Discharge Statement:  I have spent a total time of 34 minutes in caring for this patient on the day of the visit/encounter. >30 minutes of time was spent on: Diagnostic results, Instructions for management, Importance of tx compliance, Counseling / Coordination of care, Documenting in the medical record, and Communicating with other healthcare professionals .

## 2024-12-27 NOTE — DISCHARGE INSTR - AVS FIRST PAGE
You are admitted to the hospital because of very high blood sugars, because you are not taking your insulin as you are feeling unwell.  While you are here your insulin was controlled initially with an insulin drip and you were then transition to subcutaneous insulin.  We have adjusted your insulin regimen while you were here.  Please take this regimen when you get home.    Lantus 25 units twice daily in the morning and before bedtime  Lispro 5 units 3 times a day before meals  When you check your blood sugar before meals if blood sugar is above 300 please take 10 units before meal insulin of 5 units.    Please complete the prescribed course of doxycycline for your hand wound.    Please follow-up at [facility] with your primary care provider.  They will be calling shortly to schedule an appointment.

## 2024-12-27 NOTE — CASE MANAGEMENT
Case Management Discharge Planning Note    Patient name Simone GAMBOA [place]  Location [facility] 924/[facility] 924-01 MRN [id number]  : 1960 Date 2024       Current Admission Date: 2024  Current Admission Diagnosis:Hyperosmolar hyperglycemic state (HHS) ([facility])   Patient Active Problem List    Diagnosis Date Noted Date Diagnosed    Hyperosmolar hyperglycemic state (HHS) ([facility]) 2024     H/O open hand wound 2024     Rash of foot 2024     Mitral regurgitation due to cusp prolapse 2024     Congestive heart disease ([facility]) 2024     Type 2 diabetes mellitus with other specified complication, without long-term current use of insulin ([facility]) 2024     Atrial flutter ([facility]) 2024     Severe protein-calorie malnutrition ([facility]) 2024     Chronic obstructive pulmonary disease ([facility]) 2020     Methadone dependence ([facility]) 2020     Smoker 2020     Tobacco dependence 2019     Other emphysema ([facility]) 2019     Type 2 diabetes mellitus, without long-term current use of insulin ([facility]) 2016       LOS (days): 3  Geometric Mean LOS (GMLOS) (days): 3  Days to GMLOS:0.3     OBJECTIVE:  Risk of Unplanned Readmission Score: 16         Current admission status: Inpatient   [facility] Pharmacy:   [id number] [place] [place] - [address]  [address]  [facility] [place] [address]  Phone: [phone] Fax: [phone]    [facility] [place]  [place], [place] - [address]  [address]  [place] [address]  Phone: [phone] Fax: [phone]    Primary Care Provider: Nicholas Blanc DO    Primary Insurance: [employer] REP  Secondary Insurance:     DISCHARGE DETAILS:                                                    Treatment Team Recommendation: Home  Discharge Destination Plan:: Home  Transport at Discharge : Family     Number/Name of Dispatcher: Roundtrip  Transported by (Company and Unit #): [employer]  Transport (Date): 12/27/24  ETA of Transport (Time): 7429                       Additional Comments: Pt is scheduled to DC home today via [employer]. CM will continue to follow as needed.

## 2024-12-27 NOTE — ASSESSMENT & PLAN NOTE
"Wt Readings from Last 3 Encounters:   12/26/24 56.6 kg (124 lb 12.5 oz)   11/06/24 61.4 kg (135 lb 6.4 oz)   06/26/24 64 kg (141 lb)     Assessment and plan:  -May 2024 TTE: \"The left ventricular ejection fraction is 55%. Systolic function is low normal. Wall motion is normal. Diastolic function is mildly abnormal, consistent with grade I (abnormal) relaxation.\"  -Patient on 20 mg Lasix oral at home, hold given fluid resuscitation  -Monitor weights daily  -Restarted on discharge    "

## 2024-12-27 NOTE — ASSESSMENT & PLAN NOTE
Assessment and plan:  -Bilateral  -Patient endorses no history of foot wounds  -Podiatry consulted, nails trimmed  -Nystatin cream twice daily, continue as outpatient

## 2024-12-27 NOTE — ASSESSMENT & PLAN NOTE
Assessment and plan:  -Patient states that he gets methadone from [facility]  -Reports fentanyl use approximately 4 days ago through nasal route.    -He states that he takes 150 mg a day, home medications listed as 135 mg a day  -Confirmed with [facility] he is on 150 mg, reinitiated.  Will follow-up with [facility] as an outpatient

## 2024-12-27 NOTE — PROGRESS NOTES
"Time spent: 15 minutes      CRS met with patient to offer support with recovery of patient's LUDY. Patient stated he is connected with [facility] for Methadone, and is not in need of anything. Patient was optimistic and said he \"has support\" and appreciated the visit. CRS provided business card with contact information.  "

## 2024-12-30 ENCOUNTER — PATIENT OUTREACH (OUTPATIENT)
Dept: INTERNAL MEDICINE CLINIC | Facility: CLINIC | Age: 64
End: 2024-12-30

## 2024-12-30 ENCOUNTER — TELEPHONE (OUTPATIENT)
Dept: INTERNAL MEDICINE CLINIC | Facility: CLINIC | Age: 64
End: 2024-12-30

## 2024-12-30 NOTE — TELEPHONE ENCOUNTER
----- Message from Sander Cortez DO sent at 12/27/2024 10:55 AM EST -----  Regarding: Hospital follow up  Good afternoon. Patient has followed with clinic in the past and will need a hospital follow up appointment. Thank you.

## 2024-12-30 NOTE — UTILIZATION REVIEW
NOTIFICATION OF ADMISSION DISCHARGE   This is a Notification of Discharge from [facility]. Please be advised that this patient has been discharge from our facility. Below you will find the admission and discharge date and time including the patient’s disposition.   UTILIZATION REVIEW CONTACT:  Sunil James  Utilization   Network Utilization Review Department  Phone: [phone] x carefully listen to the prompts. All voicemails are confidential.  Email: [email or web address]     ADMISSION INFORMATION  PRESENTATION DATE: 12/24/2024  6:39 PM  OBERVATION ADMISSION DATE: 12/24/2024 2024  INPATIENT ADMISSION DATE: 12/24/24  9:22 PM   DISCHARGE DATE: 12/27/2024  2:43 PM   DISPOSITION:Home/Self Care    Network Utilization Review Department  ATTENTION: Please call with any questions or concerns to [phone] and carefully listen to the prompts so that you are directed to the right person. All voicemails are confidential.   For Discharge needs, contact Care Management DC Support Team at [phone] opt. 2  Send all requests for admission clinical reviews, approved or denied determinations and any other requests to dedicated fax number below belonging to the campus where the patient is receiving treatment. List of dedicated fax numbers for the Facilities:  FACILITY NAME UR FAX NUMBER   ADMISSION DENIALS (Administrative/Medical Necessity) [phone]   DISCHARGE SUPPORT TEAM ([facility]) [phone]   PARENT CHILD HEALTH (Maternity/NICU/Pediatrics) [phone]   [facility] [phone]   [facility] [phone]   [facility] [phone]   [facility] [phone]   [facility] [phone]   [facility] [phone]   [facility] [phone]   [facility]  [phone]   [facility] [phone]   [facility] [phone]   [facility] [phone]   [facility] [phone]

## 2024-12-30 NOTE — PROGRESS NOTES
Outpatient Care Management Note:    Re:  hospital follow up call     Received ADT alert and chart reviewed. Patient was at [facility] from 12/24-12/27/24 for hyperosmolar hyperglycemic state (HHS). Patient discharged on Lantus 25 units twice a day and Lispro 5 units three times a day. A1C on 12/26/24 was 16.4%. Patient has wound on left hand and forearm. Patient prescribed doxycycline. Patient discharged home and to follow up with PCP. Patient referred to wound care and patient referred to podiatry (patient to follow up in 3 months with podiatry).     Per AVS instructions:     When you check your blood sugar before meals if blood sugar is above 300 please take 10 units before meal insulin of 5 units.     Wound care instruction:   -Left posterior hand- Cleanse wounds with NSS, pat dry. Apply Dermagran over wound beds, cover with ABD pad, wrap with Rere. Change every other day and as needed for soilage/ displacement.     -Cleanse sacro-buttocks with soap and water. Apply Melgisorb/Calcium Alginate over wound bed and cover with Silicone bordered foam. Simone with T for treatment. Change every other day and PRN.

## 2024-12-31 ENCOUNTER — PATIENT OUTREACH (OUTPATIENT)
Dept: INTERNAL MEDICINE CLINIC | Facility: CLINIC | Age: 64
End: 2024-12-31

## 2024-12-31 ENCOUNTER — TRANSITIONAL CARE MANAGEMENT (OUTPATIENT)
Dept: INTERNAL MEDICINE CLINIC | Facility: CLINIC | Age: 64
End: 2024-12-31

## 2024-12-31 NOTE — PROGRESS NOTES
Outpatient Care Management Note:    Re:  hospital follow up call      Patient was at [facility] from 12/24-12/27/24 for hyperosmolar hyperglycemic state (HHS). Patient discharged on Lantus 25 units twice a day and Lispro 5 units three times a day. A1C on 12/26/24 was 16.4%. Patient has wound on left hand and forearm. Patient prescribed doxycycline. Patient discharged home and to follow up with PCP. Patient referred to wound care and patient referred to podiatry (patient to follow up in 3 months with podiatry).      Per AVS instructions:      When you check your blood sugar before meals if blood sugar is above 300 please take 10 units before meal insulin of 5 units.      Wound care instruction:   -Left posterior hand- Cleanse wounds with NSS, pat dry. Apply Dermagran over wound beds, cover with ABD pad, wrap with Rere. Change every other day and as needed for soilage/ displacement.      -Cleanse sacro-buttocks with soap and water. Apply Melgisorb/Calcium Alginate over wound bed and cover with Silicone bordered foam. Change every other day and PRN.       I called and spoke with patient. I explained my role and reason for outreach. He reports he is feeling much better and denies any complaints at this time. He shares the wound on his hand has improved greatly and confirms taking antibiotic as prescribed Doxycycline every 12 hours until completed. He reports keeping area clean and using mupirocin on area. Reviewed to keep area covered. Reviewed was referred to wound center for wound and wound on sacrum. Patient reports he does not feel he needs to go to wound care and wounds are healing and reports wound on sacrum was from him laying on his back to long when he was sick and his belt rubbing on the area.     Patient reports he is taking Lispro 5 units with meals unless his blood sugar is over 300 he takes 10 units. Patient reports taking Lantus 25 units twice day. Patient is using Dexcom G7 to check  blood sugars and has sensor in place. Patient reports blood sugar this morning was 173. Patient reports he wants to get his blood sugars under better control and take his medications as prescribed and wants to follow up with PCP office but does not have transportation at this time.     I was reviewing with patient about transportation options and will make referral to . I was discussing upcoming available appointments with patient and he then stated he needed to call me back later as his ride just got there and call disconnected.     Will plan to set up a Lyft ride to hospital follow up appointment and then have  discuss further transportation options. Patient reports he drives but no longer has a vehicle and will be trying to get another vehicle in near future.     Patient does not have any further questions, concerns, or other needs at this time. Patient has my contact number [phone] and PCP office number [phone] if needed. Patient is agreeable for further outreach.

## 2025-01-02 ENCOUNTER — PATIENT OUTREACH (OUTPATIENT)
Dept: INTERNAL MEDICINE CLINIC | Facility: CLINIC | Age: 65
End: 2025-01-02

## 2025-01-02 DIAGNOSIS — Z78.9 NEEDS ASSISTANCE WITH COMMUNITY RESOURCES: Primary | ICD-10-CM

## 2025-01-02 NOTE — PROGRESS NOTES
"Outpatient Care Management Note:    Re:  follow up call/need for PCP appointment    I called and spoke with patient as I did not hear back from him. I reviewed the need to follow up with primary care office being that he was in the hospital for his diabetes. Patient without transportation at this time and will make referral to . Will setup Lyft ride at this time and aware will need alternative transportation plan for future appointments. Will place Lyft waiver at  for patient to sign when he arrives for appointment.     Patient reports feeling better and only complaint is his legs feel \"tight.\" Patient denies swelling. Confirmed he is taking his Lasix 20 mg daily. Reviewed and encouraged to follow fluid restriction of 2,000 ml and sodium restriction of 2,000 mg. Encouraged to weigh himself daily and reviewed importance of this with his heart failure and when to call provider. Reviewed to call with weight gain of 3 lbs overnight or 5 lbs in a week, or notices swelling and/or increased shortness of breath. Patient does not have a scale at this time but reports will work on obtaining one.     Patient did not check his blood sugar yet today. Encouraged to look at CGM reader throughout the day especially first thing in the morning, before meals and at bedtime.     Confirms has insulin and taking as prescribed. Patient reports he is taking Lispro 5 units with meals unless his blood sugar is over 300 he takes 10 units. Patient reports taking Lantus 25 units twice day.     Patient reports his wound on his left hand/arm is healing and does not feel he needs to schedule with wound care. Will have provider evaluated further at PCP appointment.     Patient scheduled with Dr. Blanc on Tuesday 1/7 @ 1 pm. I did offer appointment for Friday 1/3 but declined. Patient preferred afternoon appointment.     Patient does not have any further questions, concerns, or other needs at this time. Patient has my contact " number [phone] and PCP office number [phone] if needed. Patient is agreeable for further outreach.

## 2025-01-07 ENCOUNTER — PATIENT OUTREACH (OUTPATIENT)
Dept: INTERNAL MEDICINE CLINIC | Facility: CLINIC | Age: 65
End: 2025-01-07

## 2025-01-07 NOTE — PROGRESS NOTES
"Outpatient Care Management Note:    Re:  follow up    Per chart review patient canceled appointment for today 1/7 @ 1 pm and rescheduled to 1/21. I called and spoke with patient and he needs to take the bus to the methadone clinic today and bus does not come until 11 am and will not be back in time. I explained to patient importance of following up with primary care office routinely and especially after a hospital stay. Patient agreeable to coming into PCP office tomorrow 1/8 @ 1 pm with Dr. Rodriguez. I will arrange lyft ride for tomorrow and cancel one for today. Patient plans on taking earlier bus tomorrow to methadone clinic so he is back in time.     Reminded him to bring CGM reader with him and all of his medications to the appointment. Patient denies any complaints at this time.     Patient shares he lost his \"full coverage\" and now has to pay more for his prescriptions. Reports Eliquis is $45. Unsure if he lost his medical assistance or was due to reapply. I did review it is a new year and may have a deductible he needs to meet. Suggested he call his insurance. Will request for  to further follow up with patient tomorrow along with transportation options.     I did call MA eligibility line at [phone] and patient is not eligible for MA as of today's date 1/7/25.    Patient does not have any further questions, concerns, or other needs at this time. Patient has my contact number [phone] and PCP office number [phone] if needed. Patient is agreeable for further outreach.     "

## 2025-01-07 NOTE — PROGRESS NOTES
Malorie BENDER/CM has reviewed case with SW .  She shares the pt is stating the suddenly his medications are more expensive.  Pt has been in a dueal Medicare plan [employer].  It appears his MA has .   SW has reached out to pt to suggest he plan to meet with out [employer] [facility] tomorrow before or after his appointment.  Pt agrees to same.    SW has offered to review and assist with transportation options with him.    Malorie BENDER has assisted with a Lyft ride to get pt to his appointment tomorrow.    SW to f/u and assist as indicated.

## 2025-01-08 ENCOUNTER — OFFICE VISIT (OUTPATIENT)
Dept: INTERNAL MEDICINE CLINIC | Facility: CLINIC | Age: 65
End: 2025-01-08

## 2025-01-08 ENCOUNTER — PATIENT OUTREACH (OUTPATIENT)
Dept: INTERNAL MEDICINE CLINIC | Facility: CLINIC | Age: 65
End: 2025-01-08

## 2025-01-08 VITALS
SYSTOLIC BLOOD PRESSURE: 106 MMHG | HEART RATE: 91 BPM | WEIGHT: 140.4 LBS | DIASTOLIC BLOOD PRESSURE: 62 MMHG | TEMPERATURE: 97.3 F | BODY MASS INDEX: 21.35 KG/M2

## 2025-01-08 DIAGNOSIS — Z76.89 ENCOUNTER FOR SUPPORT AND COORDINATION OF TRANSITION OF CARE: Primary | ICD-10-CM

## 2025-01-08 DIAGNOSIS — D50.9 IRON DEFICIENCY ANEMIA, UNSPECIFIED IRON DEFICIENCY ANEMIA TYPE: ICD-10-CM

## 2025-01-08 DIAGNOSIS — I50.32 CHRONIC DIASTOLIC HEART FAILURE (HCC): ICD-10-CM

## 2025-01-08 DIAGNOSIS — Z78.9 NEEDS ASSISTANCE WITH COMMUNITY RESOURCES: Primary | ICD-10-CM

## 2025-01-08 DIAGNOSIS — S41.102A: ICD-10-CM

## 2025-01-08 DIAGNOSIS — J44.9 CHRONIC OBSTRUCTIVE PULMONARY DISEASE, UNSPECIFIED COPD TYPE (HCC): ICD-10-CM

## 2025-01-08 DIAGNOSIS — B35.3 TINEA PEDIS OF BOTH FEET: ICD-10-CM

## 2025-01-08 DIAGNOSIS — E11.8 TYPE 2 DIABETES MELLITUS WITH COMPLICATION (HCC): ICD-10-CM

## 2025-01-08 PROCEDURE — 99495 TRANSJ CARE MGMT MOD F2F 14D: CPT | Performed by: INTERNAL MEDICINE

## 2025-01-08 PROCEDURE — 99214 OFFICE O/P EST MOD 30 MIN: CPT | Performed by: INTERNAL MEDICINE

## 2025-01-08 RX ORDER — FUROSEMIDE 20 MG/1
40 TABLET ORAL DAILY
Qty: 30 TABLET | Refills: 3 | Status: SHIPPED | OUTPATIENT
Start: 2025-01-08

## 2025-01-08 RX ORDER — FERROUS SULFATE 324(65)MG
324 TABLET, DELAYED RELEASE (ENTERIC COATED) ORAL EVERY OTHER DAY
Qty: 45 TABLET | Refills: 1 | Status: SHIPPED | OUTPATIENT
Start: 2025-01-08

## 2025-01-08 RX ORDER — INSULIN LISPRO 100 [IU]/ML
10 INJECTION, SOLUTION INTRAVENOUS; SUBCUTANEOUS
Qty: 9 ML | Refills: 0 | Status: SHIPPED | OUTPATIENT
Start: 2025-01-08

## 2025-01-08 RX ORDER — LANCETS 33 GAUGE
EACH MISCELLANEOUS
Qty: 100 EACH | Refills: 5 | Status: SHIPPED | OUTPATIENT
Start: 2025-01-08

## 2025-01-08 RX ORDER — FLUTICASONE PROPIONATE AND SALMETEROL 50; 250 UG/1; UG/1
1 POWDER RESPIRATORY (INHALATION) 2 TIMES DAILY
Qty: 60 BLISTER | Refills: 5 | Status: SHIPPED | OUTPATIENT
Start: 2025-01-08

## 2025-01-08 RX ORDER — PRENATAL VIT 91/IRON/FOLIC/DHA 28-975-200
COMBINATION PACKAGE (EA) ORAL 2 TIMES DAILY
Qty: 15 G | Refills: 1 | Status: SHIPPED | OUTPATIENT
Start: 2025-01-08

## 2025-01-08 RX ORDER — INSULIN GLARGINE 100 [IU]/ML
30 INJECTION, SOLUTION SUBCUTANEOUS 2 TIMES DAILY
Qty: 18 ML | Refills: 0 | Status: SHIPPED | OUTPATIENT
Start: 2025-01-08 | End: 2025-02-07
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
## 2025-01-08 NOTE — PROGRESS NOTES
SW has met with pt this date to f/u re transportation needs.    SW has assited pt with completing a [employer] age 65 plus application as well as a [employer] Reduced fare application.  Pt does appreciate the door to door van service.  Pt has also signed the [facility] Free Ride Lyft Waiver Form as pt received a free ride her this date.    SW hs also brought pt to our [employer] [facility] Office to help with MA application.    Pt has met  met with Tayo and he relates the pt is OVER income for MA and all of their Programs.    He further relates pt lost this coverage a year ago,.  Pt is in a Medicare replacement plan  [employer] Medicare PPO.  Pt notes higher co-pays for his medications and has some [facility] bills.    SW has reviewed he needs to be sure to pay his Medicare B and D premiums to keep coverage.  SW has advised pt he can f/u with the [facility] re their assistance programs.  Pt can also sign up for the PACE/NET Program to help reduce medication cost.    SW to ask [facility] to submit pt's [employer] application which SW completed and gave to [facility].    SW to also  ask [facility] to help pt with his PACE/NET application and referral for hospital assistance if needed.  In addition, pt interested in [employer] referral.  Sw to ask [facility] to assist pt wit [facility] info.    Pt shares he resides in the home of his X partner. They get along well and pt independent in his ADLS    Pt shares he is active with [facility].  He share they provide counseling to him as well.  He denies need of other service at present.     SRAVANTHI has reviewed case with Malorie BENDER/CM as well.    CM Team to remain available to assist as indicated.

## 2025-01-08 NOTE — PROGRESS NOTES
Transition of Care Visit  Name: Simone Reyes      : 1960      MRN: [id number]  Encounter Provider: Cat Rodriguez MD  Encounter Date: 2025   Encounter department: [facility]    Assessment & Plan  Encounter for support and coordination of transition of care  Patient was hospitalized on  and discharged on .  He was admitted for hyperglycemia with BG in 700s.  He was controlled on insulin drip prior to being transition back to basal bolus regimen.  Patient was discharged on Lantus 25 units twice daily and lispro 5 units 3 times daily with meals with sliding scale.      See plan below.        Type 2 diabetes mellitus with complication (HCC)    Lab Results   Component Value Date    HGBA1C 16.4 (H) 2024     Patient admitted for hyperglycemia with BG in 700s.  He was controlled on insulin drip prior to being transition back to basal bolus regimen.  Patient was discharged on Lantus 25 units twice daily and lispro 5 units 3 times daily with meals with sliding scale.      Per CGM data, patient has consistently been hyperglycemic since discharge from hospital.  Data shows he is only been within goal range 20% of the time over the past 14 days.  He has not missed any of his insulin doses other than last night. He denies any episodes of hypoglycemia, dizziness, lightheadedness.  While hospitalized, patient states his fingersticks were 20-30 off from his CGM.  He would like to have fingerstick monitor as well as CGM.    Plan:  -Increase Lantus to 30 mg BID and Lispro 10 units TID with meals  -Patient requested fingerstick monitor in addition to CGM, will order  -Patient to follow-up with podiatry for regular foot exams  -Counseled on lifestyle modifications to improve BG   -Provided return and ED precautions  -Return in 3-4 weeks for follow-up on diabetes    Orders:    Albumin / creatinine urine ratio; Future    Insulin Glargine Solostar (Lantus SoloStar) 100  UNIT/ML SOPN; Inject 0.3 mL (30 Units total) under the skin 2 (two) times a day    insulin lispro (HumALOG/ADMELOG) 100 units/mL injection; Inject 10 Units under the skin 3 (three) times a day with meals    Blood Glucose Monitoring Suppl KIT; Use 1 each as needed (Monitor blood glucose)    Lancets (OneTouch Delica Plus [id number]) MISC; Use to check blood sugar 4 times per day    glucose blood test strip; Test sugar 4 times per day    Chronic diastolic heart failure (HCC)  Wt Readings from Last 3 Encounters:   01/08/25 63.7 kg (140 lb 6.4 oz)   12/26/24 56.6 kg (124 lb 12.5 oz)   11/06/24 61.4 kg (135 lb 6.4 oz)     Most recent echo in May 2024 showed EF of 55%.  Patient was ordered a repeat echo per cardiology but has yet to complete.  Patient also has MR which also needs to be reevaluated.  He is amenable to schedule this today.     PE: appears volume overloaded on exam. Bilateral LE edema, +JVD, notable weight gain of 16 lbs since 12/24    Plan:   -Will increase Lasix to 40 mg daily   -Schedule repeat echo as soon as possible  -Advised patient to follow-up in 3-4 weeks    Orders:    furosemide (LASIX) 20 mg tablet; Take 2 tablets (40 mg total) by mouth daily    Basic metabolic panel; Future    Chronic obstructive pulmonary disease, unspecified COPD type ([facility])  Refill inhaler.     Orders:    Advair Diskus 250-50 MCG/ACT inhaler; Inhale 1 puff 2 (two) times a day Rinse mouth after use.    Tinea pedis of both feet  Patient had a dry, flaky rash of both of his feet.  He was started on nystatin with some improvement. May need to transition to cover dermatophytes    PE: bilateral feet and ankles appear dry, flaky    Plan:  -Discontinue nystatin cream  -Start terbinafine cream   -Encouraged to follow-up with podiatry outpatient considering uncontrolled diabetes    Orders:    terbinafine (LamISIL) 1 % cream; Apply topically 2 (two) times a day    Iron deficiency anemia, unspecified iron deficiency anemia type  Lab Results    Component Value Date/Time    CONCFE 13 (L) 12/25/2024 05:32 AM      Patient found to be iron deficient while hospitalized with unclear etiology.  Denies blood in his stool or any other overt signs of bleeding.  Would benefit from GI referral and colonoscopy/EGD for further evaluation.    Plan:  -Start iron supplementation  -Referral to GI for colonoscopy/EGD    Orders:    ferrous sulfate 324 (65 Fe) mg; Take 1 tablet (324 mg total) by mouth every other day    Ambulatory Referral to Gastroenterology; Future    Wounds, multiple open, arm, left, initial encounter  During his hospitalization, he was also treated with a 7-day course of doxycycline for a chronic left hand wound. Has improved since hospital stay, but still present on his left hand and his knuckles of his right hand.  Patient states his dog tends to lick his wounds but tries to keep them wrapped at home to avoid this.  He is still using the mupirocin.    PE: no pus, drainage, or fluctuance on exam, appears to be clean and dry, with intermittent scabbing. Appears patient picks at wounds with some blood.    Plan:  -Continue use of mupirocin on bilateral hand wounds  -Advised to avoid dog licking his wounds or picking at them, keep wrapped if needed   -Does not appear infected on exam today, monitor off Abx  -Can consider wound care if worsens         Depression Screening and Follow-up Plan: Patient was screened for depression during today's encounter. They screened negative with a PHQ-2 score of 0.      History of Present Illness       Transitional Care Management Review:   Simone Reyes is a 64 y.o. male here for TCM follow up.     Mr. Simone Reyes is a 64-year-old male with PMHx of T2DM with A1c 16.4 with CGM, MR, CHF, COPD, h/o opioid use on methadone, Aflutter on Eliquis who presents today for TCM visit.  Patient was hospitalized on 12/24 and discharged on 12/27.  He was admitted for hyperglycemia with BG in 700s.  He was controlled on insulin drip prior to  being transition back to basal bolus regimen.  Patient was discharged on Lantus 25 units twice daily and lispro 5 units 3 times daily with meals with sliding scale.  During his hospitalization, he was also treated with a 7-day course of doxycycline for a chronic left hand wound and seen by podiatry for bilateral rash on his feet.  His Lasix was held during hospitalization due to fluid resuscitation but restarted at discharge. Since hospitalization, patient has been taking his medications as prescribed and has been feeling well.  Patient has been having difficulty with his insurance and medical assistance, stating his medications have been more expensive.  Patient also has difficulty with transportation and required assistance to get to his appointment today. He will be seen today by social work and financial counselors to discuss these matters.     Per CGM data, patient has consistently been hyperglycemic since discharge from hospital.  Data shows he is only been within goal range 20% of the time over the past 14 days.  He has not missed any of his insulin doses other than last night.  His hand wound has improved since hospital stay, but still present on his left hand and his knuckles of his right hand.  Patient states his dog tends to lick his wounds but tries to keep them wrapped at home to avoid this.  He is still using the mupirocin.  Regarding his feet, they are still dry and flaky but patient denies any pruritus or irritation.  He also endorses bilateral leg swelling and has been taking his Lasix 20 mg daily.  Notably, patient had a 16 pound weight gain from 124 pounds to 140 pounds.  He endorses dyspnea on exertion when walking more than 1-2 blocks.    Per chart review, patient has a history of mitral regurgitation and CHF.  Most recent echo in May 2024 showed EF of 55%.  Patient was ordered a repeat echo per cardiology but has yet to complete.  He is amenable to schedule this today.  Lastly, patient had an  iron panel completed well and patient and iron saturation was 13.  Patient was previously ordered a colonoscopy but has not completed.  Spoke with patient and he is amenable to scheduling EGD/colonoscopy for further evaluation.  Will place new referral.  Otherwise no additional complaints or concerns at this time.        During the TCM phone call patient stated:  TCM Call       Date and time call was made  1/2/2025 10:57 AM    Hospital care reviewed  Records reviewed    Patient was hospitial[facility] at  [facility]    Date of Admission  12/24/24    Date of discharge  12/27/24    Diagnosis  Hyperosmolar hyperglycemic state (HHS) (HCC) E11.00    Disposition  Home    Were the patients medications reviewed and updated  Yes    Current Symptoms  None          TCM Call       Should patient be enrolled in anticoag monitoring?  Yes    Scheduled for follow up?  Yes    Did you obtain your prescribed medications  Yes    Do you need help managing your prescriptions or medications  No    Is transportation to your appointment needed  No    I have advised the patient to call PCP with any new or worsening symptoms  DEYSI ZHU MA    Have you fallen in the last 12 months  No    Interperter language line needed  No    Counseling  Patient    Counseling topics  instructions for management; Importance of RX compliance          Review of Systems   Constitutional:  Positive for appetite change and unexpected weight change. Negative for chills, diaphoresis, fatigue and fever.   HENT:  Negative for congestion, sore throat and trouble swallowing.    Respiratory:  Negative for cough, chest tightness, shortness of breath and wheezing.    Cardiovascular:  Positive for leg swelling. Negative for chest pain and palpitations.   Gastrointestinal:  Positive for constipation. Negative for abdominal distention, abdominal pain, anal bleeding, blood in stool, diarrhea, nausea and vomiting.   Endocrine: Positive for polyuria.   Genitourinary:   Negative for difficulty urinating and hematuria.   Musculoskeletal:  Negative for arthralgias and back pain.   Skin:  Positive for wound.   Neurological:  Negative for dizziness, syncope, light-headedness and headaches.   All other systems reviewed and are negative.    Objective   /62 (BP Location: Left arm, Patient Position: Sitting, Cuff Size: Adult)   Pulse 91   Temp (!) 97.3 °F (36.3 °C) (Temporal)   Wt 63.7 kg (140 lb 6.4 oz)   BMI 21.35 kg/m²     Physical Exam  Vitals and nursing note reviewed.   Constitutional:       General: He is not in acute distress.     Appearance: He is not ill-appearing or diaphoretic.   HENT:      Head: Normocephalic and atraumatic.      Nose: No congestion.      Mouth/Throat:      Pharynx: Oropharynx is clear.   Eyes:      General: No scleral icterus.     Conjunctiva/sclera: Conjunctivae normal.   Neck:      Vascular: Hepatojugular reflux and JVD present.   Cardiovascular:      Rate and Rhythm: Normal rate and regular rhythm.      Pulses: Normal pulses.      Heart sounds: Murmur heard.   Pulmonary:      Effort: Pulmonary effort is normal. No respiratory distress.      Breath sounds: Normal breath sounds. No wheezing.   Abdominal:      General: Abdomen is flat. Bowel sounds are normal. There is no distension.      Palpations: Abdomen is soft.      Tenderness: There is no abdominal tenderness.   Musculoskeletal:         General: Normal range of motion.      Cervical back: Normal range of motion.      Right lower leg: Edema present.      Left lower leg: Edema present.   Skin:     General: Skin is warm.      Capillary Refill: Capillary refill takes less than 2 seconds.      Findings: Wound present.   Neurological:      Mental Status: He is alert. Mental status is at baseline.   Psychiatric:         Mood and Affect: Mood normal.         Behavior: Behavior normal.       Medications have been reviewed by provider in current encounter    Administrative Statements   I have spent a  total time of 60 minutes in caring for this patient on the day of the visit/encounter including Diagnostic results, Risks and benefits of tx options, Instructions for management, Patient and family education, Importance of tx compliance, Risk factor reductions, Impressions, Counseling / Coordination of care, Documenting in the medical record, Reviewing / ordering tests, medicine, procedures  , and Communicating with other healthcare professionals .

## 2025-01-08 NOTE — PATIENT INSTRUCTIONS
- Complete lab work   - Schedule and complete heart echo   - Call GI doctors for EGD/colonoscopy for low iron   - Increase insulin to 30 units twice daily, 10 units with meals   - Increase Lasix to 40 mg daily   - Can use terbinafine cream on feet  -Follow-up in 4 weeks on Wednesday

## 2025-01-08 NOTE — ASSESSMENT & PLAN NOTE
Refill inhaler.     Orders:    Advair Diskus 250-50 MCG/ACT inhaler; Inhale 1 puff 2 (two) times a day Rinse mouth after use.

## 2025-01-08 NOTE — PROGRESS NOTES
Shelby with [employer]   01/08/2025    [facility] received referral from [employer] to assist patient with [employer] and [employer] reduce bus fare applications. Assist with [employer] Net Application as well as assistance with [employer].     [employer]:     Application has been e-mail to [employer] today.     [employer] Transit ID Card Application e-mail to [employer].     Next outreach is schedule for 01/15/2025.

## 2025-01-10 DIAGNOSIS — J44.9 CHRONIC OBSTRUCTIVE PULMONARY DISEASE, UNSPECIFIED COPD TYPE (HCC): ICD-10-CM

## 2025-01-10 DIAGNOSIS — R06.02 SHORTNESS OF BREATH: ICD-10-CM

## 2025-01-13 RX ORDER — ALBUTEROL SULFATE 90 UG/1
INHALANT RESPIRATORY (INHALATION)
Qty: 18 G | Refills: 3 | Status: SHIPPED | OUTPATIENT
Start: 2025-01-13

## 2025-01-15 ENCOUNTER — PATIENT OUTREACH (OUTPATIENT)
Dept: INTERNAL MEDICINE CLINIC | Facility: CLINIC | Age: 65
End: 2025-01-15

## 2025-01-15 NOTE — PROGRESS NOTES
Outgoing call   01/15/2025    [employer]:   Pt has been approved for [facility] [facility] services. Pt has to pay for medical trips.     Zac Johnson pt was approved for Transit ID Card application. The card was mailed out to patient on 01/09/25.     [facility] left message for patient to return call at [phone].

## 2025-01-16 NOTE — PROGRESS NOTES
Spoke with patient per Marybeth. Made patient aware of message. Told him Malorie was going to reach out to help with future appts getting rides set up.    He did have questions if he could use some card to help pay for the rides. I was not sure what he was talking about.

## 2025-01-21 ENCOUNTER — PATIENT OUTREACH (OUTPATIENT)
Dept: INTERNAL MEDICINE CLINIC | Facility: CLINIC | Age: 65
End: 2025-01-21

## 2025-01-21 NOTE — PROGRESS NOTES
Outpatient Care Management Note:    Re:  follow up call - diabetes / need to schedule echo and GI (colonoscopy/EGD)    I called patient and no answer. Message left with my role and reason for outreach and requested a call back and left my contact number and PCP office number.     Calling to provide further diabetic education  Calling to see how he is managing with taking insulin  Obtain blood sugars   Reviewed need for echo and GI follow up for colonoscopy and EGD  Needs podiatry appointment       [facility] assisted with [employer] application and patient is approved but does need to pay for medical trips.

## 2025-01-23 ENCOUNTER — PATIENT OUTREACH (OUTPATIENT)
Dept: INTERNAL MEDICINE CLINIC | Facility: CLINIC | Age: 65
End: 2025-01-23

## 2025-01-23 DIAGNOSIS — E11.69 TYPE 2 DIABETES MELLITUS WITH OTHER SPECIFIED COMPLICATION, WITHOUT LONG-TERM CURRENT USE OF INSULIN (HCC): Primary | ICD-10-CM

## 2025-01-23 RX ORDER — GABAPENTIN 300 MG/1
300 CAPSULE ORAL 2 TIMES DAILY
Qty: 60 CAPSULE | Refills: 0 | Status: SHIPPED | OUTPATIENT
Start: 2025-01-23

## 2025-01-23 NOTE — PROGRESS NOTES
"Outpatient Care Management Note:    Re:  follow up     Received forwarded voicemail today from clinical team that patient called requesting to speak with me yesterday. I returned call to patient. He reports feeling ok at this time. Denies any symptoms or complaints at this time.     Patient requesting to know more info regarding [employer]. I was able to answer patient's questions. He confirms he has number to call to schedule trip when needed. Aware will need to pay for trips. Aware can do more than 1 trip in 1 day if needed.     Patient requesting refill on Gabapentin. Reports has 100 mg tablets and taking 300 mg (three 100 mg tablets)  twice a day. Patient using [facility]. Patient would like 90 day supply sent as well as on his other medications for future. Will send to PCP to address.     Patient reports he received One Touch Verio test strips and lancets but no glucometer. I called pharmacy and they are stating One Touch Verio glucometer needs prior auth. Will send to clinical team to further address. Patient has Dexcom G7 sensor in place. Blood sugar when I spoke with him was 254 at 3 pm. Unsure of blood sugar this morning. Reports had 2 eggs and hashbrowns for lunch and did not take his meal time insulin. Patient reports he is taking Lantus 30 units twice a day. Reports taking Lispro 10 units if blood sugar is over 300 and sometimes 5 units if blood sugar is \"normal.\" I reviewed with patient how each insulin works and at last visit meal time insulin (Lispro) was increased to 10 units with breakfast , lunch, and dinner. Instructed to call with blood sugars less than 70 or with blood sugars greater than 300. He voiced understanding.     Reviewed need for echo, GI follow up and podiatry. Agreeable to scheduling echo and prefers to call and schedule. He has central scheduling number [phone]. He plans to use [employer].     Patient requesting if he would be a candidate for Cologuard. I did make him aware I " will have PCP further advise. I did review this screens for colon cancer and was referred to GI for iron deficiency.     Reviewed upcoming PCP appointment on 2/5 @ 3 pm and to bring CGM reader with him. Encouraged to keep and attend appointment.     Patient reports wounds on hand/forearm are still healing. Reports they do area more red the past few days. Strongly encouraged sooner follow up if redness worsens, starts with drainage, swelling, and/or fevers. Encouraged to keep area clean and dry and bandage over area if area at risk for getting dirty. He voiced understanding.     Patient does not have any further questions, concerns, or other needs at this time. Patient has my contact number [phone] and PCP office number [phone] if needed. Patient is agreeable for further outreach.

## 2025-01-24 ENCOUNTER — PATIENT OUTREACH (OUTPATIENT)
Dept: INTERNAL MEDICINE CLINIC | Facility: CLINIC | Age: 65
End: 2025-01-24

## 2025-01-24 DIAGNOSIS — E11.8 TYPE 2 DIABETES MELLITUS WITH COMPLICATION (HCC): Primary | ICD-10-CM

## 2025-01-24 RX ORDER — GABAPENTIN 300 MG/1
300 CAPSULE ORAL 2 TIMES DAILY
Qty: 180 CAPSULE | Refills: 3 | Status: SHIPPED | OUTPATIENT
Start: 2025-01-24

## 2025-01-24 NOTE — PROGRESS NOTES
Please see patient outreach note from 1/24/25. Left message for patient regarding Gabapentin and encouraged follow up with GI.

## 2025-01-24 NOTE — PROGRESS NOTES
Outpatient Care Management Note:    Re:  follow up call - gabapentin and colon cancer screening    Received [employer] message from Dr. Wall that script for gabapentin 300 mg twice a day was sent to pharmacy.   Per patient's request for cologuard Dr. Wall advising that he should not use cologuard for colon cancer screning due to his iron deficiency and that he follow up with GI as previously recommended and they will likely recommend EGD & colonoscopy.    I called patient and no answer. I left message that gabapentin was sent to pharmacy and to be aware that 1 pill is 300 mg and that he only needs to take 1 pill in the morning and 1 pill in the evening. I left other info above for patient and encouraged he schedule follow up with GI. I left my contact number and PCP office number if needed.

## 2025-01-24 NOTE — PROGRESS NOTES
Assessment/Plan:    No problem-specific Assessment & Plan notes found for this encounter.             Subjective:      Patient ID: Simone Reyes is a 64 y.o. male.    HPI    The following portions of the patient's history were reviewed and updated as appropriate: allergies, current medications, past family history, past medical history, past social history, past surgical history, and problem list.    Review of Systems      Objective:      There were no vitals taken for this visit.         Physical Exam

## 2025-01-29 ENCOUNTER — TELEPHONE (OUTPATIENT)
Dept: INTERNAL MEDICINE CLINIC | Facility: CLINIC | Age: 65
End: 2025-01-29

## 2025-01-29 NOTE — TELEPHONE ENCOUNTER
Attempted multiple times to reach out to patient's [facility] insurance at  and . Insurance company does not answer the phone or transfers to wrong department to discuss prior authorization for patient's One Touch Verio Flex Glucometer.     Referral sent to [facility] via parachute for Glucometer. Pending review.     Patient's prescription coverage information:     ID-[id number]  BIN: [id number]  PCN: [facility]  Grp: [id number]    Nurse did speak with a Hashima with [employer] stating that patient's plan will cover 1 unit every 720 day period.

## 2025-01-30 LAB
DME PARACHUTE DELIVERY DATE ACTUAL: NORMAL
DME PARACHUTE DELIVERY DATE REQUESTED: NORMAL
DME PARACHUTE ITEM DESCRIPTION: NORMAL
DME PARACHUTE ORDER STATUS: NORMAL
DME PARACHUTE SUPPLIER NAME: NORMAL
DME PARACHUTE SUPPLIER PHONE: NORMAL

## 2025-01-30 NOTE — TELEPHONE ENCOUNTER
[facility] and will be delivered to patient's home today 1/30/25 before end of day.     Attempted to reach patient on the phone. Left detailed voice message regarding patient's Glucometer and shipment to home. Office number provided for any follow up questions/concerns.

## 2025-02-03 ENCOUNTER — PATIENT OUTREACH (OUTPATIENT)
Dept: INTERNAL MEDICINE CLINIC | Facility: CLINIC | Age: 65
End: 2025-02-03

## 2025-02-03 NOTE — PROGRESS NOTES
Outgoing call   02/03/2025    [facility] left message for patient to return call at [phone].     Re: [employer] Application     Next outreach is schedule for 02/05/2025.

## 2025-02-06 ENCOUNTER — PATIENT OUTREACH (OUTPATIENT)
Dept: INTERNAL MEDICINE CLINIC | Facility: CLINIC | Age: 65
End: 2025-02-06

## 2025-02-06 NOTE — PROGRESS NOTES
Outgoing call   02/06/2025    [facility] left message for patient to return call at [phone].      Re: Pace/Pacenet Application      CM will close the case at this time due to a lack of response from the patient

## 2025-02-07 ENCOUNTER — PATIENT OUTREACH (OUTPATIENT)
Dept: INTERNAL MEDICINE CLINIC | Facility: CLINIC | Age: 65
End: 2025-02-07

## 2025-02-07 DIAGNOSIS — Z78.9 NEEDS ASSISTANCE WITH COMMUNITY RESOURCES: Primary | ICD-10-CM

## 2025-02-07 NOTE — PROGRESS NOTES
Outpatient Care Management Note:    Re:  follow up call - diabetes     I called and spoke with patient. He reports feeling well at this time and denies any symptoms or complaints. He reports blood sugars are averaging around 200. Patient using Dexcom G7 (CGM) to check blood sugars. Patient has back up glucometer and supplies now for fingersticks. He confirms he is taking his Lantus 30 units twice a day and 10 units three times a day before meals. Reports adherence with insulin. Confirms taking Metformin.     Reviewed signs and symptoms of hypoglycemia and 15-15 rule to treat. Patient reports 1 blood sugar that was 70 and did eat something and went into the 80's. Patient denies any symptoms at that time. He reports CGM and fingerstick being off and sometimes by 60 points. I did review difference on how each one measures the blood sugar and how a fingerstick is a more accurate and real time blood sugar. I did suggest calibrating CGM. Reviewed can be found in book that comes with reader. I tried explaining over the phone but would prefer to do in person at visit next week. Clinical team may need to assist with this.     Last A1C was 16.4% on 12/26/24/     Patient confirms taking Lasix 40 mg daily. He feels his breathing is at baseline and taking Advair inhaler daily and has albuterol inhaler to use if needed. Patient denies any swelling at this time. He does not have a scale at home. Encouraged to obtain scale to weigh himself daily in the morning after using bathroom. Reviewed if he is gaining weight such as 3 lbs overnight or 5 lbs in a week to call PCP office to make aware to prevent worsening of symptoms and try an avoid ED visit. He voiced understanding.     Received inbasket from [facility] and  that they closed case. [facility] assisted with Roni. Unable to complete [employer] application. Patient is further interested in applying if it could further help with cost of his prescriptions. Will make them  aware.     Patient will be coming into PCP office next week on 2/12 patient rescheduled from 2/5. Instructed to bring all medications and reader for CGM.  Encouraged to obtain labwork. Stressed importance of routine follow up.     Patient reports he will take public bus or possible setup an Uber. He is aware he has [employer] if needed but will need to pay for trips.     Patient does not have any further questions, concerns, or other needs at this time. Patient has my contact number [phone] and PCP office number [phone] if needed. Patient is agreeable for further outreach.       Per Dexcom website:     Calibration is not required with the Dexcom G7 Continuous Glucose Monitoring system. However, you can log a BG meter value as an event, or use a BG meter value as a calibration within the G7 Ralene on your smart device or on the .    To log a BG meter value or use as a calibration in the G7 arlene, follow these steps: .  Tap + in the Glucose or History tabs.  Select Log Blood Glucose and select Use as Calibration.  Follow onscreen instructions.    To log a BG meter value in the , follow these steps:  Go to the , go to Menu > Event > Blood Glucose.  Select Log Blood Glucose and select Use as Calibration.  Follow onscreen instructions.    Calibrate in one display device, even if you use both the arlene and . The sensor sends calibration information between them.    For more information on accuracy and calibrating your Dexcom G7, refer to the Accuracy and Calibration section of the Dexcom G7 User Guide.

## 2025-02-07 NOTE — PROGRESS NOTES
SW has received an In Basket update from Marybeth [facility] that she has closed the case due to lack of pt f/u with his  PACE/PACE Net application.  [facility] has assisted pt with [employer] ID Card programs.    Patient does not have any further questions, concerns, or other needs at this time.  Patient has my contact # and PCP office # if needed.  Social Work to remain available to assist as indicated.    Please re-consult Social Work if needed.

## 2025-02-10 DIAGNOSIS — E11.8 TYPE 2 DIABETES MELLITUS WITH COMPLICATION (HCC): ICD-10-CM

## 2025-02-11 RX ORDER — INSULIN GLARGINE 100 [IU]/ML
30 INJECTION, SOLUTION SUBCUTANEOUS 2 TIMES DAILY
Qty: 18 ML | Refills: 1 | Status: SHIPPED | OUTPATIENT
Start: 2025-02-11 | End: 2025-04-12

## 2025-02-12 ENCOUNTER — TELEPHONE (OUTPATIENT)
Dept: INTERNAL MEDICINE CLINIC | Facility: CLINIC | Age: 65
End: 2025-02-12

## 2025-02-12 ENCOUNTER — PATIENT OUTREACH (OUTPATIENT)
Dept: INTERNAL MEDICINE CLINIC | Facility: CLINIC | Age: 65
End: 2025-02-12

## 2025-02-12 NOTE — PROGRESS NOTES
Care Management  Note:     Message left for patient to return call to [phone]     Re: Pace/Pacenet application    CMOC left message for daughter Rafaela Aly (we have communication consent on file)     CMOC left message for significant other Missy Maldonado (we have communication consent on file) to return call at [phone].     Pt has PCP appointment today at 4:30 PM.

## 2025-02-14 ENCOUNTER — PATIENT OUTREACH (OUTPATIENT)
Dept: INTERNAL MEDICINE CLINIC | Facility: CLINIC | Age: 65
End: 2025-02-14

## 2025-02-14 NOTE — PROGRESS NOTES
note:   02/14/2025    Re: PACE/PACENET    CMOC left message for patient to return call at [phone]. Pt was a no show on 02/12/2025 at our office.     CMOC will close the case at this time due to a lack of response from the patient.

## 2025-02-17 ENCOUNTER — PATIENT OUTREACH (OUTPATIENT)
Dept: INTERNAL MEDICINE CLINIC | Facility: CLINIC | Age: 65
End: 2025-02-17

## 2025-02-17 NOTE — PROGRESS NOTES
"Outpatient Care Management Note:    Re:  follow up call - diabetes    I called patient at [phone] and received message that the \"number you are dialing has calling restrictions.\" Unable to get call to go through and unable to leave message. No other number listed. Patient not signed up for [employer].     Will attempt at a later time.     Patient did not show for PCP appointment on 12/12. Patient needs to reschedule.     Calling to see how patient is feeling and managing with medications and how blood sugars have been and provide further education to patient.     Received [employer] message on 2/14/25 from TRINY Rueda that she closed case due to lack of response from patient. She was referred to help with PACE/PACENET.           "

## 2025-02-18 ENCOUNTER — PATIENT OUTREACH (OUTPATIENT)
Dept: INTERNAL MEDICINE CLINIC | Facility: CLINIC | Age: 65
End: 2025-02-18

## 2025-02-20 DIAGNOSIS — E11.8 TYPE 2 DIABETES MELLITUS WITH COMPLICATION (HCC): ICD-10-CM

## 2025-02-21 ENCOUNTER — PATIENT OUTREACH (OUTPATIENT)
Dept: INTERNAL MEDICINE CLINIC | Facility: CLINIC | Age: 65
End: 2025-02-21

## 2025-02-21 RX ORDER — GABAPENTIN 300 MG/1
300 CAPSULE ORAL 2 TIMES DAILY
Qty: 180 CAPSULE | Refills: 3 | Status: SHIPPED | OUTPATIENT
Start: 2025-02-21

## 2025-02-21 NOTE — PROGRESS NOTES
Outpatient Care Management Note:    Re:  follow up - diabetes     I called patient and no answer. Message left with reason for call and requested a call back and left my contact number.     Patient now scheduled with PCP on 3/4/25 @ 2:30 pm.     Has labs to completed (BMP, albumin/creatinine ratio)    Plan to review meds  Obtain blood sugars  Provide further education to patient  PACE/PACENET (CMOC previously tried outreaching and had to close case).

## 2025-03-04 ENCOUNTER — TELEPHONE (OUTPATIENT)
Dept: INTERNAL MEDICINE CLINIC | Facility: CLINIC | Age: 65
End: 2025-03-04

## 2025-03-07 ENCOUNTER — TELEPHONE (OUTPATIENT)
Dept: INTERNAL MEDICINE CLINIC | Facility: CLINIC | Age: 65
End: 2025-03-07

## 2025-03-07 NOTE — LETTER
[facility]  [address] [place] [address] [address]  [place] [address]  Phone#  [phone]  Fax#  [phone]    Simone Reyes  [address] [place] [address] [place] [address]      March 7, 2025      Dear:   Simone Reyes         Our office has attempted to contact you several times regarding your Appointment.  Could you please contact our office at [phone].    Thank you.     Sincerely,    [facility]

## 2025-03-13 ENCOUNTER — PATIENT OUTREACH (OUTPATIENT)
Dept: INTERNAL MEDICINE CLINIC | Facility: CLINIC | Age: 65
End: 2025-03-13

## 2025-03-13 DIAGNOSIS — E11.69 TYPE 2 DIABETES MELLITUS WITH OTHER SPECIFIED COMPLICATION, WITHOUT LONG-TERM CURRENT USE OF INSULIN (HCC): Primary | Chronic | ICD-10-CM

## 2025-03-13 NOTE — PROGRESS NOTES
Outpatient Care Management Note:    Re:  chronic care management     I called patient and no answer. Message left with reason for call and requested a call back. Contact number left.     Patient no showed to PCP appointment on 3/4 and needs further follow up appointment.   Calling to see how patient is managing with medications and how blood sugars are.

## 2025-03-18 ENCOUNTER — PATIENT OUTREACH (OUTPATIENT)
Dept: INTERNAL MEDICINE CLINIC | Facility: CLINIC | Age: 65
End: 2025-03-18

## 2025-03-18 DIAGNOSIS — S41.102A: ICD-10-CM

## 2025-03-18 DIAGNOSIS — E11.69 TYPE 2 DIABETES MELLITUS WITH OTHER SPECIFIED COMPLICATION, WITHOUT LONG-TERM CURRENT USE OF INSULIN (HCC): Primary | Chronic | ICD-10-CM

## 2025-03-18 NOTE — PROGRESS NOTES
Outpatient Care Management Note:    Re:  chronic care management     I called patient and no answer. Message left with reason for call and requested a call back. Contact number left.      Patient no showed to PCP appointment on 3/4 and needs further follow up appointment.   Calling to see how patient is managing with medications and how blood sugars are.     2nd attempt to reach and will mail unable to reach letter to patient and will close in 2 weeks if no response. Patient not signed up for [employer].

## 2025-03-18 NOTE — LETTER
Date: 03/18/25    Dear Simone Reyes,   My name is Malorie; I am a registered nurse care manager working with [employer] to [facility], your primary care office.  I have not been able to reach you and would like to set a time that I can talk with you over the phone.   My work is to help patients that have complex medical conditions get the care they need. This includes patients who may have been in the hospital or emergency room.    Please call me with any questions you may have. I look forward to hearing from you.  You are due to follow up with primary care doctor.   Sincerely,  Malorie MART   [phone]  Outpatient Care Manager

## 2025-03-19 RX ORDER — MUPIROCIN 20 MG/G
OINTMENT TOPICAL
Qty: 22 G | Refills: 3 | Status: SHIPPED | OUTPATIENT
Start: 2025-03-19

## 2025-03-21 DIAGNOSIS — Z79.4 TYPE 2 DIABETES MELLITUS WITHOUT COMPLICATION, WITH LONG-TERM CURRENT USE OF INSULIN (HCC): ICD-10-CM

## 2025-03-21 DIAGNOSIS — E11.9 TYPE 2 DIABETES MELLITUS WITHOUT COMPLICATION, WITH LONG-TERM CURRENT USE OF INSULIN (HCC): ICD-10-CM

## 2025-03-24 RX ORDER — ACYCLOVIR 400 MG/1
TABLET ORAL
Qty: 3 EACH | Refills: 5 | Status: SHIPPED | OUTPATIENT
Start: 2025-03-24

## 2025-03-31 ENCOUNTER — PATIENT OUTREACH (OUTPATIENT)
Dept: INTERNAL MEDICINE CLINIC | Facility: CLINIC | Age: 65
End: 2025-03-31

## 2025-03-31 DIAGNOSIS — J44.9 CHRONIC OBSTRUCTIVE PULMONARY DISEASE, UNSPECIFIED COPD TYPE (HCC): ICD-10-CM

## 2025-03-31 DIAGNOSIS — E11.69 TYPE 2 DIABETES MELLITUS WITH OTHER SPECIFIED COMPLICATION, WITHOUT LONG-TERM CURRENT USE OF INSULIN (HCC): Primary | ICD-10-CM

## 2025-04-01 ENCOUNTER — PATIENT OUTREACH (OUTPATIENT)
Dept: INTERNAL MEDICINE CLINIC | Facility: CLINIC | Age: 65
End: 2025-04-01

## 2025-04-01 DIAGNOSIS — E11.8 TYPE 2 DIABETES MELLITUS WITH COMPLICATION (HCC): ICD-10-CM

## 2025-04-01 DIAGNOSIS — E11.69 TYPE 2 DIABETES MELLITUS WITH OTHER SPECIFIED COMPLICATION, WITHOUT LONG-TERM CURRENT USE OF INSULIN (HCC): ICD-10-CM

## 2025-04-01 DIAGNOSIS — I48.92 ATRIAL FLUTTER, UNSPECIFIED TYPE (HCC): ICD-10-CM

## 2025-04-01 DIAGNOSIS — J44.9 CHRONIC OBSTRUCTIVE PULMONARY DISEASE, UNSPECIFIED COPD TYPE (HCC): ICD-10-CM

## 2025-04-01 DIAGNOSIS — E11.69 TYPE 2 DIABETES MELLITUS WITH OTHER SPECIFIED COMPLICATION, WITHOUT LONG-TERM CURRENT USE OF INSULIN (HCC): Primary | Chronic | ICD-10-CM

## 2025-04-01 RX ORDER — PEN NEEDLE, DIABETIC 32GX 5/32"
NEEDLE, DISPOSABLE MISCELLANEOUS
Qty: 100 EACH | Refills: 3 | Status: SHIPPED | OUTPATIENT
Start: 2025-04-01

## 2025-04-01 RX ORDER — INSULIN LISPRO 100 [IU]/ML
10 INJECTION, SOLUTION INTRAVENOUS; SUBCUTANEOUS
Qty: 9 ML | Refills: 3 | Status: SHIPPED | OUTPATIENT
Start: 2025-04-01

## 2025-04-01 NOTE — PROGRESS NOTES
Outpatient Care Management Note:    Re:  chronic care management     I called patient and no answer. Message left with my name, role and reason for call and requested a call back.     Calling to follow up for diabetes and needs follow up with PCP.

## 2025-04-01 NOTE — PROGRESS NOTES
Patient requesting refill on his Eliquis, Lispro and pen needles. Reports down to 1 Lispro pen. Confirms taking 10 units of Lispro 3 times a day with meals. Patient using [facility] pharmacy.

## 2025-04-01 NOTE — PROGRESS NOTES
"Received return call from patient. He reports he is feeling ok but does not that blood sugars have been on the higher side. He reports issues with CGM halley on phone but did recently get it fixed. He was unable to tell me any blood sugar readings at this time. He did not check blood sugar today. Reports when he last checked it was reading \"high\" and before that it was in 200's. Denies any symptoms of hyperglycemia/DKA. Instructed to call with blood sugars greater than 300 or less than 70. He confirms he has his insulin and taking as prescribed. Taking Lispro 10 units three times a day with meals and Lantus 30 units twice a day. Patient reports taking Metformin 1,000 mg twice a day. Patient does report he has not been following the best diet and reports overeats when he does eat. Reports more snacking. Encouraged to watch portion sizes especially of potatoes, rice, pasta, and bread. Reviewed healthy plate method.     Reviewed with patient need to follow up with PCP and agreeable for an appointment but needs to be in the afternoon. First available with resident is on Tuesday 4/8 @ 2 pm. Patient notes it is his birthday this day and will keep and come to appointment. I offered to change appointment but he declined. Instructed to bring blood sugar readings to appointment. Clerical team assisted with scheduling.     Patient requesting refill on his Eliquis, Lispro and pen needles. Refill request sent to clinical team to address.     We reviewed medications today. Patient reports taking Loratadine as needed.     Patient shares that he is planning on moving to [place] next month to stay with his sister who has Lupus. Reviewed importance of following up with PCP before he moves. Reviewed with patient to call or go online to insurance website to do a provider search for PCP to find a provider in network when he moves. He voiced understanding. Reviewed importance of establishing with PCP when he moves and ongoing follow up for his " diabetes.     Patient reports he has notified [facility] where he receives his Methadone that he is moving and they are helping him connect with care in [place] per patient.     Patient asking about colon cancer screening. I did report he is due for screening. Patient requesting cologuard. Per chart review Dr. Farias advised patient should not use cologuard for colon can screening due to his iron deficiency and that he should follow up with GI as previously recommended and they will likely recommend EGD & colonoscopy. Patient declining this at this time and to discuss further at upcoming PCP appointment.     Further diabetes assessment completed and careplan reviewed and updated.     Patient does not have any further questions, concerns, or other needs at this time. Patient has my contact number [phone] and PCP office number  if needed. Patient is agreeable for further outreach at this time. Encouraged importance of attending follow up appointment next week.

## 2025-04-11 ENCOUNTER — TELEPHONE (OUTPATIENT)
Dept: INTERNAL MEDICINE CLINIC | Facility: CLINIC | Age: 65
End: 2025-04-11

## 2025-04-15 ENCOUNTER — PATIENT OUTREACH (OUTPATIENT)
Dept: INTERNAL MEDICINE CLINIC | Facility: CLINIC | Age: 65
End: 2025-04-15

## 2025-04-15 DIAGNOSIS — E11.69 TYPE 2 DIABETES MELLITUS WITH OTHER SPECIFIED COMPLICATION, WITHOUT LONG-TERM CURRENT USE OF INSULIN (HCC): Primary | Chronic | ICD-10-CM

## 2025-04-15 DIAGNOSIS — I50.9 CONGESTIVE HEART FAILURE, UNSPECIFIED HF CHRONICITY, UNSPECIFIED HEART FAILURE TYPE (HCC): ICD-10-CM

## 2025-04-15 NOTE — PROGRESS NOTES
Outpatient Care Management Note:    Re:  chronic care management     Chart reviewed    Patient canceled PCP appointment on 4/8/25 and no showed on 4/10/25. No future appointment scheduled.     I called patient to further follow up. No answer and message left with my role and reason for call and requested a call back. Contact number left.

## 2025-04-23 ENCOUNTER — PATIENT OUTREACH (OUTPATIENT)
Dept: INTERNAL MEDICINE CLINIC | Facility: CLINIC | Age: 65
End: 2025-04-23

## 2025-04-23 DIAGNOSIS — E11.69 TYPE 2 DIABETES MELLITUS WITH OTHER SPECIFIED COMPLICATION, WITHOUT LONG-TERM CURRENT USE OF INSULIN (HCC): Primary | ICD-10-CM

## 2025-04-23 DIAGNOSIS — I50.9 CONGESTIVE HEART FAILURE, UNSPECIFIED HF CHRONICITY, UNSPECIFIED HEART FAILURE TYPE (HCC): ICD-10-CM

## 2025-04-23 NOTE — PROGRESS NOTES
Outpatient Care Management Note:    Re:  chronic care management     Chart reviewed     Patient canceled PCP appointment on 4/8/25 and no showed on 4/10/25. No future appointment scheduled. Patient did share in previous outreach he was planning on moving to [place] and wanted to follow up and get medication refills before he left.      Patient needs follow up for his diabetes.     I called patient to further follow up. No answer and message left with my role and reason for call and requested a call back. Contact number left.     2nd attempt to reach and will send unable to reach letter at this time.     Careplan reviewed.

## 2025-04-23 NOTE — LETTER
Date: 04/23/25    Dear Simone Reyes,   My name is Malorie; I am a registered nurse care manager working with [facility], your primary care office.  I have not been able to reach you and would like to set a time that I can talk with you over the phone.  My work is to help patients that have complex medical conditions get the care they need. This includes patients who may have been in the hospital or emergency room.    Please call me with any questions you may have. I look forward to hearing from you.  Sincerely,  Malorie MART   [phone]  Outpatient Care Manager

## 2025-04-24 DIAGNOSIS — E78.2 MIXED HYPERLIPIDEMIA: ICD-10-CM

## 2025-04-25 RX ORDER — ROSUVASTATIN CALCIUM 20 MG/1
20 TABLET, COATED ORAL DAILY
Qty: 30 TABLET | Refills: 3 | Status: SHIPPED | OUTPATIENT
Start: 2025-04-25

## 2025-05-07 ENCOUNTER — PATIENT OUTREACH (OUTPATIENT)
Dept: INTERNAL MEDICINE CLINIC | Facility: CLINIC | Age: 65
End: 2025-05-07

## 2025-05-07 DIAGNOSIS — I50.9 CONGESTIVE HEART FAILURE, UNSPECIFIED HF CHRONICITY, UNSPECIFIED HEART FAILURE TYPE (HCC): ICD-10-CM

## 2025-05-07 DIAGNOSIS — E11.69 TYPE 2 DIABETES MELLITUS WITH OTHER SPECIFIED COMPLICATION, WITHOUT LONG-TERM CURRENT USE OF INSULIN (HCC): Primary | Chronic | ICD-10-CM

## 2025-05-07 NOTE — PROGRESS NOTES
Outpatient Care Management Note:    Re:  chronic care management     Patient enrolled in chronic care management program. Unable to reach patient and no response from calls made or letter mailed to patient. Patient does not have an upcoming PCP appointment scheduled. When I last spoke with patient last he did say he was planning on moving to [place].  Will close at this time. Please re-consult as needed.

## 2025-05-08 DIAGNOSIS — E78.2 MIXED HYPERLIPIDEMIA: ICD-10-CM

## 2025-05-09 RX ORDER — ROSUVASTATIN CALCIUM 20 MG/1
20 TABLET, COATED ORAL DAILY
Qty: 30 TABLET | Refills: 0 | Status: SHIPPED | OUTPATIENT
Start: 2025-05-09

## 2025-05-12 DIAGNOSIS — E11.8 TYPE 2 DIABETES MELLITUS WITH COMPLICATION (HCC): ICD-10-CM

## 2025-05-12 RX ORDER — LANCETS 33 GAUGE
EACH MISCELLANEOUS
Qty: 100 EACH | Refills: 5 | Status: SHIPPED | OUTPATIENT
Start: 2025-05-12

## 2025-05-27 NOTE — PROGRESS NOTES
Faxed mask order and OV note to [facility] via [employer]   Outpatient Care Management Note:    Re:  follow up call     I called and spoke with patient. He shares he has been feeling ok and denies any symptoms or complaints at this time. Patient is using Dexcom G7 (CGM) to check blood sugars. He denies any difficulty with changing sensors or using device and reports he likes using CGM compared to fingersticks. He confirms he is taking Lantus 50 units at bedtime and is taking Metformin 1,000 mg twice a day. Patient reports he eats when he is hungry and does watch portion sizes. He denies drinking sugary drinks and making healthier food choices.     Patient reports he has noticed lately his blood sugars before bedtime have been higher than usual upper 200's to 300 and not sure why as he has not changed the way he is taking his medications or his diet.     I reviewed with patient missed PCP appointments. He shares he got his dates mixed up. Patient would like to be seen at PCP office within next 1-2 weeks and prefers an afternoon appointment. Patient scheduled with Dr. Freedman for Tuesday 11/5 @ 2 pm. Patient instructed to bring Dexcom G7 reader and medications with him.     Reviewed with patient the need to complete labwork before appointment next week and reviewed purpose of labwork and what it is checking. Reviewed to be fasting. (Lipid panel, BMP. Hep B labwork, C-peptide, anti-islet cell antibody, glutamic acid decarboxylase.)     Please see previous patient outreach note from 10/10/24 with other info.     Patient does not have any further questions, concerns, or other needs at this time. Patient has my contact number [phone] and PCP office number [phone] if needed. Patient is agreeable for further outreach.

## 2025-06-18 ENCOUNTER — VBI (OUTPATIENT)
Dept: ADMINISTRATIVE | Facility: OTHER | Age: 65
End: 2025-06-18

## 2025-07-11 NOTE — TELEPHONE ENCOUNTER
07/11/25 12:14 PM    Patient was flagged by a Third Party Payer report as being due for a refill on the following medications, ROSUVASTATIN TAB 20MG. Patient was contacted to review these medications. There was no answer and a message was left.     Thank you.  Shanique Wood MA  [facility]